# Patient Record
Sex: FEMALE | Race: WHITE | NOT HISPANIC OR LATINO | Employment: OTHER | URBAN - NONMETROPOLITAN AREA
[De-identification: names, ages, dates, MRNs, and addresses within clinical notes are randomized per-mention and may not be internally consistent; named-entity substitution may affect disease eponyms.]

---

## 2017-01-13 ENCOUNTER — OFFICE VISIT (OUTPATIENT)
Dept: CARDIOLOGY | Facility: CLINIC | Age: 72
End: 2017-01-13

## 2017-01-13 VITALS
HEART RATE: 59 BPM | BODY MASS INDEX: 45.99 KG/M2 | HEIGHT: 67 IN | DIASTOLIC BLOOD PRESSURE: 58 MMHG | WEIGHT: 293 LBS | SYSTOLIC BLOOD PRESSURE: 132 MMHG

## 2017-01-13 DIAGNOSIS — I10 ESSENTIAL HYPERTENSION: ICD-10-CM

## 2017-01-13 DIAGNOSIS — I48.0 PAROXYSMAL ATRIAL FIBRILLATION (HCC): Primary | ICD-10-CM

## 2017-01-13 PROCEDURE — 93000 ELECTROCARDIOGRAM COMPLETE: CPT | Performed by: INTERNAL MEDICINE

## 2017-01-13 PROCEDURE — 99213 OFFICE O/P EST LOW 20 MIN: CPT | Performed by: INTERNAL MEDICINE

## 2017-01-13 RX ORDER — CLONIDINE HYDROCHLORIDE 0.2 MG/1
0.2 TABLET ORAL 2 TIMES DAILY
COMMUNITY
End: 2017-03-21

## 2017-01-13 NOTE — PROGRESS NOTES
Holly Corona  1945  [phone]      01/13/2017    [facility] CARDIOLOGY     Abdoul Andrew MD  [address] [place] [address]    Chief Complaint   Patient presents with   • Atrial Fibrillation       Problem List:   PROBLEM LIST:  1. Paroxysmal atrial fibrillation/atrial flutter:  a. Echocardiogram, 02/08/2006 with mild to moderate LVH, moderate MR, pulmonary HTN with RVSP at 55 mmHg; EF 65%.   b. GXT, 04/03/2006 with no ischemia, EF 65%.  c. Event recorder, April 2007 through May 2007 showing atrial flutter.  d. Echocardiogram, 05/07/2007 with pulmonary HTN, LA 4.5, EF 65%.  e. Cardiolite, 05/07/2007, normal study, EF 78%.  f. Left heart cath, 05/09/2007 with normal coronary arteries, EF 70%.  g. EKG, 10/21/2007 showing atrial fibrillation at 146 BPM.  h. Failed sotalol therapy.  i. Tikosyn initiated, 08/08/2012.  j. Echocardiogram, 09/17/2012 with an EF of 65%; mild LVH, moderate diastolic dysfunction, moderate to severe biatrial enlargement, mild MR, moderate TR with an RVSP of 65 mmHg. LA 4.7. Aortic valve sclerosis.  k. Pulmonary vein isolation procedure with ablation of right atrial flutter and nonsustained low posterior atrial tachycardia, 03/15/2013 complicated by dysphagia that lasted 24-48 hours.  2. Hypertension.   3. Right leg deep venous thrombosis, treated with Coumadin therapy until October 2007.  4. Hiatal hernia/gastroesophageal reflux disease.  5. Carotid bruits: Normal carotid duplex, March 2006 and June 2007.  6. Osteoarthritis.  7. Hypothyroidism.  8. Sleep apnea, on CPAP.  9. Dyslipidemia, no current therapy.  10. Colon cancer, status post resection of 17 inches of the right ascending colon, undergoing chemotherapy.  11. Pulmonary embolism, February 2012, Coumadin initiated.  12. Pulmonary hypertension:  a. Echocardiogram, 06/21/2011, showing PA systolic pressure estimated at 60-65 mmHg.  b. Moderate TR with an RVSP of 65 mmHg by echocardiogram,  09/17/2012.  13. Surgical history:  a. Hysterectomy.  b. Carpal tunnel repair.  c. Left knee replacement.  d. Foot surgery.    Allergies  Allergies   Allergen Reactions   • Ciprofloxacin    • Iodinated Diagnostic Agents    • Ofloxacin        Current Medications    Current Outpatient Prescriptions:   •  albuterol (PROVENTIL) (2.5 MG/3ML) 0.083% nebulizer solution, Take 2.5 mg by nebulization every 4 (four) hours as needed for wheezing., Disp: , Rfl:   •  aspirin 81 MG EC tablet, Take 1 tablet by mouth daily., Disp: , Rfl:   •  CloNIDine (CATAPRES) 0.2 MG tablet, Take 0.2 mg by mouth 2 (Two) Times a Day., Disp: , Rfl:   •  esomeprazole (NexIUM) 40 MG capsule, Take 1 capsule by mouth daily., Disp: , Rfl:   •  hydrALAZINE (APRESOLINE) 25 MG tablet, Take 1 tablet by mouth 3 (three) times a day., Disp: 90 tablet, Rfl: 5  •  hydrochlorothiazide (HYDRODIURIL) 12.5 MG tablet, Take 12.5 mg by mouth daily., Disp: , Rfl:   •  levothyroxine (SYNTHROID, LEVOTHROID) 88 MCG tablet, Take 1 tablet by mouth daily., Disp: , Rfl:   •  losartan (COZAAR) 100 MG tablet, Take 1 tablet by mouth daily., Disp: , Rfl:   •  sotalol (BETAPACE) 120 MG tablet, Take 0.5 mg by mouth 2 (Two) Times a Day. Take one half of a tablet twice a day, Disp: , Rfl:   •  warfarin (COUMADIN) 6 MG tablet, Take  by mouth. 5mg x 6 days & 7.5 mg on wednesday, Disp: , Rfl:     History of Present Illness   HPI    Pt presents for follow up of paroxysmal atrial fibrillation. Since we last saw the pt, she had two episodes of atrial fibrillation that lasted about 2-3 hours with symptoms of palpitations and SOB with fatigue.  She is taking her coumadin with therapeutic levels. Her BP has been running high and her other doctor prescribed hydralazine. Since starting this medication, she still has up and down BPs. She cannot be very active due to mobility issues and has gained weight.     ROS:  General:  Positive fatigue, positive weight gain no weight loss  Cardiovascular:   "Denies CP, PND, syncope, near syncope, edema or palpitations.  Pulmonary:  Denies MAGUIRE, cough, or wheezing      Vitals:    01/13/17 1433   BP: 132/58   BP Location: Left arm   Patient Position: Sitting   Pulse: 59   Weight: (!) 330 lb (150 kg)   Height: 67\" (170.2 cm)     PE:  NAD  Neck: no JVD, no carotid bruits, no TM  Heart RRR, NL S1, S2, S4 present, no rubs, TR murmur  Lungs: CTA  Abd: soft, non-tender, NL BS  Ext: No musculoskeletal deformities    Diagnostic Data:    ECG 12 Lead  Date/Time: 1/13/2017 3:02 PM  Performed by: MELANIE MARIO  Authorized by: MELANIE MARIO   Rhythm: sinus rhythm  BPM: 59  Comments: QTc normal            1. Paroxysmal atrial fibrillation    2. Essential hypertension          Plan:  1) AF- overall well controlled for the most part on Sotalol: will increase sotalol to 80 mg po BID  Continue present medications.   2) Anticoagulation  Continue warfarin  3) HTN- continue to monitor, per Dr. Romero  Wt loss, exercise, salt reduction  4) Pulm HTN: per Dr Nick BAKER/up in 6 months  Scribed for Melanie Mario MD by Hiwot Lemus PA-C. 1/13/2017  3:04 PM    IHiwot PA, personally performed the services described in this documentation as scribed by the above named individual in my presence, and it is both accurate and complete.  1/13/2017  3:07 PM  "

## 2017-01-13 NOTE — LETTER
January 13, 2017     Abdoul Andrew MD  [address] [place], [place] [id number]    Patient: Holly Corona   YOB: 1945   Date of Visit: 1/13/2017       Dear Dr. Lorrie MD:    Thank you for referring Holly Corona to me for evaluation. Below are the relevant portions of my assessment and plan of care.    If you have questions, please do not hesitate to call me. I look forward to following Holly along with you.         Sincerely,        Bala Mario MD        CC: MD Hiwot Castillo PA  1/13/2017  3:11 PM  Sign at close encounter  Holly Corona  1945  [phone]      01/13/2017    [facility] CARDIOLOGY     Abdoul Andrew MD  [address] [place], [place] [id number]    Chief Complaint   Patient presents with   • Atrial Fibrillation       Problem List:   PROBLEM LIST:  1. Paroxysmal atrial fibrillation/atrial flutter:  a. Echocardiogram, 02/08/2006 with mild to moderate LVH, moderate MR, pulmonary HTN with RVSP at 55 mmHg; EF 65%.   b. GXT, 04/03/2006 with no ischemia, EF 65%.  c. Event recorder, April 2007 through May 2007 showing atrial flutter.  d. Echocardiogram, 05/07/2007 with pulmonary HTN, LA 4.5, EF 65%.  e. Cardiolite, 05/07/2007, normal study, EF 78%.  f. Left heart cath, 05/09/2007 with normal coronary arteries, EF 70%.  g. EKG, 10/21/2007 showing atrial fibrillation at 146 BPM.  h. Failed sotalol therapy.  i. Tikosyn initiated, 08/08/2012.  j. Echocardiogram, 09/17/2012 with an EF of 65%; mild LVH, moderate diastolic dysfunction, moderate to severe biatrial enlargement, mild MR, moderate TR with an RVSP of 65 mmHg. LA 4.7. Aortic valve sclerosis.  k. Pulmonary vein isolation procedure with ablation of right atrial flutter and nonsustained low posterior atrial tachycardia, 03/15/2013 complicated by dysphagia that lasted 24-48 hours.  2. Hypertension.   3. Right leg deep venous thrombosis, treated with Coumadin therapy until  October 2007.  4. Hiatal hernia/gastroesophageal reflux disease.  5. Carotid bruits: Normal carotid duplex, March 2006 and June 2007.  6. Osteoarthritis.  7. Hypothyroidism.  8. Sleep apnea, on CPAP.  9. Dyslipidemia, no current therapy.  10. Colon cancer, status post resection of 17 inches of the right ascending colon, undergoing chemotherapy.  11. Pulmonary embolism, February 2012, Coumadin initiated.  12. Pulmonary hypertension:  a. Echocardiogram, 06/21/2011, showing PA systolic pressure estimated at 60-65 mmHg.  b. Moderate TR with an RVSP of 65 mmHg by echocardiogram, 09/17/2012.  13. Surgical history:  a. Hysterectomy.  b. Carpal tunnel repair.  c. Left knee replacement.  d. Foot surgery.    Allergies  Allergies   Allergen Reactions   • Ciprofloxacin    • Iodinated Diagnostic Agents    • Ofloxacin        Current Medications    Current Outpatient Prescriptions:   •  albuterol (PROVENTIL) (2.5 MG/3ML) 0.083% nebulizer solution, Take 2.5 mg by nebulization every 4 (four) hours as needed for wheezing., Disp: , Rfl:   •  aspirin 81 MG EC tablet, Take 1 tablet by mouth daily., Disp: , Rfl:   •  CloNIDine (CATAPRES) 0.2 MG tablet, Take 0.2 mg by mouth 2 (Two) Times a Day., Disp: , Rfl:   •  esomeprazole (NexIUM) 40 MG capsule, Take 1 capsule by mouth daily., Disp: , Rfl:   •  hydrALAZINE (APRESOLINE) 25 MG tablet, Take 1 tablet by mouth 3 (three) times a day., Disp: 90 tablet, Rfl: 5  •  hydrochlorothiazide (HYDRODIURIL) 12.5 MG tablet, Take 12.5 mg by mouth daily., Disp: , Rfl:   •  levothyroxine (SYNTHROID, LEVOTHROID) 88 MCG tablet, Take 1 tablet by mouth daily., Disp: , Rfl:   •  losartan (COZAAR) 100 MG tablet, Take 1 tablet by mouth daily., Disp: , Rfl:   •  sotalol (BETAPACE) 120 MG tablet, Take 0.5 mg by mouth 2 (Two) Times a Day. Take one half of a tablet twice a day, Disp: , Rfl:   •  warfarin (COUMADIN) 6 MG tablet, Take  by mouth. 5mg x 6 days & 7.5 mg on wednesday, Disp: , Rfl:     History of Present  "Illness   HPI    Pt presents for follow up of paroxysmal atrial fibrillation. Since we last saw the pt, she had two episodes of atrial fibrillation that lasted about 2-3 hours with symptoms of palpitations and SOB with fatigue.  She is taking her coumadin with therapeutic levels. Her BP has been running high and her other doctor prescribed hydralazine. Since starting this medication, she still has up and down BPs. She cannot be very active due to mobility issues and has gained weight.     ROS:  General:  Positive fatigue, positive weight gain no weight loss  Cardiovascular:  Denies CP, PND, syncope, near syncope, edema or palpitations.  Pulmonary:  Denies MAGUIRE, cough, or wheezing      Vitals:    01/13/17 1433   BP: 132/58   BP Location: Left arm   Patient Position: Sitting   Pulse: 59   Weight: (!) 330 lb (150 kg)   Height: 67\" (170.2 cm)     PE:  NAD  Neck: no JVD, no carotid bruits, no TM  Heart RRR, NL S1, S2, S4 present, no rubs, TR murmur  Lungs: CTA  Abd: soft, non-tender, NL BS  Ext: No musculoskeletal deformities    Diagnostic Data:    ECG 12 Lead  Date/Time: 1/13/2017 3:02 PM  Performed by: BALA MARIO  Authorized by: BALA MARIO   Rhythm: sinus rhythm  BPM: 59  Comments: QTc normal            1. Paroxysmal atrial fibrillation    2. Essential hypertension          Plan:  1) AF- overall well controlled for the most part on Sotalol: will increase sotalol to 80 mg po BID  Continue present medications.   2) Anticoagulation  Continue warfarin  3) HTN- continue to monitor, per Dr. Romero  Wt loss, exercise, salt reduction  4) Pulm HTN: per Dr Nick BAKER/up in 6 months  Scribed for Bala Mario MD by Hiwot Lemus PA-C. 1/13/2017  3:04 PM    I, MARIELLA Strong, personally performed the services described in this documentation as scribed by the above named individual in my presence, and it is both accurate and complete.  1/13/2017  3:07 PM  "

## 2017-02-21 ENCOUNTER — OFFICE VISIT (OUTPATIENT)
Dept: CARDIOLOGY | Facility: CLINIC | Age: 72
End: 2017-02-21

## 2017-02-21 VITALS
DIASTOLIC BLOOD PRESSURE: 50 MMHG | HEIGHT: 67 IN | WEIGHT: 293 LBS | HEART RATE: 76 BPM | SYSTOLIC BLOOD PRESSURE: 150 MMHG | OXYGEN SATURATION: 96 % | BODY MASS INDEX: 45.99 KG/M2

## 2017-02-21 DIAGNOSIS — R00.2 PALPITATIONS: ICD-10-CM

## 2017-02-21 DIAGNOSIS — R60.9 EDEMA, UNSPECIFIED TYPE: ICD-10-CM

## 2017-02-21 DIAGNOSIS — I48.0 PAROXYSMAL ATRIAL FIBRILLATION (HCC): Primary | ICD-10-CM

## 2017-02-21 DIAGNOSIS — I73.9 PERIPHERAL VASCULAR DISEASE (HCC): ICD-10-CM

## 2017-02-21 DIAGNOSIS — I10 ESSENTIAL HYPERTENSION: ICD-10-CM

## 2017-02-21 DIAGNOSIS — I27.20 PULMONARY HYPERTENSION (HCC): ICD-10-CM

## 2017-02-21 DIAGNOSIS — R42 DIZZINESS: ICD-10-CM

## 2017-02-21 DIAGNOSIS — R09.89 BRUIT OF RIGHT CAROTID ARTERY: ICD-10-CM

## 2017-02-21 DIAGNOSIS — E78.5 DYSLIPIDEMIA: ICD-10-CM

## 2017-02-21 PROCEDURE — 99214 OFFICE O/P EST MOD 30 MIN: CPT | Performed by: INTERNAL MEDICINE

## 2017-02-21 RX ORDER — SOTALOL HYDROCHLORIDE 160 MG/1
160 TABLET ORAL 2 TIMES DAILY
COMMUNITY
End: 2017-03-21 | Stop reason: SDUPTHER

## 2017-02-21 RX ORDER — POTASSIUM CHLORIDE 20 MEQ/1
20 TABLET, EXTENDED RELEASE ORAL DAILY
Qty: 30 TABLET | Refills: 11 | Status: SHIPPED | OUTPATIENT
Start: 2017-02-21 | End: 2017-02-21 | Stop reason: SDUPTHER

## 2017-02-21 RX ORDER — FUROSEMIDE 40 MG/1
40 TABLET ORAL DAILY
Qty: 30 TABLET | Refills: 11 | Status: SHIPPED | OUTPATIENT
Start: 2017-02-21 | End: 2017-02-21 | Stop reason: SDUPTHER

## 2017-02-21 RX ORDER — POTASSIUM CHLORIDE 20 MEQ/1
20 TABLET, EXTENDED RELEASE ORAL DAILY
Qty: 30 TABLET | Refills: 11 | Status: SHIPPED | OUTPATIENT
Start: 2017-02-21 | End: 2017-03-24 | Stop reason: SDUPTHER

## 2017-02-21 RX ORDER — FUROSEMIDE 40 MG/1
40 TABLET ORAL DAILY
Qty: 30 TABLET | Refills: 11 | Status: SHIPPED | OUTPATIENT
Start: 2017-02-21 | End: 2017-03-24 | Stop reason: SDUPTHER

## 2017-02-21 NOTE — PROGRESS NOTES
Subjective   Holly Corona is a 71 y.o. female     Chief Complaint   Patient presents with   • Follow-up     presents as a follow up   • Hypertension       PROBLEM LIST:     1. Atrial fibrillation  1.1 Pulmonary vein isolation procedure with ablation of right atrial flutter by Dr. Mario, March 2013.  2. Severe pulmonary hypertension with pulmonary pressure 60-65% by most recent cath.  3. Hypertension  3.1 Echo 9/9/15 - mild LVH; EF > 65%; mild to mod MR; mod TR; PA 55-60; mild ME  4. Dyslipidemia  5. History of DVT/PE  6. History of colon CA x 2 status post surgery 2014.  7. Carotid Artery Stenosis  7.1 Carotid U/S 3/8/16 - 16-49% MARYBEL and LICA; antegrade flow  8. HANK - CPAP       Specialty Problems        Cardiology Problems    HTN (hypertension)        Paroxysmal atrial fibrillation        Atrial fibrillation        Carotid artery stenosis        Carotid bruit        Deep venous thrombosis        Diastolic dysfunction        Hypertension        Palpitations        Peripheral vascular disease        Pulmonary embolus        Pulmonary hypertension        Syncope                HPI: Ms. Corona presents to follow up on the above problems.  She continues to have marked shortness of breath and tachycardia with even low levels of physical activity.  She now becomes fatigued and short of breath walking from room to room.  She has no orthopnea, PND, presyncope, or syncope.  She does have mild dizziness, and her lower extremity edema has significantly worsened since the time of her last visit.    The patient denies any bleeding with Coumadin and she relates, specifically, no hemoptysis, hematemesis, melena, hematochezia, or hematuria.  She has had no symptoms of recurrent pulmonary embolism or other symptoms of peripheral arterial disease or arterial embolic events.    Ms. Corona states that she is following with pulmonary medicine is scheduled for pulmonary function studies tomorrow, and CT of the chest shortly thereafter.   Echocardiogram performed in our office in 2015 demonstrated preserved left ventricular systolic function, probably mild to moderate (Issa grade 2) diastolic dysfunction, and PA pressures in the mid to high 40s.  Ms. Corona is had no evidence of significant coronary disease and negative stress tests in the past.    CURRENT MEDICATION:    Current Outpatient Prescriptions   Medication Sig Dispense Refill   • albuterol (PROVENTIL) (2.5 MG/3ML) 0.083% nebulizer solution Take 2.5 mg by nebulization every 4 (four) hours as needed for wheezing.     • aspirin 81 MG EC tablet Take 1 tablet by mouth daily.     • CloNIDine (CATAPRES) 0.2 MG tablet Take 0.2 mg by mouth 2 (Two) Times a Day.     • esomeprazole (NexIUM) 40 MG capsule Take 1 capsule by mouth daily.     • hydrALAZINE (APRESOLINE) 25 MG tablet Take 1 tablet by mouth 3 (three) times a day. 90 tablet 5   • hydrochlorothiazide (HYDRODIURIL) 12.5 MG tablet Take 12.5 mg by mouth daily.     • levothyroxine (SYNTHROID, LEVOTHROID) 88 MCG tablet Take 1 tablet by mouth daily.     • losartan (COZAAR) 100 MG tablet Take 1 tablet by mouth daily.     • Sotalol HCl AF 80 MG tablet Take 1 tablet by mouth 2 (Two) Times a Day. 180 tablet 2   • Sotalol HCl, AF, 160 MG tablet Take 160 mg by mouth 2 (Two) Times a Day.     • warfarin (COUMADIN) 6 MG tablet Take  by mouth. 5mg x 6 days & 7.5 mg on wednesday     • furosemide (LASIX) 40 MG tablet Take 1 tablet by mouth Daily. 30 tablet 11   • potassium chloride (K-DUR,KLOR-CON) 20 MEQ CR tablet Take 1 tablet by mouth Daily. 30 tablet 11     No current facility-administered medications for this visit.        ALLERGIES:    Ciprofloxacin; Iodinated diagnostic agents; and Ofloxacin    PAST MEDICAL HISTORY:    Past Medical History   Diagnosis Date   • Anemia    • Atrial fibrillation      A.  History of prior pulmonary vein ablation 03/14/2013. B.  On chronic Coumadin and Tikosyn therapy.   • Carotid artery stenosis    • Carotid bruit    • Deep  venous thrombosis      A.  History of right lower extremity DVT treated with in therapy until October 2000.   • Diastolic dysfunction    • Diastolic dysfunction    • Dizziness    • Dyslipidemia    • Edema    • Exertional shortness of breath    • GERD (gastroesophageal reflux disease)    • H/O echocardiogram      A.  Echocardiogram of 10/16/2013 reports an ejection fraction of 55-60%, a moderately to severely dilated left atrium, mild to moderately dilated right atrium, trace aortic regurgitation, mild mitral and tricuspid regurgitation with calculated    • Hiatal hernia    • Hypertension      A.  Echocardiogram 10/16/2013 reports a calculated RVSP of 50-55 mmHg.B.  Right heart catheterization 03/12/2009 at  reported RV of 72/19, PA 72/27 and PCWP of 24, this was felt to be     • Hypothyroidism    • Morbid obesity    • Obstructive sleep apnea      A.  On CPAP each bedtime.   • Osteoarthritis    • Palpitations    • Peripheral vascular disease    • Pulmonary embolus      A.  Developed during chemotherapy in 2/2012. B.  Coumadin therapy reinitiated at that time.   • Pulmonary hypertension    • Signet ring cell adenocarcinoma      A.  Diagnosed on colonoscopy E. 10/14/2011, status post colon resection and 2 of 20 nodes positive, T3, N1b Stage IIIB. B.  Status post chemotherapy.    • Syncope        SURGICAL HISTORY:    Past Surgical History   Procedure Laterality Date   • Other surgical history       cardiac cath procedure summary, A.  Cardiac catheterization April 2007 reported no significance coronary artery disease with markedly elevated LVEDP.   • Other surgical history       catheter ablation atrial fibrillation, 2013   • Foot surgery     • Hernia repair       2011   • Hysterectomy       1993   • Other surgical history Left      knee replacement 2005   • Other surgical history       neuroplasty decompression median nerve at carpal tunnel 1997   • Other surgical history       partial colectomy , A.  Status post  "right hemicolectomy secondary to colon cancer in 2011.       SOCIAL HISTORY:    Social History     Social History   • Marital status:      Spouse name: N/A   • Number of children: N/A   • Years of education: N/A     Occupational History   • Not on file.     Social History Main Topics   • Smoking status: Former Smoker   • Smokeless tobacco: Never Used   • Alcohol use No   • Drug use: No   • Sexual activity: Defer     Other Topics Concern   • Not on file     Social History Narrative       FAMILY HISTORY:    Family History   Problem Relation Age of Onset   • Other Mother      MEDICAL PROBLEMS   • Other Sister      myocardial infarction.       Review of Systems   Constitutional: Positive for fatigue.   HENT: Negative.    Eyes: Negative.    Respiratory: Positive for shortness of breath. Negative for chest tightness.    Cardiovascular: Positive for chest pain, palpitations (heart rate going up) and leg swelling.   Gastrointestinal: Negative.  Negative for abdominal pain, blood in stool (no melena, no hematuria, no heamtemesis, no hematochezia ), constipation, diarrhea, nausea and vomiting.   Endocrine: Negative.    Genitourinary: Negative.    Musculoskeletal: Positive for arthralgias and myalgias.   Skin: Negative.    Allergic/Immunologic: Negative.    Neurological: Negative.  Negative for dizziness, tremors, seizures, syncope, facial asymmetry, speech difficulty, weakness, light-headedness, numbness and headaches.   Hematological: Bruises/bleeds easily.   Psychiatric/Behavioral: The patient is nervous/anxious.        VISIT VITALS:    Visit Vitals   • /50 (BP Location: Left arm, Patient Position: Sitting)   • Pulse 76   • Ht 67\" (170.2 cm)   • Wt (!) 336 lb 3.2 oz (152 kg)   • SpO2 96%   • BMI 52.66 kg/m2       RECENT LABS:    Objective       Physical Exam   Constitutional: She is oriented to person, place, and time. She appears well-developed and well-nourished. No distress.   HENT:   Head: Normocephalic and " atraumatic.   Eyes: Conjunctivae and EOM are normal. Pupils are equal, round, and reactive to light.   Neck: Normal range of motion. Neck supple. Normal carotid pulses, no hepatojugular reflux and no JVD present. Carotid bruit is present (soft right carotid bruit).   Cardiovascular: Intact distal pulses and normal pulses.   No extrasystoles are present. PMI is not displaced.  Exam reveals no gallop, no friction rub and no decreased pulses.    Murmur (2-3/6 tricuspid regurgitation murmur ) heard.  Pulmonary/Chest: Effort normal and breath sounds normal. No respiratory distress. She has no wheezes. She has no rales. She exhibits no tenderness.   Abdominal: Soft. Bowel sounds are normal. She exhibits no distension, no abdominal bruit and no mass. There is no tenderness.   Negative organomegaly      Musculoskeletal: Normal range of motion. She exhibits edema (3-4 + edema LLE to the calf. 3+ edema RLE to the knee).   Neurological: She is alert and oriented to person, place, and time. She has normal reflexes.   Skin: Skin is warm and dry. She is not diaphoretic.   Nursing note and vitals reviewed.      Procedures      Assessment/Plan   #1.  Ms. Corona has progressively severe exertional dyspnea and tachycardia which occurs only with activity.  This likely is multifactorial with, clearly, weight gain and physical deconditioning playing a role.  However, the patient's pulmonary hypertension and general to debility may also be culpable to some degree in her exertional dyspnea.    #2.  The patient describes no symptoms to suggest ischemia as a cause, and stress testing and catheter previously been unremarkable.  Therefore, I don't think that repeat ischemia assessment is indicated at this time.    #3.  However, I would like to repeat echocardiography to reassess LV systolic and diastolic performance, to look for evidence of pericardial constriction or restrictive physiology, and reassess pulmonary pressures.  Based on the  patient's previous study, I don't believe the degree of diastolic dysfunction we observed can be considered the sole cause of the patient's moderate and progressively severe pulmonary hypertension.    #4.  I'm concerned about Ms. Corona is lower extremity edema which is significantly worsened.  I think she's to the point where tissue injury may be problematic, particularly on the left.  Therefore, I discussed with the patient's mechanical measures to reduce edema, continue sodium restriction, and we will trial a several week course of Lasix 40 mg a day (in addition to hydrochlorothiazide) to see if we can improve edema to the point where tissue perfusion won't be of concern     #5.  Ms. Corona will follow-up with Dr. Andrew per his office, with Dr. Hernandez as R rescheduled, and in our office in 4-6 weeks or on a when necessary basis for symptoms.               Diagnosis Plan   1. Paroxysmal atrial fibrillation  Adult Transthoracic Echo Complete    Magnesium    Comprehensive Metabolic Panel    Adult Transthoracic Echo Complete    Magnesium    Comprehensive Metabolic Panel   2. Essential hypertension  Adult Transthoracic Echo Complete    Magnesium    Comprehensive Metabolic Panel    Adult Transthoracic Echo Complete    Magnesium    Comprehensive Metabolic Panel   3. Pulmonary hypertension  Adult Transthoracic Echo Complete    Magnesium    Comprehensive Metabolic Panel    Adult Transthoracic Echo Complete    Magnesium    Comprehensive Metabolic Panel   4. Bruit of right carotid artery  Magnesium    Comprehensive Metabolic Panel    Magnesium    Comprehensive Metabolic Panel   5. Edema, unspecified type  Adult Transthoracic Echo Complete    Magnesium    Comprehensive Metabolic Panel    Adult Transthoracic Echo Complete    Magnesium    Comprehensive Metabolic Panel   6. Dyslipidemia  Magnesium    Comprehensive Metabolic Panel    Magnesium    Comprehensive Metabolic Panel   7. Peripheral vascular disease  Magnesium     Comprehensive Metabolic Panel    Magnesium    Comprehensive Metabolic Panel   8. Dizziness  Adult Transthoracic Echo Complete    Magnesium    Comprehensive Metabolic Panel    Adult Transthoracic Echo Complete    Magnesium    Comprehensive Metabolic Panel   9. Palpitations         Return in about 4 weeks (around 3/21/2017), or if symptoms worsen or fail to improve, for Next scheduled follow up.         Sacha Romero MD

## 2017-03-09 ENCOUNTER — OUTSIDE FACILITY SERVICE (OUTPATIENT)
Dept: CARDIOLOGY | Facility: CLINIC | Age: 72
End: 2017-03-09

## 2017-03-09 ENCOUNTER — HOSPITAL ENCOUNTER (OUTPATIENT)
Dept: CARDIOLOGY | Facility: HOSPITAL | Age: 72
Discharge: HOME | End: 2017-03-09
Attending: INTERNAL MEDICINE | Admitting: INTERNAL MEDICINE

## 2017-03-09 PROCEDURE — 93306 TTE W/DOPPLER COMPLETE: CPT | Performed by: INTERNAL MEDICINE

## 2017-03-09 PROCEDURE — 93306 TTE W/DOPPLER COMPLETE: CPT

## 2017-03-21 ENCOUNTER — OFFICE VISIT (OUTPATIENT)
Dept: CARDIOLOGY | Facility: CLINIC | Age: 72
End: 2017-03-21

## 2017-03-21 VITALS
HEART RATE: 66 BPM | OXYGEN SATURATION: 96 % | HEIGHT: 67 IN | DIASTOLIC BLOOD PRESSURE: 80 MMHG | SYSTOLIC BLOOD PRESSURE: 193 MMHG | BODY MASS INDEX: 45.99 KG/M2 | WEIGHT: 293 LBS

## 2017-03-21 DIAGNOSIS — R60.9 EDEMA, UNSPECIFIED TYPE: ICD-10-CM

## 2017-03-21 DIAGNOSIS — R06.02 EXERTIONAL SHORTNESS OF BREATH: ICD-10-CM

## 2017-03-21 DIAGNOSIS — I10 ESSENTIAL HYPERTENSION: ICD-10-CM

## 2017-03-21 DIAGNOSIS — E78.5 DYSLIPIDEMIA: ICD-10-CM

## 2017-03-21 DIAGNOSIS — I48.0 PAROXYSMAL ATRIAL FIBRILLATION (HCC): Primary | ICD-10-CM

## 2017-03-21 DIAGNOSIS — R42 DIZZINESS: ICD-10-CM

## 2017-03-21 DIAGNOSIS — I51.89 DIASTOLIC DYSFUNCTION: ICD-10-CM

## 2017-03-21 DIAGNOSIS — I27.20 PULMONARY HYPERTENSION (HCC): ICD-10-CM

## 2017-03-21 PROCEDURE — 99213 OFFICE O/P EST LOW 20 MIN: CPT | Performed by: INTERNAL MEDICINE

## 2017-03-21 RX ORDER — CLONIDINE HYDROCHLORIDE 0.2 MG/1
0.2 TABLET ORAL 3 TIMES DAILY
Qty: 90 TABLET | Refills: 3 | Status: SHIPPED | OUTPATIENT
Start: 2017-03-21 | End: 2022-08-18 | Stop reason: SDUPTHER

## 2017-03-21 NOTE — PROGRESS NOTES
Subjective   Holly Corona is a 71 y.o. female     Chief Complaint   Patient presents with   • Follow-up     4 week echo f/u       PROBLEM LIST:     1. Atrial fibrillation  1.1 Pulmonary vein isolation procedure with ablation of right atrial flutter by Dr. Mario, March 2013.  2. Severe pulmonary hypertension with pulmonary pressure 60-65% by most recent cath.  3. Hypertension  3.1 Echo 9/9/15 - mild LVH; EF > 65%; mild to mod MR; mod TR; PA 55-60; mild OR  4. Dyslipidemia  5. History of DVT/PE  6. History of colon CA x 2 status post surgery 2014.  7. Carotid Artery Stenosis  7.1 Carotid U/S 3/8/16 - 16-49% MARYBEL and LICA; antegrade flow  8. HANK - CPAP          Specialty Problems        Cardiology Problems    HTN (hypertension)        Paroxysmal atrial fibrillation        Atrial fibrillation        Carotid artery stenosis        Carotid bruit        Deep venous thrombosis        Diastolic dysfunction        Hypertension        Palpitations        Peripheral vascular disease        Pulmonary embolus        Pulmonary hypertension        Syncope                HPI:  Ms. Weber returns for follow-up on severe dyspnea.    She tolerated the addition of Lasix to her chronic hydrochlorothiazide, lost 4 pounds, but this had minimal change in her lower extremity edema and no change in her exertional dyspnea.  Ms. Corona is trying to increase her physical activity and is actually gone to the [employer] gym and does try to walk on a fairly regular basis.  She continues to deny orthopnea or PND.    Pulmonary function studies from Dr. Leung's office demonstrated no significant obstructive or restrictive disease knowing mildly decreased diffusion capacity of carbon monoxide.    Echocardiogram demonstrated preserved LV systolic function, grade 2 diastolic, and PA systolic pressures of 55-60.  All of these findings are unchanged from the patient's prior exam.  I most recent labs potassium was 4.5, creatinine was 1.3 and  BUN/creatinine ratio was slightly greater than 20 suggesting a mild degree of intravascular volume depletion.            CURRENT MEDICATION:    Current Outpatient Prescriptions   Medication Sig Dispense Refill   • albuterol (PROVENTIL) (2.5 MG/3ML) 0.083% nebulizer solution Take 2.5 mg by nebulization every 4 (four) hours as needed for wheezing.     • aspirin 81 MG EC tablet Take 1 tablet by mouth daily.     • CloNIDine (CATAPRES) 0.2 MG tablet Take 1 tablet by mouth 3 (Three) Times a Day. 90 tablet 3   • esomeprazole (NexIUM) 40 MG capsule Take 1 capsule by mouth daily.     • furosemide (LASIX) 40 MG tablet Take 1 tablet by mouth Daily. 30 tablet 11   • hydrALAZINE (APRESOLINE) 25 MG tablet Take 1 tablet by mouth 3 (three) times a day. 90 tablet 5   • hydrochlorothiazide (HYDRODIURIL) 12.5 MG tablet Take 12.5 mg by mouth daily.     • levothyroxine (SYNTHROID, LEVOTHROID) 88 MCG tablet Take 1 tablet by mouth daily.     • losartan (COZAAR) 100 MG tablet Take 1 tablet by mouth daily.     • potassium chloride (K-DUR,KLOR-CON) 20 MEQ CR tablet Take 1 tablet by mouth Daily. 30 tablet 11   • Sotalol HCl AF 80 MG tablet Take 1 tablet by mouth 2 (Two) Times a Day. 180 tablet 2   • warfarin (COUMADIN) 6 MG tablet Take  by mouth. 5mg x 6 days & 7.5 mg on wednesday       No current facility-administered medications for this visit.        ALLERGIES:    Ciprofloxacin; Iodinated diagnostic agents; and Ofloxacin    PAST MEDICAL HISTORY:    Past Medical History   Diagnosis Date   • Anemia    • Atrial fibrillation      A.  History of prior pulmonary vein ablation 03/14/2013. B.  On chronic Coumadin and Tikosyn therapy.   • Carotid artery stenosis    • Carotid bruit    • Deep venous thrombosis      A.  History of right lower extremity DVT treated with in therapy until October 2000.   • Diastolic dysfunction    • Diastolic dysfunction    • Dizziness    • Dyslipidemia    • Edema    • Exertional shortness of breath    • GERD  (gastroesophageal reflux disease)    • H/O echocardiogram      A.  Echocardiogram of 10/16/2013 reports an ejection fraction of 55-60%, a moderately to severely dilated left atrium, mild to moderately dilated right atrium, trace aortic regurgitation, mild mitral and tricuspid regurgitation with calculated    • Hiatal hernia    • Hypertension      A.  Echocardiogram 10/16/2013 reports a calculated RVSP of 50-55 mmHg.B.  Right heart catheterization 03/12/2009 at  reported RV of 72/19, PA 72/27 and PCWP of 24, this was felt to be     • Hypothyroidism    • Morbid obesity    • Obstructive sleep apnea      A.  On CPAP each bedtime.   • Osteoarthritis    • Palpitations    • Peripheral vascular disease    • Pulmonary embolus      A.  Developed during chemotherapy in 2/2012. B.  Coumadin therapy reinitiated at that time.   • Pulmonary hypertension    • Signet ring cell adenocarcinoma      A.  Diagnosed on colonoscopy E. 10/14/2011, status post colon resection and 2 of 20 nodes positive, T3, N1b Stage IIIB. B.  Status post chemotherapy.    • Syncope        SURGICAL HISTORY:    Past Surgical History   Procedure Laterality Date   • Other surgical history       cardiac cath procedure summary, A.  Cardiac catheterization April 2007 reported no significance coronary artery disease with markedly elevated LVEDP.   • Other surgical history       catheter ablation atrial fibrillation, 2013   • Foot surgery     • Hernia repair       2011   • Hysterectomy       1993   • Other surgical history Left      knee replacement 2005   • Other surgical history       neuroplasty decompression median nerve at carpal tunnel 1997   • Other surgical history       partial colectomy , A.  Status post right hemicolectomy secondary to colon cancer in 2011.       SOCIAL HISTORY:    Social History     Social History   • Marital status:      Spouse name: N/A   • Number of children: N/A   • Years of education: N/A     Occupational History   • Not on  "file.     Social History Main Topics   • Smoking status: Former Smoker   • Smokeless tobacco: Never Used   • Alcohol use No   • Drug use: No   • Sexual activity: Defer     Other Topics Concern   • Not on file     Social History Narrative       FAMILY HISTORY:    Family History   Problem Relation Age of Onset   • Other Mother      MEDICAL PROBLEMS   • Other Sister      myocardial infarction.       Review of Systems   Constitutional: Positive for fatigue. Negative for chills, diaphoresis and fever.   HENT: Negative.    Eyes: Positive for visual disturbance (reading glasses).   Respiratory: Positive for shortness of breath (recent normal PFT, no orthopnea or PND). Negative for chest tightness.    Cardiovascular: Positive for palpitations and leg swelling. Negative for chest pain.   Gastrointestinal: Negative.  Negative for abdominal pain, blood in stool, constipation, diarrhea, nausea and vomiting.   Endocrine: Negative.  Negative for cold intolerance and heat intolerance.   Genitourinary: Negative.    Musculoskeletal: Positive for arthralgias and myalgias.   Skin: Negative.    Allergic/Immunologic: Negative.    Neurological: Positive for dizziness (occasionally ) and light-headedness (\"not much\").   Hematological: Negative.    Psychiatric/Behavioral: Negative.        VISIT VITALS:    Visit Vitals   • BP (!) 193/80 (BP Location: Left arm, Patient Position: Sitting)   • Pulse 66   • Ht 67\" (170.2 cm)   • Wt (!) 332 lb (151 kg)   • SpO2 96%   • BMI 52 kg/m2       RECENT LABS:    Objective       Physical Exam    Procedures      Assessment/Plan    #1.  Profound exertional dyspnea.  I think this is likely multifactorial in etiology with a physical component due to weight gain, physical deconditioning, diastolic dysfunction, and stable pulmonary hypertension.  I feel that it is the patient's best interest to continue efforts at advancing her physical capacity, and to continue, at least for OR present, dual diuretic therapy.  " We will need to follow electrolytes and renal function closely.    #2.  As we have no other etiology for the patient's progressive symptoms we will perform pharmacologic stress testing is ischemia assessment.    #3.  Ms. Corona will follow-up with Dr. Andrew and Dr. Hernandez as instructed, and in our office after testing her on a when necessary basis for symptoms.  We will check a basic metabolic panel and magnesium in 3-4 weeks.    #4.  To address the patient's persistent hypertension we will increase clonidine to 0.2 mg 3 times daily and hydralazine to 25 mg 3 times daily.  Ms. Corona will follow blood pressures closely at home.                 Diagnosis Plan   1. Paroxysmal atrial fibrillation  Stress Test With Myocardial Perfusion One Day    Comprehensive Metabolic Panel    Magnesium    Stress Test With Myocardial Perfusion One Day    Comprehensive Metabolic Panel    Magnesium   2. Essential hypertension  Stress Test With Myocardial Perfusion One Day    Comprehensive Metabolic Panel    Magnesium    Stress Test With Myocardial Perfusion One Day    Comprehensive Metabolic Panel    Magnesium   3. Edema, unspecified type  Stress Test With Myocardial Perfusion One Day    Comprehensive Metabolic Panel    Magnesium    Stress Test With Myocardial Perfusion One Day    Comprehensive Metabolic Panel    Magnesium   4. Exertional shortness of breath  Stress Test With Myocardial Perfusion One Day    Comprehensive Metabolic Panel    Magnesium    Stress Test With Myocardial Perfusion One Day    Comprehensive Metabolic Panel    Magnesium   5. Dizziness  Stress Test With Myocardial Perfusion One Day    Comprehensive Metabolic Panel    Magnesium    Stress Test With Myocardial Perfusion One Day    Comprehensive Metabolic Panel    Magnesium   6. Dyslipidemia  Stress Test With Myocardial Perfusion One Day    Comprehensive Metabolic Panel    Magnesium    Stress Test With Myocardial Perfusion One Day    Comprehensive Metabolic Panel     Magnesium   7. Pulmonary hypertension  Stress Test With Myocardial Perfusion One Day    Comprehensive Metabolic Panel    Magnesium    Stress Test With Myocardial Perfusion One Day    Comprehensive Metabolic Panel    Magnesium   8. Diastolic dysfunction         Return for f/u after stress .         Sacha Romero MD

## 2017-03-24 DIAGNOSIS — I10 ESSENTIAL HYPERTENSION: ICD-10-CM

## 2017-03-24 RX ORDER — POTASSIUM CHLORIDE 20 MEQ/1
20 TABLET, EXTENDED RELEASE ORAL DAILY
Qty: 90 TABLET | Refills: 3 | Status: SHIPPED | OUTPATIENT
Start: 2017-03-24 | End: 2018-04-25 | Stop reason: SDUPTHER

## 2017-03-24 RX ORDER — FUROSEMIDE 40 MG/1
40 TABLET ORAL DAILY
Qty: 90 TABLET | Refills: 3 | Status: SHIPPED | OUTPATIENT
Start: 2017-03-24 | End: 2018-04-30 | Stop reason: SDUPTHER

## 2017-03-24 RX ORDER — HYDRALAZINE HYDROCHLORIDE 25 MG/1
25 TABLET, FILM COATED ORAL 3 TIMES DAILY
Qty: 90 TABLET | Refills: 5 | Status: SHIPPED | OUTPATIENT
Start: 2017-03-24 | End: 2017-06-20

## 2017-04-11 ENCOUNTER — HOSPITAL ENCOUNTER (OUTPATIENT)
Dept: CARDIOLOGY | Facility: HOSPITAL | Age: 72
Discharge: HOME | End: 2017-04-11
Attending: INTERNAL MEDICINE

## 2017-04-11 ENCOUNTER — OUTSIDE FACILITY SERVICE (OUTPATIENT)
Dept: CARDIOLOGY | Facility: CLINIC | Age: 72
End: 2017-04-11

## 2017-04-11 DIAGNOSIS — R53.83 OTHER FATIGUE: Primary | ICD-10-CM

## 2017-04-11 DIAGNOSIS — I48.0 PAROXYSMAL ATRIAL FIBRILLATION (HCC): ICD-10-CM

## 2017-04-11 DIAGNOSIS — E78.5 HYPERLIPIDEMIA, UNSPECIFIED HYPERLIPIDEMIA TYPE: Primary | ICD-10-CM

## 2017-04-11 LAB
MAXIMAL PREDICTED HEART RATE: 147 BPM
STRESS TARGET HR: 125 BPM

## 2017-04-11 PROCEDURE — 78452 HT MUSCLE IMAGE SPECT MULT: CPT | Performed by: INTERNAL MEDICINE

## 2017-04-11 PROCEDURE — 0 TECHNETIUM SESTAMIBI: Performed by: INTERNAL MEDICINE

## 2017-04-11 PROCEDURE — 93018 CV STRESS TEST I&R ONLY: CPT | Performed by: INTERNAL MEDICINE

## 2017-04-11 PROCEDURE — 93017 CV STRESS TEST TRACING ONLY: CPT

## 2017-04-11 PROCEDURE — A9500 TC99M SESTAMIBI: HCPCS | Performed by: INTERNAL MEDICINE

## 2017-04-11 PROCEDURE — 25010000002 REGADENOSON 0.4 MG/5ML SOLUTION: Performed by: INTERNAL MEDICINE

## 2017-04-11 PROCEDURE — 78452 HT MUSCLE IMAGE SPECT MULT: CPT

## 2017-04-11 RX ADMIN — Medication 1 DOSE: at 13:30

## 2017-04-11 RX ADMIN — REGADENOSON 0.4 MG: 0.08 INJECTION, SOLUTION INTRAVENOUS at 13:30

## 2017-04-17 ENCOUNTER — OFFICE VISIT (OUTPATIENT)
Dept: CARDIOLOGY | Facility: CLINIC | Age: 72
End: 2017-04-17

## 2017-04-17 VITALS
OXYGEN SATURATION: 96 % | DIASTOLIC BLOOD PRESSURE: 80 MMHG | HEIGHT: 67 IN | HEART RATE: 70 BPM | WEIGHT: 293 LBS | BODY MASS INDEX: 45.99 KG/M2 | SYSTOLIC BLOOD PRESSURE: 158 MMHG

## 2017-04-17 DIAGNOSIS — I48.0 PAROXYSMAL ATRIAL FIBRILLATION (HCC): Primary | ICD-10-CM

## 2017-04-17 DIAGNOSIS — I51.89 DIASTOLIC DYSFUNCTION: ICD-10-CM

## 2017-04-17 DIAGNOSIS — R60.9 EDEMA, UNSPECIFIED TYPE: ICD-10-CM

## 2017-04-17 DIAGNOSIS — E78.5 DYSLIPIDEMIA: ICD-10-CM

## 2017-04-17 DIAGNOSIS — R06.02 EXERTIONAL SHORTNESS OF BREATH: ICD-10-CM

## 2017-04-17 DIAGNOSIS — R94.39 ABNORMAL STRESS TEST: ICD-10-CM

## 2017-04-17 DIAGNOSIS — R00.2 PALPITATIONS: ICD-10-CM

## 2017-04-17 DIAGNOSIS — I10 ESSENTIAL HYPERTENSION: ICD-10-CM

## 2017-04-17 DIAGNOSIS — R42 DIZZINESS: ICD-10-CM

## 2017-04-17 DIAGNOSIS — I27.20 PULMONARY HYPERTENSION (HCC): ICD-10-CM

## 2017-04-17 PROCEDURE — 99213 OFFICE O/P EST LOW 20 MIN: CPT | Performed by: INTERNAL MEDICINE

## 2017-04-17 NOTE — PROGRESS NOTES
Subjective   Holly Corona is a 71 y.o. female     Chief Complaint   Patient presents with   • Follow-up     presents as a follow up       PROBLEM LIST:      1. Atrial fibrillation  1.1 Pulmonary vein isolation procedure with ablation of right atrial flutter by Dr. Mario, March 2013.  2. Severe pulmonary hypertension with pulmonary pressure 60-65% by most recent cath.  3. Hypertension  3.1 Echo 9/9/15 - mild LVH; EF > 65%; mild to mod MR; mod TR; PA 55-60; mild IL  3.2 recent ECHO 03/09/17, left ventricular chamber is mildly dilated. Mild concentric LVH. EF > 65%. Issa Grade ll diastolic dysfunction. Left atrium moderately dilated, right atrium mild to moderate dilated. Systolic pressure 55-60 mmHg.   3.3 recent stress 04/11/17 inferior ischemia, chest wall attenuation.   4. Dyslipidemia  5. History of DVT/PE  6. History of colon CA x 2 status post surgery 2014.  7. Carotid Artery Stenosis  7.1 Carotid U/S 3/8/16 - 16-49% MARYBEL and LICA; antegrade flow  8. HANK - CPAP      Specialty Problems        Cardiology Problems    HTN (hypertension)        Paroxysmal atrial fibrillation        Atrial fibrillation        Carotid artery stenosis        Carotid bruit        Deep venous thrombosis        Diastolic dysfunction        Hypertension        Palpitations        Peripheral vascular disease        Pulmonary embolus        Pulmonary hypertension        Syncope                HPI:  Ms. Corona returns for follow-up on test results.  She continues to have profound exertional dyspnea.  All the symptoms have also remained unchanged.    Echocardiogram demonstrated preserved LV systolic function with grade 2 diastolic dysfunction, and PA systolic pressures of 55-60.    Stress test demonstrated chest wall attenuation, inferior wall ischemia, and preserved LV systolic function.    Notably, right heart catheterization from 2009 at  demonstrated PA systolic pressures in the mid 70s.  Also, previous stress test demonstrated  inferoposterolateral ischemia.  This prompted catheterization that demonstrated no epicardial obstructive disease.                CURRENT MEDICATION:    Current Outpatient Prescriptions   Medication Sig Dispense Refill   • albuterol (PROVENTIL) (2.5 MG/3ML) 0.083% nebulizer solution Take 2.5 mg by nebulization every 4 (four) hours as needed for wheezing.     • aspirin 81 MG EC tablet Take 1 tablet by mouth daily.     • CloNIDine (CATAPRES) 0.2 MG tablet Take 1 tablet by mouth 3 (Three) Times a Day. 90 tablet 3   • esomeprazole (NexIUM) 40 MG capsule Take 1 capsule by mouth daily.     • furosemide (LASIX) 40 MG tablet Take 1 tablet by mouth Daily. 90 tablet 3   • hydrALAZINE (APRESOLINE) 25 MG tablet Take 1 tablet by mouth 3 (Three) Times a Day. 90 tablet 5   • hydrochlorothiazide (HYDRODIURIL) 12.5 MG tablet Take 12.5 mg by mouth daily.     • levothyroxine (SYNTHROID, LEVOTHROID) 88 MCG tablet Take 1 tablet by mouth daily.     • losartan (COZAAR) 100 MG tablet Take 1 tablet by mouth daily.     • potassium chloride (K-DUR,KLOR-CON) 20 MEQ CR tablet Take 1 tablet by mouth Daily. 90 tablet 3   • Sotalol HCl AF 80 MG tablet Take 1 tablet by mouth 2 (Two) Times a Day. 180 tablet 2   • warfarin (COUMADIN) 6 MG tablet Take  by mouth. 5mg x 6 days & 7.5 mg on wednesday       No current facility-administered medications for this visit.        ALLERGIES:    Ciprofloxacin; Iodinated diagnostic agents; and Ofloxacin    PAST MEDICAL HISTORY:    Past Medical History:   Diagnosis Date   • Anemia    • Atrial fibrillation     A.  History of prior pulmonary vein ablation 03/14/2013. B.  On chronic Coumadin and Tikosyn therapy.   • Carotid artery stenosis    • Carotid bruit    • Deep venous thrombosis     A.  History of right lower extremity DVT treated with in therapy until October 2000.   • Diastolic dysfunction    • Diastolic dysfunction    • Dizziness    • Dyslipidemia    • Edema    • Exertional shortness of breath    • GERD  (gastroesophageal reflux disease)    • H/O echocardiogram     A.  Echocardiogram of 10/16/2013 reports an ejection fraction of 55-60%, a moderately to severely dilated left atrium, mild to moderately dilated right atrium, trace aortic regurgitation, mild mitral and tricuspid regurgitation with calculated    • Hiatal hernia    • Hypertension     A.  Echocardiogram 10/16/2013 reports a calculated RVSP of 50-55 mmHg.B.  Right heart catheterization 03/12/2009 at  reported RV of 72/19, PA 72/27 and PCWP of 24, this was felt to be     • Hypothyroidism    • Morbid obesity    • Obstructive sleep apnea     A.  On CPAP each bedtime.   • Osteoarthritis    • Palpitations    • Peripheral vascular disease    • Pulmonary embolus     A.  Developed during chemotherapy in 2/2012. B.  Coumadin therapy reinitiated at that time.   • Pulmonary hypertension    • Signet ring cell adenocarcinoma     A.  Diagnosed on colonoscopy E. 10/14/2011, status post colon resection and 2 of 20 nodes positive, T3, N1b Stage IIIB. B.  Status post chemotherapy.    • Syncope        SURGICAL HISTORY:    Past Surgical History:   Procedure Laterality Date   • FOOT SURGERY     • HERNIA REPAIR      2011   • HYSTERECTOMY      1993   • OTHER SURGICAL HISTORY      cardiac cath procedure summary, A.  Cardiac catheterization April 2007 reported no significance coronary artery disease with markedly elevated LVEDP.   • OTHER SURGICAL HISTORY      catheter ablation atrial fibrillation, 2013   • OTHER SURGICAL HISTORY Left     knee replacement 2005   • OTHER SURGICAL HISTORY      neuroplasty decompression median nerve at carpal tunnel 1997   • OTHER SURGICAL HISTORY      partial colectomy , A.  Status post right hemicolectomy secondary to colon cancer in 2011.       SOCIAL HISTORY:    Social History     Social History   • Marital status:      Spouse name: N/A   • Number of children: N/A   • Years of education: N/A     Occupational History   • Not on file.  "    Social History Main Topics   • Smoking status: Former Smoker   • Smokeless tobacco: Never Used   • Alcohol use No   • Drug use: No   • Sexual activity: Defer     Other Topics Concern   • Not on file     Social History Narrative       FAMILY HISTORY:    Family History   Problem Relation Age of Onset   • Other Mother      MEDICAL PROBLEMS   • Other Sister      myocardial infarction.       Review of Systems   Constitutional: Positive for fatigue (anergy ). Negative for chills, diaphoresis and fever.   HENT: Negative.    Eyes: Negative.  Negative for visual disturbance.   Respiratory: Positive for shortness of breath (progressively worsening ). Negative for chest tightness.    Cardiovascular: Positive for leg swelling. Negative for chest pain and palpitations.   Gastrointestinal: Negative.  Negative for abdominal pain, blood in stool (no melena, no hematuria, no hematmesis, no hematochezia ), constipation, diarrhea, nausea and vomiting.   Endocrine: Negative.  Negative for cold intolerance and heat intolerance.   Genitourinary: Negative.    Musculoskeletal: Positive for arthralgias, joint swelling and myalgias.   Skin: Negative.    Allergic/Immunologic: Negative.    Neurological: Positive for weakness and light-headedness. Negative for dizziness, tremors, seizures, facial asymmetry, speech difficulty, numbness and headaches.   Hematological: Negative.    Psychiatric/Behavioral: Negative.        VISIT VITALS:    /80 (BP Location: Left arm, Patient Position: Sitting)  Pulse 70  Ht 67\" (170.2 cm)  Wt (!) 338 lb 3.2 oz (153 kg)  SpO2 96%  BMI 52.97 kg/m2    RECENT LABS:    Objective       Physical Exam   Constitutional: She is oriented to person, place, and time. She appears well-developed and well-nourished. She appears distressed.   HENT:   Head: Normocephalic and atraumatic.   Eyes: Conjunctivae and EOM are normal.   Neck: Normal range of motion. Neck supple. Normal carotid pulses, no hepatojugular reflux " and no JVD present. Carotid bruit is not present. No thyroid mass and no thyromegaly present.   Cardiovascular: Normal rate, regular rhythm, normal heart sounds and intact distal pulses.  Exam reveals no gallop and no friction rub.    No murmur heard.  Pulses:       Radial pulses are 2+ on the right side, and 2+ on the left side.   Pulmonary/Chest: Effort normal and breath sounds normal. No respiratory distress. She has no decreased breath sounds. She has no wheezes. She has no rhonchi. She has no rales. She exhibits no tenderness.   Abdominal: Soft. Bowel sounds are normal. She exhibits no distension and no abdominal bruit. There is no tenderness.   Negative organomegaly      Musculoskeletal: She exhibits edema.   Neurological: She is alert and oriented to person, place, and time. She has normal reflexes.   Skin: Skin is warm and dry. No rash noted. She is not diaphoretic. No erythema. No pallor.   Psychiatric: She has a normal mood and affect. Her speech is normal and behavior is normal. Judgment and thought content normal. Cognition and memory are normal.   Nursing note and vitals reviewed.      Procedures      Assessment/Plan    #1.  Long discussion with Ms. Corona reference the above findings.  Clearly she has had no progression of pulmonary hypertension and may have had a significant degree of improvement.  Therefore, I don't think we can blame progressive symptoms and worsening pulmonary hypertension.    #2.  Likewise, the patient had prior inferior wall ischemic defect without epicardial coronary disease.  I think that that makes the current finding less likely to represent disease progression.  However, in the setting of progressive symptoms without worsening of lung disease, I think it is reasonable to perform repeat cardiac catheterization for definitive assessment.    #3.  Part of the rationale for that decision rests on the fact that Ms. Corona is extremely concerned about coronary circulation and may be  limiting her activity to some degree because of concerns of precipitating heart damage.    #4.  However, the patient is also severely physically limited by spinal DJD.  She can't perform any physical activity without severe back and leg pain.    #5.  We will hold Coumadin for 4 days prior to catheterization and Ms. Corona will follow-up with Dr. Hoffman's office as instructed.  Other recommendations will be based on findings at heart catheter.              No diagnosis found.    No Follow-up on file.         Ying Emmanuel MA

## 2017-04-24 ENCOUNTER — OUTSIDE FACILITY SERVICE (OUTPATIENT)
Dept: CARDIOLOGY | Facility: CLINIC | Age: 72
End: 2017-04-24

## 2017-04-25 ENCOUNTER — OFFICE VISIT (OUTPATIENT)
Dept: CARDIOLOGY | Facility: CLINIC | Age: 72
End: 2017-04-25

## 2017-04-25 VITALS
SYSTOLIC BLOOD PRESSURE: 162 MMHG | BODY MASS INDEX: 45.99 KG/M2 | WEIGHT: 293 LBS | DIASTOLIC BLOOD PRESSURE: 80 MMHG | HEART RATE: 72 BPM | OXYGEN SATURATION: 95 % | HEIGHT: 67 IN

## 2017-04-25 DIAGNOSIS — I51.89 DIASTOLIC DYSFUNCTION: ICD-10-CM

## 2017-04-25 DIAGNOSIS — E78.5 DYSLIPIDEMIA: ICD-10-CM

## 2017-04-25 DIAGNOSIS — R60.9 EDEMA, UNSPECIFIED TYPE: Primary | ICD-10-CM

## 2017-04-25 DIAGNOSIS — R94.39 ABNORMAL STRESS TEST: ICD-10-CM

## 2017-04-25 DIAGNOSIS — I48.0 PAROXYSMAL ATRIAL FIBRILLATION (HCC): Primary | ICD-10-CM

## 2017-04-25 DIAGNOSIS — R60.9 EDEMA, UNSPECIFIED TYPE: ICD-10-CM

## 2017-04-25 DIAGNOSIS — I10 ESSENTIAL HYPERTENSION: ICD-10-CM

## 2017-04-25 DIAGNOSIS — R06.02 EXERTIONAL SHORTNESS OF BREATH: ICD-10-CM

## 2017-04-25 PROCEDURE — 99213 OFFICE O/P EST LOW 20 MIN: CPT | Performed by: INTERNAL MEDICINE

## 2017-04-25 RX ORDER — METOLAZONE 5 MG/1
5 TABLET ORAL DAILY
Qty: 30 TABLET | Refills: 0 | Status: SHIPPED | OUTPATIENT
Start: 2017-04-25 | End: 2017-05-02

## 2017-04-25 RX ORDER — METOLAZONE 5 MG/1
5 TABLET ORAL DAILY
Qty: 30 TABLET | Refills: 0 | Status: SHIPPED | OUTPATIENT
Start: 2017-04-25 | End: 2017-04-25 | Stop reason: SDUPTHER

## 2017-04-25 NOTE — PATIENT INSTRUCTIONS
**Start metolazone 5 mg take with morning lasix 40 mg for the next 5 days and hold hydrochlorothiazide while taking metolazone, take extra potassium.  Once metolazone is stopped start hydrochlorothiazide back. MAKE SURE YOU TAKE LASIX 40 MG DAILY and go back to once a day potassium after 5 days.

## 2017-04-25 NOTE — PROGRESS NOTES
Subjective   Holly Corona is a 71 y.o. female     Chief Complaint   Patient presents with   • Follow-up     presents as a follow up from cath       PROBLEM LIST:     1. Paroxysmal Atrial fibrillation  1.1 Pulmonary vein isolation procedure with ablation of right atrial flutter by Dr. Mario, March 2013.  2. Severe pulmonary hypertension with pulmonary pressure 60-65% by most recent cath.  3. Hypertension  3.1 Echo 9/9/15 - mild LVH; EF > 65%; mild to mod MR; mod TR; PA 55-60; mild OR  3.2 recent ECHO 03/09/17, left ventricular chamber is mildly dilated. Mild concentric LVH. EF > 65%. Issa Grade ll diastolic dysfunction. Left atrium moderately dilated, right atrium mild to moderate dilated. Systolic pressure 55-60 mmHg.   3.3 recent stress 04/11/17 inferior ischemia, chest wall attenuation.   4. Dyslipidemia  5. History of DVT/PE  6. History of colon CA x 2 status post surgery 2014.  7. Carotid Artery Stenosis  7.1 Carotid U/S 3/8/16 - 16-49% MARYBEL and LICA; antegrade flow  8. HANK - CPAP         Specialty Problems        Cardiology Problems    HTN (hypertension)        Paroxysmal atrial fibrillation        Atrial fibrillation        Carotid artery stenosis        Carotid bruit        Deep venous thrombosis        Diastolic dysfunction        Hypertension        Palpitations        Peripheral vascular disease        Pulmonary embolus        Pulmonary hypertension        Syncope        Abnormal stress test                HPI:  Ms. Corona returns for follow-up today after an aborted attempt at cardiac catheterization.    Ms. Corona was prepped in the usual fashion, as we were to begin the procedure, she developed incessant coughing.  We tried placing her on a wet, and she eventually had to sit up or coughing persisted.  Examined that time demonstrated HJR (albeit this was a difficult exam because of the patient's size and a (scant wheezes, but no rales.    We treated Ms. Corona empirically with bronchodilators which  improved her symptoms somewhat, and IV Lasix which resulted in complete resolution of symptoms.  She was to return today to reschedule catheterization.    Today, Ms. Corona relates that she was on vacation for 4 days, and had increased salt intake, and did not take her Lasix.  I believe that she was volume overloaded prior to the procedure yesterday.  This was exacerbated by the sodium bicarbonate drip we were using for renal protection.              CURRENT MEDICATION:    Current Outpatient Prescriptions   Medication Sig Dispense Refill   • albuterol (PROVENTIL) (2.5 MG/3ML) 0.083% nebulizer solution Take 2.5 mg by nebulization every 4 (four) hours as needed for wheezing.     • aspirin 81 MG EC tablet Take 1 tablet by mouth daily.     • CloNIDine (CATAPRES) 0.2 MG tablet Take 1 tablet by mouth 3 (Three) Times a Day. 90 tablet 3   • esomeprazole (NexIUM) 40 MG capsule Take 1 capsule by mouth daily.     • furosemide (LASIX) 40 MG tablet Take 1 tablet by mouth Daily. 90 tablet 3   • hydrALAZINE (APRESOLINE) 25 MG tablet Take 1 tablet by mouth 3 (Three) Times a Day. 90 tablet 5   • hydrochlorothiazide (HYDRODIURIL) 12.5 MG tablet Take 12.5 mg by mouth daily.     • levothyroxine (SYNTHROID, LEVOTHROID) 88 MCG tablet Take 1 tablet by mouth daily.     • losartan (COZAAR) 100 MG tablet Take 1 tablet by mouth daily.     • potassium chloride (K-DUR,KLOR-CON) 20 MEQ CR tablet Take 1 tablet by mouth Daily. 90 tablet 3   • Sotalol HCl AF 80 MG tablet Take 1 tablet by mouth 2 (Two) Times a Day. 180 tablet 2   • warfarin (COUMADIN) 6 MG tablet Take  by mouth. 5mg x 6 days & 7.5 mg on wednesday     • metOLazone (ZAROXOLYN) 5 MG tablet Take 1 tablet by mouth Daily. 30 tablet 0     No current facility-administered medications for this visit.        ALLERGIES:    Ciprofloxacin; Iodinated diagnostic agents; and Ofloxacin    PAST MEDICAL HISTORY:    Past Medical History:   Diagnosis Date   • Anemia    • Atrial fibrillation     A.   History of prior pulmonary vein ablation 03/14/2013. B.  On chronic Coumadin and Tikosyn therapy.   • Carotid artery stenosis    • Carotid bruit    • Deep venous thrombosis     A.  History of right lower extremity DVT treated with in therapy until October 2000.   • Diastolic dysfunction    • Diastolic dysfunction    • Dizziness    • Dyslipidemia    • Edema    • Exertional shortness of breath    • GERD (gastroesophageal reflux disease)    • H/O echocardiogram     A.  Echocardiogram of 10/16/2013 reports an ejection fraction of 55-60%, a moderately to severely dilated left atrium, mild to moderately dilated right atrium, trace aortic regurgitation, mild mitral and tricuspid regurgitation with calculated    • Hiatal hernia    • Hypertension     A.  Echocardiogram 10/16/2013 reports a calculated RVSP of 50-55 mmHg.B.  Right heart catheterization 03/12/2009 at  reported RV of 72/19, PA 72/27 and PCWP of 24, this was felt to be     • Hypothyroidism    • Morbid obesity    • Obstructive sleep apnea     A.  On CPAP each bedtime.   • Osteoarthritis    • Palpitations    • Peripheral vascular disease    • Pulmonary embolus     A.  Developed during chemotherapy in 2/2012. B.  Coumadin therapy reinitiated at that time.   • Pulmonary hypertension    • Signet ring cell adenocarcinoma     A.  Diagnosed on colonoscopy E. 10/14/2011, status post colon resection and 2 of 20 nodes positive, T3, N1b Stage IIIB. B.  Status post chemotherapy.    • Syncope        SURGICAL HISTORY:    Past Surgical History:   Procedure Laterality Date   • FOOT SURGERY     • HERNIA REPAIR      2011   • HYSTERECTOMY      1993   • OTHER SURGICAL HISTORY      cardiac cath procedure summary, A.  Cardiac catheterization April 2007 reported no significance coronary artery disease with markedly elevated LVEDP.   • OTHER SURGICAL HISTORY      catheter ablation atrial fibrillation, 2013   • OTHER SURGICAL HISTORY Left     knee replacement 2005   • OTHER SURGICAL  "HISTORY      neuroplasty decompression median nerve at carpal tunnel 1997   • OTHER SURGICAL HISTORY      partial colectomy , A.  Status post right hemicolectomy secondary to colon cancer in 2011.       SOCIAL HISTORY:    Social History     Social History   • Marital status:      Spouse name: N/A   • Number of children: N/A   • Years of education: N/A     Occupational History   • Not on file.     Social History Main Topics   • Smoking status: Former Smoker   • Smokeless tobacco: Never Used   • Alcohol use No   • Drug use: No   • Sexual activity: Defer     Other Topics Concern   • Not on file     Social History Narrative       FAMILY HISTORY:    Family History   Problem Relation Age of Onset   • Other Mother      MEDICAL PROBLEMS   • Other Sister      myocardial infarction.       Review of Systems   Constitutional: Positive for fatigue. Negative for chills, diaphoresis and fever.   HENT: Negative.    Eyes: Negative.    Respiratory: Positive for shortness of breath (with  min. exertion). Negative for chest tightness.    Cardiovascular: Positive for leg swelling (moderate ). Negative for chest pain and palpitations.   Gastrointestinal: Negative.  Negative for abdominal pain, blood in stool (no melena, no hematuria, no hematemesis, no hematochezia ), constipation, nausea and vomiting.   Endocrine: Negative.  Negative for cold intolerance and heat intolerance.   Genitourinary: Positive for frequency.   Musculoskeletal: Positive for arthralgias and gait problem (ambulates with aid of cane ). Negative for myalgias.   Skin: Negative.    Allergic/Immunologic: Negative.    Neurological: Negative for dizziness, tremors, seizures, syncope, facial asymmetry, speech difficulty, weakness, light-headedness, numbness and headaches.   Hematological: Bruises/bleeds easily.   Psychiatric/Behavioral: Negative.        VISIT VITALS:    /80 (BP Location: Left arm, Patient Position: Sitting)  Pulse 72  Ht 67\" (170.2 cm)  Wt " (!) 336 lb 3.2 oz (152 kg)  SpO2 95%  BMI 52.66 kg/m2    RECENT LABS:    Objective       Physical Exam    Procedures      Assessment/Plan   #1.  Ms. Corona was scheduled for heart catheter because of persistent exertional and rest dyspnea and inferior ischemia stress testing.  Although she has a positive study she had no high risk scintigraphic markers.  She was having no angina.    #2.  Given the circumstances described above and the patient's response to symptoms, I believe the most likely cause of Ms. Corona's dyspnea is elevated left ventricular filling pressures and interstitial edema with some degree of pulmonary congestion.    #3.  Therefore, I would like to perform an empiric trial of increased diuretics.  Ms. Corona admits that she was taking her Lasix only intermittently even prior to her four-day vacation.  She will take Lasix 40 mg daily in the morning.  For the next 5 days we'll hold her hydrochlorothiazide and substitute metolazone 5 mg daily and add additional potassium.    #4.  I would like to check labs in 5 days and follow-up in 7 days to assess Ms. Corona is response to therapy.  If she has significant improvement I think would be reasonable to try to increase her diuretic dosing and follow her renal function closely.  Additionally, if she is response to therapy and would not feel compelled to proceed to cardiac catheterization in the absence of significant angina.               Diagnosis Plan   1. Paroxysmal atrial fibrillation  Basic Metabolic Panel    Magnesium   2. Essential hypertension  Basic Metabolic Panel    Magnesium   3. Dyslipidemia  Basic Metabolic Panel    Magnesium   4. Edema, unspecified type  Basic Metabolic Panel    Magnesium   5. Exertional shortness of breath  Basic Metabolic Panel    Magnesium   6. Abnormal stress test     7. Diastolic dysfunction         Return in about 7 days (around 5/2/2017).         Sacha Romero MD

## 2017-05-01 DIAGNOSIS — I48.0 PAROXYSMAL ATRIAL FIBRILLATION (HCC): Primary | ICD-10-CM

## 2017-05-02 ENCOUNTER — OFFICE VISIT (OUTPATIENT)
Dept: CARDIOLOGY | Facility: CLINIC | Age: 72
End: 2017-05-02

## 2017-05-02 VITALS
BODY MASS INDEX: 45.99 KG/M2 | HEART RATE: 58 BPM | DIASTOLIC BLOOD PRESSURE: 56 MMHG | OXYGEN SATURATION: 96 % | HEIGHT: 67 IN | WEIGHT: 293 LBS | SYSTOLIC BLOOD PRESSURE: 148 MMHG

## 2017-05-02 DIAGNOSIS — R79.89 ELEVATED SERUM CREATININE: ICD-10-CM

## 2017-05-02 DIAGNOSIS — I51.89 DIASTOLIC DYSFUNCTION: ICD-10-CM

## 2017-05-02 DIAGNOSIS — R06.02 EXERTIONAL SHORTNESS OF BREATH: ICD-10-CM

## 2017-05-02 DIAGNOSIS — R60.9 EDEMA, UNSPECIFIED TYPE: ICD-10-CM

## 2017-05-02 DIAGNOSIS — I27.20 PULMONARY HYPERTENSION (HCC): ICD-10-CM

## 2017-05-02 DIAGNOSIS — I10 ESSENTIAL HYPERTENSION: ICD-10-CM

## 2017-05-02 DIAGNOSIS — I48.0 PAROXYSMAL ATRIAL FIBRILLATION (HCC): Primary | ICD-10-CM

## 2017-05-02 DIAGNOSIS — E78.5 DYSLIPIDEMIA: ICD-10-CM

## 2017-05-02 PROCEDURE — 99214 OFFICE O/P EST MOD 30 MIN: CPT | Performed by: INTERNAL MEDICINE

## 2017-05-02 RX ORDER — METOLAZONE 5 MG/1
5 TABLET ORAL EVERY OTHER DAY
Qty: 30 TABLET | Refills: 3 | Status: SHIPPED | OUTPATIENT
Start: 2017-05-02 | End: 2017-05-02 | Stop reason: SDUPTHER

## 2017-05-02 RX ORDER — METOLAZONE 5 MG/1
5 TABLET ORAL EVERY OTHER DAY
Qty: 90 TABLET | Refills: 3 | Status: SHIPPED | OUTPATIENT
Start: 2017-05-02 | End: 2018-04-30 | Stop reason: SDUPTHER

## 2017-05-30 ENCOUNTER — OFFICE VISIT (OUTPATIENT)
Dept: CARDIOLOGY | Facility: CLINIC | Age: 72
End: 2017-05-30

## 2017-05-30 VITALS
HEART RATE: 63 BPM | SYSTOLIC BLOOD PRESSURE: 152 MMHG | HEIGHT: 67 IN | WEIGHT: 293 LBS | BODY MASS INDEX: 45.99 KG/M2 | OXYGEN SATURATION: 97 % | DIASTOLIC BLOOD PRESSURE: 60 MMHG

## 2017-05-30 DIAGNOSIS — R94.39 ABNORMAL STRESS TEST: ICD-10-CM

## 2017-05-30 DIAGNOSIS — R60.9 EDEMA, UNSPECIFIED TYPE: ICD-10-CM

## 2017-05-30 DIAGNOSIS — I27.20 PULMONARY HYPERTENSION (HCC): ICD-10-CM

## 2017-05-30 DIAGNOSIS — I48.0 PAROXYSMAL ATRIAL FIBRILLATION (HCC): ICD-10-CM

## 2017-05-30 DIAGNOSIS — I73.9 PERIPHERAL VASCULAR DISEASE (HCC): ICD-10-CM

## 2017-05-30 DIAGNOSIS — E78.5 DYSLIPIDEMIA: ICD-10-CM

## 2017-05-30 DIAGNOSIS — R06.02 EXERTIONAL SHORTNESS OF BREATH: ICD-10-CM

## 2017-05-30 DIAGNOSIS — I10 ESSENTIAL HYPERTENSION: Primary | ICD-10-CM

## 2017-05-30 PROCEDURE — 99214 OFFICE O/P EST MOD 30 MIN: CPT | Performed by: INTERNAL MEDICINE

## 2017-06-09 ENCOUNTER — OUTSIDE FACILITY SERVICE (OUTPATIENT)
Dept: CARDIOLOGY | Facility: CLINIC | Age: 72
End: 2017-06-09

## 2017-06-09 PROCEDURE — 93458 L HRT ARTERY/VENTRICLE ANGIO: CPT | Performed by: INTERNAL MEDICINE

## 2017-06-20 ENCOUNTER — OFFICE VISIT (OUTPATIENT)
Dept: CARDIOLOGY | Facility: CLINIC | Age: 72
End: 2017-06-20

## 2017-06-20 VITALS
SYSTOLIC BLOOD PRESSURE: 178 MMHG | OXYGEN SATURATION: 96 % | DIASTOLIC BLOOD PRESSURE: 86 MMHG | HEART RATE: 57 BPM | WEIGHT: 293 LBS | HEIGHT: 67 IN | BODY MASS INDEX: 45.99 KG/M2

## 2017-06-20 DIAGNOSIS — I10 ESSENTIAL HYPERTENSION: ICD-10-CM

## 2017-06-20 DIAGNOSIS — E78.5 DYSLIPIDEMIA: ICD-10-CM

## 2017-06-20 DIAGNOSIS — R60.9 EDEMA, UNSPECIFIED TYPE: ICD-10-CM

## 2017-06-20 DIAGNOSIS — R06.02 SHORTNESS OF BREATH: ICD-10-CM

## 2017-06-20 DIAGNOSIS — I48.0 PAROXYSMAL ATRIAL FIBRILLATION (HCC): Primary | ICD-10-CM

## 2017-06-20 DIAGNOSIS — G47.33 OBSTRUCTIVE SLEEP APNEA: ICD-10-CM

## 2017-06-20 DIAGNOSIS — I27.20 PULMONARY HYPERTENSION (HCC): ICD-10-CM

## 2017-06-20 DIAGNOSIS — I51.89 DIASTOLIC DYSFUNCTION: ICD-10-CM

## 2017-06-20 PROCEDURE — 99213 OFFICE O/P EST LOW 20 MIN: CPT | Performed by: INTERNAL MEDICINE

## 2017-06-20 RX ORDER — HYDRALAZINE HYDROCHLORIDE 50 MG/1
50 TABLET, FILM COATED ORAL 3 TIMES DAILY
Qty: 90 TABLET | Refills: 5 | Status: SHIPPED | OUTPATIENT
Start: 2017-06-20 | End: 2017-06-29

## 2017-06-20 RX ORDER — ISOSORBIDE MONONITRATE 30 MG/1
30 TABLET, EXTENDED RELEASE ORAL DAILY
Qty: 30 TABLET | Refills: 11 | Status: SHIPPED | OUTPATIENT
Start: 2017-06-20 | End: 2018-03-13 | Stop reason: SDUPTHER

## 2017-06-20 NOTE — PROGRESS NOTES
Subjective   Holly Corona is a 71 y.o. female     Chief Complaint   Patient presents with   • Follow-up     heart cath. f/u       PROBLEM LIST:     1. Paroxysmal Atrial  fibrillation  1.1 Pulmonary vein isolation procedure with ablation of right atrial flutter by Dr. Mario, March 2013.  2. Severe pulmonary hypertension with pulmonary pressure 60-65% by most recent cath.  3. Hypertension  3.1 Echo 9/9/15 - mild LVH; EF > 65%; mild to mod MR; mod TR; PA 55-60; mild VT  3.2 recent ECHO 03/09/17, left ventricular chamber is mildly dilated. Mild concentric LVH. EF > 65%. Issa Grade ll diastolic dysfunction. Left atrium moderately dilated, right atrium mild to moderate dilated. Systolic pressure 55-60 mmHg.   3.3 recent stress 04/11/17 inferior ischemia, chest wall attenuation.   4. Dyslipidemia  5. History of DVT/PE  6. History of colon CA x 2 status post surgery 2014.  7. Carotid Artery Stenosis  7.1 Carotid U/S 3/8/16 - 16-49% MARYBEL and LICA; antegrade flow  8. HANK - CPAP      Specialty Problems        Cardiology Problems    HTN (hypertension)        Paroxysmal atrial fibrillation        Atrial fibrillation        Carotid artery stenosis        Carotid bruit        Deep venous thrombosis        Diastolic dysfunction        Hypertension        Palpitations        Peripheral vascular disease        Pulmonary embolus        Pulmonary hypertension        Syncope        Abnormal stress test                HPI:  Ms. Corona returns for follow-up after cardiac catheterization.    She had no postprocedural complications, and she has had no change in her symptoms.  Catheter was performed because of severe progressive low-level exertional dyspnea and chest discomfort in the setting of a positive stress test demonstrating inferior ischemia.  Arteriography demonstrated normal coronaries, global LV systolic function was preserved with an ejection fraction estimated at 55%, and left ventricular end-diastolic pressure was  40-42.    Ms. Corona continues to be short of breath.  She relates orthopnea and occasional PND.  Lower extremity edema remains unchanged.  She has been compliant with medications but readily admits she does not restrict her sodium intake.            CURRENT MEDICATION:    Current Outpatient Prescriptions   Medication Sig Dispense Refill   • albuterol (PROVENTIL) (2.5 MG/3ML) 0.083% nebulizer solution Take 2.5 mg by nebulization every 4 (four) hours as needed for wheezing.     • aspirin 81 MG EC tablet Take 1 tablet by mouth daily.     • CloNIDine (CATAPRES) 0.2 MG tablet Take 1 tablet by mouth 3 (Three) Times a Day. 90 tablet 3   • esomeprazole (NexIUM) 40 MG capsule Take 1 capsule by mouth daily.     • furosemide (LASIX) 40 MG tablet Take 1 tablet by mouth Daily. 90 tablet 3   • hydrALAZINE (APRESOLINE) 50 MG tablet Take 1 tablet by mouth 3 (Three) Times a Day. 90 tablet 5   • levothyroxine (SYNTHROID, LEVOTHROID) 88 MCG tablet Take 1 tablet by mouth daily.     • losartan (COZAAR) 100 MG tablet Take 1 tablet by mouth daily.     • metOLazone (ZAROXOLYN) 5 MG tablet Take 1 tablet by mouth Every Other Day. 90 tablet 3   • potassium chloride (K-DUR,KLOR-CON) 20 MEQ CR tablet Take 1 tablet by mouth Daily. (Patient taking differently: Take 20 mEq by mouth. Daily and takes 40 mg every other day with metolazone) 90 tablet 3   • Sotalol HCl AF 80 MG tablet Take 1 tablet by mouth 2 (Two) Times a Day. 180 tablet 2   • warfarin (COUMADIN) 6 MG tablet Take  by mouth. 5mg x 6 days & 7.5 mg on wednesday     • isosorbide mononitrate (IMDUR) 30 MG 24 hr tablet Take 1 tablet by mouth Daily. 30 tablet 11     No current facility-administered medications for this visit.        ALLERGIES:    Ciprofloxacin; Iodinated diagnostic agents; and Ofloxacin    PAST MEDICAL HISTORY:    Past Medical History:   Diagnosis Date   • Anemia    • Atrial fibrillation     A.  History of prior pulmonary vein ablation 03/14/2013. B.  On chronic Coumadin and  Tikosyn therapy.   • Carotid artery stenosis    • Carotid bruit    • Deep venous thrombosis     A.  History of right lower extremity DVT treated with in therapy until October 2000.   • Diastolic dysfunction    • Diastolic dysfunction    • Dizziness    • Dyslipidemia    • Edema    • Exertional shortness of breath    • GERD (gastroesophageal reflux disease)    • H/O echocardiogram     A.  Echocardiogram of 10/16/2013 reports an ejection fraction of 55-60%, a moderately to severely dilated left atrium, mild to moderately dilated right atrium, trace aortic regurgitation, mild mitral and tricuspid regurgitation with calculated    • Hiatal hernia    • Hypertension     A.  Echocardiogram 10/16/2013 reports a calculated RVSP of 50-55 mmHg.B.  Right heart catheterization 03/12/2009 at  reported RV of 72/19, PA 72/27 and PCWP of 24, this was felt to be     • Hypothyroidism    • Morbid obesity    • Obstructive sleep apnea     A.  On CPAP each bedtime.   • Osteoarthritis    • Palpitations    • Peripheral vascular disease    • Pulmonary embolus     A.  Developed during chemotherapy in 2/2012. B.  Coumadin therapy reinitiated at that time.   • Pulmonary hypertension    • Signet ring cell adenocarcinoma     A.  Diagnosed on colonoscopy E. 10/14/2011, status post colon resection and 2 of 20 nodes positive, T3, N1b Stage IIIB. B.  Status post chemotherapy.    • Syncope        SURGICAL HISTORY:    Past Surgical History:   Procedure Laterality Date   • CARDIAC CATHETERIZATION     • FOOT SURGERY     • HERNIA REPAIR      2011   • HYSTERECTOMY      1993   • OTHER SURGICAL HISTORY      cardiac cath procedure summary, A.  Cardiac catheterization April 2007 reported no significance coronary artery disease with markedly elevated LVEDP.   • OTHER SURGICAL HISTORY      catheter ablation atrial fibrillation, 2013   • OTHER SURGICAL HISTORY Left     knee replacement 2005   • OTHER SURGICAL HISTORY      neuroplasty decompression median nerve at  "carpal tunnel 1997   • OTHER SURGICAL HISTORY      partial colectomy , A.  Status post right hemicolectomy secondary to colon cancer in 2011.       SOCIAL HISTORY:    Social History     Social History   • Marital status:      Spouse name: N/A   • Number of children: N/A   • Years of education: N/A     Occupational History   • Not on file.     Social History Main Topics   • Smoking status: Former Smoker   • Smokeless tobacco: Never Used   • Alcohol use No   • Drug use: No   • Sexual activity: Defer     Other Topics Concern   • Not on file     Social History Narrative       FAMILY HISTORY:    Family History   Problem Relation Age of Onset   • Other Mother      MEDICAL PROBLEMS   • Other Sister      myocardial infarction.       Review of Systems   Constitutional: Positive for fatigue. Negative for chills, diaphoresis and fever.   HENT: Negative.    Eyes: Positive for visual disturbance (reading glasses).   Respiratory: Positive for apnea (HANK, treated with CPAP) and shortness of breath (with exertion). Negative for cough, chest tightness and wheezing.    Cardiovascular: Positive for leg swelling (BLE). Negative for chest pain and palpitations.   Gastrointestinal: Negative.  Negative for abdominal pain, blood in stool (no melena, no hematuria, no hematemesis, no hematocheiza), constipation, diarrhea, nausea and vomiting.   Endocrine: Negative.  Negative for cold intolerance and heat intolerance.   Genitourinary: Negative.    Musculoskeletal: Positive for arthralgias and myalgias.   Skin: Negative.    Allergic/Immunologic: Negative.    Neurological: Negative.  Negative for dizziness, seizures, syncope, facial asymmetry (no stroke like symptoms), speech difficulty, weakness, light-headedness, numbness and headaches.   Hematological: Negative.    Psychiatric/Behavioral: Negative.        VISIT VITALS:    /86 (BP Location: Left arm, Patient Position: Sitting)  Pulse 57  Ht 67\" (170.2 cm)  Wt (!) 332 lb 9.6 oz " (151 kg)  SpO2 96%  BMI 52.09 kg/m2    RECENT LABS:    Objective       Physical Exam    Procedures      Assessment/Plan   #1.  I think that Ms. Corona's dyspnea is multifactorial in etiology.  Clearly, diastolic dysfunction and increased left ventricular filling pressures may be a significant contributing factor.    #2.  Therefore, I would like to try to increase diuretic dosing with Lasix 40 mg twice daily stagger dosing and continue metolazone 2.5 mg every other day.    #3.  I  Extensive discussion with the patient reference sodium restriction and increase physical activity as able.    #4.  Empirically, we will trial isosorbide mononitrate and attempt to cause pulmonary vasodilation and lower pressures.  I would also like Ms. Corona to perform overnight oximetry to see if she is becoming hypoxemic despite CPAP.    #5.  The patient needs better long-term blood pressure control.  We will increase hydralazine to 50 mg 3 times daily and follow blood pressures closely.    #6.  Ms. Corona will follow-up with Dr. Amador as instructed, and in our office in one week with previous labs or on a when necessary basis for symptoms as discussed today.                   Diagnosis Plan   1. Paroxysmal atrial fibrillation  Basic Metabolic Panel    Magnesium    Basic Metabolic Panel    Magnesium   2. Essential hypertension  hydrALAZINE (APRESOLINE) 50 MG tablet    Basic Metabolic Panel    Magnesium    Basic Metabolic Panel    Magnesium   3. Pulmonary hypertension  Basic Metabolic Panel    Magnesium    Basic Metabolic Panel    Magnesium   4. Edema, unspecified type  Basic Metabolic Panel    Magnesium    Basic Metabolic Panel    Magnesium   5. Dyslipidemia  Basic Metabolic Panel    Magnesium    Basic Metabolic Panel    Magnesium   6. Obstructive sleep apnea  Overnight Sleep Oximetry Study    Basic Metabolic Panel    Magnesium    Overnight Sleep Oximetry Study    Basic Metabolic Panel    Magnesium   7. Shortness of breath  Overnight Sleep  Oximetry Study    Basic Metabolic Panel    Magnesium    Overnight Sleep Oximetry Study    Basic Metabolic Panel    Magnesium   8. Diastolic dysfunction  Basic Metabolic Panel    Magnesium    Basic Metabolic Panel    Magnesium       Return in about 9 days (around 6/29/2017).         Sacha Romero MD

## 2017-06-20 NOTE — PATIENT INSTRUCTIONS
"Low-Sodium Eating Plan  Sodium raises blood pressure and causes water to be held in the body. Getting less sodium from food will help lower your blood pressure, reduce any swelling, and protect your heart, liver, and kidneys. We get sodium by adding salt (sodium chloride) to food. Most of our sodium comes from canned, boxed, and frozen foods. Restaurant foods, fast foods, and pizza are also very high in sodium. Even if you take medicine to lower your blood pressure or to reduce fluid in your body, getting less sodium from your food is important.  WHAT IS MY PLAN?  Most people should limit their sodium intake to 2,000 mg a day. Your health care provider recommends that you limit your sodium intake to __2,000 mg________ a day.   WHAT DO I NEED TO KNOW ABOUT THIS EATING PLAN?  For the low-sodium eating plan, you will follow these general guidelines:  · Choose foods with a % Daily Value for sodium of less than 5% (as listed on the food label).    · Use salt-free seasonings or herbs instead of table salt or sea salt.    · Check with your health care provider or pharmacist before using salt substitutes.    · Eat fresh foods.  · Eat more vegetables and fruits.  · Limit canned vegetables. If you do use them, rinse them well to decrease the sodium.    · Limit cheese to 1 oz (28 g) per day.     · Eat lower-sodium products, often labeled as \"lower sodium\" or \"no salt added.\"  · Avoid foods that contain monosodium glutamate (MSG). MSG is sometimes added to Chinese food and some canned foods.    · Check food labels (Nutrition Facts labels) on foods to learn how much sodium is in one serving.  · Eat more home-cooked food and less restaurant, buffet, and fast food.   · When eating at a restaurant, ask that your food be prepared with less salt, or no salt if possible.    HOW DO I READ FOOD LABELS FOR SODIUM INFORMATION?  The Nutrition Facts label lists the amount of sodium in one serving of the food. If you eat more than one " serving, you must multiply the listed amount of sodium by the number of servings.  Food labels may also identify foods as:  · Sodium free--Less than 5 mg in a serving.  · Very low sodium--35 mg or less in a serving.  · Low sodium--140 mg or less in a serving.  · Light in sodium--50% less sodium in a serving. For example, if a food that usually has 300 mg of sodium is changed to become light in sodium, it will have 150 mg of sodium.  · Reduced sodium--25% less sodium in a serving. For example, if a food that usually has 400 mg of sodium is changed to reduced sodium, it will have 300 mg of sodium.  WHAT FOODS CAN I EAT?  Grains   Low-sodium cereals, including oats, puffed wheat and rice, and shredded wheat cereals. Low-sodium crackers. Unsalted rice and pasta. Lower-sodium bread.   Vegetables   Frozen or fresh vegetables. Low-sodium or reduced-sodium canned vegetables. Low-sodium or reduced-sodium tomato sauce and paste. Low-sodium or reduced-sodium tomato and vegetable juices.   Fruits   Fresh, frozen, and canned fruit. Fruit juice.   Meat and Other Protein Products   Low-sodium canned tuna and salmon. Fresh or frozen meat, poultry, seafood, and fish. Lamb. Unsalted nuts. Dried beans, peas, and lentils without added salt. Unsalted canned beans. Homemade soups without salt. Eggs.   Dairy   Milk. Soy milk. Ricotta cheese. Low-sodium or reduced-sodium cheeses. Yogurt.   Condiments   Fresh and dried herbs and spices. Salt-free seasonings. Onion and garlic powders. Low-sodium varieties of mustard and ketchup. Fresh or refrigerated horseradish. Lemon juice.   Fats and Oils    Reduced-sodium salad dressings. Unsalted butter.    Other   Unsalted popcorn and pretzels.   The items listed above may not be a complete list of recommended foods or beverages. Contact your dietitian for more options.  WHAT FOODS ARE NOT RECOMMENDED?  Grains   Instant hot cereals. Bread stuffing, pancake, and biscuit mixes. Croutons. Seasoned rice or  pasta mixes. Noodle soup cups. Boxed or frozen macaroni and cheese. Self-rising flour. Regular salted crackers.  Vegetables   Regular canned vegetables. Regular canned tomato sauce and paste. Regular tomato and vegetable juices. Frozen vegetables in sauces. Salted French fries. Olives. Pickles. Relishes. Sauerkraut. Salsa.  Meat and Other Protein Products   Salted, canned, smoked, spiced, or pickled meats, seafood, or fish. Dugan, ham, sausage, hot dogs, corned beef, chipped beef, and packaged luncheon meats. Salt pork. Jerky. Pickled herring. Anchovies, regular canned tuna, and sardines. Salted nuts.  Dairy   Processed cheese and cheese spreads. Cheese curds. Blue cheese and cottage cheese. Buttermilk.   Condiments   Onion and garlic salt, seasoned salt, table salt, and sea salt. Canned and packaged gravies. Worcestershire sauce. Tartar sauce. Barbecue sauce. Teriyaki sauce. Soy sauce, including reduced sodium. Steak sauce. Fish sauce. Oyster sauce. Cocktail sauce. Horseradish that you find on the shelf. Regular ketchup and mustard. Meat flavorings and tenderizers. Bouillon cubes. Hot sauce. Tabasco sauce. Marinades. Taco seasonings. Relishes.  Fats and Oils    Regular salad dressings. Salted butter. Margarine. Ghee. Dugan fat.   Other   Potato and tortilla chips. Corn chips and puffs. Salted popcorn and pretzels. Canned or dried soups. Pizza. Frozen entrees and pot pies.    The items listed above may not be a complete list of foods and beverages to avoid. Contact your dietitian for more information.     This information is not intended to replace advice given to you by your health care provider. Make sure you discuss any questions you have with your health care provider.     Document Released: 06/09/2003 Document Revised: 01/08/2016 Document Reviewed: 10/22/2014  [employer] Patient Education ©2017 [employer]        **Take metolazone 5 mg every other day and lasix 40 mg in the Am and another lasix 40  mg about 2 pm. Weight daily and keep a daily log.

## 2017-06-22 ENCOUNTER — TELEPHONE (OUTPATIENT)
Dept: CARDIOLOGY | Facility: CLINIC | Age: 72
End: 2017-06-22

## 2017-06-22 NOTE — TELEPHONE ENCOUNTER
Patient verbalize understanding pulse ox. Results. Per [facility], fax results to Dr. Hernandez and have Holly F/u with Dr. Hernandez.

## 2017-06-29 ENCOUNTER — OFFICE VISIT (OUTPATIENT)
Dept: CARDIOLOGY | Facility: CLINIC | Age: 72
End: 2017-06-29

## 2017-06-29 VITALS
HEIGHT: 67 IN | HEART RATE: 52 BPM | WEIGHT: 293 LBS | SYSTOLIC BLOOD PRESSURE: 112 MMHG | OXYGEN SATURATION: 95 % | DIASTOLIC BLOOD PRESSURE: 62 MMHG | BODY MASS INDEX: 45.99 KG/M2

## 2017-06-29 DIAGNOSIS — I27.20 PULMONARY HYPERTENSION (HCC): Primary | ICD-10-CM

## 2017-06-29 DIAGNOSIS — I51.89 DIASTOLIC DYSFUNCTION: ICD-10-CM

## 2017-06-29 DIAGNOSIS — R60.9 EDEMA, UNSPECIFIED TYPE: ICD-10-CM

## 2017-06-29 DIAGNOSIS — I73.9 PERIPHERAL VASCULAR DISEASE (HCC): ICD-10-CM

## 2017-06-29 DIAGNOSIS — I48.0 PAROXYSMAL ATRIAL FIBRILLATION (HCC): ICD-10-CM

## 2017-06-29 DIAGNOSIS — I10 ESSENTIAL HYPERTENSION: ICD-10-CM

## 2017-06-29 PROCEDURE — 99214 OFFICE O/P EST MOD 30 MIN: CPT | Performed by: INTERNAL MEDICINE

## 2017-06-29 RX ORDER — HYDRALAZINE HYDROCHLORIDE 25 MG/1
25 TABLET, FILM COATED ORAL 3 TIMES DAILY
Qty: 90 TABLET | Refills: 3 | Status: SHIPPED | OUTPATIENT
Start: 2017-06-29 | End: 2018-04-13 | Stop reason: SDUPTHER

## 2017-06-29 NOTE — PROGRESS NOTES
Subjective   Holly Corona is a 71 y.o. female     Chief Complaint   Patient presents with   • Follow-up       PROBLEM LIST:     1. Paroxysmal Atrial  fibrillation  1.1 Pulmonary vein isolation procedure with ablation of right atrial flutter by Dr. Mario, March 2013.  2. Severe pulmonary hypertension with pulmonary pressure 60-65% by most recent cath.  3. Hypertension  3.1 Echo 9/9/15 - mild LVH; EF > 65%; mild to mod MR; mod TR; PA 55-60; mild IA  3.2 recent ECHO 03/09/17, left ventricular chamber is mildly dilated. Mild concentric LVH. EF > 65%. Issa Grade ll diastolic dysfunction. Left atrium moderately dilated, right atrium mild to moderate dilated. Systolic pressure 55-60 mmHg.   3.3 recent stress 04/11/17 inferior ischemia, chest wall attenuation.   4. Dyslipidemia  5. History of DVT/PE  6. History of colon CA x 2 status post surgery 2014.  7. Carotid Artery Stenosis  7.1 Carotid U/S 3/8/16 - 16-49% MARYBEL and LICA; antegrade flow  8. HANK - CPAP      Specialty Problems        Cardiology Problems    HTN (hypertension)        Paroxysmal atrial fibrillation        Atrial fibrillation        Carotid artery stenosis        Carotid bruit        Deep venous thrombosis        Diastolic dysfunction        Hypertension        Palpitations        Peripheral vascular disease        Pulmonary embolus        Pulmonary hypertension        Syncope        Abnormal stress test                HPI:  Ms. Corona returns for follow-up on diastolic dysfunction and congestive heart failure.    She has successfully restricting her salt intake, as an increased her diuretic and hydralazine as well as continue to take long-acting nitrate.  She feels poorly.  She feels drained and weak.  She has had no change in her lower extremity edema and no change in her exertional dyspnea.  Nocturnal pulse oximetry study demonstrated no significant hypoxemic episodes.              CURRENT MEDICATION:    Current Outpatient Prescriptions   Medication Sig  Dispense Refill   • albuterol (PROVENTIL) (2.5 MG/3ML) 0.083% nebulizer solution Take 2.5 mg by nebulization every 4 (four) hours as needed for wheezing.     • aspirin 81 MG EC tablet Take 1 tablet by mouth daily.     • CloNIDine (CATAPRES) 0.2 MG tablet Take 1 tablet by mouth 3 (Three) Times a Day. (Patient taking differently: Take 0.2 mg by mouth 2 (Two) Times a Day.) 90 tablet 3   • esomeprazole (NexIUM) 40 MG capsule Take 1 capsule by mouth daily.     • furosemide (LASIX) 40 MG tablet Take 1 tablet by mouth Daily. (Patient taking differently: Take 80 mg by mouth Daily.) 90 tablet 3   • hydrALAZINE (APRESOLINE) 50 MG tablet Take 1 tablet by mouth 3 (Three) Times a Day. 90 tablet 5   • isosorbide mononitrate (IMDUR) 30 MG 24 hr tablet Take 1 tablet by mouth Daily. 30 tablet 11   • levothyroxine (SYNTHROID, LEVOTHROID) 88 MCG tablet Take 1 tablet by mouth daily.     • losartan (COZAAR) 100 MG tablet Take 1 tablet by mouth daily.     • metOLazone (ZAROXOLYN) 5 MG tablet Take 1 tablet by mouth Every Other Day. 90 tablet 3   • potassium chloride (K-DUR,KLOR-CON) 20 MEQ CR tablet Take 1 tablet by mouth Daily. (Patient taking differently: Take 20 mEq by mouth. 20 meq alternated with 40 meq) 90 tablet 3   • Sotalol HCl AF 80 MG tablet Take 1 tablet by mouth 2 (Two) Times a Day. 180 tablet 2   • warfarin (COUMADIN) 6 MG tablet Take  by mouth. 5mg x 6 days & 7.5 mg on wednesday       No current facility-administered medications for this visit.        ALLERGIES:    Ciprofloxacin; Iodinated diagnostic agents; and Ofloxacin    PAST MEDICAL HISTORY:    Past Medical History:   Diagnosis Date   • Anemia    • Atrial fibrillation     A.  History of prior pulmonary vein ablation 03/14/2013. B.  On chronic Coumadin and Tikosyn therapy.   • Carotid artery stenosis    • Carotid bruit    • Deep venous thrombosis     A.  History of right lower extremity DVT treated with in therapy until October 2000.   • Diastolic dysfunction    •  Diastolic dysfunction    • Dizziness    • Dyslipidemia    • Edema    • Exertional shortness of breath    • GERD (gastroesophageal reflux disease)    • H/O echocardiogram     A.  Echocardiogram of 10/16/2013 reports an ejection fraction of 55-60%, a moderately to severely dilated left atrium, mild to moderately dilated right atrium, trace aortic regurgitation, mild mitral and tricuspid regurgitation with calculated    • Hiatal hernia    • Hypertension     A.  Echocardiogram 10/16/2013 reports a calculated RVSP of 50-55 mmHg.B.  Right heart catheterization 03/12/2009 at  reported RV of 72/19, PA 72/27 and PCWP of 24, this was felt to be     • Hypothyroidism    • Morbid obesity    • Obstructive sleep apnea     A.  On CPAP each bedtime.   • Osteoarthritis    • Palpitations    • Peripheral vascular disease    • Pulmonary embolus     A.  Developed during chemotherapy in 2/2012. B.  Coumadin therapy reinitiated at that time.   • Pulmonary hypertension    • Signet ring cell adenocarcinoma     A.  Diagnosed on colonoscopy E. 10/14/2011, status post colon resection and 2 of 20 nodes positive, T3, N1b Stage IIIB. B.  Status post chemotherapy.    • Syncope        SURGICAL HISTORY:    Past Surgical History:   Procedure Laterality Date   • CARDIAC CATHETERIZATION     • FOOT SURGERY     • HERNIA REPAIR      2011   • HYSTERECTOMY      1993   • OTHER SURGICAL HISTORY      cardiac cath procedure summary, A.  Cardiac catheterization April 2007 reported no significance coronary artery disease with markedly elevated LVEDP.   • OTHER SURGICAL HISTORY      catheter ablation atrial fibrillation, 2013   • OTHER SURGICAL HISTORY Left     knee replacement 2005   • OTHER SURGICAL HISTORY      neuroplasty decompression median nerve at carpal tunnel 1997   • OTHER SURGICAL HISTORY      partial colectomy , A.  Status post right hemicolectomy secondary to colon cancer in 2011.       SOCIAL HISTORY:    Social History     Social History   •  "Marital status:      Spouse name: N/A   • Number of children: N/A   • Years of education: N/A     Occupational History   • Not on file.     Social History Main Topics   • Smoking status: Former Smoker   • Smokeless tobacco: Never Used   • Alcohol use No   • Drug use: No   • Sexual activity: Defer     Other Topics Concern   • Not on file     Social History Narrative       FAMILY HISTORY:    Family History   Problem Relation Age of Onset   • Other Mother      MEDICAL PROBLEMS   • Other Sister      myocardial infarction.       Review of Systems   Constitutional: Positive for fatigue (anergy ). Negative for chills, diaphoresis and fever.   HENT: Negative.    Eyes: Positive for visual disturbance (reading glasses).   Respiratory: Positive for shortness of breath. Negative for cough, chest tightness and wheezing.    Cardiovascular: Positive for palpitations (occas.) and leg swelling. Negative for chest pain.   Gastrointestinal: Negative.  Negative for abdominal pain, blood in stool (no melena, no hematuria, no hematemesis, no hematochezia), constipation, diarrhea, nausea and vomiting.   Endocrine: Negative.  Negative for cold intolerance and heat intolerance.   Genitourinary: Negative.    Musculoskeletal: Positive for arthralgias and myalgias.   Skin: Negative.    Allergic/Immunologic: Positive for environmental allergies.   Neurological: Negative.  Negative for dizziness, tremors, seizures, syncope, facial asymmetry (no stroke like symptoms ), speech difficulty, weakness, light-headedness, numbness and headaches.   Hematological: Negative.    Psychiatric/Behavioral: Negative.        VISIT VITALS:    /62 (BP Location: Left arm, Patient Position: Sitting)  Pulse 52  Ht 67\" (170.2 cm)  Wt (!) 332 lb (151 kg)  SpO2 95%  BMI 52 kg/m2    RECENT LABS:    Objective       Physical Exam   Constitutional: She is oriented to person, place, and time. She appears well-developed and well-nourished. No distress. "   HENT:   Head: Normocephalic and atraumatic.   Eyes: Conjunctivae and EOM are normal. Pupils are equal, round, and reactive to light.   Neck: Normal range of motion. Neck supple. Normal carotid pulses, no hepatojugular reflux and no JVD present. Carotid bruit is not present. No thyroid mass and no thyromegaly present.   Cardiovascular: Intact distal pulses and normal pulses.  Exam reveals no gallop, no friction rub and no decreased pulses.    Murmur heard.   Systolic murmur is present with a grade of 2/6   Pulmonary/Chest: Effort normal and breath sounds normal. No respiratory distress. She has no decreased breath sounds. She has no wheezes. She has no rhonchi. She has no rales. She exhibits no tenderness.   Abdominal: Soft. Bowel sounds are normal. She exhibits no distension and no mass. There is no tenderness.   Musculoskeletal: She exhibits edema (tense edema LLE, 3+ RLE). She exhibits no tenderness or deformity.   Lymphadenopathy:     She has no cervical adenopathy.     She has no axillary adenopathy.   Neurological: She is alert and oriented to person, place, and time. She has normal reflexes.   Skin: Skin is warm and dry. No rash noted. She is not diaphoretic. No erythema. No pallor.   Psychiatric: She has a normal mood and affect. Her speech is normal and behavior is normal. Judgment and thought content normal. Cognition and memory are normal.   Nursing note and vitals reviewed.      Procedures      Assessment/Plan   #1.  HFFPEF.  I think that Ms. Corona feels poorly because of a marked decline in her systolic blood pressure as a result of a combination of therapeutic changes we made at the time of her last visit.  I still believe is a dressing increased preload is the only option we have from a cardiac standpoint is improving dyspnea.    #2.  Therefore, we will decrease hydralazine to 25 mg 3 times daily but continue increased Lasix dosing and isosorbide.    #3.  I admonished the patient to try to start relic  regular physical activity.  I've asked Mrs. Corona to at least be mobile for 15-20 minutes at a time on most days of the week.  I think that physical deconditioning and severe obesity are also major contributing factors to her dyspnea.    #4.  We will need to recheck electrolytes and renal functi                      No diagnosis found.    No Follow-up on file.         Nissa Qiu LPN

## 2017-07-13 ENCOUNTER — OFFICE VISIT (OUTPATIENT)
Dept: CARDIOLOGY | Facility: CLINIC | Age: 72
End: 2017-07-13

## 2017-07-13 VITALS
OXYGEN SATURATION: 96 % | BODY MASS INDEX: 45.99 KG/M2 | HEIGHT: 67 IN | WEIGHT: 293 LBS | HEART RATE: 64 BPM | SYSTOLIC BLOOD PRESSURE: 152 MMHG | DIASTOLIC BLOOD PRESSURE: 84 MMHG

## 2017-07-13 DIAGNOSIS — I48.0 PAROXYSMAL ATRIAL FIBRILLATION (HCC): ICD-10-CM

## 2017-07-13 DIAGNOSIS — E78.5 DYSLIPIDEMIA: ICD-10-CM

## 2017-07-13 DIAGNOSIS — I10 ESSENTIAL HYPERTENSION: ICD-10-CM

## 2017-07-13 DIAGNOSIS — R60.9 EDEMA, UNSPECIFIED TYPE: ICD-10-CM

## 2017-07-13 DIAGNOSIS — Z79.899 ENCOUNTER FOR LONG-TERM (CURRENT) USE OF MEDICATIONS: ICD-10-CM

## 2017-07-13 DIAGNOSIS — M54.6 CHRONIC BILATERAL THORACIC BACK PAIN: ICD-10-CM

## 2017-07-13 DIAGNOSIS — I51.89 DIASTOLIC DYSFUNCTION: Primary | ICD-10-CM

## 2017-07-13 DIAGNOSIS — R06.02 EXERTIONAL SHORTNESS OF BREATH: ICD-10-CM

## 2017-07-13 DIAGNOSIS — G89.29 CHRONIC BILATERAL THORACIC BACK PAIN: ICD-10-CM

## 2017-07-13 DIAGNOSIS — M54.40 CHRONIC LOW BACK PAIN WITH SCIATICA, SCIATICA LATERALITY UNSPECIFIED, UNSPECIFIED BACK PAIN LATERALITY: ICD-10-CM

## 2017-07-13 DIAGNOSIS — G89.29 CHRONIC LOW BACK PAIN WITH SCIATICA, SCIATICA LATERALITY UNSPECIFIED, UNSPECIFIED BACK PAIN LATERALITY: ICD-10-CM

## 2017-07-13 DIAGNOSIS — I27.20 PULMONARY HYPERTENSION (HCC): ICD-10-CM

## 2017-07-13 PROCEDURE — 99213 OFFICE O/P EST LOW 20 MIN: CPT | Performed by: INTERNAL MEDICINE

## 2017-07-13 NOTE — PROGRESS NOTES
Subjective   Holly Corona is a 71 y.o. female     Chief Complaint   Patient presents with   • Follow-up     patient appears in office today for follow up        PROBLEM LIST:     1. Paroxysmal Atrial  fibrillation  1.1 Pulmonary vein isolation procedure with ablation of right atrial flutter by Dr. Mario, March 2013.  2. Severe pulmonary hypertension with pulmonary pressure 60-65% by most recent cath.  3. Hypertension  3.1 Echo 9/9/15 - mild LVH; EF > 65%; mild to mod MR; mod TR; PA 55-60; mild IL  3.2 recent ECHO 03/09/17, left ventricular chamber is mildly dilated. Mild concentric LVH. EF > 65%. Issa Grade ll diastolic dysfunction. Left atrium moderately dilated, right atrium mild to moderate dilated. Systolic pressure 55-60 mmHg.   3.3 recent stress 04/11/17 inferior ischemia, chest wall attenuation.   4. Dyslipidemia  5. History of DVT/PE  6. History of colon CA x 2 status post surgery 2014.  7. Carotid Artery Stenosis  7.1 Carotid U/S 3/8/16 - 16-49% MARYBEL and LICA; antegrade flow  8. HANK - CPAP     Specialty Problems        Cardiology Problems    HTN (hypertension)        Paroxysmal atrial fibrillation        Atrial fibrillation        Carotid artery stenosis        Carotid bruit        Deep venous thrombosis        Diastolic dysfunction        Hypertension        Palpitations        Peripheral vascular disease        Pulmonary embolus        Pulmonary hypertension        Syncope        Abnormal stress test                HPI:  Ms. Corona returns for follow-up on response to therapy.    At the time of her last visit she felt poorly which was felt secondary to significant decrease in her systolic blood pressure.  Hydralazine was decreased, diuretics were continued, and the patient was admonished to begin a regular low-level aerobic exercise program.     Ms. Corona has increased her physical activity, but still feels poorly on initial discussion.,  But on more detailed questioning, she does have somewhat less  fatigue, less exertional dyspnea, but the same degree of anergy.    Labs from yesterday demonstrated BUNs of 34 and creatinine 1.5.  Electrolytes were unremarkable            CURRENT MEDICATION:    Current Outpatient Prescriptions   Medication Sig Dispense Refill   • albuterol (PROVENTIL) (2.5 MG/3ML) 0.083% nebulizer solution Take 2.5 mg by nebulization every 4 (four) hours as needed for wheezing.     • aspirin 81 MG EC tablet Take 1 tablet by mouth daily.     • CloNIDine (CATAPRES) 0.2 MG tablet Take 1 tablet by mouth 3 (Three) Times a Day. (Patient taking differently: Take 0.2 mg by mouth 2 (Two) Times a Day.) 90 tablet 3   • esomeprazole (NexIUM) 40 MG capsule Take 1 capsule by mouth daily.     • furosemide (LASIX) 40 MG tablet Take 1 tablet by mouth Daily. (Patient taking differently: Take 80 mg by mouth Daily.) 90 tablet 3   • hydrALAZINE (APRESOLINE) 25 MG tablet Take 1 tablet by mouth 3 (Three) Times a Day. 90 tablet 3   • isosorbide mononitrate (IMDUR) 30 MG 24 hr tablet Take 1 tablet by mouth Daily. 30 tablet 11   • levothyroxine (SYNTHROID, LEVOTHROID) 88 MCG tablet Take 1 tablet by mouth daily.     • metOLazone (ZAROXOLYN) 5 MG tablet Take 1 tablet by mouth Every Other Day. 90 tablet 3   • potassium chloride (K-DUR,KLOR-CON) 20 MEQ CR tablet Take 1 tablet by mouth Daily. (Patient taking differently: Take 20 mEq by mouth. 20 meq alternated with 40 meq) 90 tablet 3   • Sotalol HCl AF 80 MG tablet Take 1 tablet by mouth 2 (Two) Times a Day. 180 tablet 2   • warfarin (COUMADIN) 6 MG tablet Take  by mouth. 5mg x 6 days & 7.5 mg on wednesday     • losartan (COZAAR) 50 MG tablet Take 1 tablet by mouth Daily. 30 tablet 5     No current facility-administered medications for this visit.        ALLERGIES:    Ciprofloxacin; Iodinated diagnostic agents; and Ofloxacin    PAST MEDICAL HISTORY:    Past Medical History:   Diagnosis Date   • Anemia    • Atrial fibrillation     A.  History of prior pulmonary vein ablation  03/14/2013. B.  On chronic Coumadin and Tikosyn therapy.   • Carotid artery stenosis    • Carotid bruit    • Deep venous thrombosis     A.  History of right lower extremity DVT treated with in therapy until October 2000.   • Diastolic dysfunction    • Diastolic dysfunction    • Dizziness    • Dyslipidemia    • Edema    • Exertional shortness of breath    • GERD (gastroesophageal reflux disease)    • H/O echocardiogram     A.  Echocardiogram of 10/16/2013 reports an ejection fraction of 55-60%, a moderately to severely dilated left atrium, mild to moderately dilated right atrium, trace aortic regurgitation, mild mitral and tricuspid regurgitation with calculated    • Hiatal hernia    • Hypertension     A.  Echocardiogram 10/16/2013 reports a calculated RVSP of 50-55 mmHg.B.  Right heart catheterization 03/12/2009 at  reported RV of 72/19, PA 72/27 and PCWP of 24, this was felt to be     • Hypothyroidism    • Morbid obesity    • Obstructive sleep apnea     A.  On CPAP each bedtime.   • Osteoarthritis    • Palpitations    • Peripheral vascular disease    • Pulmonary embolus     A.  Developed during chemotherapy in 2/2012. B.  Coumadin therapy reinitiated at that time.   • Pulmonary hypertension    • Signet ring cell adenocarcinoma     A.  Diagnosed on colonoscopy E. 10/14/2011, status post colon resection and 2 of 20 nodes positive, T3, N1b Stage IIIB. B.  Status post chemotherapy.    • Syncope        SURGICAL HISTORY:    Past Surgical History:   Procedure Laterality Date   • CARDIAC CATHETERIZATION     • FOOT SURGERY     • HERNIA REPAIR      2011   • HYSTERECTOMY      1993   • OTHER SURGICAL HISTORY      cardiac cath procedure summary, A.  Cardiac catheterization April 2007 reported no significance coronary artery disease with markedly elevated LVEDP.   • OTHER SURGICAL HISTORY      catheter ablation atrial fibrillation, 2013   • OTHER SURGICAL HISTORY Left     knee replacement 2005   • OTHER SURGICAL HISTORY       neuroplasty decompression median nerve at carpal tunnel 1997   • OTHER SURGICAL HISTORY      partial colectomy , A.  Status post right hemicolectomy secondary to colon cancer in 2011.       SOCIAL HISTORY:    Social History     Social History   • Marital status:      Spouse name: N/A   • Number of children: N/A   • Years of education: N/A     Occupational History   • Not on file.     Social History Main Topics   • Smoking status: Former Smoker   • Smokeless tobacco: Never Used   • Alcohol use No   • Drug use: No   • Sexual activity: Defer     Other Topics Concern   • Not on file     Social History Narrative       FAMILY HISTORY:    Family History   Problem Relation Age of Onset   • Other Mother      MEDICAL PROBLEMS   • Other Sister      myocardial infarction.       Review of Systems   Constitutional: Negative.  Negative for chills, diaphoresis, fatigue and fever.   HENT: Negative.    Eyes: Positive for visual disturbance (wears reading glasses).   Respiratory: Positive for shortness of breath (ucnhanged since last visit ). Negative for cough, chest tightness and wheezing.    Cardiovascular: Positive for leg swelling. Negative for chest pain and palpitations.   Gastrointestinal: Negative.  Negative for abdominal pain, blood in stool (no melena, no hematuria, no hematemesis, no hematochezia), constipation, diarrhea, nausea and vomiting.   Endocrine: Negative.  Negative for cold intolerance, heat intolerance, polyphagia and polyuria.   Genitourinary: Negative.  Negative for difficulty urinating, frequency and urgency.   Musculoskeletal: Positive for arthralgias (back/legs) and back pain (due to arthritis). Negative for myalgias, neck pain and neck stiffness.   Skin: Negative.  Negative for rash.   Allergic/Immunologic: Negative.  Negative for environmental allergies and food allergies.   Neurological: Positive for light-headedness. Negative for dizziness, tremors, seizures, syncope, facial asymmetry, speech  "difficulty, weakness, numbness and headaches.   Hematological: Negative.  Does not bruise/bleed easily.   Psychiatric/Behavioral: Negative.  Negative for agitation, confusion and sleep disturbance. The patient is not nervous/anxious.        VISIT VITALS:    /84 (BP Location: Left arm, Patient Position: Sitting)  Pulse 64  Ht 67\" (170.2 cm)  Wt (!) 329 lb (149 kg)  SpO2 96%  BMI 51.53 kg/m2    RECENT LABS:    Objective       Physical Exam  Exam today was limited to vital signs as above.  Procedures      Assessment/Plan    #1. HFPEF.  I think that currently, intravascular volume is reasonably well-controlled.  Although the patient has significant pulmonary hypertension and lower extremity edema, I think that each of these is multifactorial and not solely related to increased intravascular volume.    #2.  Therefore, we will continue current medications.  I do not feel comfortable increasing diuretic added to address edema at this time because of the elevated BUN/creatinine as above.    #3.  I again encouraged Ms. Corona 2-calorie restricted, continue exercise, and use physical measures for lower extremity edema.    #4.  The patient will follow-up with Dr. darrel arroyo instructed, and in our office in 2-3 months or on a when necessary basis for symptoms.             Diagnosis Plan   1. Diastolic dysfunction     2. Essential hypertension     3. Paroxysmal atrial fibrillation     4. Pulmonary hypertension     5. Edema, unspecified type     6. Exertional shortness of breath     7. Dyslipidemia     8. Chronic bilateral thoracic back pain  MRI thoracic spine w wo contrast    Ambulatory Referral to Neurology    MRI thoracic spine w wo contrast       Return in about 6 weeks (around 8/24/2017), or if symptoms worsen or fail to improve, for Next scheduled follow up.         Sacha Romero MD  "

## 2017-07-25 ENCOUNTER — TELEPHONE (OUTPATIENT)
Dept: CARDIOLOGY | Facility: CLINIC | Age: 72
End: 2017-07-25

## 2017-07-25 RX ORDER — LOSARTAN POTASSIUM 50 MG/1
50 TABLET ORAL DAILY
Qty: 30 TABLET | Refills: 5 | Status: SHIPPED | OUTPATIENT
Start: 2017-07-25 | End: 2017-12-10 | Stop reason: SDUPTHER

## 2017-07-25 NOTE — TELEPHONE ENCOUNTER
Dr. Romero reviewed chart. Patient is to cut losartan in 1/2 to 50 mg daily. Continue monitoring BP and HR. Patient to call our office with questions or concerns.     ----- Message from Violetta Saul LPN sent at 7/25/2017  1:30 PM EDT -----  Contact: HAILEE      ----- Message -----     From: Sharon Gautam     Sent: 7/25/2017  12:31 PM       To: [facility]    PT IS HAVING ISSUES WITH HER B/P 100/48  HR 53 AND REQUESTING A NURSE TO CALL HER. PT SAID   AFTER TAKING  HER MEDICINE  SHE GETS REAL SLEEPY. PLEASE CALL HER ON THE HOME PHONE   [phone]

## 2017-08-09 ENCOUNTER — TELEPHONE (OUTPATIENT)
Dept: CARDIOLOGY | Facility: CLINIC | Age: 72
End: 2017-08-09

## 2017-08-09 DIAGNOSIS — I10 ESSENTIAL HYPERTENSION: ICD-10-CM

## 2017-08-09 DIAGNOSIS — Z79.899 ENCOUNTER FOR LONG-TERM (CURRENT) USE OF MEDICATIONS: ICD-10-CM

## 2017-08-09 DIAGNOSIS — R60.9 EDEMA, UNSPECIFIED TYPE: Primary | ICD-10-CM

## 2017-08-09 NOTE — TELEPHONE ENCOUNTER
Per Dr. Romero, it is ok to order labs.         ----- Message from Estefanía Owens sent at 8/9/2017 10:53 AM EDT -----  Pt rq pt and BMP labs to be done before her next visit on Aug. 24.

## 2017-08-24 ENCOUNTER — OFFICE VISIT (OUTPATIENT)
Dept: CARDIOLOGY | Facility: CLINIC | Age: 72
End: 2017-08-24

## 2017-08-24 VITALS
BODY MASS INDEX: 45.99 KG/M2 | HEIGHT: 67 IN | SYSTOLIC BLOOD PRESSURE: 119 MMHG | WEIGHT: 293 LBS | HEART RATE: 58 BPM | OXYGEN SATURATION: 95 % | DIASTOLIC BLOOD PRESSURE: 65 MMHG

## 2017-08-24 DIAGNOSIS — Z79.899 ENCOUNTER FOR MONITORING SOTALOL THERAPY: ICD-10-CM

## 2017-08-24 DIAGNOSIS — Z51.81 ENCOUNTER FOR MONITORING SOTALOL THERAPY: ICD-10-CM

## 2017-08-24 DIAGNOSIS — I10 ESSENTIAL HYPERTENSION: ICD-10-CM

## 2017-08-24 DIAGNOSIS — R06.02 SHORTNESS OF BREATH: ICD-10-CM

## 2017-08-24 DIAGNOSIS — I51.89 DIASTOLIC DYSFUNCTION: ICD-10-CM

## 2017-08-24 DIAGNOSIS — I48.0 PAROXYSMAL ATRIAL FIBRILLATION (HCC): Primary | ICD-10-CM

## 2017-08-24 DIAGNOSIS — R60.9 EDEMA, UNSPECIFIED TYPE: ICD-10-CM

## 2017-08-24 DIAGNOSIS — I73.9 PERIPHERAL VASCULAR DISEASE (HCC): ICD-10-CM

## 2017-08-24 PROCEDURE — 99213 OFFICE O/P EST LOW 20 MIN: CPT | Performed by: INTERNAL MEDICINE

## 2017-08-24 PROCEDURE — 93000 ELECTROCARDIOGRAM COMPLETE: CPT | Performed by: INTERNAL MEDICINE

## 2017-08-24 NOTE — PROGRESS NOTES
Subjective   Holly Corona is a 71 y.o. female     Chief Complaint   Patient presents with   • Follow-up     6 week f/u    • Shortness of Breath   • Atrial Fibrillation     on sotalol 80 BID       PROBLEM LIST:     1. Paroxysmal Atrial  fibrillation  1.1 Pulmonary vein isolation procedure with ablation of right atrial flutter by Dr. Mario, March 2013.  2. Severe pulmonary hypertension with pulmonary pressure 60-65% by most recent cath.  3. Hypertension  3.1 Echo 9/9/15 - mild LVH; EF > 65%; mild to mod MR; mod TR; PA 55-60; mild LA  3.2 recent ECHO 03/09/17, left ventricular chamber is mildly dilated. Mild concentric LVH. EF > 65%. Issa Grade ll diastolic dysfunction. Left atrium moderately dilated, right atrium mild to moderate dilated. Systolic pressure 55-60 mmHg.   3.3 recent stress 04/11/17 inferior ischemia, chest wall attenuation.   4. Dyslipidemia  5. History of DVT/PE  6. History of colon CA x 2 status post surgery 2014.  7. Carotid Artery Stenosis  7.1 Carotid U/S 3/8/16 - 16-49% MARYBEL and LICA; antegrade flow  8. HANK - CPAP              Specialty Problems        Cardiology Problems    HTN (hypertension)        Paroxysmal atrial fibrillation        Atrial fibrillation        Carotid artery stenosis        Carotid bruit        Deep venous thrombosis        Diastolic dysfunction        Hypertension        Palpitations        Peripheral vascular disease        Pulmonary embolus        Pulmonary hypertension        Syncope        Abnormal stress test                HPI:  Ms. Corona returns for follow-up on labs and to reassess clinical course.    She feels improved from the time of her last visit.  She's trying to be more active, is more diligent on her sodium restriction and mechanical measures for lower extremity edema.  She has lost 3 pounds.    Despite decreasing diuretics she has less lower extremity edema.  She continues to be without other cardiac symptoms at this time although she does describe 1  episode of nonanginal chest discomfort.  This occurred when she was walking and treadmill but was very atypical for angina by description.  Additionally, the patient had normal coronary arteries recent catheter.    With a decrease in diuretic dosing creatinine has decreased from 1.3-1.2 and BUN from 34-31.            CURRENT MEDICATION:    Current Outpatient Prescriptions   Medication Sig Dispense Refill   • albuterol (PROVENTIL) (2.5 MG/3ML) 0.083% nebulizer solution Take 2.5 mg by nebulization every 4 (four) hours as needed for wheezing.     • aspirin 81 MG EC tablet Take 1 tablet by mouth daily.     • CloNIDine (CATAPRES) 0.2 MG tablet Take 1 tablet by mouth 3 (Three) Times a Day. (Patient taking differently: Take 0.2 mg by mouth 2 (Two) Times a Day.) 90 tablet 3   • esomeprazole (NexIUM) 40 MG capsule Take 1 capsule by mouth daily.     • furosemide (LASIX) 40 MG tablet Take 1 tablet by mouth Daily. (Patient taking differently: Take 40 mg by mouth 2 (Two) Times a Day.) 90 tablet 3   • hydrALAZINE (APRESOLINE) 25 MG tablet Take 1 tablet by mouth 3 (Three) Times a Day. 90 tablet 3   • isosorbide mononitrate (IMDUR) 30 MG 24 hr tablet Take 1 tablet by mouth Daily. 30 tablet 11   • levothyroxine (SYNTHROID, LEVOTHROID) 88 MCG tablet Take 1 tablet by mouth daily.     • losartan (COZAAR) 50 MG tablet Take 1 tablet by mouth Daily. 30 tablet 5   • metOLazone (ZAROXOLYN) 5 MG tablet Take 1 tablet by mouth Every Other Day. 90 tablet 3   • potassium chloride (K-DUR,KLOR-CON) 20 MEQ CR tablet Take 1 tablet by mouth Daily. (Patient taking differently: Take 20 mEq by mouth. 20 meq alternated with 40 meq) 90 tablet 3   • Sotalol HCl AF 80 MG tablet Take 1 tablet by mouth 2 (Two) Times a Day. 180 tablet 2   • warfarin (COUMADIN) 6 MG tablet Take  by mouth. 5mg x 6 days & 7.5 mg on wednesday       No current facility-administered medications for this visit.        ALLERGIES:    Ciprofloxacin; Iodinated diagnostic agents; and  Ofloxacin    PAST MEDICAL HISTORY:    Past Medical History:   Diagnosis Date   • Anemia    • Atrial fibrillation     A.  History of prior pulmonary vein ablation 03/14/2013. B.  On chronic Coumadin and Tikosyn therapy.   • Carotid artery stenosis    • Carotid bruit    • Deep venous thrombosis     A.  History of right lower extremity DVT treated with in therapy until October 2000.   • Diastolic dysfunction    • Diastolic dysfunction    • Dizziness    • Dyslipidemia    • Edema    • Exertional shortness of breath    • GERD (gastroesophageal reflux disease)    • H/O echocardiogram     A.  Echocardiogram of 10/16/2013 reports an ejection fraction of 55-60%, a moderately to severely dilated left atrium, mild to moderately dilated right atrium, trace aortic regurgitation, mild mitral and tricuspid regurgitation with calculated    • Hiatal hernia    • Hypertension     A.  Echocardiogram 10/16/2013 reports a calculated RVSP of 50-55 mmHg.B.  Right heart catheterization 03/12/2009 at  reported RV of 72/19, PA 72/27 and PCWP of 24, this was felt to be     • Hypothyroidism    • Morbid obesity    • Obstructive sleep apnea     A.  On CPAP each bedtime.   • Osteoarthritis    • Palpitations    • Peripheral vascular disease    • Pulmonary embolus     A.  Developed during chemotherapy in 2/2012. B.  Coumadin therapy reinitiated at that time.   • Pulmonary hypertension    • Signet ring cell adenocarcinoma     A.  Diagnosed on colonoscopy E. 10/14/2011, status post colon resection and 2 of 20 nodes positive, T3, N1b Stage IIIB. B.  Status post chemotherapy.    • Syncope        SURGICAL HISTORY:    Past Surgical History:   Procedure Laterality Date   • CARDIAC CATHETERIZATION     • FOOT SURGERY     • HERNIA REPAIR      2011   • HYSTERECTOMY      1993   • OTHER SURGICAL HISTORY      cardiac cath procedure summary, A.  Cardiac catheterization April 2007 reported no significance coronary artery disease with markedly elevated LVEDP.   •  OTHER SURGICAL HISTORY      catheter ablation atrial fibrillation, 2013   • OTHER SURGICAL HISTORY Left     knee replacement 2005   • OTHER SURGICAL HISTORY      neuroplasty decompression median nerve at carpal tunnel 1997   • OTHER SURGICAL HISTORY      partial colectomy , A.  Status post right hemicolectomy secondary to colon cancer in 2011.       SOCIAL HISTORY:    Social History     Social History   • Marital status:      Spouse name: N/A   • Number of children: N/A   • Years of education: N/A     Occupational History   • Not on file.     Social History Main Topics   • Smoking status: Former Smoker   • Smokeless tobacco: Never Used   • Alcohol use No   • Drug use: No   • Sexual activity: Defer     Other Topics Concern   • Not on file     Social History Narrative       FAMILY HISTORY:    Family History   Problem Relation Age of Onset   • Other Mother      MEDICAL PROBLEMS   • Other Sister      myocardial infarction.       Review of Systems   Constitutional: Positive for fatigue (anergy). Negative for chills, diaphoresis and fever.   HENT: Negative.    Eyes: Negative.  Negative for visual disturbance.   Respiratory: Positive for apnea (HANK, uses CPAP) and shortness of breath (with exertion, no orthopnea or PND). Negative for cough, chest tightness and wheezing.    Cardiovascular: Positive for palpitations (improved since last visit) and leg swelling (stable since last visit). Negative for chest pain.   Gastrointestinal: Negative for abdominal pain, blood in stool (no melena, no hematuria, no hematemesis, no hematochezia), constipation, diarrhea, nausea and vomiting.   Endocrine: Negative.  Negative for cold intolerance and heat intolerance.   Genitourinary: Negative.    Musculoskeletal: Positive for gait problem (ambulated with aid of cane).   Skin: Negative for color change, pallor, rash and wound.   Allergic/Immunologic: Negative.    Neurological: Negative for dizziness, tremors, seizures, syncope, facial  "asymmetry (no stroke like symptoms), speech difficulty, weakness, light-headedness, numbness and headaches.   Hematological: Negative.    Psychiatric/Behavioral: Negative.  Negative for sleep disturbance.       VISIT VITALS:    /65 (BP Location: Left arm, Patient Position: Sitting)  Pulse 58  Ht 67\" (170.2 cm)  Wt (!) 327 lb 6.4 oz (149 kg)  SpO2 95%  BMI 51.28 kg/m2    RECENT LABS:    Objective       Physical Exam   Constitutional: She is oriented to person, place, and time. She appears well-developed and well-nourished. No distress.   HENT:   Head: Normocephalic and atraumatic.   Eyes: Conjunctivae, EOM and lids are normal. Pupils are equal, round, and reactive to light. Lids are everted and swept, no foreign bodies found.   Neck: Normal range of motion. Neck supple. Normal carotid pulses, no hepatojugular reflux and no JVD present. Carotid bruit is not present. No thyroid mass and no thyromegaly present.   Cardiovascular: Exam reveals no gallop and no friction rub.    Murmur heard.   Systolic murmur is present with a grade of 2/6   S1 decreased     Pulmonary/Chest: Effort normal and breath sounds normal. No respiratory distress. She has no decreased breath sounds. She has no wheezes. She has no rhonchi. She has no rales. She exhibits no tenderness.   Abdominal: Soft. Bowel sounds are normal. She exhibits no distension and no mass. There is no tenderness.   Negative organomegaly      Musculoskeletal: Normal range of motion. She exhibits edema. She exhibits no deformity.   Lymphadenopathy:     She has no cervical adenopathy.     She has no axillary adenopathy.   Neurological: She is alert and oriented to person, place, and time. She has normal reflexes.   Skin: Skin is warm and dry. No rash noted. She is not diaphoretic. No erythema. No pallor.   Psychiatric: She has a normal mood and affect. Her speech is normal and behavior is normal. Judgment and thought content normal. Cognition and memory are " normal.   Nursing note and vitals reviewed.        ECG 12 Lead  Date/Time: 8/24/2017 10:17 AM  Performed by: SACHA ROMERO  Authorized by: SACHA ROMERO   Rhythm: sinus rhythm  Clinical impression: normal ECG  Comments: EPR              Assessment/Plan    #1.  Azotemia has improved area despite decreased diuretic dosing the patient has lost 3 pounds and has less edema.  Therefore, we will continue current measures.    #2.  Ms. Corona was encouraged to continue to restrict calories restrict sodium water, and to attempt progressive exercise.    #3.  Ms. Corona will follow-up with Dr. Andrew as instructed, and in our office in 6 months, or on a when necessary basis for symptoms as discussed today.                   Diagnosis Plan   1. Paroxysmal atrial fibrillation  ECG 12 Lead   2. Shortness of breath  ECG 12 Lead   3. Encounter for monitoring sotalol therapy  ECG 12 Lead   4. Edema, unspecified type     5. Essential hypertension     6. Diastolic dysfunction     7. Peripheral vascular disease         Return in about 4 months (around 12/24/2017), or if symptoms worsen or fail to improve, for Next scheduled follow up.         Sacha Romero MD   Scribed by VICTOR MANUEL Hager, read and verified by Sacha Romero MD [facility]

## 2017-09-08 ENCOUNTER — OFFICE VISIT (OUTPATIENT)
Dept: CARDIOLOGY | Facility: CLINIC | Age: 72
End: 2017-09-08

## 2017-09-08 VITALS
DIASTOLIC BLOOD PRESSURE: 60 MMHG | BODY MASS INDEX: 45.99 KG/M2 | HEART RATE: 57 BPM | WEIGHT: 293 LBS | HEIGHT: 67 IN | SYSTOLIC BLOOD PRESSURE: 124 MMHG

## 2017-09-08 DIAGNOSIS — I10 ESSENTIAL HYPERTENSION: ICD-10-CM

## 2017-09-08 DIAGNOSIS — I48.0 PAROXYSMAL ATRIAL FIBRILLATION (HCC): Primary | ICD-10-CM

## 2017-09-08 DIAGNOSIS — I27.20 PULMONARY HYPERTENSION (HCC): ICD-10-CM

## 2017-09-08 PROCEDURE — 99213 OFFICE O/P EST LOW 20 MIN: CPT | Performed by: INTERNAL MEDICINE

## 2017-09-08 PROCEDURE — 93000 ELECTROCARDIOGRAM COMPLETE: CPT | Performed by: INTERNAL MEDICINE

## 2017-09-08 NOTE — PROGRESS NOTES
Holly Corona  1945  [phone]      09/08/2017    [facility] CARDIOLOGY     Abdoul Andrew MD  [address] [place] [address]    Chief Complaint   Patient presents with   • Atrial Fibrillation       Problem List:   PROBLEM LIST:  1. Paroxysmal atrial fibrillation/atrial flutter:  a. Echocardiogram, 02/08/2006 with mild to moderate LVH, moderate MR, pulmonary HTN with RVSP at 55 mmHg; EF 65%.   b. GXT, 04/03/2006 with no ischemia, EF 65%.  c. Event recorder, April 2007 through May 2007 showing atrial flutter.  d. Echocardiogram, 05/07/2007 with pulmonary HTN, LA 4.5, EF 65%.  e. Cardiolite, 05/07/2007, normal study, EF 78%.  f. Left heart cath, 05/09/2007 with normal coronary arteries, EF 70%.  g. EKG, 10/21/2007 showing atrial fibrillation at 146 BPM.  h. Failed sotalol therapy.  i. Tikosyn initiated, 08/08/2012.  j. Echocardiogram, 09/17/2012 with an EF of 65%; mild LVH, moderate diastolic dysfunction, moderate to severe biatrial enlargement, mild MR, moderate TR with an RVSP of 65 mmHg. LA 4.7. Aortic valve sclerosis.  k. Pulmonary vein isolation procedure with ablation of right atrial flutter and nonsustained low posterior atrial tachycardia, 03/15/2013 complicated by dysphagia that lasted 24-48 hours.  2. CP  a. Heart cath 6/9/17: Non obstructive CAD, LVEF 55%   3. HTN  4. Right leg deep venous thrombosis, treated with Coumadin therapy until October 2007.  5. Hiatal hernia/gastroesophageal reflux disease.  6. Carotid bruits: Normal carotid duplex, March 2006 and June 2007.  7. Osteoarthritis.  8. Hypothyroidism.  9. Sleep apnea, on CPAP.  10. Dyslipidemia, no current therapy.  11. Colon cancer, status post resection of 17 inches of the right ascending colon, undergoing chemotherapy.  12. Pulmonary embolism, February 2012, Coumadin initiated.  13. Pulmonary hypertension:  a. Echocardiogram, 06/21/2011, showing PA systolic pressure estimated at 60-65  mmHg.  b. Moderate TR with an RVSP of 65 mmHg by echocardiogram, 09/17/2012.  14. Surgical history:  a. Hysterectomy.  b. Carpal tunnel repair.  c. Left knee replacement.  d. Foot surgery.     Allergies  Allergies   Allergen Reactions   • Ciprofloxacin    • Iodinated Diagnostic Agents    • Ofloxacin        Current Medications    Current Outpatient Prescriptions:   •  albuterol (PROVENTIL) (2.5 MG/3ML) 0.083% nebulizer solution, Take 2.5 mg by nebulization every 4 (four) hours as needed for wheezing., Disp: , Rfl:   •  aspirin 81 MG EC tablet, Take 1 tablet by mouth daily., Disp: , Rfl:   •  CloNIDine (CATAPRES) 0.2 MG tablet, Take 1 tablet by mouth 3 (Three) Times a Day. (Patient taking differently: Take 0.2 mg by mouth 2 (Two) Times a Day.), Disp: 90 tablet, Rfl: 3  •  esomeprazole (NexIUM) 40 MG capsule, Take 1 capsule by mouth daily., Disp: , Rfl:   •  furosemide (LASIX) 40 MG tablet, Take 1 tablet by mouth Daily. (Patient taking differently: Take 40 mg by mouth 2 (Two) Times a Day.), Disp: 90 tablet, Rfl: 3  •  hydrALAZINE (APRESOLINE) 25 MG tablet, Take 1 tablet by mouth 3 (Three) Times a Day., Disp: 90 tablet, Rfl: 3  •  isosorbide mononitrate (IMDUR) 30 MG 24 hr tablet, Take 1 tablet by mouth Daily., Disp: 30 tablet, Rfl: 11  •  levothyroxine (SYNTHROID, LEVOTHROID) 88 MCG tablet, Take 1 tablet by mouth daily., Disp: , Rfl:   •  losartan (COZAAR) 50 MG tablet, Take 1 tablet by mouth Daily., Disp: 30 tablet, Rfl: 5  •  metOLazone (ZAROXOLYN) 5 MG tablet, Take 1 tablet by mouth Every Other Day., Disp: 90 tablet, Rfl: 3  •  potassium chloride (K-DUR,KLOR-CON) 20 MEQ CR tablet, Take 1 tablet by mouth Daily. (Patient taking differently: Take 20 mEq by mouth. 20 meq alternated with 40 meq), Disp: 90 tablet, Rfl: 3  •  Sotalol HCl AF 80 MG tablet, Take 1 tablet by mouth 2 (Two) Times a Day., Disp: 180 tablet, Rfl: 2  •  warfarin (COUMADIN) 6 MG tablet, Take  by mouth. 5mg x 6 days & 7.5 mg on wednesday, Disp: , Rfl:  "    History of Present Illness   HPI    Pt presents for follow up of AF/HTN. Since we last saw the pt, she had an abnormal stress test and heart cath. Since then pt denies any sustained AF episodes, CP, LH, and dizziness. Denies any hospitalizations, ER visits, bleeding, or TIA/CVA symptoms. Overall feels well.    ROS:  General:  + fatigue, weight gain or loss  Cardiovascular:  Denies CP, PND, syncope, near syncope, + edema + palpitations.  Pulmonary:  + chronic MAGUIRE, No cough, or wheezing      Vitals:    09/08/17 1258   BP: 124/60   BP Location: Right arm   Patient Position: Sitting   Pulse: 57   Weight: (!) 330 lb (150 kg)   Height: 67\" (170.2 cm)     PE:  General: NAD  Neck: no JVD, no carotid bruits, no TM  Heart RRR, NL S1, S2, S4 present, no rubs, murmurs  Lungs: CTA, no wheezes, rhonchi, or rales  Abd: soft, non-tender, NL BS  Ext: No musculoskeletal deformities, no edema, cyanosis, or clubbing  Psych: normal mood and affect    Diagnostic Data:        ECG 12 Lead  Date/Time: 9/8/2017 1:22 PM  Performed by: MELANIE NORMAN  Authorized by: MELANIE NORMAN   Comparison: not compared with previous ECG   Rhythm: sinus rhythm  BPM: 60              1. Paroxysmal atrial fibrillation    2. Essential hypertension    3. Pulmonary hypertension          Plan:  1) PAF s/p RFA/PV: No recurrence on sotalol  Continue present medications.   2) Anticoagulation  Continue warfarin  3) HTN  Wt loss, exercise, salt reduction    F/up in 6 months      "

## 2017-10-05 RX ORDER — SOTALOL HYDROCHLORIDE 80 MG/1
TABLET ORAL
Qty: 180 TABLET | Refills: 2 | Status: SHIPPED | OUTPATIENT
Start: 2017-10-05 | End: 2018-06-25 | Stop reason: SDUPTHER

## 2017-10-10 ENCOUNTER — TELEPHONE (OUTPATIENT)
Dept: CARDIOLOGY | Facility: CLINIC | Age: 72
End: 2017-10-10

## 2017-10-10 DIAGNOSIS — M54.40 CHRONIC LOW BACK PAIN WITH SCIATICA, SCIATICA LATERALITY UNSPECIFIED, UNSPECIFIED BACK PAIN LATERALITY: Primary | ICD-10-CM

## 2017-10-10 DIAGNOSIS — Z79.899 ENCOUNTER FOR LONG-TERM (CURRENT) USE OF HIGH-RISK MEDICATION: ICD-10-CM

## 2017-10-10 DIAGNOSIS — I10 ESSENTIAL HYPERTENSION: Primary | ICD-10-CM

## 2017-10-10 DIAGNOSIS — G89.29 CHRONIC LOW BACK PAIN WITH SCIATICA, SCIATICA LATERALITY UNSPECIFIED, UNSPECIFIED BACK PAIN LATERALITY: Primary | ICD-10-CM

## 2017-10-11 ENCOUNTER — TELEPHONE (OUTPATIENT)
Dept: CARDIOLOGY | Facility: CLINIC | Age: 72
End: 2017-10-11

## 2017-10-11 DIAGNOSIS — G89.29 CHRONIC LOW BACK PAIN WITH SCIATICA, SCIATICA LATERALITY UNSPECIFIED, UNSPECIFIED BACK PAIN LATERALITY: Primary | ICD-10-CM

## 2017-10-11 DIAGNOSIS — M54.40 CHRONIC LOW BACK PAIN WITH SCIATICA, SCIATICA LATERALITY UNSPECIFIED, UNSPECIFIED BACK PAIN LATERALITY: Primary | ICD-10-CM

## 2017-10-11 NOTE — TELEPHONE ENCOUNTER
Ok to order without contrast per Dr. Romero.       ----- Message from Estefanía Owens sent at 10/11/2017 11:21 AM EDT -----   Pt said she does not want to have contrast with her MRI and will require a new order without contrast ordered.

## 2017-12-06 ENCOUNTER — TELEPHONE (OUTPATIENT)
Dept: CARDIOLOGY | Facility: CLINIC | Age: 72
End: 2017-12-06

## 2017-12-06 DIAGNOSIS — I10 ESSENTIAL HYPERTENSION: ICD-10-CM

## 2017-12-06 DIAGNOSIS — I48.0 PAROXYSMAL ATRIAL FIBRILLATION (HCC): Primary | ICD-10-CM

## 2017-12-06 DIAGNOSIS — Z79.899 NEED FOR PROPHYLACTIC CHEMOTHERAPY: ICD-10-CM

## 2017-12-06 NOTE — TELEPHONE ENCOUNTER
Per Dr. Romero, ok to order BMP and mag.     ----- Message from Violetta Saul LPN sent at 12/6/2017  4:44 PM EST -----   Holly states she has an appointment next week with Dr. Romero and she wants to come by and  lab orders prior to appointment.

## 2017-12-11 RX ORDER — LOSARTAN POTASSIUM 50 MG/1
TABLET ORAL
Qty: 30 TABLET | Refills: 5 | Status: SHIPPED | OUTPATIENT
Start: 2017-12-11 | End: 2018-04-13 | Stop reason: SDUPTHER

## 2017-12-12 ENCOUNTER — OFFICE VISIT (OUTPATIENT)
Dept: CARDIOLOGY | Facility: CLINIC | Age: 72
End: 2017-12-12

## 2017-12-12 VITALS
WEIGHT: 293 LBS | OXYGEN SATURATION: 97 % | HEART RATE: 58 BPM | HEIGHT: 67 IN | DIASTOLIC BLOOD PRESSURE: 69 MMHG | BODY MASS INDEX: 45.99 KG/M2 | SYSTOLIC BLOOD PRESSURE: 152 MMHG

## 2017-12-12 DIAGNOSIS — I48.0 PAROXYSMAL ATRIAL FIBRILLATION (HCC): Primary | ICD-10-CM

## 2017-12-12 DIAGNOSIS — I27.20 PULMONARY HYPERTENSION (HCC): ICD-10-CM

## 2017-12-12 DIAGNOSIS — I10 ESSENTIAL HYPERTENSION: ICD-10-CM

## 2017-12-12 DIAGNOSIS — R60.9 EDEMA, UNSPECIFIED TYPE: ICD-10-CM

## 2017-12-12 PROCEDURE — 99214 OFFICE O/P EST MOD 30 MIN: CPT | Performed by: INTERNAL MEDICINE

## 2017-12-12 RX ORDER — FAMOTIDINE 40 MG/1
TABLET, FILM COATED ORAL DAILY
COMMUNITY
Start: 2017-12-10 | End: 2017-12-12 | Stop reason: ALTCHOICE

## 2017-12-12 RX ORDER — PANTOPRAZOLE SODIUM 40 MG/1
40 TABLET, DELAYED RELEASE ORAL DAILY
Qty: 90 TABLET | Refills: 3 | Status: SHIPPED | OUTPATIENT
Start: 2017-12-12 | End: 2019-02-11 | Stop reason: SDUPTHER

## 2017-12-12 NOTE — PROGRESS NOTES
Subjective   Holly Corona is a 72 y.o. female     Chief Complaint   Patient presents with   • Follow-up     4 month    • Shortness of Breath   • Extremity Weakness       PROBLEM LIST:     1. Paroxysmal Atrial  fibrillation  1.1 Pulmonary vein isolation procedure with ablation of right atrial flutter by Dr. Mario, March 2013.  2. Severe pulmonary hypertension with pulmonary pressure 60-65% by most recent cath.  3. Hypertension  3.1 Echo 9/9/15 - mild LVH; EF > 65%; mild to mod MR; mod TR; PA 55-60; mild WA  3.2 recent ECHO 03/09/17, left ventricular chamber is mildly dilated. Mild concentric LVH. EF > 65%. Issa Grade ll diastolic dysfunction. Left atrium moderately dilated, right atrium mild to moderate dilated. Systolic pressure 55-60 mmHg.   3.3 recent stress 04/11/17 inferior ischemia, chest wall attenuation.   4. Dyslipidemia  5. History of DVT/PE  6. History of colon CA x 2 status post surgery 2014.  7. Carotid Artery Stenosis  7.1 Carotid U/S 3/8/16 - 16-49% MARYBEL and LICA; antegrade flow  8. HANK - CPAP            Specialty Problems        Cardiology Problems    HTN (hypertension)        Paroxysmal atrial fibrillation        Atrial fibrillation        Carotid artery stenosis        Carotid bruit        Deep venous thrombosis        Diastolic dysfunction        Hypertension        Palpitations        Peripheral vascular disease        Pulmonary embolus        Pulmonary hypertension        Syncope        Abnormal stress test                HPI:  Ms. Corona returns for follow-up on the above problems.  She has done well since the time of her last visit.  She has no chest pain.  She relates orthopnea, PND, and her lower extremity edema is not changed.  He has palpitations, dizziness, presyncope, or syncope.    Her major complaint currently is of pain and weakness in both lower extremities.  She restrict her sodium intake and elevates her legs when she can but she cannot use compressive stockings.  Feels that she  needs right knee replacement think that surgery would be at too high risk because of her other comorbid conditions.     Although blood pressures elevated today, Ms. Corona states that they are generally well-controlled at home with systolic blood pressures in the 120s and 130s.  Ms. Corona recently saw Dr. Mario who felt no changes in her antidysrhythmic therapy were indicated.  Ms. Corona is had no symptoms of arterial embolic events while on Coumadin, and also specifically denies hemoptysis, hematemesis, melena, or hematochezia.                CURRENT MEDICATION:    Current Outpatient Prescriptions   Medication Sig Dispense Refill   • albuterol (PROVENTIL) (2.5 MG/3ML) 0.083% nebulizer solution Take 2.5 mg by nebulization every 4 (four) hours as needed for wheezing.     • aspirin 81 MG EC tablet Take 1 tablet by mouth daily.     • CloNIDine (CATAPRES) 0.2 MG tablet Take 1 tablet by mouth 3 (Three) Times a Day. (Patient taking differently: Take 0.2 mg by mouth 2 (Two) Times a Day.) 90 tablet 3   • furosemide (LASIX) 40 MG tablet Take 1 tablet by mouth Daily. 90 tablet 3   • hydrALAZINE (APRESOLINE) 25 MG tablet Take 1 tablet by mouth 3 (Three) Times a Day. 90 tablet 3   • isosorbide mononitrate (IMDUR) 30 MG 24 hr tablet Take 1 tablet by mouth Daily. 30 tablet 11   • levothyroxine (SYNTHROID, LEVOTHROID) 88 MCG tablet Take 1 tablet by mouth daily.     • losartan (COZAAR) 50 MG tablet TAKE 1 TABLET DAILY 30 tablet 5   • metOLazone (ZAROXOLYN) 5 MG tablet Take 1 tablet by mouth Every Other Day. 90 tablet 3   • potassium chloride (K-DUR,KLOR-CON) 20 MEQ CR tablet Take 1 tablet by mouth Daily. (Patient taking differently: Take 20 mEq by mouth. 20 meq alternated with 40 meq) 90 tablet 3   • sotalol (BETAPACE AF) 80 MG tablet tablet TAKE 1 TABLET TWICE A  tablet 2   • warfarin (COUMADIN) 6 MG tablet Take  by mouth. 5mg x 6 days & 7.5 mg on wednesday     • pantoprazole (PROTONIX) 40 MG EC tablet Take 1 tablet by  mouth Daily. 90 tablet 3     No current facility-administered medications for this visit.        ALLERGIES:    Ciprofloxacin; Iodinated diagnostic agents; and Ofloxacin    PAST MEDICAL HISTORY:    Past Medical History:   Diagnosis Date   • Anemia    • Atrial fibrillation     A.  History of prior pulmonary vein ablation 03/14/2013. B.  On chronic Coumadin and Tikosyn therapy.   • Carotid artery stenosis    • Carotid bruit    • Deep venous thrombosis     A.  History of right lower extremity DVT treated with in therapy until October 2000.   • Diastolic dysfunction    • Diastolic dysfunction    • Dizziness    • Dyslipidemia    • Edema    • Exertional shortness of breath    • GERD (gastroesophageal reflux disease)    • H/O echocardiogram     A.  Echocardiogram of 10/16/2013 reports an ejection fraction of 55-60%, a moderately to severely dilated left atrium, mild to moderately dilated right atrium, trace aortic regurgitation, mild mitral and tricuspid regurgitation with calculated    • Hiatal hernia    • Hypertension     A.  Echocardiogram 10/16/2013 reports a calculated RVSP of 50-55 mmHg.B.  Right heart catheterization 03/12/2009 at  reported RV of 72/19, PA 72/27 and PCWP of 24, this was felt to be     • Hypothyroidism    • Morbid obesity    • Obstructive sleep apnea     A.  On CPAP each bedtime.   • Osteoarthritis    • Palpitations    • Peripheral vascular disease    • Pulmonary embolus     A.  Developed during chemotherapy in 2/2012. B.  Coumadin therapy reinitiated at that time.   • Pulmonary hypertension    • Signet ring cell adenocarcinoma     A.  Diagnosed on colonoscopy E. 10/14/2011, status post colon resection and 2 of 20 nodes positive, T3, N1b Stage IIIB. B.  Status post chemotherapy.    • Syncope        SURGICAL HISTORY:    Past Surgical History:   Procedure Laterality Date   • CARDIAC CATHETERIZATION     • FOOT SURGERY     • HERNIA REPAIR      2011   • HYSTERECTOMY      1993   • OTHER SURGICAL HISTORY       cardiac cath procedure summary, A.  Cardiac catheterization April 2007 reported no significance coronary artery disease with markedly elevated LVEDP.   • OTHER SURGICAL HISTORY      catheter ablation atrial fibrillation, 2013   • OTHER SURGICAL HISTORY Left     knee replacement 2005   • OTHER SURGICAL HISTORY      neuroplasty decompression median nerve at carpal tunnel 1997   • OTHER SURGICAL HISTORY      partial colectomy , A.  Status post right hemicolectomy secondary to colon cancer in 2011.       SOCIAL HISTORY:    Social History     Social History   • Marital status:      Spouse name: N/A   • Number of children: N/A   • Years of education: N/A     Occupational History   • Not on file.     Social History Main Topics   • Smoking status: Former Smoker   • Smokeless tobacco: Never Used   • Alcohol use No   • Drug use: No   • Sexual activity: Defer     Other Topics Concern   • Not on file     Social History Narrative       FAMILY HISTORY:    Family History   Problem Relation Age of Onset   • Other Mother      MEDICAL PROBLEMS   • Other Sister      myocardial infarction.       Review of Systems   Constitutional: Positive for fatigue (mobility fatigue ). Negative for chills, diaphoresis and fever.   HENT: Negative.    Eyes: Negative for visual disturbance.   Respiratory: Positive for shortness of breath (with min exertion). Negative for cough, choking, wheezing and stridor.    Cardiovascular: Positive for leg swelling (moderate BLE). Negative for palpitations.   Gastrointestinal: Negative for abdominal pain, blood in stool (no melena, no hematuria, no hematemesis, no hematochezia), constipation, diarrhea, nausea and vomiting.   Endocrine: Negative for cold intolerance and heat intolerance.   Genitourinary: Positive for frequency.   Musculoskeletal: Positive for arthralgias, back pain and gait problem.   Skin: Negative for color change, pallor, rash and wound.   Allergic/Immunologic: Negative.   "  Neurological: Positive for weakness (mobility weakness). Negative for dizziness, tremors, seizures, syncope, facial asymmetry, speech difficulty, light-headedness, numbness and headaches.   Hematological: Negative.        VISIT VITALS:  Vitals:    12/12/17 1154   BP: 152/69   BP Location: Left arm   Patient Position: Sitting   Pulse: 58   SpO2: 97%   Weight: (!) 149 kg (328 lb)   Height: 170.2 cm (67\")      /69 (BP Location: Left arm, Patient Position: Sitting)  Pulse 58  Ht 170.2 cm (67\")  Wt (!) 149 kg (328 lb)  SpO2 97%  BMI 51.37 kg/m2    RECENT LABS:    Objective       Physical Exam   Constitutional: She is oriented to person, place, and time. She appears well-developed and well-nourished. No distress.   HENT:   Head: Normocephalic and atraumatic.   Eyes: Conjunctivae and EOM are normal. Pupils are equal, round, and reactive to light.   Neck: Normal range of motion. Neck supple. Normal carotid pulses, no hepatojugular reflux and no JVD present. Carotid bruit is not present. No tracheal deviation present. No thyroid mass and no thyromegaly present.   Cardiovascular: Normal rate, regular rhythm and intact distal pulses.    Murmur heard.   Systolic (RUSB radiates to LUSB and [place]) murmur is present   S1 decreased      Pulmonary/Chest: Effort normal and breath sounds normal. She has no decreased breath sounds. She has no wheezes. She has no rhonchi. She has no rales.   Abdominal: Soft. Bowel sounds are normal. She exhibits no distension, no abdominal bruit and no mass. There is no tenderness. There is no rebound and no guarding.   Negative organomegaly      Musculoskeletal: Normal range of motion. She exhibits edema (3+ edema to mid calf BLE, mild venostasis changes BLE). She exhibits no tenderness or deformity.   Lymphadenopathy:     She has no cervical adenopathy.     She has no axillary adenopathy.   Neurological: She is alert and oriented to person, place, and time.   Skin: Skin is warm and dry. No " rash noted. No erythema. No pallor.   Psychiatric: She has a normal mood and affect. Her speech is normal and behavior is normal. Judgment and thought content normal. Cognition and memory are normal.   Nursing note and vitals reviewed.      Procedures      Assessment/Plan   #1.  She'll atrial fibrillation.  The patient is stable regard.    #2.  Lower extremity edema, multifactorial in etiology, currently stable.    #3.  Mild chronic renal insufficiency.  Renal function stabilized and a decrease in diuretic therapy but 8 and edema have remained stable or actually improved somewhat since that maneuver.  No changes are indicated at this time.    #4.  Moderate pulmonary hypertension.  We'll need to follow pressures over time.    #5 other problems also appear to be stable.  Therefore, I've asked Ms. Corona to follow-up with Dr. Andrew as instructed through his office and in our office in 6-9 months or on a when necessary basis for symptoms as discussed in detail today.                   Diagnosis Plan   1. Paroxysmal atrial fibrillation     2. Essential hypertension     3. Pulmonary hypertension     4. Edema, unspecified type         Return in about 9 months (around 9/12/2018), or if symptoms worsen or fail to improve, for Next scheduled follow up.         Sacha Romero MD   Scribed for Dr. Sacha Romero by Brook Machuca, [facility] December 12, 2017 1:48 PM               Electronically signed by:            This note is dictated utilizing voice recognition software.  Although this record has been proof read, transcriptional errors may still be present. If questions occur regarding the content of this record please do not hesitate to call our office.

## 2018-03-09 ENCOUNTER — OFFICE VISIT (OUTPATIENT)
Dept: CARDIOLOGY | Facility: CLINIC | Age: 73
End: 2018-03-09

## 2018-03-09 VITALS
BODY MASS INDEX: 45.99 KG/M2 | WEIGHT: 293 LBS | HEIGHT: 67 IN | DIASTOLIC BLOOD PRESSURE: 50 MMHG | SYSTOLIC BLOOD PRESSURE: 134 MMHG | HEART RATE: 62 BPM

## 2018-03-09 DIAGNOSIS — I10 ESSENTIAL HYPERTENSION: ICD-10-CM

## 2018-03-09 DIAGNOSIS — I48.0 PAROXYSMAL ATRIAL FIBRILLATION (HCC): Primary | ICD-10-CM

## 2018-03-09 PROCEDURE — 99213 OFFICE O/P EST LOW 20 MIN: CPT | Performed by: INTERNAL MEDICINE

## 2018-03-09 PROCEDURE — 93010 ELECTROCARDIOGRAM REPORT: CPT | Performed by: INTERNAL MEDICINE

## 2018-03-09 NOTE — PROGRESS NOTES
Holly Corona  1945  [phone]      03/09/2018    [facility] CARDIOLOGY     Abdoul Andrew MD  [address] [place] [address]    Chief Complaint   Patient presents with   • Atrial Fibrillation         PROBLEM LIST:  1. Paroxysmal atrial fibrillation/atrial flutter:  a. Echocardiogram, 02/08/2006 with mild to moderate LVH, moderate MR, pulmonary HTN with RVSP at 55 mmHg; EF 65%.   b. GXT, 04/03/2006 with no ischemia, EF 65%.  c. Event recorder, April 2007 through May 2007 showing atrial flutter.  d. Echocardiogram, 05/07/2007 with pulmonary HTN, LA 4.5, EF 65%.  e. Cardiolite, 05/07/2007, normal study, EF 78%.  f. Left heart cath, 05/09/2007 with normal coronary arteries, EF 70%.  g. EKG, 10/21/2007 showing atrial fibrillation at 146 BPM.  h. Failed sotalol therapy.  i. Tikosyn initiated, 08/08/2012.  j. Echocardiogram, 09/17/2012 with an EF of 65%; mild LVH, moderate diastolic dysfunction, moderate to severe biatrial enlargement, mild MR, moderate TR with an RVSP of 65 mmHg. LA 4.7. Aortic valve sclerosis.  k. Pulmonary vein isolation procedure with ablation of right atrial flutter and nonsustained low posterior atrial tachycardia, 03/15/2013 complicated by dysphagia that lasted 24-48 hours.  2. CP  a. Heart cath 6/9/17: Non obstructive CAD, LVEF 55%   3. HTN  4. Right leg deep venous thrombosis, treated with Coumadin therapy until October 2007.  5. Hiatal hernia/gastroesophageal reflux disease.  6. Carotid bruits: Normal carotid duplex, March 2006 and June 2007.  7. Osteoarthritis.  8. Hypothyroidism.  9. Sleep apnea, on CPAP.  10. Dyslipidemia, no current therapy.  11. Colon cancer, status post resection of 17 inches of the right ascending colon, undergoing chemotherapy.  12. Pulmonary embolism, February 2012, Coumadin initiated.  13. Pulmonary hypertension:  a. Echocardiogram, 06/21/2011, showing PA systolic pressure estimated at 60-65 mmHg.  b. Moderate TR with  an RVSP of 65 mmHg by echocardiogram, 09/17/2012.  14. Surgical history:  a. Hysterectomy.  b. Carpal tunnel repair.  c. Left knee replacement.  d. Foot surgery.    Allergies  Allergies   Allergen Reactions   • Ciprofloxacin    • Iodinated Diagnostic Agents    • Ofloxacin        Current Medications    Current Outpatient Prescriptions:   •  albuterol (PROVENTIL) (2.5 MG/3ML) 0.083% nebulizer solution, Take 2.5 mg by nebulization every 4 (four) hours as needed for wheezing., Disp: , Rfl:   •  aspirin 81 MG EC tablet, Take 1 tablet by mouth daily., Disp: , Rfl:   •  CloNIDine (CATAPRES) 0.2 MG tablet, Take 1 tablet by mouth 3 (Three) Times a Day. (Patient taking differently: Take 0.2 mg by mouth 2 (Two) Times a Day.), Disp: 90 tablet, Rfl: 3  •  furosemide (LASIX) 40 MG tablet, Take 1 tablet by mouth Daily., Disp: 90 tablet, Rfl: 3  •  hydrALAZINE (APRESOLINE) 25 MG tablet, Take 1 tablet by mouth 3 (Three) Times a Day., Disp: 90 tablet, Rfl: 3  •  isosorbide mononitrate (IMDUR) 30 MG 24 hr tablet, Take 1 tablet by mouth Daily., Disp: 30 tablet, Rfl: 11  •  levothyroxine (SYNTHROID, LEVOTHROID) 88 MCG tablet, Take 1 tablet by mouth daily., Disp: , Rfl:   •  losartan (COZAAR) 50 MG tablet, TAKE 1 TABLET DAILY, Disp: 30 tablet, Rfl: 5  •  metOLazone (ZAROXOLYN) 5 MG tablet, Take 1 tablet by mouth Every Other Day., Disp: 90 tablet, Rfl: 3  •  pantoprazole (PROTONIX) 40 MG EC tablet, Take 1 tablet by mouth Daily., Disp: 90 tablet, Rfl: 3  •  potassium chloride (K-DUR,KLOR-CON) 20 MEQ CR tablet, Take 1 tablet by mouth Daily. (Patient taking differently: Take 20 mEq by mouth. 20 meq alternated with 40 meq), Disp: 90 tablet, Rfl: 3  •  sotalol (BETAPACE AF) 80 MG tablet tablet, TAKE 1 TABLET TWICE A DAY, Disp: 180 tablet, Rfl: 2  •  warfarin (COUMADIN) 6 MG tablet, Take  by mouth. 5mg x 6 days & 7.5 mg on wednesday, Disp: , Rfl:     History of Present Illness   HPI    Pt presents for follow up of atrial fibrillation. Since we  "last saw the pt, pt admits to occasional short episodes of atrial fibrillation that last about one hour. She has chronic SOB which is unchanged due to COPD and pulm HTN. She denies CP, LH, and dizziness. Denies any hospitalizations, ER visits, bleeding, or TIA/CVA symptoms. Overall feels well. INRs have been therapeutic.     ROS:  General:  Denies fatigue, weight gain or loss  Cardiovascular:  Denies CP, PND, syncope, near syncope, edema + palpitations.  Pulmonary:  +MAGUIRE, - cough, or wheezing      Vitals:    03/09/18 1326   BP: 134/50   BP Location: Left arm   Patient Position: Sitting   Pulse: 62   Weight: (!) 149 kg (328 lb)   Height: 170.2 cm (67\")     Body mass index is 51.37 kg/(m^2).  PE:  General: NAD A & O x 3  Neck: no JVD, no carotid bruits, no TM  Heart RRR, NL S1, S2, S4 present, no rubs, murmurs  Lungs: CTA, no wheezes, rhonchi, or rales  Abd: soft, non-tender, NL BS  Ext: No musculoskeletal deformities, no edema, cyanosis, or clubbing  Psych: normal mood and affect    Diagnostic Data:        ECG 12 Lead  Date/Time: 3/9/2018 1:48 PM  Performed by: MELANIE MARIO  Authorized by: MELANIE MARIO   Comparison: compared with previous ECG from 9/8/2017  Similar to previous ECG  Rhythm: sinus rhythm  BPM: 62              1. Paroxysmal atrial fibrillation    2. Essential hypertension          Plan:  1) PAF s/p RFA/PV: Doing well on sotalol, QTc stable  Continue present medications.   2) Anticoagulation  Continue warfarin  3) HTN- controlled  Wt loss, exercise, salt reduction      Follow up 6-8 months    Scribed for Melanie Mario MD by Hiwot Lemus PA-C. 3/9/2018  1:49 PM     I, Melanie Mario MD, personally performed the services described in this documentation as scribed by the above named individual in my presence, and it is both accurate and complete.  3/9/2018  1:51 PM        "

## 2018-03-13 ENCOUNTER — OFFICE VISIT (OUTPATIENT)
Dept: CARDIOLOGY | Facility: CLINIC | Age: 73
End: 2018-03-13

## 2018-03-13 VITALS
HEIGHT: 67 IN | DIASTOLIC BLOOD PRESSURE: 55 MMHG | HEART RATE: 78 BPM | BODY MASS INDEX: 45.99 KG/M2 | OXYGEN SATURATION: 92 % | WEIGHT: 293 LBS | SYSTOLIC BLOOD PRESSURE: 138 MMHG

## 2018-03-13 DIAGNOSIS — E78.2 MIXED HYPERLIPIDEMIA: ICD-10-CM

## 2018-03-13 DIAGNOSIS — I48.0 PAROXYSMAL ATRIAL FIBRILLATION (HCC): ICD-10-CM

## 2018-03-13 DIAGNOSIS — I73.9 PERIPHERAL VASCULAR DISEASE (HCC): ICD-10-CM

## 2018-03-13 DIAGNOSIS — Z79.899 ENCOUNTER FOR LONG-TERM (CURRENT) USE OF MEDICATIONS: ICD-10-CM

## 2018-03-13 DIAGNOSIS — E78.5 DYSLIPIDEMIA: ICD-10-CM

## 2018-03-13 DIAGNOSIS — I10 ESSENTIAL HYPERTENSION: ICD-10-CM

## 2018-03-13 DIAGNOSIS — M54.40 ACUTE BILATERAL LOW BACK PAIN WITH SCIATICA, SCIATICA LATERALITY UNSPECIFIED: Primary | ICD-10-CM

## 2018-03-13 DIAGNOSIS — R06.02 SHORTNESS OF BREATH: ICD-10-CM

## 2018-03-13 DIAGNOSIS — M79.604 PAIN IN BOTH LOWER EXTREMITIES: ICD-10-CM

## 2018-03-13 DIAGNOSIS — M79.605 PAIN IN BOTH LOWER EXTREMITIES: ICD-10-CM

## 2018-03-13 DIAGNOSIS — I27.20 PULMONARY HYPERTENSION (HCC): ICD-10-CM

## 2018-03-13 DIAGNOSIS — R60.1 GENERALIZED EDEMA: ICD-10-CM

## 2018-03-13 PROCEDURE — 99214 OFFICE O/P EST MOD 30 MIN: CPT | Performed by: INTERNAL MEDICINE

## 2018-03-13 RX ORDER — ISOSORBIDE MONONITRATE 30 MG/1
30 TABLET, EXTENDED RELEASE ORAL DAILY
Qty: 90 TABLET | Refills: 3 | Status: SHIPPED | OUTPATIENT
Start: 2018-03-13 | End: 2019-02-11 | Stop reason: SDUPTHER

## 2018-03-13 RX ORDER — CYCLOSPORINE 0.5 MG/ML
1 EMULSION OPHTHALMIC NIGHTLY
COMMUNITY
Start: 2018-01-12

## 2018-04-13 DIAGNOSIS — I10 ESSENTIAL HYPERTENSION: ICD-10-CM

## 2018-04-13 RX ORDER — LOSARTAN POTASSIUM 50 MG/1
50 TABLET ORAL DAILY
Qty: 90 TABLET | Refills: 3 | Status: SHIPPED | OUTPATIENT
Start: 2018-04-13 | End: 2019-01-04

## 2018-04-13 RX ORDER — HYDRALAZINE HYDROCHLORIDE 25 MG/1
25 TABLET, FILM COATED ORAL 3 TIMES DAILY
Qty: 90 TABLET | Refills: 3 | Status: SHIPPED | OUTPATIENT
Start: 2018-04-13 | End: 2018-04-18 | Stop reason: SDUPTHER

## 2018-04-18 DIAGNOSIS — I10 ESSENTIAL HYPERTENSION: ICD-10-CM

## 2018-04-18 RX ORDER — HYDRALAZINE HYDROCHLORIDE 25 MG/1
25 TABLET, FILM COATED ORAL 3 TIMES DAILY
Qty: 90 TABLET | Refills: 3 | Status: SHIPPED | OUTPATIENT
Start: 2018-04-18 | End: 2018-08-07 | Stop reason: SDUPTHER

## 2018-04-23 ENCOUNTER — TRANSCRIBE ORDERS (OUTPATIENT)
Dept: CARDIOLOGY | Facility: HOSPITAL | Age: 73
End: 2018-04-23

## 2018-04-23 ENCOUNTER — TELEPHONE (OUTPATIENT)
Dept: CARDIOLOGY | Facility: CLINIC | Age: 73
End: 2018-04-23

## 2018-04-23 DIAGNOSIS — R06.02 SHORTNESS OF BREATH: Primary | ICD-10-CM

## 2018-04-23 NOTE — TELEPHONE ENCOUNTER
Eloisa from Dr. Hernandez's office called stating that Edwin seen Dr. Hernandez today and that the patient was explaining to her that she feels her blood is not pumping well and she is worried about pulmonary hypertension so Dr. Hernandez has ordered an echo and wants to get that scheduled since its been awhile. Informed her that she had one last march 2017 but she wants to go ahead and proceed with echo. Eloisa is going to fax the last office note and order to get echo scheduled. I will take order to C and have them scheduled echo and call patient with date and take. patient is scheduled for Tuesday 05/02/18 @ 1500. patient aware of date and time.

## 2018-04-24 ENCOUNTER — TELEPHONE (OUTPATIENT)
Dept: CARDIOLOGY | Facility: CLINIC | Age: 73
End: 2018-04-24

## 2018-04-24 NOTE — TELEPHONE ENCOUNTER
----- Message from Sharon Gautam sent at 4/24/2018  3:22 PM EDT -----  Contact: HAILEE  PATIENT CALLED AND STATED HER BLOOD PRESSURE /77. I INFORMED KENN FUENTES.      I spoke with patient who states that she had been in the basement boxing things for the [employer] sale. She states that she had been bending over and picking things up when she started to feel hot and dizzy like she was going to pass out. She states she had to climb the basement stairs and she then checked her BP and it showed 203/77. She states she has been very anxious and is taking half a Xanax 0.5 mg to help her calm down some. She rechecked her BP and got 164/102.  She denies chest pain or worsening shortness of breath at this time. Patient is advised to lay down for a little while and see if she starts to feel better. She will recheck her BP and call us back, but if she develops worsening symptoms she will go to the ER.     4:30 Patient called back to state that she is feeling some better after laying down. She states her KARMEN is now 164/73, 63. She states she is just going to rest and take it easy the rest of the evening. I again instructed the patient to proceed to the ER for any worsening symptoms.

## 2018-04-25 ENCOUNTER — TELEPHONE (OUTPATIENT)
Dept: CARDIOLOGY | Facility: CLINIC | Age: 73
End: 2018-04-25

## 2018-04-25 RX ORDER — POTASSIUM CHLORIDE 20 MEQ/1
20 TABLET, EXTENDED RELEASE ORAL DAILY
Qty: 180 TABLET | Refills: 3 | Status: SHIPPED | OUTPATIENT
Start: 2018-04-25 | End: 2019-08-13

## 2018-04-25 NOTE — TELEPHONE ENCOUNTER
----- Message from Nissa Qiu LPN sent at 4/25/2018  3:38 PM EDT -----  REFILL K+ 90 DAY SUPPLY TO [facility]

## 2018-04-26 ENCOUNTER — APPOINTMENT (OUTPATIENT)
Dept: CARDIOLOGY | Facility: HOSPITAL | Age: 73
End: 2018-04-26

## 2018-04-30 DIAGNOSIS — R60.9 EDEMA, UNSPECIFIED TYPE: ICD-10-CM

## 2018-04-30 RX ORDER — METOLAZONE 5 MG/1
5 TABLET ORAL EVERY OTHER DAY
Qty: 90 TABLET | Refills: 3 | Status: SHIPPED | OUTPATIENT
Start: 2018-04-30 | End: 2019-11-22

## 2018-04-30 RX ORDER — FUROSEMIDE 40 MG/1
40 TABLET ORAL DAILY
Qty: 90 TABLET | Refills: 3 | Status: SHIPPED | OUTPATIENT
Start: 2018-04-30 | End: 2019-02-11 | Stop reason: SDUPTHER

## 2018-05-02 ENCOUNTER — HOSPITAL ENCOUNTER (OUTPATIENT)
Dept: CARDIOLOGY | Facility: HOSPITAL | Age: 73
Discharge: HOME | End: 2018-05-02
Admitting: SPECIALIST

## 2018-05-02 ENCOUNTER — OUTSIDE FACILITY SERVICE (OUTPATIENT)
Dept: CARDIOLOGY | Facility: CLINIC | Age: 73
End: 2018-05-02

## 2018-05-02 DIAGNOSIS — R06.02 SHORTNESS OF BREATH: ICD-10-CM

## 2018-05-02 LAB
MAXIMAL PREDICTED HEART RATE: 146 BPM
STRESS TARGET HR: 124 BPM

## 2018-05-02 PROCEDURE — 93306 TTE W/DOPPLER COMPLETE: CPT

## 2018-05-02 PROCEDURE — 93306 TTE W/DOPPLER COMPLETE: CPT | Performed by: INTERNAL MEDICINE

## 2018-06-25 ENCOUNTER — TELEPHONE (OUTPATIENT)
Dept: CARDIOLOGY | Facility: CLINIC | Age: 73
End: 2018-06-25

## 2018-06-25 DIAGNOSIS — I10 ESSENTIAL HYPERTENSION: ICD-10-CM

## 2018-06-25 DIAGNOSIS — E78.2 MIXED HYPERLIPIDEMIA: ICD-10-CM

## 2018-06-25 DIAGNOSIS — I48.0 PAROXYSMAL ATRIAL FIBRILLATION (HCC): ICD-10-CM

## 2018-06-25 DIAGNOSIS — I27.20 PULMONARY HYPERTENSION (HCC): Primary | ICD-10-CM

## 2018-06-25 DIAGNOSIS — Z79.899 ENCOUNTER FOR LONG-TERM (CURRENT) USE OF MEDICATIONS: ICD-10-CM

## 2018-06-25 DIAGNOSIS — R06.02 SHORTNESS OF BREATH: ICD-10-CM

## 2018-06-25 RX ORDER — SOTALOL HYDROCHLORIDE 80 MG/1
80 TABLET ORAL EVERY 12 HOURS SCHEDULED
Qty: 180 TABLET | Refills: 3 | Status: SHIPPED | OUTPATIENT
Start: 2018-06-25 | End: 2019-02-11 | Stop reason: SDUPTHER

## 2018-06-25 NOTE — TELEPHONE ENCOUNTER
Patient called stating that she has been having a lot of gout flare ups almost an episode every 2-3 weeks. Patient went to see her pediatrist Dr. Arteaga and he ran a uric acid labs on her which demonstrated her level to be 13  And normal range is 2-6. Patient was stated on allopurinol but has  Not started bc [facility] and the pharmacist said that the lasix and metolazone both can cause gout. Patient takes lasix 40 daily and metolazone 5 mg every other day. The pharmacist told her to ask about maybe switching to spironolactone. Holly would like to know what Dr. Romero Suggest. I informed her that he is at the cath lab and I would speak with him tomorrow in regard and call her back. Patient verbalized understanding.       Spoke with Dr. Romero in regard to above. Spironolactone not near as potent as lasix or metolazone and with her history patient needs to stay on lasix and metolazone. The 2 diuretics can lead to gout and she needs to go ahead and start allopurinol. Dr. Romero is fine with order labs CMP and mag. Since it has been awhile to since her kidney function has been checked. Patient will come by our office to  lab order.

## 2018-07-05 ENCOUNTER — TELEPHONE (OUTPATIENT)
Dept: CARDIOLOGY | Facility: CLINIC | Age: 73
End: 2018-07-05

## 2018-07-05 NOTE — TELEPHONE ENCOUNTER
Patient was advised that Kidney function is stable, continue medicines as prescribed. Patient verbalized understanding. Patient will come by the office to  copy of labs

## 2018-07-05 NOTE — TELEPHONE ENCOUNTER
----- Message from Ying Martell MA sent at 7/5/2018  3:29 PM EDT -----  Patient is calling for lab results. Patient call back is [phone]

## 2018-08-07 ENCOUNTER — TELEPHONE (OUTPATIENT)
Dept: CARDIOLOGY | Facility: CLINIC | Age: 73
End: 2018-08-07

## 2018-08-07 DIAGNOSIS — I10 ESSENTIAL HYPERTENSION: ICD-10-CM

## 2018-08-07 RX ORDER — HYDRALAZINE HYDROCHLORIDE 25 MG/1
25 TABLET, FILM COATED ORAL 3 TIMES DAILY
Qty: 90 TABLET | Refills: 3 | Status: SHIPPED | OUTPATIENT
Start: 2018-08-07 | End: 2020-07-02 | Stop reason: SDUPTHER

## 2018-08-07 NOTE — TELEPHONE ENCOUNTER
----- Message from Ying Martell MA sent at 8/7/2018 12:39 PM EDT -----  Patient called stating she needs a refill on hydralazine 25mg tid. [facility].

## 2018-09-11 ENCOUNTER — OFFICE VISIT (OUTPATIENT)
Dept: CARDIOLOGY | Facility: CLINIC | Age: 73
End: 2018-09-11

## 2018-09-11 VITALS
HEIGHT: 67 IN | BODY MASS INDEX: 45.99 KG/M2 | SYSTOLIC BLOOD PRESSURE: 127 MMHG | HEART RATE: 71 BPM | DIASTOLIC BLOOD PRESSURE: 51 MMHG | WEIGHT: 293 LBS | OXYGEN SATURATION: 95 %

## 2018-09-11 DIAGNOSIS — I73.9 PERIPHERAL VASCULAR DISEASE (HCC): ICD-10-CM

## 2018-09-11 DIAGNOSIS — R55 SYNCOPE, UNSPECIFIED SYNCOPE TYPE: ICD-10-CM

## 2018-09-11 DIAGNOSIS — I51.89 DIASTOLIC DYSFUNCTION: ICD-10-CM

## 2018-09-11 DIAGNOSIS — I65.23 BILATERAL CAROTID ARTERY STENOSIS: ICD-10-CM

## 2018-09-11 DIAGNOSIS — R42 DIZZINESS: ICD-10-CM

## 2018-09-11 DIAGNOSIS — I48.0 PAROXYSMAL ATRIAL FIBRILLATION (HCC): Primary | ICD-10-CM

## 2018-09-11 DIAGNOSIS — E78.5 DYSLIPIDEMIA: ICD-10-CM

## 2018-09-11 DIAGNOSIS — R94.39 ABNORMAL STRESS TEST: ICD-10-CM

## 2018-09-11 DIAGNOSIS — R00.2 PALPITATIONS: ICD-10-CM

## 2018-09-11 DIAGNOSIS — R53.83 FATIGUE, UNSPECIFIED TYPE: ICD-10-CM

## 2018-09-11 DIAGNOSIS — I25.10 CORONARY ARTERY DISEASE INVOLVING NATIVE CORONARY ARTERY OF NATIVE HEART WITHOUT ANGINA PECTORIS: ICD-10-CM

## 2018-09-11 DIAGNOSIS — I27.20 PULMONARY HYPERTENSION (HCC): ICD-10-CM

## 2018-09-11 DIAGNOSIS — Z00.00 HEALTHCARE MAINTENANCE: ICD-10-CM

## 2018-09-11 DIAGNOSIS — I82.4Y9 ACUTE DEEP VEIN THROMBOSIS (DVT) OF PROXIMAL VEIN OF LOWER EXTREMITY, UNSPECIFIED LATERALITY (HCC): ICD-10-CM

## 2018-09-11 DIAGNOSIS — I26.99 OTHER ACUTE PULMONARY EMBOLISM WITHOUT ACUTE COR PULMONALE (HCC): ICD-10-CM

## 2018-09-11 DIAGNOSIS — E78.2 MIXED HYPERLIPIDEMIA: ICD-10-CM

## 2018-09-11 DIAGNOSIS — R09.89 BRUIT OF RIGHT CAROTID ARTERY: ICD-10-CM

## 2018-09-11 DIAGNOSIS — G47.33 OSA ON CPAP: ICD-10-CM

## 2018-09-11 DIAGNOSIS — I10 ESSENTIAL HYPERTENSION: ICD-10-CM

## 2018-09-11 DIAGNOSIS — R60.9 EDEMA, UNSPECIFIED TYPE: ICD-10-CM

## 2018-09-11 DIAGNOSIS — Z99.89 OSA ON CPAP: ICD-10-CM

## 2018-09-11 PROBLEM — M10.9: Status: ACTIVE | Noted: 2018-09-11

## 2018-09-11 PROCEDURE — 93000 ELECTROCARDIOGRAM COMPLETE: CPT | Performed by: INTERNAL MEDICINE

## 2018-09-11 PROCEDURE — 99214 OFFICE O/P EST MOD 30 MIN: CPT | Performed by: INTERNAL MEDICINE

## 2018-09-11 RX ORDER — PREDNISONE 10 MG/1
TABLET ORAL
COMMUNITY
Start: 2018-09-07 | End: 2019-02-11

## 2018-09-11 RX ORDER — WARFARIN SODIUM 5 MG/1
5 TABLET ORAL TAKE AS DIRECTED
Status: ON HOLD | COMMUNITY
Start: 2018-08-28 | End: 2021-03-29 | Stop reason: SDUPTHER

## 2018-09-11 NOTE — PROGRESS NOTES
Subjective   Holly Corona is a 73 y.o. female     Chief Complaint   Patient presents with   • Atrial Fibrillation     Here for 6 mo. f/u   • Hypertension   • Palpitations   • Deep Vein Thrombosis   • Pulmonary Embolism   • Sleep Apnea   • Hypothyroidism       PROBLEM LIST:         1. Paroxysmal Atrial  fibrillation  1.1 Pulmonary vein isolation procedure with ablation of right atrial flutter by Dr. Mario, March 2013.  2. Severe pulmonary hypertension with pulmonary pressure 60-65% by most recent cath.  3. Hypertension  3.1 Echo 9/9/15 - mild LVH; EF > 65%; mild to mod MR; mod TR; PA 55-60; mild IA  3.2 recent ECHO 03/09/17, left ventricular chamber is mildly dilated. Mild concentric LVH. EF > 65%. Issa Grade ll diastolic dysfunction. Left atrium moderately dilated, right atrium mild to moderate dilated. Systolic pressure 55-60 mmHg.   3.3 recent stress 04/11/17 inferior ischemia, chest wall attenuation.   4. Dyslipidemia  5. History of DVT/PE  6. History of colon CA x 2 status post surgery 2014.  7. Carotid Artery Stenosis  7.1 Carotid U/S 3/8/16 - 16-49% MARYBEL and LICA; antegrade flow  8. HANK - CPAP   9. GOUT, recurrent flare-ups            Specialty Problems        Cardiology Problems    HTN (hypertension)        Paroxysmal atrial fibrillation (CMS/HCC)        Atrial fibrillation (CMS/HCC)        Carotid artery stenosis        Carotid bruit        Deep venous thrombosis (CMS/HCC)        Diastolic dysfunction        Hypertension        Palpitations        Peripheral vascular disease (CMS/HCC)        Pulmonary embolus (CMS/HCC)        Pulmonary hypertension        Syncope        Abnormal stress test                HPI:  Ms. Corona returns for follow-up on the above problems.    She has had several episodes of gout since her last visit.  This is her major complaint today.  She searched the Internet and found that this was likely related to her diuretic therapy which is what she was told when she called our office  with her symptoms.  She was instructed not to stop diuretics because of her severely elevated left ventricular filling pressures at the time of prior cardiac catheterization, and her ongoing edema.    Ms. Corona continues to be without chest pain, orthopnea, or PND.  Lower extremity edema is stable.  She has no palpitations presyncope or syncope.  When initially evaluated for gout Ms. Corona was prescribed allopurinol.  She does not take that medication for fear of side effects.              CURRENT MEDICATION:    Current Outpatient Prescriptions   Medication Sig Dispense Refill   • albuterol (PROVENTIL) (2.5 MG/3ML) 0.083% nebulizer solution Take 2.5 mg by nebulization every 4 (four) hours as needed for wheezing.     • aspirin 81 MG EC tablet Take 1 tablet by mouth daily.     • CloNIDine (CATAPRES) 0.2 MG tablet Take 1 tablet by mouth 3 (Three) Times a Day. (Patient taking differently: Take 0.2 mg by mouth 2 (Two) Times a Day.) 90 tablet 3   • furosemide (LASIX) 40 MG tablet Take 1 tablet by mouth Daily. 90 tablet 3   • hydrALAZINE (APRESOLINE) 25 MG tablet Take 1 tablet by mouth 3 (Three) Times a Day. 90 tablet 3   • isosorbide mononitrate (IMDUR) 30 MG 24 hr tablet Take 1 tablet by mouth Daily. 90 tablet 3   • levothyroxine (SYNTHROID, LEVOTHROID) 88 MCG tablet Take 1 tablet by mouth daily.     • losartan (COZAAR) 50 MG tablet Take 1 tablet by mouth Daily. 90 tablet 3   • metOLazone (ZAROXOLYN) 5 MG tablet Take 1 tablet by mouth Every Other Day. 90 tablet 3   • O2 (OXYGEN) Inhale 2 L/min 1 (One) Time. Through c-pap     • pantoprazole (PROTONIX) 40 MG EC tablet Take 1 tablet by mouth Daily. 90 tablet 3   • potassium chloride (K-DUR,KLOR-CON) 20 MEQ CR tablet Take 1 tablet by mouth Daily. 20 meq alternated with 40 meq 180 tablet 3   • predniSONE (DELTASONE) 10 MG tablet taper dosing     • RESTASIS 0.05 % ophthalmic emulsion Daily.     • sotalol (BETAPACE AF) 80 MG tablet tablet Take 1 tablet by mouth Every 12 (Twelve)  Hours. 180 tablet 3   • warfarin (COUMADIN) 5 MG tablet Daily.       No current facility-administered medications for this visit.        ALLERGIES:    Ciprofloxacin; Ofloxacin; and Iodinated diagnostic agents    PAST MEDICAL HISTORY:    Past Medical History:   Diagnosis Date   • Anemia    • Atrial fibrillation (CMS/HCC)     A.  History of prior pulmonary vein ablation 03/14/2013. B.  On chronic Coumadin and Tikosyn therapy.   • Carotid artery stenosis    • Carotid bruit    • Deep venous thrombosis (CMS/HCC)     A.  History of right lower extremity DVT treated with in therapy until October 2000.   • Diastolic dysfunction    • Diastolic dysfunction    • Dizziness    • Dyslipidemia    • Edema    • Exertional shortness of breath    • GERD (gastroesophageal reflux disease)    • Gout    • H/O echocardiogram     A.  Echocardiogram of 10/16/2013 reports an ejection fraction of 55-60%, a moderately to severely dilated left atrium, mild to moderately dilated right atrium, trace aortic regurgitation, mild mitral and tricuspid regurgitation with calculated    • Hiatal hernia    • Hypertension     A.  Echocardiogram 10/16/2013 reports a calculated RVSP of 50-55 mmHg.B.  Right heart catheterization 03/12/2009 at  reported RV of 72/19, PA 72/27 and PCWP of 24, this was felt to be     • Hypothyroidism    • Morbid obesity (CMS/HCC)    • Obstructive sleep apnea     A.  On CPAP each bedtime.   • Osteoarthritis    • Palpitations    • Peripheral vascular disease (CMS/HCC)    • Pulmonary embolus (CMS/HCC)     A.  Developed during chemotherapy in 2/2012. B.  Coumadin therapy reinitiated at that time.   • Pulmonary hypertension    • Signet ring cell adenocarcinoma (CMS/HCC)     A.  Diagnosed on colonoscopy E. 10/14/2011, status post colon resection and 2 of 20 nodes positive, T3, N1b Stage IIIB. B.  Status post chemotherapy.    • Syncope        SURGICAL HISTORY:    Past Surgical History:   Procedure Laterality Date   • CARDIAC  CATHETERIZATION     • FOOT SURGERY     • HERNIA REPAIR      2011   • HYSTERECTOMY      1993   • OTHER SURGICAL HISTORY      cardiac cath procedure summary, A.  Cardiac catheterization April 2007 reported no significance coronary artery disease with markedly elevated LVEDP.   • OTHER SURGICAL HISTORY      catheter ablation atrial fibrillation, 2013   • OTHER SURGICAL HISTORY Left     knee replacement 2005   • OTHER SURGICAL HISTORY      neuroplasty decompression median nerve at carpal tunnel 1997   • OTHER SURGICAL HISTORY      partial colectomy , A.  Status post right hemicolectomy secondary to colon cancer in 2011.       SOCIAL HISTORY:    Social History     Social History   • Marital status:      Spouse name: N/A   • Number of children: N/A   • Years of education: N/A     Occupational History   • Not on file.     Social History Main Topics   • Smoking status: Former Smoker   • Smokeless tobacco: Never Used   • Alcohol use No   • Drug use: No   • Sexual activity: Defer     Other Topics Concern   • Not on file     Social History Narrative   • No narrative on file       FAMILY HISTORY:    Family History   Problem Relation Age of Onset   • Other Mother         MEDICAL PROBLEMS   • Other Sister         myocardial infarction.       Review of Systems   Constitutional: Positive for fatigue.   HENT: Negative.    Eyes: Positive for visual disturbance (glasses prn).   Respiratory: Positive for shortness of breath.         No orthopnea/PND   Cardiovascular: Positive for palpitations and leg swelling (usual). Negative for chest pain.   Gastrointestinal: Negative.  Negative for blood in stool (no melena,hematuria,hematochezia,hemoptysis).   Endocrine: Negative.    Genitourinary: Negative.    Musculoskeletal: Positive for arthralgias, back pain, gait problem (ambulates with cane), joint swelling (gout) and myalgias.   Skin: Negative.    Allergic/Immunologic: Negative.    Neurological: Negative for dizziness and  "light-headedness.   Hematological: Bruises/bleeds easily (on Coumadin).   Psychiatric/Behavioral: Positive for sleep disturbance (uses c-pap with 02).       VISIT VITALS:  Vitals:    09/11/18 1048   BP: 165/57   BP Location: Left arm   Patient Position: Sitting   Pulse: 71   SpO2: 95%   Weight: (!) 148 kg (325 lb 9.6 oz)   Height: 170.2 cm (67.01\")      /57 (BP Location: Left arm, Patient Position: Sitting)   Pulse 71   Ht 170.2 cm (67.01\")   Wt (!) 148 kg (325 lb 9.6 oz)   SpO2 95%   BMI 50.98 kg/m²     RECENT LABS:    Objective       Physical Exam   Constitutional: She is oriented to person, place, and time. She appears well-developed and well-nourished. No distress.   HENT:   Head: Normocephalic and atraumatic.   Eyes: Pupils are equal, round, and reactive to light. Conjunctivae and EOM are normal.   Neck: Normal range of motion. Neck supple. No hepatojugular reflux and no JVD present. Carotid bruit is present (soft left bruit). No tracheal deviation present.   Cardiovascular: Normal rate, regular rhythm, S2 normal and intact distal pulses.  Exam reveals no S3, no S4 and no friction rub.    Murmur heard.   Systolic murmur is present   S1 decreased  2/6 RUSB  Systolic murmur   Pulmonary/Chest: Effort normal and breath sounds normal. She has no wheezes. She has no rhonchi. She has no rales.   NL. Exp. phase   Abdominal: Soft. Bowel sounds are normal. She exhibits no distension, no abdominal bruit and no mass. There is no tenderness. There is no rebound and no guarding.   No organomegaly   Musculoskeletal: Normal range of motion. She exhibits edema. She exhibits no tenderness or deformity.   BLE,  edema 3-4 + to mid calf   LLE severe venous stasis changes  RLE, Mod. Venous changes   Neurological: She is alert and oriented to person, place, and time.   Skin: Skin is warm and dry. No rash noted. No erythema. No pallor.   Psychiatric: She has a normal mood and affect. Her behavior is normal. Judgment and " thought content normal.   Nursing note and vitals reviewed.        ECG 12 Lead  Date/Time: 9/11/2018 11:02 AM  Performed by: CINDY LEYVA  Authorized by: CINDY LEYVA   Rhythm: sinus rhythm  Rate: bradycardic  QRS axis: normal                Assessment/Plan   #1.  History of mild congestive heart failure related to diastolic dysfunction and severely elevated left ventricular filling pressures area and left ventricular end-diastolic pressure was 40 at the time prior cardiac catheterization.    #2.  Pulmonary hypertension.  This is likely secondary to diastolic dysfunction, struck of sleep apnea and history of pulmonary embolism.    #3.  Given #1 and 2 above, I think that it is of cordoba tantamount importance that the patient continue diuretic therapy.  Therefore, I explained the pathophysiology of increased uric acid levels in the setting of diuretics, Ms. Corona voiced understanding, and she will continue her current dosing as starting allopurinol.    #4.  I also encouraged the patient to follow-up with neurosurgery with regard to any treatment which might increase her morbility which would help with edema, weight loss, physical conditioning, blood pressure, and lipids.    #5.  Ms. Corona will follow-up with Dr. Andrew as instructed and in our office in 6 months or on a when necessary basis for symptoms as discussed today.        Diagnosis Plan   1. Paroxysmal atrial fibrillation (CMS/HCC)     2. Essential hypertension     3. Palpitations     4. Diastolic dysfunction     5. Acute deep vein thrombosis (DVT) of proximal vein of lower extremity, unspecified laterality (CMS/HCC)     6. Other acute pulmonary embolism without acute cor pulmonale (CMS/HCC)     7. Peripheral vascular disease (CMS/HCC)     8. Pulmonary hypertension     9. Syncope, unspecified syncope type     10. Bruit of right carotid artery     11. Bilateral carotid artery stenosis     12. Abnormal stress test     13. HANK on CPAP     14. Dyslipidemia      15. Edema, unspecified type     16. Dizziness         No Follow-up on file.              Patient's Body mass index is 50.98 kg/m². BMI is above normal parameters. Recommendations include: educational material and referral to primary care.       Nissa Qiu LPN    Scribed for Dr. Sacha Romero by Nissa Qiu LPN September 11, 2018 11:02 AM         Electronically signed by:            This note is dictated utilizing voice recognition software.  Although this record has been proof read, transcriptional errors may still be present. If questions occur regarding the content of this record please do not hesitate to call our office.

## 2018-09-11 NOTE — PATIENT INSTRUCTIONS
Obesity, Adult  Obesity is the condition of having too much total body fat. Being overweight or obese means that your weight is greater than what is considered healthy for your body size. Obesity is determined by a measurement called BMI. BMI is an estimate of body fat and is calculated from height and weight. For adults, a BMI of 30 or higher is considered obese.  Obesity can eventually lead to other health concerns and major illnesses, including:  · Stroke.  · Coronary artery disease (CAD).  · Type 2 diabetes.  · Some types of cancer, including cancers of the colon, breast, uterus, and gallbladder.  · Osteoarthritis.  · High blood pressure (hypertension).  · High cholesterol.  · Sleep apnea.  · Gallbladder stones.  · Infertility problems.    What are the causes?  The main cause of obesity is taking in (consuming) more calories than your body uses for energy. Other factors that contribute to this condition may include:  · Being born with genes that make you more likely to become obese.  · Having a medical condition that causes obesity. These conditions include:  ? Hypothyroidism.  ? Polycystic ovarian syndrome (PCOS).  ? Binge-eating disorder.  ? Cushing syndrome.  · Taking certain medicines, such as steroids, antidepressants, and seizure medicines.  · Not being physically active (sedentary lifestyle).  · Living where there are limited places to exercise safely or buy healthy foods.  · Not getting enough sleep.    What increases the risk?  The following factors may increase your risk of this condition:  · Having a family history of obesity.  · Being a woman of -American descent.  · Being a man of  descent.    What are the signs or symptoms?  Having excessive body fat is the main symptom of this condition.  How is this diagnosed?  This condition may be diagnosed based on:  · Your symptoms.  · Your medical history.  · A physical exam. Your health care provider may measure:  ? Your BMI. If you are an  adult with a BMI between 25 and less than 30, you are considered overweight. If you are an adult with a BMI of 30 or higher, you are considered obese.  ? The distances around your hips and your waist (circumferences). These may be compared to each other to help diagnose your condition.  ? Your skinfold thickness. Your health care provider may gently pinch a fold of your skin and measure it.    How is this treated?  Treatment for this condition often includes changing your lifestyle. Treatment may include some or all of the following:  · Dietary changes. Work with your health care provider and a dietitian to set a weight-loss goal that is healthy and reasonable for you. Dietary changes may include eating:  ? Smaller portions. A portion size is the amount of a particular food that is healthy for you to eat at one time. This varies from person to person.  ? Low-calorie or low-fat options.  ? More whole grains, fruits, and vegetables.  · Regular physical activity. This may include aerobic activity (cardio) and strength training.  · Medicine to help you lose weight. Your health care provider may prescribe medicine if you are unable to lose 1 pound a week after 6 weeks of eating more healthily and doing more physical activity.  · Surgery. Surgical options may include gastric banding and gastric bypass. Surgery may be done if:  ? Other treatments have not helped to improve your condition.  ? You have a BMI of 40 or higher.  ? You have life-threatening health problems related to obesity.    Follow these instructions at home:    Eating and drinking    · Follow recommendations from your health care provider about what you eat and drink. Your health care provider may advise you to:  ? Limit fast foods, sweets, and processed snack foods.  ? Choose low-fat options, such as low-fat milk instead of whole milk.  ? Eat 5 or more servings of fruits or vegetables every day.  ? Eat at home more often. This gives you more control over  what you eat.  ? Choose healthy foods when you eat out.  ? Learn what a healthy portion size is.  ? Keep low-fat snacks on hand.  ? Avoid sugary drinks, such as soda, fruit juice, iced tea sweetened with sugar, and flavored milk.  ? Eat a healthy breakfast.  · Drink enough water to keep your urine clear or pale yellow.  · Do not go without eating for long periods of time (do not fast) or follow a fad diet. Fasting and fad diets can be unhealthy and even dangerous.  Physical Activity  · Exercise regularly, as told by your health care provider. Ask your health care provider what types of exercise are safe for you and how often you should exercise.  · Warm up and stretch before being active.  · Cool down and stretch after being active.  · Rest between periods of activity.  Lifestyle  · Limit the time that you spend in front of your TV, computer, or video game system.  · Find ways to reward yourself that do not involve food.  · Limit alcohol intake to no more than 1 drink a day for nonpregnant women and 2 drinks a day for men. One drink equals 12 oz of beer, 5 oz of wine, or 1½ oz of hard liquor.  General instructions  · Keep a weight loss journal to keep track of the food you eat and how much you exercise you get.  · Take over-the-counter and prescription medicines only as told by your health care provider.  · Take vitamins and supplements only as told by your health care provider.  · Consider joining a support group. Your health care provider may be able to recommend a support group.  · Keep all follow-up visits as told by your health care provider. This is important.  Contact a health care provider if:  · You are unable to meet your weight loss goal after 6 weeks of dietary and lifestyle changes.  This information is not intended to replace advice given to you by your health care provider. Make sure you discuss any questions you have with your health care provider.  Document Released: 01/25/2006 Document Revised:  05/22/2017 Document Reviewed: 10/05/2016  [employer] Interactive Patient Education © 2018 [employer]  MyPlate from [employer]  The general, healthful diet is based on the 2010 Dietary Guidelines for Americans. The amount of food you need to eat from each food group depends on your age, sex, and level of physical activity and can be individualized by a dietitian. Go to ChooseMyPlate.gov for more information.  What do I need to know about the MyPlate plan?  · Enjoy your food, but eat less.  · Avoid oversized portions.  ? ½ of your plate should include fruits and vegetables.  ? ¼ of your plate should be grains.  ? ¼ of your plate should be protein.  Grains  · Make at least half of your grains whole grains.  · For a 2,000 calorie daily food plan, eat 6 oz every day.  · 1 oz is about 1 slice bread, 1 cup cereal, or ½ cup cooked rice, cereal, or pasta.  Vegetables  · Make half your plate fruits and vegetables.  · For a 2,000 calorie daily food plan, eat 2½ cups every day.  · 1 cup is about 1 cup raw or cooked vegetables or vegetable juice or 2 cups raw leafy greens.  Fruits  · Make half your plate fruits and vegetables.  · For a 2,000 calorie daily food plan, eat 2 cups every day.  · 1 cup is about 1 cup fruit or 100% fruit juice or ½ cup dried fruit.  Protein  · For a 2,000 calorie daily food plan, eat 5½ oz every day.  · 1 oz is about 1 oz meat, poultry, or fish, ¼ cup cooked beans, 1 egg, 1 Tbsp peanut butter, or ½ oz nuts or seeds.  Dairy  · Switch to fat-free or low-fat (1%) milk.  · For a 2,000 calorie daily food plan, eat 3 cups every day.  · 1 cup is about 1 cup milk or yogurt or soy milk (soy beverage), 1½ oz natural cheese, or 2 oz processed cheese.  Fats, Oils, and Empty Calories  · Only small amounts of oils are recommended.  · Empty calories are calories from solid fats or added sugars.  · Compare sodium in foods like soup, bread, and frozen meals. Choose the foods with lower numbers.  · Drink water instead of  sugary drinks.  What foods can I eat?  Grains  Whole grains such as whole wheat, quinoa, millet, and bulgur. Bread, rolls, and pasta made from whole grains. Brown or wild rice. Hot or cold cereals made from whole grains and without added sugar.  Vegetables  All fresh vegetables, especially fresh red, dark green, or orange vegetables. Peas and beans. Low-sodium frozen or canned vegetables prepared without added salt. Low-sodium vegetable juices.  Fruits  All fresh, frozen, and dried fruits. Canned fruit packed in water or fruit juice without added sugar. Fruit juices without added sugar.  Meats and Other Protein Sources  Boiled, baked, or grilled lean meat trimmed of fat. Skinless poultry. Fresh seafood and shellfish. Canned seafood packed in water. Unsalted nuts and unsalted nut butters. Tofu. Dried beans and pea. Eggs.  Dairy  Low-fat or fat-free milk, yogurt, and cheeses.  Sweets and Desserts  Frozen desserts made from low-fat milk.  Fats and Oils  Olive, peanut, and canola oils and margarine. Salad dressing and mayonnaise made from these oils.  Other  Soups and casseroles made from allowed ingredients and without added fat or salt.  The items listed above may not be a complete list of recommended foods or beverages. Contact your dietitian for more options.  What foods are not recommended?  Grains  Sweetened, low-fiber cereals. Packaged baked goods. Snack crackers and chips. Cheese crackers, butter crackers, and biscuits. Frozen waffles, sweet breads, doughnuts, pastries, packaged baking mixes, pancakes, cakes, and cookies.  Vegetables  Regular canned or frozen vegetables or vegetables prepared with salt. Canned tomatoes. Canned tomato sauce. Fried vegetables. Vegetables in cream sauce or cheese sauce.  Fruits  Fruits packed in syrup or made with added sugar.  Meats and Other Protein Sources  Marbled or fatty meats such as ribs. Poultry with skin. Fried meats, poultry, eggs, or fish. Sausages, hot dogs, and deli  meats such as pastrami, bologna, or salami.  Dairy  Whole milk, cream, cheeses made from whole milk, sour cream. Ice cream or yogurt made from whole milk or with added sugar.  Beverages  For adults, no more than one alcoholic drink per day. Regular soft drinks or other sugary beverages. Juice drinks.  Sweets and Desserts  Sugary or fatty desserts, candy, and other sweets.  Fats and Oils  Solid shortening or partially hydrogenated oils. Solid margarine. Margarine that contains trans fats. Butter.  The items listed above may not be a complete list of foods and beverages to avoid. Contact your dietitian for more information.  This information is not intended to replace advice given to you by your health care provider. Make sure you discuss any questions you have with your health care provider.  Document Released: 01/06/2009 Document Revised: 05/25/2017 Document Reviewed: 11/26/2014  [employer] Interactive Patient Education © 2018 [employer]

## 2018-10-12 ENCOUNTER — TELEPHONE (OUTPATIENT)
Dept: CARDIOLOGY | Facility: CLINIC | Age: 73
End: 2018-10-12

## 2018-10-12 DIAGNOSIS — R53.83 FATIGUE, UNSPECIFIED TYPE: ICD-10-CM

## 2018-10-12 DIAGNOSIS — Z00.00 HEALTHCARE MAINTENANCE: ICD-10-CM

## 2018-10-12 DIAGNOSIS — E78.2 MIXED HYPERLIPIDEMIA: ICD-10-CM

## 2018-10-12 DIAGNOSIS — I25.10 CORONARY ARTERY DISEASE INVOLVING NATIVE CORONARY ARTERY OF NATIVE HEART WITHOUT ANGINA PECTORIS: Primary | ICD-10-CM

## 2018-10-12 NOTE — TELEPHONE ENCOUNTER
"----- Message from Violetta Saul LPN sent at 10/12/2018 10:20 AM EDT -----   Received VM from pt stating that she's supposed to get labs done after her Alopurinol. Pt stated that she wants to get \"complete and comprehensive\" lab work done. She would like an order entered for outside lab.     Lab orders entered for patient and faxed to [facility] lab per patient request.   "

## 2018-10-18 ENCOUNTER — TELEPHONE (OUTPATIENT)
Dept: CARDIOLOGY | Facility: CLINIC | Age: 73
End: 2018-10-18

## 2018-10-18 NOTE — TELEPHONE ENCOUNTER
Patient called wanting lab results. I advised patient of results. She was wanting uric acid ordered since the patient was on allopurinol. I advised patient to have her PCP order the test. She verbalized understanding.

## 2018-10-18 NOTE — TELEPHONE ENCOUNTER
----- Message from Alejandranatalie Almanza sent at 10/18/2018 10:07 AM EDT -----  Contact: PT  PT IS WANTING SOMEONE TO CALL HER ABOUT HER BLOOD WORK. SHE SAID SHE CALLED YESTERDAY BUT NO ONE RETURNED HER CALL. SHE IS WANTING A NURSE TO CALL HER BACK. PLEASE CALL [phone]

## 2019-01-03 ENCOUNTER — TELEPHONE (OUTPATIENT)
Dept: CARDIOLOGY | Facility: CLINIC | Age: 74
End: 2019-01-03

## 2019-01-03 NOTE — TELEPHONE ENCOUNTER
Patient called stating that she is not wanting to take Losartan any more due to a recall she seen on TV.     Per [facility] to start Benicar 40mg. She is to take it one daily in replacement of losartan. I advised her on voicemail I would send in 1 month for the patient to see if it can be tolerated. She was advised to keep a blood pressure log.     I called patient and left voicemail with instructions.

## 2019-01-04 RX ORDER — OLMESARTAN MEDOXOMIL 40 MG/1
40 TABLET ORAL DAILY
Qty: 30 TABLET | Refills: 0 | Status: SHIPPED | OUTPATIENT
Start: 2019-01-04 | End: 2019-01-07

## 2019-01-07 ENCOUNTER — TELEPHONE (OUTPATIENT)
Dept: CARDIOLOGY | Facility: CLINIC | Age: 74
End: 2019-01-07

## 2019-01-07 RX ORDER — TELMISARTAN 80 MG/1
80 TABLET ORAL DAILY
Qty: 90 TABLET | Refills: 3 | Status: SHIPPED | OUTPATIENT
Start: 2019-01-07 | End: 2021-11-10 | Stop reason: ALTCHOICE

## 2019-01-07 NOTE — TELEPHONE ENCOUNTER
----- Message from Nissa Qiu LPN sent at 1/7/2019  4:40 PM EST -----  [facility] CHANGED LOSARTAN TO BENICAR. 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

## 2019-01-07 NOTE — TELEPHONE ENCOUNTER
Comments     Change to Micardis 80 mg daily. And any further needs to be reviewed by . -[facility]      Patient was notified of medication changes @ 0604 PM. -STEPHANIE;ELVA

## 2019-02-11 ENCOUNTER — OFFICE VISIT (OUTPATIENT)
Dept: CARDIOLOGY | Facility: CLINIC | Age: 74
End: 2019-02-11

## 2019-02-11 VITALS
HEART RATE: 64 BPM | DIASTOLIC BLOOD PRESSURE: 58 MMHG | WEIGHT: 293 LBS | BODY MASS INDEX: 45.99 KG/M2 | OXYGEN SATURATION: 96 % | HEIGHT: 67 IN | SYSTOLIC BLOOD PRESSURE: 143 MMHG

## 2019-02-11 DIAGNOSIS — I73.9 PERIPHERAL VASCULAR DISEASE (HCC): ICD-10-CM

## 2019-02-11 DIAGNOSIS — I27.20 PULMONARY HYPERTENSION (HCC): ICD-10-CM

## 2019-02-11 DIAGNOSIS — I10 ESSENTIAL HYPERTENSION: ICD-10-CM

## 2019-02-11 DIAGNOSIS — R42 DIZZINESS: ICD-10-CM

## 2019-02-11 DIAGNOSIS — R07.2 PRECORDIAL PAIN: ICD-10-CM

## 2019-02-11 DIAGNOSIS — G47.33 OSA ON CPAP: ICD-10-CM

## 2019-02-11 DIAGNOSIS — R09.89 BILATERAL CAROTID BRUITS: ICD-10-CM

## 2019-02-11 DIAGNOSIS — K21.9 GASTROESOPHAGEAL REFLUX DISEASE, ESOPHAGITIS PRESENCE NOT SPECIFIED: ICD-10-CM

## 2019-02-11 DIAGNOSIS — R60.9 EDEMA, UNSPECIFIED TYPE: ICD-10-CM

## 2019-02-11 DIAGNOSIS — R00.2 PALPITATIONS: ICD-10-CM

## 2019-02-11 DIAGNOSIS — Z99.89 OSA ON CPAP: ICD-10-CM

## 2019-02-11 DIAGNOSIS — I51.89 DIASTOLIC DYSFUNCTION: ICD-10-CM

## 2019-02-11 DIAGNOSIS — R55 SYNCOPE, UNSPECIFIED SYNCOPE TYPE: ICD-10-CM

## 2019-02-11 DIAGNOSIS — I82.4Y9 ACUTE DEEP VEIN THROMBOSIS (DVT) OF PROXIMAL VEIN OF LOWER EXTREMITY, UNSPECIFIED LATERALITY (HCC): ICD-10-CM

## 2019-02-11 DIAGNOSIS — E78.5 DYSLIPIDEMIA: ICD-10-CM

## 2019-02-11 DIAGNOSIS — I48.0 PAROXYSMAL ATRIAL FIBRILLATION (HCC): Primary | ICD-10-CM

## 2019-02-11 DIAGNOSIS — I65.23 BILATERAL CAROTID ARTERY STENOSIS: ICD-10-CM

## 2019-02-11 DIAGNOSIS — I26.99 OTHER ACUTE PULMONARY EMBOLISM WITHOUT ACUTE COR PULMONALE (HCC): ICD-10-CM

## 2019-02-11 DIAGNOSIS — R06.02 EXERTIONAL SHORTNESS OF BREATH: ICD-10-CM

## 2019-02-11 PROCEDURE — 99214 OFFICE O/P EST MOD 30 MIN: CPT | Performed by: INTERNAL MEDICINE

## 2019-02-11 RX ORDER — PANTOPRAZOLE SODIUM 40 MG/1
40 TABLET, DELAYED RELEASE ORAL DAILY
Qty: 90 TABLET | Refills: 3 | Status: SHIPPED | OUTPATIENT
Start: 2019-02-11

## 2019-02-11 RX ORDER — ISOSORBIDE MONONITRATE 30 MG/1
30 TABLET, EXTENDED RELEASE ORAL DAILY
Qty: 90 TABLET | Refills: 3 | Status: SHIPPED | OUTPATIENT
Start: 2019-02-11 | End: 2020-02-13 | Stop reason: SDUPTHER

## 2019-02-11 RX ORDER — ALLOPURINOL 300 MG/1
TABLET ORAL NIGHTLY
COMMUNITY
Start: 2019-01-08 | End: 2019-03-22

## 2019-02-11 RX ORDER — FUROSEMIDE 40 MG/1
40 TABLET ORAL DAILY
Qty: 90 TABLET | Refills: 3 | Status: SHIPPED | OUTPATIENT
Start: 2019-02-11 | End: 2020-07-02 | Stop reason: DRUGHIGH

## 2019-02-11 RX ORDER — SOTALOL HYDROCHLORIDE 80 MG/1
80 TABLET ORAL EVERY 12 HOURS SCHEDULED
Qty: 180 TABLET | Refills: 3 | Status: SHIPPED | OUTPATIENT
Start: 2019-02-11 | End: 2019-11-22

## 2019-02-11 RX ORDER — ALLOPURINOL 100 MG/1
TABLET ORAL EVERY MORNING
COMMUNITY
Start: 2019-01-16 | End: 2019-03-22

## 2019-02-11 NOTE — PROGRESS NOTES
Subjective   Holly Corona is a 73 y.o. female     Chief Complaint   Patient presents with   • Atrial Fibrillation   • Hypertension   • Palpitations     PHTN and SOB   • Deep Vein Thrombosis   • Pulmonary Embolism   • Sleep Apnea   • Shortness of Breath   • Hyperlipidemia       PROBLEM LIST:       1. Paroxysmal Atrial  fibrillation  1.1 Pulmonary vein isolation procedure with ablation of right atrial flutter by Dr. Mario, March 2013.  2. Severe pulmonary hypertension with pulmonary pressure 60-65% by most recent cath.  3. Hypertension  3.1 Echo 9/9/15 - mild LVH; EF > 65%; mild to mod MR; mod TR; PA 55-60; mild WI  3.2 recent ECHO 03/09/17, left ventricular chamber is mildly dilated. Mild concentric LVH. EF > 65%. Issa Grade ll diastolic dysfunction. Left atrium moderately dilated, right atrium mild to moderate dilated. Systolic pressure 55-60 mmHg.   3.3 recent stress 04/11/17 inferior ischemia, chest wall attenuation.   4. Dyslipidemia  5. History of DVT/PE  6. History of colon CA x 2 status post surgery 2014.  7. Carotid Artery Stenosis  7.1 Carotid U/S 3/8/16 - 16-49% MARYBEL and LICA; antegrade flow  8. HANK - CPAP   9. GOUT, recurrent flare-ups            Specialty Problems        Cardiology Problems    HTN (hypertension)        Paroxysmal atrial fibrillation (CMS/HCC)        Atrial fibrillation (CMS/HCC)        Carotid artery stenosis        Carotid bruit        Deep venous thrombosis (CMS/HCC)        Diastolic dysfunction        Palpitations        Peripheral vascular disease (CMS/HCC)        Pulmonary embolus (CMS/HCC)        Pulmonary hypertension (CMS/HCC)        Syncope        Abnormal stress test                HPI:  Ms. Corona returns for follow-up on the above problems.    Her complaints are unchanged.  She has profound exertional dyspnea and moving from room to room.  However, she readily admits that she almost always goes from bed to table to chair and regularly doesn't change per Salo for an entire  day.  She makes no attempt to regular exercise.  He states this is because of limiting back pain    The patient denies orthopnea or PND.  She uses her CPAP mask nightly.  She complains of worsening lower extremity edema her weights have been stable and (see exam below) I ceased no significant change by examination today.  Holly palpitates but only when she attempts to be physically active she has no palpitations at rest.  He does describe short-lived episodes of dizziness or decreased awareness but not of dizziness, presyncope, or syncope.  She does not palpitate during those episodes.  She also denies hemoptysis, hematemesis, melena, hematochezia, or hematuria.  She describes no symptoms of focal neurologic events to suggest TIA or stroke.    Another complaint today is of chronic itching which Ms. Corona relates to allopurinol.  She stopped that medication temporarily and her itching improved, but her uric acid levels jay quickly.  She restarted that medication and itching recurred.                CURRENT MEDICATION:    Current Outpatient Medications   Medication Sig Dispense Refill   • albuterol (PROVENTIL) (2.5 MG/3ML) 0.083% nebulizer solution Take 2.5 mg by nebulization every 4 (four) hours as needed for wheezing.     • allopurinol (ZYLOPRIM) 100 MG tablet Every Morning.     • allopurinol (ZYLOPRIM) 300 MG tablet Every Night.     • aspirin 81 MG EC tablet Take 1 tablet by mouth daily.     • CloNIDine (CATAPRES) 0.2 MG tablet Take 1 tablet by mouth 3 (Three) Times a Day. (Patient taking differently: Take 0.2 mg by mouth 2 (Two) Times a Day.) 90 tablet 3   • furosemide (LASIX) 40 MG tablet Take 1 tablet by mouth Daily. 90 tablet 3   • hydrALAZINE (APRESOLINE) 25 MG tablet Take 1 tablet by mouth 3 (Three) Times a Day. 90 tablet 3   • isosorbide mononitrate (IMDUR) 30 MG 24 hr tablet Take 1 tablet by mouth Daily. 90 tablet 3   • levothyroxine (SYNTHROID, LEVOTHROID) 88 MCG tablet Take 1 tablet by mouth daily.     •  metOLazone (ZAROXOLYN) 5 MG tablet Take 1 tablet by mouth Every Other Day. 90 tablet 3   • O2 (OXYGEN) Inhale 2 L/min 1 (One) Time. Through c-pap     • pantoprazole (PROTONIX) 40 MG EC tablet Take 1 tablet by mouth Daily. 90 tablet 3   • potassium chloride (K-DUR,KLOR-CON) 20 MEQ CR tablet Take 1 tablet by mouth Daily. 20 meq alternated with 40 meq (Patient taking differently: Take 40 mEq by mouth Every Other Day.) 180 tablet 3   • RESTASIS 0.05 % ophthalmic emulsion Daily.     • sotalol (BETAPACE AF) 80 MG tablet tablet Take 1 tablet by mouth Every 12 (Twelve) Hours. 180 tablet 3   • telmisartan (MICARDIS) 80 MG tablet Take 1 tablet by mouth Daily. 90 tablet 3   • warfarin (COUMADIN) 5 MG tablet Daily. Takes 5 mg 5 days a week and 2.5 mg other 2 days       No current facility-administered medications for this visit.        ALLERGIES:    Ciprofloxacin; Ofloxacin; and Iodinated diagnostic agents    PAST MEDICAL HISTORY:    Past Medical History:   Diagnosis Date   • Anemia    • Atrial fibrillation (CMS/HCC)     A.  History of prior pulmonary vein ablation 03/14/2013. B.  On chronic Coumadin and Tikosyn therapy.   • Carotid artery stenosis    • Carotid bruit    • Deep venous thrombosis (CMS/HCC)     A.  History of right lower extremity DVT treated with in therapy until October 2000.   • Diastolic dysfunction    • Diastolic dysfunction    • Dizziness    • Dyslipidemia    • Edema    • Exertional shortness of breath    • GERD (gastroesophageal reflux disease)    • Gout    • H/O echocardiogram     A.  Echocardiogram of 10/16/2013 reports an ejection fraction of 55-60%, a moderately to severely dilated left atrium, mild to moderately dilated right atrium, trace aortic regurgitation, mild mitral and tricuspid regurgitation with calculated    • Hiatal hernia    • Hypertension     A.  Echocardiogram 10/16/2013 reports a calculated RVSP of 50-55 mmHg.B.  Right heart catheterization 03/12/2009 at  reported RV of 72/19, PA  72/27 and PCWP of 24, this was felt to be     • Hypothyroidism    • Morbid obesity (CMS/HCC)    • Obstructive sleep apnea     A.  On CPAP each bedtime.   • Osteoarthritis    • Palpitations    • Peripheral vascular disease (CMS/HCC)    • Pulmonary embolus (CMS/HCC)     A.  Developed during chemotherapy in 2/2012. B.  Coumadin therapy reinitiated at that time.   • Pulmonary hypertension (CMS/HCC)    • Signet ring cell adenocarcinoma (CMS/HCC)     A.  Diagnosed on colonoscopy E. 10/14/2011, status post colon resection and 2 of 20 nodes positive, T3, N1b Stage IIIB. B.  Status post chemotherapy.    • Syncope        SURGICAL HISTORY:    Past Surgical History:   Procedure Laterality Date   • CARDIAC CATHETERIZATION     • FOOT SURGERY     • HERNIA REPAIR      2011   • HYSTERECTOMY      1993   • OTHER SURGICAL HISTORY      cardiac cath procedure summary, A.  Cardiac catheterization April 2007 reported no significance coronary artery disease with markedly elevated LVEDP.   • OTHER SURGICAL HISTORY      catheter ablation atrial fibrillation, 2013   • OTHER SURGICAL HISTORY Left     knee replacement 2005   • OTHER SURGICAL HISTORY      neuroplasty decompression median nerve at carpal tunnel 1997   • OTHER SURGICAL HISTORY      partial colectomy , A.  Status post right hemicolectomy secondary to colon cancer in 2011.       SOCIAL HISTORY:    Social History     Socioeconomic History   • Marital status:      Spouse name: Not on file   • Number of children: Not on file   • Years of education: Not on file   • Highest education level: Not on file   Social Needs   • Financial resource strain: Not on file   • Food insecurity - worry: Not on file   • Food insecurity - inability: Not on file   • Transportation needs - medical: Not on file   • Transportation needs - non-medical: Not on file   Occupational History   • Not on file   Tobacco Use   • Smoking status: Former Smoker   • Smokeless tobacco: Never Used   Substance and  "Sexual Activity   • Alcohol use: No   • Drug use: No   • Sexual activity: Defer   Other Topics Concern   • Not on file   Social History Narrative   • Not on file       FAMILY HISTORY:    Family History   Problem Relation Age of Onset   • Other Mother         MEDICAL PROBLEMS   • Other Sister         myocardial infarction.       Review of Systems   Constitutional: Positive for fatigue.   HENT: Negative.    Eyes: Positive for visual disturbance (glasses prn).   Respiratory: Positive for shortness of breath (on 02).    Cardiovascular: Positive for chest pain (occas. \"discomfort\"), palpitations and leg swelling (rt. > lt.).   Gastrointestinal: Negative.  Negative for blood in stool (no melena,hematuria,hemoptysis,hematochezia).   Endocrine: Negative.    Genitourinary: Negative.    Musculoskeletal: Positive for arthralgias and myalgias.   Skin: Negative.    Allergic/Immunologic: Negative.    Neurological: Positive for dizziness and light-headedness.        Denies stroke like sx's   Hematological: Negative.    Psychiatric/Behavioral: Negative.        VISIT VITALS:  Vitals:    02/11/19 0836   BP: 143/58   BP Location: Left arm   Patient Position: Sitting   Pulse: 64   SpO2: 96%   Weight: (!) 149 kg (327 lb 9.6 oz)   Height: 170.2 cm (67.01\")      /58 (BP Location: Left arm, Patient Position: Sitting)   Pulse 64   Ht 170.2 cm (67.01\")   Wt (!) 149 kg (327 lb 9.6 oz)   SpO2 96%   BMI 51.30 kg/m²     RECENT LABS:    Objective       Physical Exam   Constitutional: She is oriented to person, place, and time. She appears well-developed and well-nourished. No distress.   HENT:   Head: Normocephalic and atraumatic.   Eyes: Conjunctivae and EOM are normal. Pupils are equal, round, and reactive to light.   Neck: Normal range of motion. Neck supple. No JVD present. Carotid bruit is present (loud rt., soft lt.). No tracheal deviation present.   NL. Carotid upstrokes     Cardiovascular: Normal rate, regular rhythm and intact " distal pulses. Exam reveals gallop and S4. Exam reveals no S3.   Murmur heard.   Systolic murmur is present.  Pulses:       Radial pulses are 2+ on the right side, and 2+ on the left side.   S1 decreased  2-3/6 systolic ejection murmur RUSB & LUSB  No MR    Pulmonary/Chest: Effort normal and breath sounds normal. She has no wheezes. She has no rhonchi. She has no rales.   Abdominal: Soft. Bowel sounds are normal. She exhibits no distension, no abdominal bruit and no mass. There is no tenderness. There is no rebound and no guarding.   Musculoskeletal: Normal range of motion. She exhibits edema. She exhibits no tenderness or deformity.   RLE, 1+ edema, mod. Venous stasis changes  LLE, 1-2+ edema to mid calf, mod. Venous stasis changes   Neurological: She is alert and oriented to person, place, and time.   Skin: Skin is warm and dry. No rash noted. No erythema. No pallor.   Psychiatric: She has a normal mood and affect. Her behavior is normal. Judgment and thought content normal.   Nursing note and vitals reviewed.      Procedures      Assessment/Plan   #1.  Profound exertional dyspnea.  As discussed with the patient on multiple occasions, I believe this is multifactorial in etiology with morbid obesity and physical deconditioning plane major roles.  I again tried to enlist Ms. Corona is interested in efforts at beginning to increase her physical activity as I have a multiple times in the past.  She feels that her back pain is limiting.  Therefore I asked her again to pursue any measures which might improve her back discomfort.  She was going to look at having back injections but decided not to pursue that route.  Discussed how exercise could result in mood elevation, calorie consumption, and might help with weight loss as well as improving physical conditioning.    #2.  Paroxysmal atrial fibrillation.  Although the patient palpitates this was only with exercise and I feel this represents tachycardia from lung disease,  obesity, and physical deconditioning.  We will not pursue further evaluation or changes in treatment at this time.  The patient is tolerating anticoagulation with warfarin without bleeding or symptoms of stroke.    #3.  Suboptimally controlled systemic hypertension.  Ms. Corona actually clear today if she is overmedicated from the standpoint of blood pressure.  I tried to reassure her that she needs to continue her medications and lifestyle modification to improve  pressures.    #4.  Itching.  I asked Ms. Corona did discuss with Dr. Andrew and possibly changing allopurinol    #5.  I believe that Ms. Corona is depressed, a motivational, and I think that there is significant secondary gain as a part of her willingness to actively involve herself in her own care.  I tried to address this with the patient today and urged her to begin any efforts which might improve her strength, physical activity, and address her physical to deconditioning and sleep.    #6.  The patient will follow-up with Dr. Andrew as instructed, and we will see Ms. Corona in our office in 6-12 months or on a when necessary basis.  Last carotid duplex study was done in 2016, we will repeat that study given physical exam findings today.  We'll follow-up with Ms. Corona telephonically if there is been significant change.   Diagnosis Plan   1. Paroxysmal atrial fibrillation (CMS/HCC)  sotalol (BETAPACE AF) 80 MG tablet tablet   2. Bilateral carotid artery stenosis  Duplex Carotid Ultrasound CAR   3. Acute deep vein thrombosis (DVT) of proximal vein of lower extremity, unspecified laterality (CMS/HCC)     4. Diastolic dysfunction     5. Essential hypertension     6. Palpitations     7. Peripheral vascular disease (CMS/HCC)     8. Other acute pulmonary embolism without acute cor pulmonale (CMS/HCC)     9. Pulmonary hypertension (CMS/HCC)     10. Syncope, unspecified syncope type     11. Exertional shortness of breath     12. HANK on CPAP     13. Dizziness     14.  Dyslipidemia     15. Edema, unspecified type  furosemide (LASIX) 40 MG tablet   16. Gastroesophageal reflux disease, esophagitis presence not specified  pantoprazole (PROTONIX) 40 MG EC tablet   17. Precordial pain  isosorbide mononitrate (IMDUR) 30 MG 24 hr tablet   18. Bilateral carotid bruits  Duplex Carotid Ultrasound CAR       Return in about 6 months (around 8/11/2019), or if symptoms worsen or fail to improve.           Patient's Body mass index is 51.3 kg/m². BMI is above normal parameters. Recommendations include: educational material and referral to primary care.       Nissa Qiu LPN      Scribed for Dr. Sacha Romero by Nissa Qiu LPN February 11, 2019 9:24 AM       Electronically signed by:            This note is dictated utilizing voice recognition software.  Although this record has been proof read, transcriptional errors may still be present. If questions occur regarding the content of this record please do not hesitate to call our office.

## 2019-02-11 NOTE — PATIENT INSTRUCTIONS
Obesity, Adult  Obesity is the condition of having too much total body fat. Being overweight or obese means that your weight is greater than what is considered healthy for your body size. Obesity is determined by a measurement called BMI. BMI is an estimate of body fat and is calculated from height and weight. For adults, a BMI of 30 or higher is considered obese.  Obesity can eventually lead to other health concerns and major illnesses, including:  · Stroke.  · Coronary artery disease (CAD).  · Type 2 diabetes.  · Some types of cancer, including cancers of the colon, breast, uterus, and gallbladder.  · Osteoarthritis.  · High blood pressure (hypertension).  · High cholesterol.  · Sleep apnea.  · Gallbladder stones.  · Infertility problems.    What are the causes?  The main cause of obesity is taking in (consuming) more calories than your body uses for energy. Other factors that contribute to this condition may include:  · Being born with genes that make you more likely to become obese.  · Having a medical condition that causes obesity. These conditions include:  ? Hypothyroidism.  ? Polycystic ovarian syndrome (PCOS).  ? Binge-eating disorder.  ? Cushing syndrome.  · Taking certain medicines, such as steroids, antidepressants, and seizure medicines.  · Not being physically active (sedentary lifestyle).  · Living where there are limited places to exercise safely or buy healthy foods.  · Not getting enough sleep.    What increases the risk?  The following factors may increase your risk of this condition:  · Having a family history of obesity.  · Being a woman of -American descent.  · Being a man of  descent.    What are the signs or symptoms?  Having excessive body fat is the main symptom of this condition.  How is this diagnosed?  This condition may be diagnosed based on:  · Your symptoms.  · Your medical history.  · A physical exam. Your health care provider may measure:  ? Your BMI. If you are an  adult with a BMI between 25 and less than 30, you are considered overweight. If you are an adult with a BMI of 30 or higher, you are considered obese.  ? The distances around your hips and your waist (circumferences). These may be compared to each other to help diagnose your condition.  ? Your skinfold thickness. Your health care provider may gently pinch a fold of your skin and measure it.    How is this treated?  Treatment for this condition often includes changing your lifestyle. Treatment may include some or all of the following:  · Dietary changes. Work with your health care provider and a dietitian to set a weight-loss goal that is healthy and reasonable for you. Dietary changes may include eating:  ? Smaller portions. A portion size is the amount of a particular food that is healthy for you to eat at one time. This varies from person to person.  ? Low-calorie or low-fat options.  ? More whole grains, fruits, and vegetables.  · Regular physical activity. This may include aerobic activity (cardio) and strength training.  · Medicine to help you lose weight. Your health care provider may prescribe medicine if you are unable to lose 1 pound a week after 6 weeks of eating more healthily and doing more physical activity.  · Surgery. Surgical options may include gastric banding and gastric bypass. Surgery may be done if:  ? Other treatments have not helped to improve your condition.  ? You have a BMI of 40 or higher.  ? You have life-threatening health problems related to obesity.    Follow these instructions at home:    Eating and drinking    · Follow recommendations from your health care provider about what you eat and drink. Your health care provider may advise you to:  ? Limit fast foods, sweets, and processed snack foods.  ? Choose low-fat options, such as low-fat milk instead of whole milk.  ? Eat 5 or more servings of fruits or vegetables every day.  ? Eat at home more often. This gives you more control over  what you eat.  ? Choose healthy foods when you eat out.  ? Learn what a healthy portion size is.  ? Keep low-fat snacks on hand.  ? Avoid sugary drinks, such as soda, fruit juice, iced tea sweetened with sugar, and flavored milk.  ? Eat a healthy breakfast.  · Drink enough water to keep your urine clear or pale yellow.  · Do not go without eating for long periods of time (do not fast) or follow a fad diet. Fasting and fad diets can be unhealthy and even dangerous.  Physical Activity  · Exercise regularly, as told by your health care provider. Ask your health care provider what types of exercise are safe for you and how often you should exercise.  · Warm up and stretch before being active.  · Cool down and stretch after being active.  · Rest between periods of activity.  Lifestyle  · Limit the time that you spend in front of your TV, computer, or video game system.  · Find ways to reward yourself that do not involve food.  · Limit alcohol intake to no more than 1 drink a day for nonpregnant women and 2 drinks a day for men. One drink equals 12 oz of beer, 5 oz of wine, or 1½ oz of hard liquor.  General instructions  · Keep a weight loss journal to keep track of the food you eat and how much you exercise you get.  · Take over-the-counter and prescription medicines only as told by your health care provider.  · Take vitamins and supplements only as told by your health care provider.  · Consider joining a support group. Your health care provider may be able to recommend a support group.  · Keep all follow-up visits as told by your health care provider. This is important.  Contact a health care provider if:  · You are unable to meet your weight loss goal after 6 weeks of dietary and lifestyle changes.  This information is not intended to replace advice given to you by your health care provider. Make sure you discuss any questions you have with your health care provider.  Document Released: 01/25/2006 Document Revised:  05/22/2017 Document Reviewed: 10/05/2016  [employer] Interactive Patient Education © 2018 [employer]  MyPlate from [employer]  The general, healthful diet is based on the 2010 Dietary Guidelines for Americans. The amount of food you need to eat from each food group depends on your age, sex, and level of physical activity and can be individualized by a dietitian. Go to ChooseMyPlate.gov for more information.  What do I need to know about the MyPlate plan?  · Enjoy your food, but eat less.  · Avoid oversized portions.  ? ½ of your plate should include fruits and vegetables.  ? ¼ of your plate should be grains.  ? ¼ of your plate should be protein.  Grains  · Make at least half of your grains whole grains.  · For a 2,000 calorie daily food plan, eat 6 oz every day.  · 1 oz is about 1 slice bread, 1 cup cereal, or ½ cup cooked rice, cereal, or pasta.  Vegetables  · Make half your plate fruits and vegetables.  · For a 2,000 calorie daily food plan, eat 2½ cups every day.  · 1 cup is about 1 cup raw or cooked vegetables or vegetable juice or 2 cups raw leafy greens.  Fruits  · Make half your plate fruits and vegetables.  · For a 2,000 calorie daily food plan, eat 2 cups every day.  · 1 cup is about 1 cup fruit or 100% fruit juice or ½ cup dried fruit.  Protein  · For a 2,000 calorie daily food plan, eat 5½ oz every day.  · 1 oz is about 1 oz meat, poultry, or fish, ¼ cup cooked beans, 1 egg, 1 Tbsp peanut butter, or ½ oz nuts or seeds.  Dairy  · Switch to fat-free or low-fat (1%) milk.  · For a 2,000 calorie daily food plan, eat 3 cups every day.  · 1 cup is about 1 cup milk or yogurt or soy milk (soy beverage), 1½ oz natural cheese, or 2 oz processed cheese.  Fats, Oils, and Empty Calories  · Only small amounts of oils are recommended.  · Empty calories are calories from solid fats or added sugars.  · Compare sodium in foods like soup, bread, and frozen meals. Choose the foods with lower numbers.  · Drink water instead of  sugary drinks.  What foods can I eat?  Grains  Whole grains such as whole wheat, quinoa, millet, and bulgur. Bread, rolls, and pasta made from whole grains. Brown or wild rice. Hot or cold cereals made from whole grains and without added sugar.  Vegetables  All fresh vegetables, especially fresh red, dark green, or orange vegetables. Peas and beans. Low-sodium frozen or canned vegetables prepared without added salt. Low-sodium vegetable juices.  Fruits  All fresh, frozen, and dried fruits. Canned fruit packed in water or fruit juice without added sugar. Fruit juices without added sugar.  Meats and Other Protein Sources  Boiled, baked, or grilled lean meat trimmed of fat. Skinless poultry. Fresh seafood and shellfish. Canned seafood packed in water. Unsalted nuts and unsalted nut butters. Tofu. Dried beans and pea. Eggs.  Dairy  Low-fat or fat-free milk, yogurt, and cheeses.  Sweets and Desserts  Frozen desserts made from low-fat milk.  Fats and Oils  Olive, peanut, and canola oils and margarine. Salad dressing and mayonnaise made from these oils.  Other  Soups and casseroles made from allowed ingredients and without added fat or salt.  The items listed above may not be a complete list of recommended foods or beverages. Contact your dietitian for more options.  What foods are not recommended?  Grains  Sweetened, low-fiber cereals. Packaged baked goods. Snack crackers and chips. Cheese crackers, butter crackers, and biscuits. Frozen waffles, sweet breads, doughnuts, pastries, packaged baking mixes, pancakes, cakes, and cookies.  Vegetables  Regular canned or frozen vegetables or vegetables prepared with salt. Canned tomatoes. Canned tomato sauce. Fried vegetables. Vegetables in cream sauce or cheese sauce.  Fruits  Fruits packed in syrup or made with added sugar.  Meats and Other Protein Sources  Marbled or fatty meats such as ribs. Poultry with skin. Fried meats, poultry, eggs, or fish. Sausages, hot dogs, and deli  meats such as pastrami, bologna, or salami.  Dairy  Whole milk, cream, cheeses made from whole milk, sour cream. Ice cream or yogurt made from whole milk or with added sugar.  Beverages  For adults, no more than one alcoholic drink per day. Regular soft drinks or other sugary beverages. Juice drinks.  Sweets and Desserts  Sugary or fatty desserts, candy, and other sweets.  Fats and Oils  Solid shortening or partially hydrogenated oils. Solid margarine. Margarine that contains trans fats. Butter.  The items listed above may not be a complete list of foods and beverages to avoid. Contact your dietitian for more information.  This information is not intended to replace advice given to you by your health care provider. Make sure you discuss any questions you have with your health care provider.  Document Released: 01/06/2009 Document Revised: 05/25/2017 Document Reviewed: 11/26/2014  [employer] Interactive Patient Education © 2018 Elsevier Inc.

## 2019-02-13 ENCOUNTER — HOSPITAL ENCOUNTER (OUTPATIENT)
Dept: CARDIOLOGY | Facility: HOSPITAL | Age: 74
Discharge: HOME | End: 2019-02-13
Admitting: INTERNAL MEDICINE

## 2019-02-13 DIAGNOSIS — R09.89 BILATERAL CAROTID BRUITS: ICD-10-CM

## 2019-02-13 DIAGNOSIS — I65.23 BILATERAL CAROTID ARTERY STENOSIS: ICD-10-CM

## 2019-02-13 PROCEDURE — 93880 EXTRACRANIAL BILAT STUDY: CPT

## 2019-02-13 PROCEDURE — 93880 EXTRACRANIAL BILAT STUDY: CPT | Performed by: INTERNAL MEDICINE

## 2019-02-19 ENCOUNTER — TELEPHONE (OUTPATIENT)
Dept: CARDIOLOGY | Facility: CLINIC | Age: 74
End: 2019-02-19

## 2019-02-25 LAB
BH CV ECHO MEAS - BSA(HAYCOCK): 2.7 M^2
BH CV ECHO MEAS - BSA: 2.5 M^2
BH CV ECHO MEAS - BZI_BMI: 51.2 KILOGRAMS/M^2
BH CV ECHO MEAS - BZI_METRIC_HEIGHT: 170.2 CM
BH CV ECHO MEAS - BZI_METRIC_WEIGHT: 148.3 KG
BH CV XLRA MEAS LEFT BULB EDV: 13 CM/SEC
BH CV XLRA MEAS LEFT BULB PSV: 89.4 CM/SEC
BH CV XLRA MEAS LEFT CCA RATIO VEL: 84.8 CM/SEC
BH CV XLRA MEAS LEFT DIST CCA EDV: 14.5 CM/SEC
BH CV XLRA MEAS LEFT DIST CCA PSV: 84.8 CM/SEC
BH CV XLRA MEAS LEFT DIST ICA EDV: -28.3 CM/SEC
BH CV XLRA MEAS LEFT DIST ICA PSV: -141 CM/SEC
BH CV XLRA MEAS LEFT ICA RATIO VEL: -141 CM/SEC
BH CV XLRA MEAS LEFT ICA/CCA RATIO: -1.7
BH CV XLRA MEAS LEFT MID CCA EDV: 0 CM/SEC
BH CV XLRA MEAS LEFT MID CCA PSV: 89.4 CM/SEC
BH CV XLRA MEAS LEFT MID ICA EDV: -29 CM/SEC
BH CV XLRA MEAS LEFT MID ICA PSV: -131 CM/SEC
BH CV XLRA MEAS LEFT PROX CCA EDV: 10.7 CM/SEC
BH CV XLRA MEAS LEFT PROX CCA PSV: 114 CM/SEC
BH CV XLRA MEAS LEFT PROX ICA EDV: -15.3 CM/SEC
BH CV XLRA MEAS LEFT PROX ICA PSV: -92.4 CM/SEC
BH CV XLRA MEAS LEFT VERTEBRAL A EDV: 8.4 CM/SEC
BH CV XLRA MEAS LEFT VERTEBRAL A PSV: 68 CM/SEC
BH CV XLRA MEAS RIGHT BULB EDV: 8.6 CM/SEC
BH CV XLRA MEAS RIGHT BULB PSV: 121 CM/SEC
BH CV XLRA MEAS RIGHT CCA RATIO VEL: 116 CM/SEC
BH CV XLRA MEAS RIGHT DIST CCA EDV: 2.2 CM/SEC
BH CV XLRA MEAS RIGHT DIST CCA PSV: 116 CM/SEC
BH CV XLRA MEAS RIGHT DIST ICA EDV: -14 CM/SEC
BH CV XLRA MEAS RIGHT DIST ICA PSV: -112 CM/SEC
BH CV XLRA MEAS RIGHT ICA RATIO VEL: -112 CM/SEC
BH CV XLRA MEAS RIGHT ICA/CCA RATIO: -0.97
BH CV XLRA MEAS RIGHT MID CCA EDV: 0 CM/SEC
BH CV XLRA MEAS RIGHT MID CCA PSV: 95.6 CM/SEC
BH CV XLRA MEAS RIGHT MID ICA EDV: -7.5 CM/SEC
BH CV XLRA MEAS RIGHT MID ICA PSV: -106 CM/SEC
BH CV XLRA MEAS RIGHT PROX CCA EDV: 5.4 CM/SEC
BH CV XLRA MEAS RIGHT PROX CCA PSV: 124 CM/SEC
BH CV XLRA MEAS RIGHT PROX ICA EDV: -15 CM/SEC
BH CV XLRA MEAS RIGHT PROX ICA PSV: -107 CM/SEC
BH CV XLRA MEAS RIGHT VERTEBRAL A EDV: 0 CM/SEC
BH CV XLRA MEAS RIGHT VERTEBRAL A PSV: 47 CM/SEC

## 2019-02-26 ENCOUNTER — TELEPHONE (OUTPATIENT)
Dept: CARDIOLOGY | Facility: CLINIC | Age: 74
End: 2019-02-26

## 2019-02-26 NOTE — TELEPHONE ENCOUNTER
2-25-19 3:16 PM L/M NEEDING TO DISCUSS CAROTID U/S RESULTS. PH,LPN      PATIENT AWARE OF CAROTID U/S RESULTS. ALFREDO TORREZ        ----- Message from Sacha Romero MD sent at 2/25/2019 12:34 PM EST -----  Routine f/u.

## 2019-02-26 NOTE — TELEPHONE ENCOUNTER
Pt called to get result notes for Duplex Carotid Ultrasound.  Interpretation Summary explained.  Pt requested copy at front office to p/uSim VELA CMA

## 2019-03-22 ENCOUNTER — OFFICE VISIT (OUTPATIENT)
Dept: CARDIOLOGY | Facility: CLINIC | Age: 74
End: 2019-03-22

## 2019-03-22 VITALS
BODY MASS INDEX: 45.99 KG/M2 | DIASTOLIC BLOOD PRESSURE: 60 MMHG | SYSTOLIC BLOOD PRESSURE: 144 MMHG | WEIGHT: 293 LBS | HEIGHT: 67 IN | HEART RATE: 62 BPM

## 2019-03-22 DIAGNOSIS — I48.0 PAROXYSMAL ATRIAL FIBRILLATION (HCC): Primary | ICD-10-CM

## 2019-03-22 DIAGNOSIS — I10 ESSENTIAL HYPERTENSION: ICD-10-CM

## 2019-03-22 PROCEDURE — 93000 ELECTROCARDIOGRAM COMPLETE: CPT | Performed by: INTERNAL MEDICINE

## 2019-03-22 PROCEDURE — 99213 OFFICE O/P EST LOW 20 MIN: CPT | Performed by: INTERNAL MEDICINE

## 2019-03-22 NOTE — PROGRESS NOTES
Holly Corona  1945  [phone]    03/22/2019    [facility] CARDIOLOGY     Abdoul Andrew MD  [address] [place] [address]    Chief Complaint   Patient presents with   • Atrial Fibrillation       PROBLEM LIST:  1. Paroxysmal atrial fibrillation/atrial flutter:  a. Echocardiogram, 02/08/2006 with mild to moderate LVH, moderate MR, pulmonary HTN with RVSP at 55 mmHg; EF 65%.   b. GXT, 04/03/2006 with no ischemia, EF 65%.  c. Event recorder, April 2007 through May 2007 showing atrial flutter.  d. Echocardiogram, 05/07/2007 with pulmonary HTN, LA 4.5, EF 65%.  e. Cardiolite, 05/07/2007, normal study, EF 78%.  f. Left heart cath, 05/09/2007 with normal coronary arteries, EF 70%.  g. EKG, 10/21/2007 showing atrial fibrillation at 146 BPM.  h. Failed sotalol therapy.  i. Tikosyn initiated, 08/08/2012.  j. Echocardiogram, 09/17/2012 with an EF of 65%; mild LVH, moderate diastolic dysfunction, moderate to severe biatrial enlargement, mild MR, moderate TR with an RVSP of 65 mmHg. LA 4.7. Aortic valve sclerosis.  k. Pulmonary vein isolation procedure with ablation of right atrial flutter and nonsustained low posterior atrial tachycardia, 03/15/2013 complicated by dysphagia that lasted 24-48 hours.  2. CP  a. Heart cath 6/9/17: Non obstructive CAD, LVEF 55%   3. HTN  4. Right leg deep venous thrombosis, treated with Coumadin therapy until October 2007.  5. Hiatal hernia/gastroesophageal reflux disease.  6. Carotid bruits: Normal carotid duplex, March 2006 and June 2007.  7. Osteoarthritis.  8. Hypothyroidism.  9. Sleep apnea, on CPAP.  10. Dyslipidemia, no current therapy.  11. Colon cancer, status post resection of 17 inches of the right ascending colon, undergoing chemotherapy.  12. Pulmonary embolism, February 2012, Coumadin initiated.  13. Pulmonary hypertension:  a. Echocardiogram, 06/21/2011, showing PA systolic pressure estimated at 60-65 mmHg.  b. Moderate TR with an  RVSP of 65 mmHg by echocardiogram, 09/17/2012.  14. Surgical history:  a. Hysterectomy.  b. Carpal tunnel repair.  c. Left knee replacement.  d. Foot surgery.     Allergies  Allergies   Allergen Reactions   • Ciprofloxacin Itching   • Ofloxacin    • Iodinated Diagnostic Agents Rash       Current Medications    Current Outpatient Medications:   •  albuterol (PROVENTIL) (2.5 MG/3ML) 0.083% nebulizer solution, Take 2.5 mg by nebulization every 4 (four) hours as needed for wheezing., Disp: , Rfl:   •  aspirin 81 MG EC tablet, Take 1 tablet by mouth daily., Disp: , Rfl:   •  CloNIDine (CATAPRES) 0.2 MG tablet, Take 1 tablet by mouth 3 (Three) Times a Day. (Patient taking differently: Take 0.2 mg by mouth 2 (Two) Times a Day.), Disp: 90 tablet, Rfl: 3  •  furosemide (LASIX) 40 MG tablet, Take 1 tablet by mouth Daily., Disp: 90 tablet, Rfl: 3  •  hydrALAZINE (APRESOLINE) 25 MG tablet, Take 1 tablet by mouth 3 (Three) Times a Day., Disp: 90 tablet, Rfl: 3  •  isosorbide mononitrate (IMDUR) 30 MG 24 hr tablet, Take 1 tablet by mouth Daily., Disp: 90 tablet, Rfl: 3  •  levothyroxine (SYNTHROID, LEVOTHROID) 88 MCG tablet, Take 1 tablet by mouth daily., Disp: , Rfl:   •  metOLazone (ZAROXOLYN) 5 MG tablet, Take 1 tablet by mouth Every Other Day., Disp: 90 tablet, Rfl: 3  •  O2 (OXYGEN), Inhale 2 L/min 1 (One) Time. Through c-pap, Disp: , Rfl:   •  pantoprazole (PROTONIX) 40 MG EC tablet, Take 1 tablet by mouth Daily., Disp: 90 tablet, Rfl: 3  •  potassium chloride (K-DUR,KLOR-CON) 20 MEQ CR tablet, Take 1 tablet by mouth Daily. 20 meq alternated with 40 meq (Patient taking differently: Take 40 mEq by mouth Every Other Day.), Disp: 180 tablet, Rfl: 3  •  RESTASIS 0.05 % ophthalmic emulsion, Daily., Disp: , Rfl:   •  sotalol (BETAPACE AF) 80 MG tablet tablet, Take 1 tablet by mouth Every 12 (Twelve) Hours., Disp: 180 tablet, Rfl: 3  •  telmisartan (MICARDIS) 80 MG tablet, Take 1 tablet by mouth Daily., Disp: 90 tablet, Rfl: 3  •   "warfarin (COUMADIN) 5 MG tablet, Daily. Takes 5 mg 5 days a week and 2.5 mg other 2 days, Disp: , Rfl:     History of Present Illness     Pt presents for follow up of AF/HTN . Since we last saw the pt, pt denies any sustained AF episodes, SOB, CP, LH, and dizziness. Denies any hospitalizations, ER visits, bleeding, or TIA/CVA symptoms. Overall feels ok  BP at home well controlled. Dealing with anemia and workup in progress    ROS:  General:  + fatigue, No weight gain or loss  Cardiovascular:  Denies CP, PND, syncope, near syncope,+ edema + rare  palpitations.  Pulmonary:  + chronic mild MAGUIRE, No cough, or wheezing      Vitals:    03/22/19 1417   BP: 144/60   BP Location: Left arm   Patient Position: Sitting   Pulse: 62   Weight: (!) 147 kg (325 lb)   Height: 170.2 cm (67\")     Body mass index is 50.9 kg/m².  PE:  General: NAD  Neck: no JVD, no carotid bruits, no TM  Heart RRR, NL S1, S2, S4 present, no rubs, + TR murmur  Lungs: CTA, no wheezes, rhonchi, or rales  Abd: soft, non-tender, NL BS  Ext: No musculoskeletal deformities, + edema, No cyanosis, or clubbing  Psych: normal mood and affect    Diagnostic Data:        ECG 12 Lead  Date/Time: 3/22/2019 2:47 PM  Performed by: Bala Mario MD  Authorized by: Bala Mario MD   Comparison: compared with previous ECG from 9/13/2018  Similar to previous ECG  Rhythm: sinus bradycardia  BPM: 50              1. Paroxysmal atrial fibrillation (CMS/HCC)    2. Essential hypertension          Plan:  1) PAF s/p RFA/PV: Doing well on sotalol, QTc stable  Continue present medications.   2) Anticoagulation  Continue warfarin  3) HTN- controlled  Wt loss, exercise, salt reduction    F/up in 9 months      "

## 2019-04-15 ENCOUNTER — TRANSCRIBE ORDERS (OUTPATIENT)
Dept: CARDIOLOGY | Facility: HOSPITAL | Age: 74
End: 2019-04-15

## 2019-04-15 DIAGNOSIS — R09.02 HYPOXEMIA: ICD-10-CM

## 2019-04-15 DIAGNOSIS — R06.02 SHORTNESS OF BREATH: Primary | ICD-10-CM

## 2019-04-30 ENCOUNTER — HOSPITAL ENCOUNTER (OUTPATIENT)
Dept: CARDIOLOGY | Facility: HOSPITAL | Age: 74
Discharge: HOME | End: 2019-04-30
Admitting: SPECIALIST

## 2019-04-30 DIAGNOSIS — R06.02 SHORTNESS OF BREATH: ICD-10-CM

## 2019-04-30 DIAGNOSIS — R09.02 HYPOXEMIA: ICD-10-CM

## 2019-04-30 PROCEDURE — 93306 TTE W/DOPPLER COMPLETE: CPT | Performed by: INTERNAL MEDICINE

## 2019-04-30 PROCEDURE — 93306 TTE W/DOPPLER COMPLETE: CPT

## 2019-05-05 LAB
BH CV ECHO MEAS - ACS: 1.3 CM
BH CV ECHO MEAS - AO MAX PG: 48 MMHG
BH CV ECHO MEAS - AO MEAN PG (FULL): 25.8 MMHG
BH CV ECHO MEAS - AO MEAN PG: 31 MMHG
BH CV ECHO MEAS - AO ROOT AREA (BSA CORRECTED): 1
BH CV ECHO MEAS - AO ROOT AREA: 5 CM^2
BH CV ECHO MEAS - AO ROOT DIAM: 2.5 CM
BH CV ECHO MEAS - AO V2 MAX: 345 CM/SEC
BH CV ECHO MEAS - AO V2 MEAN: 263.2 CM/SEC
BH CV ECHO MEAS - AO V2 VTI: 94 CM
BH CV ECHO MEAS - AVA(I,A): 1.2 CM^2
BH CV ECHO MEAS - AVA(I,D): 1.2 CM^2
BH CV ECHO MEAS - BSA(HAYCOCK): 2.7 M^2
BH CV ECHO MEAS - BSA: 2.5 M^2
BH CV ECHO MEAS - BZI_BMI: 50.9 KILOGRAMS/M^2
BH CV ECHO MEAS - BZI_METRIC_HEIGHT: 170.2 CM
BH CV ECHO MEAS - BZI_METRIC_WEIGHT: 147.4 KG
BH CV ECHO MEAS - EDV(CUBED): 69.3 ML
BH CV ECHO MEAS - EDV(MOD-SP4): 75 ML
BH CV ECHO MEAS - EDV(TEICH): 74.6 ML
BH CV ECHO MEAS - EF(CUBED): 91.1 %
BH CV ECHO MEAS - EF(MOD-SP4): 68 %
BH CV ECHO MEAS - EF(TEICH): 86.4 %
BH CV ECHO MEAS - ESV(CUBED): 6.1 ML
BH CV ECHO MEAS - ESV(MOD-SP4): 24 ML
BH CV ECHO MEAS - ESV(TEICH): 10.2 ML
BH CV ECHO MEAS - FS: 55.4 %
BH CV ECHO MEAS - IVS/LVPW: 1.1
BH CV ECHO MEAS - IVSD: 1.7 CM
BH CV ECHO MEAS - LA DIMENSION: 4.6 CM
BH CV ECHO MEAS - LA/AO: 1.8
BH CV ECHO MEAS - LV DIASTOLIC VOL/BSA (35-75): 30.2 ML/M^2
BH CV ECHO MEAS - LV IVRT: 0.06 SEC
BH CV ECHO MEAS - LV MASS(C)D: 280.5 GRAMS
BH CV ECHO MEAS - LV MASS(C)DI: 112.9 GRAMS/M^2
BH CV ECHO MEAS - LV MEAN PG: 5.2 MMHG
BH CV ECHO MEAS - LV SYSTOLIC VOL/BSA (12-30): 9.7 ML/M^2
BH CV ECHO MEAS - LV V1 MEAN: 106.7 CM/SEC
BH CV ECHO MEAS - LV V1 VTI: 38.3 CM
BH CV ECHO MEAS - LVIDD: 4.1 CM
BH CV ECHO MEAS - LVIDS: 1.8 CM
BH CV ECHO MEAS - LVLD AP4: 6.9 CM
BH CV ECHO MEAS - LVLS AP4: 5.8 CM
BH CV ECHO MEAS - LVOT AREA (M): 3.1 CM^2
BH CV ECHO MEAS - LVOT AREA: 3 CM^2
BH CV ECHO MEAS - LVOT DIAM: 2 CM
BH CV ECHO MEAS - LVPWD: 1.6 CM
BH CV ECHO MEAS - MV A MAX VEL: 79 CM/SEC
BH CV ECHO MEAS - MV DEC SLOPE: 415.4 CM/SEC^2
BH CV ECHO MEAS - MV E MAX VEL: 121.9 CM/SEC
BH CV ECHO MEAS - MV E/A: 1.5
BH CV ECHO MEAS - MV MEAN PG: 2.3 MMHG
BH CV ECHO MEAS - MV P1/2T MAX VEL: 144.3 CM/SEC
BH CV ECHO MEAS - MV P1/2T: 101.8 MSEC
BH CV ECHO MEAS - MV V2 MEAN: 68.1 CM/SEC
BH CV ECHO MEAS - MV V2 VTI: 51.4 CM
BH CV ECHO MEAS - MVA P1/2T LCG: 1.5 CM^2
BH CV ECHO MEAS - MVA(P1/2T): 2.2 CM^2
BH CV ECHO MEAS - MVA(VTI): 2.2 CM^2
BH CV ECHO MEAS - RAP SYSTOLE: 10 MMHG
BH CV ECHO MEAS - RVDD: 3.1 CM
BH CV ECHO MEAS - RVSP: 53.5 MMHG
BH CV ECHO MEAS - SI(AO): 188.7 ML/M^2
BH CV ECHO MEAS - SI(CUBED): 25.4 ML/M^2
BH CV ECHO MEAS - SI(LVOT): 46.3 ML/M^2
BH CV ECHO MEAS - SI(MOD-SP4): 20.5 ML/M^2
BH CV ECHO MEAS - SI(TEICH): 25.9 ML/M^2
BH CV ECHO MEAS - SV(AO): 469.1 ML
BH CV ECHO MEAS - SV(CUBED): 63.2 ML
BH CV ECHO MEAS - SV(LVOT): 115.1 ML
BH CV ECHO MEAS - SV(MOD-SP4): 51 ML
BH CV ECHO MEAS - SV(TEICH): 64.4 ML
BH CV ECHO MEAS - TR MAX VEL: 329.8 CM/SEC
MAXIMAL PREDICTED HEART RATE: 145 BPM
STRESS TARGET HR: 123 BPM

## 2019-08-13 ENCOUNTER — OFFICE VISIT (OUTPATIENT)
Dept: CARDIOLOGY | Facility: CLINIC | Age: 74
End: 2019-08-13

## 2019-08-13 VITALS
OXYGEN SATURATION: 93 % | DIASTOLIC BLOOD PRESSURE: 57 MMHG | BODY MASS INDEX: 45.99 KG/M2 | SYSTOLIC BLOOD PRESSURE: 150 MMHG | WEIGHT: 293 LBS | HEIGHT: 67 IN | HEART RATE: 65 BPM

## 2019-08-13 DIAGNOSIS — Z99.89 OSA ON CPAP: ICD-10-CM

## 2019-08-13 DIAGNOSIS — R06.02 EXERTIONAL SHORTNESS OF BREATH: ICD-10-CM

## 2019-08-13 DIAGNOSIS — R60.9 EDEMA, UNSPECIFIED TYPE: ICD-10-CM

## 2019-08-13 DIAGNOSIS — I73.9 PERIPHERAL VASCULAR DISEASE (HCC): ICD-10-CM

## 2019-08-13 DIAGNOSIS — I51.89 DIASTOLIC DYSFUNCTION: ICD-10-CM

## 2019-08-13 DIAGNOSIS — I10 ESSENTIAL HYPERTENSION: ICD-10-CM

## 2019-08-13 DIAGNOSIS — G47.33 OSA ON CPAP: ICD-10-CM

## 2019-08-13 DIAGNOSIS — I27.20 PULMONARY HYPERTENSION (HCC): ICD-10-CM

## 2019-08-13 DIAGNOSIS — I65.23 BILATERAL CAROTID ARTERY STENOSIS: ICD-10-CM

## 2019-08-13 DIAGNOSIS — R00.2 PALPITATIONS: ICD-10-CM

## 2019-08-13 DIAGNOSIS — Z13.1 DIABETES MELLITUS SCREENING: ICD-10-CM

## 2019-08-13 DIAGNOSIS — E78.5 DYSLIPIDEMIA: ICD-10-CM

## 2019-08-13 DIAGNOSIS — I48.0 PAROXYSMAL ATRIAL FIBRILLATION (HCC): ICD-10-CM

## 2019-08-13 DIAGNOSIS — R42 DIZZINESS: ICD-10-CM

## 2019-08-13 DIAGNOSIS — R94.39 ABNORMAL STRESS TEST: Primary | ICD-10-CM

## 2019-08-13 PROCEDURE — 99214 OFFICE O/P EST MOD 30 MIN: CPT | Performed by: INTERNAL MEDICINE

## 2019-08-13 PROCEDURE — 93000 ELECTROCARDIOGRAM COMPLETE: CPT | Performed by: INTERNAL MEDICINE

## 2019-08-13 RX ORDER — IRON POLYSACCHARIDE COMPLEX 150 MG
CAPSULE ORAL DAILY
Refills: 5 | COMMUNITY
Start: 2019-08-05 | End: 2020-07-02

## 2019-08-13 RX ORDER — KETOCONAZOLE 20 MG/G
CREAM TOPICAL EVERY 12 HOURS SCHEDULED
COMMUNITY
End: 2020-02-13

## 2019-08-13 NOTE — PATIENT INSTRUCTIONS
Obesity, Adult  Obesity is the condition of having too much total body fat. Being overweight or obese means that your weight is greater than what is considered healthy for your body size. Obesity is determined by a measurement called BMI. BMI is an estimate of body fat and is calculated from height and weight. For adults, a BMI of 30 or higher is considered obese.  Obesity can eventually lead to other health concerns and major illnesses, including:  · Stroke.  · Coronary artery disease (CAD).  · Type 2 diabetes.  · Some types of cancer, including cancers of the colon, breast, uterus, and gallbladder.  · Osteoarthritis.  · High blood pressure (hypertension).  · High cholesterol.  · Sleep apnea.  · Gallbladder stones.  · Infertility problems.  What are the causes?  The main cause of obesity is taking in (consuming) more calories than your body uses for energy. Other factors that contribute to this condition may include:  · Being born with genes that make you more likely to become obese.  · Having a medical condition that causes obesity. These conditions include:  ? Hypothyroidism.  ? Polycystic ovarian syndrome (PCOS).  ? Binge-eating disorder.  ? Cushing syndrome.  · Taking certain medicines, such as steroids, antidepressants, and seizure medicines.  · Not being physically active (sedentary lifestyle).  · Living where there are limited places to exercise safely or buy healthy foods.  · Not getting enough sleep.  What increases the risk?  The following factors may increase your risk of this condition:  · Having a family history of obesity.  · Being a woman of -American descent.  · Being a man of  descent.  What are the signs or symptoms?  Having excessive body fat is the main symptom of this condition.  How is this diagnosed?  This condition may be diagnosed based on:  · Your symptoms.  · Your medical history.  · A physical exam. Your health care provider may measure:  ? Your BMI. If you are an adult  with a BMI between 25 and less than 30, you are considered overweight. If you are an adult with a BMI of 30 or higher, you are considered obese.  ? The distances around your hips and your waist (circumferences). These may be compared to each other to help diagnose your condition.  ? Your skinfold thickness. Your health care provider may gently pinch a fold of your skin and measure it.  How is this treated?  Treatment for this condition often includes changing your lifestyle. Treatment may include some or all of the following:  · Dietary changes. Work with your health care provider and a dietitian to set a weight-loss goal that is healthy and reasonable for you. Dietary changes may include eating:  ? Smaller portions. A portion size is the amount of a particular food that is healthy for you to eat at one time. This varies from person to person.  ? Low-calorie or low-fat options.  ? More whole grains, fruits, and vegetables.  · Regular physical activity. This may include aerobic activity (cardio) and strength training.  · Medicine to help you lose weight. Your health care provider may prescribe medicine if you are unable to lose 1 pound a week after 6 weeks of eating more healthily and doing more physical activity.  · Surgery. Surgical options may include gastric banding and gastric bypass. Surgery may be done if:  ? Other treatments have not helped to improve your condition.  ? You have a BMI of 40 or higher.  ? You have life-threatening health problems related to obesity.  Follow these instructions at home:    Eating and drinking    · Follow recommendations from your health care provider about what you eat and drink. Your health care provider may advise you to:  ? Limit fast foods, sweets, and processed snack foods.  ? Choose low-fat options, such as low-fat milk instead of whole milk.  ? Eat 5 or more servings of fruits or vegetables every day.  ? Eat at home more often. This gives you more control over what you  eat.  ? Choose healthy foods when you eat out.  ? Learn what a healthy portion size is.  ? Keep low-fat snacks on hand.  ? Avoid sugary drinks, such as soda, fruit juice, iced tea sweetened with sugar, and flavored milk.  ? Eat a healthy breakfast.  · Drink enough water to keep your urine clear or pale yellow.  · Do not go without eating for long periods of time (do not fast) or follow a fad diet. Fasting and fad diets can be unhealthy and even dangerous.  Physical Activity  · Exercise regularly, as told by your health care provider. Ask your health care provider what types of exercise are safe for you and how often you should exercise.  · Warm up and stretch before being active.  · Cool down and stretch after being active.  · Rest between periods of activity.  Lifestyle  · Limit the time that you spend in front of your TV, computer, or video game system.  · Find ways to reward yourself that do not involve food.  · Limit alcohol intake to no more than 1 drink a day for nonpregnant women and 2 drinks a day for men. One drink equals 12 oz of beer, 5 oz of wine, or 1½ oz of hard liquor.  General instructions  · Keep a weight loss journal to keep track of the food you eat and how much you exercise you get.  · Take over-the-counter and prescription medicines only as told by your health care provider.  · Take vitamins and supplements only as told by your health care provider.  · Consider joining a support group. Your health care provider may be able to recommend a support group.  · Keep all follow-up visits as told by your health care provider. This is important.  Contact a health care provider if:  · You are unable to meet your weight loss goal after 6 weeks of dietary and lifestyle changes.  This information is not intended to replace advice given to you by your health care provider. Make sure you discuss any questions you have with your health care provider.  Document Released: 01/25/2006 Document Revised: 05/22/2017  Document Reviewed: 10/05/2016  [employer] Interactive Patient Education © 2019 [employer]  MyPlate from [employer]    MyPlate is an outline of a general healthy diet based on the 2010 Dietary Guidelines for Americans, from the U.S. Department of Agriculture (USDA). It sets guidelines for how much food you should eat from each food group based on your age, sex, and level of physical activity.  What are tips for following MyPlate?  To follow MyPlate recommendations:  · Eat a wide variety of fruits and vegetables, grains, and protein foods.  · Serve smaller portions and eat less food throughout the day.  · Limit portion sizes to avoid overeating.  · Enjoy your food.  · Get at least 150 minutes of exercise every week. This is about 30 minutes each day, 5 or more days per week.  It can be difficult to have every meal look like MyPlate. Think about MyPlate as eating guidelines for an entire day, rather than each individual meal.  Fruits and vegetables  · Make half of your plate fruits and vegetables.  · Eat many different colors of fruits and vegetables each day.  · For a 2,000 calorie daily food plan, eat:  ? 2½ cups of vegetables every day.  ? 2 cups of fruit every day.  · 1 cup is equal to:  ? 1 cup raw or cooked vegetables.  ? 1 cup raw fruit.  ? 1 medium-sized orange, apple, or banana.  ? 1 cup 100% fruit or vegetable juice.  ? 2 cups raw leafy greens, such as lettuce, spinach, or kale.  ? ½ cup dried fruit.  Grains  · One fourth of your plate should be grains.  · Make at least half of the grains you eat each day whole grains.  · For a 2,000 calorie daily food plan, eat 6 oz of grains every day.  · 1 oz is equal to:  ? 1 slice bread.  ? 1 cup cereal.  ? ½ cup cooked rice, cereal, or pasta.  Protein  · One fourth of your plate should be protein.  · Eat a wide variety of protein foods, including meat, poultry, fish, eggs, beans, nuts, and tofu.  · For a 2,000 calorie daily food plan, eat 5½ oz of protein every day.  · 1 oz  is equal to:  ? 1 oz meat, poultry, or fish.  ? ¼ cup cooked beans.  ? 1 egg.  ? ½ oz nuts or seeds.  ? 1 Tbsp peanut butter.  Dairy  · Drink fat-free or low-fat (1%) milk.  · Eat or drink dairy as a side to meals.  · For a 2,000 calorie daily food plan, eat or drink 3 cups of dairy every day.  · 1 cup is equal to:  ? 1 cup milk, yogurt, cottage cheese, or soy milk (soy beverage).  ? 2 oz processed cheese.  ? 1½ oz natural cheese.  Fats, oils, salt, and sugars  · Only small amounts of oils are recommended.  · Avoid foods that are high in calories and low in nutritional value (empty calories), like foods high in fat or added sugars.  · Choose foods that are low in salt (sodium). Choose foods that have less than 140 milligrams (mg) of sodium per serving.  · Drink water instead of sugary drinks. Drink enough water each day to keep your urine pale yellow.  Where to find support  · Work with your health care provider or a nutrition specialist (dietitian) to develop a customized eating plan that is right for you.  · Download an hannah (mobile application) to help you track your daily food intake.  Where to find more information  · Go to ChooseMyPlate.gov for more information.  · Learn more and log your daily food intake according to MyPlate using USDA's SuperTracker: www.supertracker.usda.gov  Summary  · MyPlate is a general guideline for healthy eating from the USDA. It is based on the 2010 Dietary Guidelines for Americans.  · In general, fruits and vegetables should take up ½ of your plate, grains should take up ¼ of your plate, and protein should take up ¼ of your plate.  This information is not intended to replace advice given to you by your health care provider. Make sure you discuss any questions you have with your health care provider.  Document Released: 01/06/2009 Document Revised: 03/19/2018 Document Reviewed: 03/19/2018  [employer] Interactive Patient Education © 2019 [employer]

## 2019-08-13 NOTE — PROGRESS NOTES
Subjective   Holly Corona is a 73 y.o. female     Chief Complaint   Patient presents with   • Atrial Fibrillation   • Hypertension     Here for 6 mo. f/u   • Pulmonary Embolism   • Deep Vein Thrombosis   • Carotid Artery Disease   • Hyperlipidemia       PROBLEM LIST:     1. Paroxysmal Atrial  fibrillation  1.1 Pulmonary vein isolation procedure with ablation of right atrial flutter by Dr. Mario, March 2013.  2. Severe pulmonary hypertension with pulmonary pressure 60-65% by most recent cath.  3. Hypertension  3.1 Echo 9/9/15 - mild LVH; EF > 65%; mild to mod MR; mod TR; PA 55-60; mild MA  3.2 recent ECHO 03/09/17, left ventricular chamber is mildly dilated. Mild concentric LVH. EF > 65%. Issa Grade ll diastolic dysfunction. Left atrium moderately dilated, right atrium mild to moderate dilated. Systolic pressure 55-60 mmHg.   3.3 recent stress 04/11/17 inferior ischemia, chest wall attenuation.   4. Dyslipidemia  5. History of DVT/PE  6. History of colon CA x 2 status post surgery 2014.  7. Carotid Artery Stenosis  7.1 Carotid U/S 3/8/16 - 16-49% MARYBEL and LICA; antegrade flow  8. HANK - CPAP   9. GOUT, recurrent flare-ups                  Specialty Problems        Cardiology Problems    HTN (hypertension)        Paroxysmal atrial fibrillation (CMS/HCC)        Carotid artery stenosis        Carotid bruit        Deep venous thrombosis (CMS/HCC)        Diastolic dysfunction        Palpitations        Peripheral vascular disease (CMS/HCC)        Pulmonary embolus (CMS/HCC)        Pulmonary hypertension (CMS/HCC)        Syncope        Abnormal stress test                HPI:  Ms. Corona returns for follow-up on the above problems.    She feels slightly improved since the time of her last visit.  She is wearing compression hose and has less lower extremity edema.  She has lost 4 pounds and feels somewhat less short of breath with physical activity.  She continues to deny any type of chest discomfort suggestive of angina,  and she continues to be without orthopnea or PND.  She does not claudicate.  She describes no symptoms of TIA or stroke and she has had no bleeding specifically denying hemoptysis, hematemesis, melena, hematochezia, or hematuria.  He has minimal terms of palpitations, and no significant presyncope or syncope.  She has mild orthostasis.    Ms. Lynn states that blood pressures generally run in the high 130s and low 140s in systole when checked at home.                      CURRENT MEDICATION:    Current Outpatient Medications   Medication Sig Dispense Refill   • albuterol (PROVENTIL) (2.5 MG/3ML) 0.083% nebulizer solution Take 2.5 mg by nebulization every 4 (four) hours as needed for wheezing.     • aspirin 81 MG EC tablet Take 1 tablet by mouth daily.     • CloNIDine (CATAPRES) 0.2 MG tablet Take 1 tablet by mouth 3 (Three) Times a Day. (Patient taking differently: Take 0.2 mg by mouth 2 (Two) Times a Day.) 90 tablet 3   • FERREX 150 150 MG capsule Daily.  5   • furosemide (LASIX) 40 MG tablet Take 1 tablet by mouth Daily. 90 tablet 3   • hydrALAZINE (APRESOLINE) 25 MG tablet Take 1 tablet by mouth 3 (Three) Times a Day. 90 tablet 3   • isosorbide mononitrate (IMDUR) 30 MG 24 hr tablet Take 1 tablet by mouth Daily. 90 tablet 3   • ketoconazole (NIZORAL) 2 % cream Apply  topically to the appropriate area as directed Every 12 (Twelve) Hours.     • levothyroxine (SYNTHROID, LEVOTHROID) 88 MCG tablet Take 1 tablet by mouth daily.     • metOLazone (ZAROXOLYN) 5 MG tablet Take 1 tablet by mouth Every Other Day. (Patient taking differently: Take 2.5 mg by mouth Every Other Day.) 90 tablet 3   • O2 (OXYGEN) Inhale 2 L/min 1 (One) Time. Through c-pap     • pantoprazole (PROTONIX) 40 MG EC tablet Take 1 tablet by mouth Daily. 90 tablet 3   • RESTASIS 0.05 % ophthalmic emulsion Daily.     • sotalol (BETAPACE AF) 80 MG tablet tablet Take 1 tablet by mouth Every 12 (Twelve) Hours. 180 tablet 3   • telmisartan (MICARDIS) 80 MG  tablet Take 1 tablet by mouth Daily. 90 tablet 3   • warfarin (COUMADIN) 5 MG tablet Daily. Takes 5 mg 5 days a week and 2.5 mg x1 day then none one day     • potassium chloride (K-DUR,KLOR-CON) 20 MEQ CR tablet Take 1 tablet by mouth Daily. 20 meq alternated with 40 meq (Patient taking differently: Take 40 mEq by mouth Every Other Day.) 180 tablet 3     No current facility-administered medications for this visit.        ALLERGIES:    Ciprofloxacin; Ofloxacin; and Iodinated diagnostic agents    PAST MEDICAL HISTORY:    Past Medical History:   Diagnosis Date   • Anemia    • Atrial fibrillation (CMS/HCC)     A.  History of prior pulmonary vein ablation 03/14/2013. B.  On chronic Coumadin and Tikosyn therapy.   • Carotid artery stenosis    • Carotid bruit    • Deep venous thrombosis (CMS/HCC)     A.  History of right lower extremity DVT treated with in therapy until October 2000.   • Diastolic dysfunction    • Diastolic dysfunction    • Dizziness    • Dyslipidemia    • Edema    • Exertional shortness of breath    • GERD (gastroesophageal reflux disease)    • Gout    • H/O echocardiogram     A.  Echocardiogram of 10/16/2013 reports an ejection fraction of 55-60%, a moderately to severely dilated left atrium, mild to moderately dilated right atrium, trace aortic regurgitation, mild mitral and tricuspid regurgitation with calculated    • Hiatal hernia    • Hypertension     A.  Echocardiogram 10/16/2013 reports a calculated RVSP of 50-55 mmHg.B.  Right heart catheterization 03/12/2009 at  reported RV of 72/19, PA 72/27 and PCWP of 24, this was felt to be     • Hypothyroidism    • Morbid obesity (CMS/HCC)    • Obstructive sleep apnea     A.  On CPAP each bedtime.   • Osteoarthritis    • Palpitations    • Peripheral vascular disease (CMS/HCC)    • Pulmonary embolus (CMS/HCC)     A.  Developed during chemotherapy in 2/2012. B.  Coumadin therapy reinitiated at that time.   • Pulmonary hypertension (CMS/HCC)    • Signet ring  "cell adenocarcinoma (CMS/HCC)     A.  Diagnosed on colonoscopy E. 10/14/2011, status post colon resection and 2 of 20 nodes positive, T3, N1b Stage IIIB. B.  Status post chemotherapy.    • Syncope        SURGICAL HISTORY:    Past Surgical History:   Procedure Laterality Date   • CARDIAC CATHETERIZATION     • FOOT SURGERY     • HERNIA REPAIR      2011   • HYSTERECTOMY      1993   • OTHER SURGICAL HISTORY      cardiac cath procedure summary, A.  Cardiac catheterization April 2007 reported no significance coronary artery disease with markedly elevated LVEDP.   • OTHER SURGICAL HISTORY      catheter ablation atrial fibrillation, 2013   • OTHER SURGICAL HISTORY Left     knee replacement 2005   • OTHER SURGICAL HISTORY      neuroplasty decompression median nerve at carpal tunnel 1997   • OTHER SURGICAL HISTORY      partial colectomy , A.  Status post right hemicolectomy secondary to colon cancer in 2011.       SOCIAL HISTORY:    Social History     Socioeconomic History   • Marital status:      Spouse name: Not on file   • Number of children: Not on file   • Years of education: Not on file   • Highest education level: Not on file   Tobacco Use   • Smoking status: Former Smoker   • Smokeless tobacco: Never Used   Substance and Sexual Activity   • Alcohol use: No   • Drug use: No   • Sexual activity: Defer       FAMILY HISTORY:    Family History   Problem Relation Age of Onset   • Other Mother         MEDICAL PROBLEMS   • Other Sister         myocardial infarction.       Review of Systems   Constitutional: Positive for fatigue.   HENT: Negative.    Eyes: Positive for visual disturbance (galsses prn).   Respiratory: Positive for shortness of breath.    Cardiovascular: Positive for chest pain (\"very little\"), palpitations (occas.) and leg swelling.   Gastrointestinal: Negative.    Endocrine: Negative.    Genitourinary: Negative.    Musculoskeletal: Positive for arthralgias and myalgias.   Skin: Negative.  " "  Allergic/Immunologic: Positive for environmental allergies.   Neurological: Negative.    Hematological: Negative.    Psychiatric/Behavioral: Negative.        VISIT VITALS:  Vitals:    08/13/19 1019   BP: 150/57   BP Location: Left arm   Patient Position: Sitting   Pulse: 65   SpO2: 93%   Weight: (!) 146 kg (321 lb 9.6 oz)   Height: 170.2 cm (67.01\")      /57 (BP Location: Left arm, Patient Position: Sitting)   Pulse 65   Ht 170.2 cm (67.01\")   Wt (!) 146 kg (321 lb 9.6 oz)   SpO2 93%   BMI 50.36 kg/m²     RECENT LABS:    Objective       Physical Exam   Constitutional: She is oriented to person, place, and time. She appears well-developed and well-nourished. No distress.   HENT:   Head: Normocephalic and atraumatic.   Eyes: Conjunctivae and EOM are normal. Pupils are equal, round, and reactive to light.   Neck: Normal range of motion. Neck supple. No JVD present. Carotid bruit is not present. No tracheal deviation present.   Nl. Carotid upstrokes   Cardiovascular: Normal rate, regular rhythm, normal heart sounds and intact distal pulses.   Decreased S1  Split S2    2-3/6 systolic ejection murmur RUSB  2/6 TR  No MR   Pulmonary/Chest: Effort normal and breath sounds normal. She has no wheezes. She has no rhonchi. She has no rales.   Nl. Expir. Phase  Nl. breath sound intensity   Abdominal: Soft. Bowel sounds are normal. She exhibits no distension, no abdominal bruit and no mass. There is no tenderness. There is no rebound and no guarding.   No organomegaly   Musculoskeletal: Normal range of motion. She exhibits edema. She exhibits no tenderness or deformity.   BLE, 3+ edema to mid calf, left greater than rt.     Neurological: She is alert and oriented to person, place, and time.   Skin: Skin is warm and dry. No rash noted. No erythema. No pallor.   Psychiatric: She has a normal mood and affect. Her behavior is normal. Judgment and thought content normal.   Nursing note and vitals reviewed.        ECG 12 " Lead  Date/Time: 8/13/2019 2:27 PM  Performed by: Sacha Romero MD  Authorized by: Sacha Romero MD   Comments: Sinus bradycardia at 54.  Otherwise, within normal limits.              Assessment/Plan   #1.  Conduction system disease.  The patient has a history of atrial flutter status post ablation, atrial fibrillation, and subsequent pulmonary artery vein isolation.  She is minimally symptomatic of palpitations at this time and is tolerating Coumadin without bleeding and without symptoms of cerebral ischemia.  She has no symptoms of pro arrhythmia on sotalol.  QTC today was 456.  We will continue current therapy and follow QTC closely.    2.  The patient has a history of chest pain and exertional dyspnea, with chest pain currently quiesced sent and no obstructive coronary disease by cardiac catheterization several years ago.  Risk factor modification only is indicated at this time.    3.  Lower extremity edema.  This is multifactorial in etiology and is mildly improved.  It was recommended patient continue support hose, leg elevation, and other mechanical measures for edema as well as continue efforts at increasing physical activity and weight loss.    4.  Systemic hypertension.  Blood pressures are mildly elevated today but I feel that, in general, blood pressure control is adequate and would recommend no changes at this time.    5.  Pulmonary hypertension.  This is been stable symptomatically, the patient uses mask for sleep apnea, and I feel that efforts at exercise and weight loss may help improve pulmonary pressures.  There is no evidence for a significant cardiac contributing factor.    6.  Valvular heart disease.  The patient has mild to moderate aortic stenosis with grade 2 diastolic dysfunction.  We will continue current therapy.  The patient queries decreasing diuretics but I do not think that that would be wise, despite renal insufficiency, given the degree of edema still present and previously  demonstrated occultly with wound healing in the lower extremities.    7.  Ms. Corona will follow with Dr. Andrew as instructed, and we will plan on seeing her in follow-up in 6 months or on a as needed basis for symptoms as discussed in detail today.       Diagnosis Plan   1. Abnormal stress test     2. Bilateral carotid artery stenosis     3. Diastolic dysfunction     4. Essential hypertension     5. Palpitations     6. Paroxysmal atrial fibrillation (CMS/HCC)     7. Peripheral vascular disease (CMS/HCC)     8. Pulmonary hypertension (CMS/HCC)     9. Exertional shortness of breath     10. HANK on CPAP     11. Dizziness     12. Dyslipidemia     13. Edema, unspecified type         No Follow-up on file.                  Patient's Body mass index is 50.36 kg/m². BMI is above normal parameters. Recommendations include: educational material and referral to primary care.       Nissa Qiu LPN    Scribed for Dr. Sacha Romero by Nissa Qiu LPN August 13, 2019 10:26 AM         Electronically signed by:            This note is dictated utilizing voice recognition software.  Although this record has been proof read, transcriptional errors may still be present. If questions occur regarding the content of this record please do not hesitate to call our office.

## 2019-10-16 ENCOUNTER — TELEPHONE (OUTPATIENT)
Dept: CARDIOLOGY | Facility: CLINIC | Age: 74
End: 2019-10-16

## 2019-10-16 NOTE — TELEPHONE ENCOUNTER
PATIENT REPORTS LABS ON Monday WITH BUN 40 CREAT. 1.9 GFR 27 AND THAT DR. VITAL WANTS TO DECREASE HER LASIX TO 20 MG DAILY FROM 40 MG DAILY. PATIENT STILL WITH LE EDEMA AND SHORTNESS OF BREATH. WANTED DR. LEYVA OPINION. PER DR. LEYVA THAT'S FINE, BUT WITH FUTURE ISSUES WITH EDEMA AND SHORTNESS OF BREATH, DR. BYERS CAN ADDRESS. PATIENT AWARE OF THIS. VERBALIZED OK. ALFREDO TORREZ          ----- Message from Heather Obando MA sent at 10/16/2019 10:28 AM EDT -----  Patient requesting you call her back in regards to her appt yesterday with PCP. -STEPHANIE;ELVA

## 2019-11-22 ENCOUNTER — OFFICE VISIT (OUTPATIENT)
Dept: CARDIOLOGY | Facility: CLINIC | Age: 74
End: 2019-11-22

## 2019-11-22 VITALS
HEIGHT: 67 IN | DIASTOLIC BLOOD PRESSURE: 48 MMHG | HEART RATE: 61 BPM | SYSTOLIC BLOOD PRESSURE: 118 MMHG | WEIGHT: 293 LBS | BODY MASS INDEX: 45.99 KG/M2

## 2019-11-22 DIAGNOSIS — I48.0 PAROXYSMAL ATRIAL FIBRILLATION (HCC): Primary | ICD-10-CM

## 2019-11-22 PROCEDURE — 93000 ELECTROCARDIOGRAM COMPLETE: CPT | Performed by: INTERNAL MEDICINE

## 2019-11-22 PROCEDURE — 99214 OFFICE O/P EST MOD 30 MIN: CPT | Performed by: INTERNAL MEDICINE

## 2019-11-22 RX ORDER — SOTALOL HYDROCHLORIDE 120 MG/1
120 TABLET ORAL DAILY
Qty: 90 TABLET | Refills: 0 | OUTPATIENT
Start: 2019-11-22 | End: 2021-03-29

## 2019-11-22 RX ORDER — ALLOPURINOL 100 MG/1
100 TABLET ORAL DAILY
COMMUNITY
End: 2020-07-02

## 2019-11-22 RX ORDER — ALLOPURINOL 300 MG/1
300 TABLET ORAL NIGHTLY
COMMUNITY
End: 2020-07-02

## 2019-11-22 NOTE — PROGRESS NOTES
Cardiac Electrophysiology Outpatient Follow Up Note            [facility]     Follow Up Office Visit      Holly Corona  [id number]  11/22/2019  [unfilled]  [unfilled]    Primary Care Physician: Abdoul Andrew MD    Referred By: No ref. provider found    Subjective     Chief Complaint:   Chief Complaint   Patient presents with   • Atrial Fibrillation   PROBLEM LIST:  1. Paroxysmal atrial fibrillation/atrial flutter:  a. Echocardiogram, 02/08/2006 with mild to moderate LVH, moderate MR, pulmonary HTN with RVSP at 55 mmHg; EF 65%.   b. GXT, 04/03/2006 with no ischemia, EF 65%.  c. Event recorder, April 2007 through May 2007 showing atrial flutter.  d. Echocardiogram, 05/07/2007 with pulmonary HTN, LA 4.5, EF 65%.  e. Cardiolite, 05/07/2007, normal study, EF 78%.  f. Left heart cath, 05/09/2007 with normal coronary arteries, EF 70%.  g. EKG, 10/21/2007 showing atrial fibrillation at 146 BPM.  h. Failed sotalol therapy.  i. Tikosyn initiated, 08/08/2012.  j. Echocardiogram, 09/17/2012 with an EF of 65%; mild LVH, moderate diastolic dysfunction, moderate to severe biatrial enlargement, mild MR, moderate TR with an RVSP of 65 mmHg. LA 4.7. Aortic valve sclerosis.  k. Pulmonary vein isolation procedure with ablation of right atrial flutter and nonsustained low posterior atrial tachycardia, 03/15/2013 complicated by dysphagia that lasted 24-48 hours.  2. CP  a. Heart cath 6/9/17: Non obstructive CAD, LVEF 55%   3. HTN  4. Right leg deep venous thrombosis, treated with Coumadin therapy until October 2007.  5. Hiatal hernia/gastroesophageal reflux disease.  6. Carotid bruits: Normal carotid duplex, March 2006 and June 2007.  7. Osteoarthritis.  8. Hypothyroidism.  9. Sleep apnea, on CPAP.  10. Dyslipidemia, no current therapy.  11. Colon cancer, status post resection of 17 inches of the right ascending colon, undergoing chemotherapy.  12. Pulmonary embolism, February 2012,  Coumadin initiated.  13. Pulmonary hypertension:  a. Echocardiogram, 06/21/2011, showing PA systolic pressure estimated at 60-65 mmHg.  b. Moderate TR with an RVSP of 65 mmHg by echocardiogram, 09/17/2012.  14. Surgical history:  a. Hysterectomy.  b. Carpal tunnel repair.  c. Left knee replacement.  Foot surgery.    History of Present Illness:   Mrs. Holly Corona is a 74 y.o. female who presents to my electrophysiology clinic for follow up of paroxysmal H fibrillation.  She states to me that she has had more episodes of atrial fibrillation recently.  She recalls that for several years after her catheter ablation procedure of the left atrium for her paroxysmal A. fib it was markedly improved.  It is only in the last 6 months or so that she has had more defibrillation.  He tells me now that she has severe arthritis she has a lot of pain in her feet.    She has not had any syncope or presyncope.  No chest pain nausea vomiting fevers or chills.  She is compliant with her CPAP therapy.    She tells me that she is struggling to lose weight and rather is gaining weight.    She reliably takes her sotalol twice a day.  Her most recent laboratory studies demonstrated GFR of 30.    Review of Systems:   Constitutional: No fevers or chills, no recent weight gain or weight loss or fatigue  Eyes: No visual loss, blurred vision, double vision, yellow sclerae.  ENT: No headaches, hearing loss, vertigo, congestion or sore throat.   Cardiovascular: Per HPI  Respiratory: No cough or wheezing, no sputum production, no hematemesis   Gastrointestinal: No abdominal pain, no nausea, vomiting, constipation, diarrhea, melena.   Genitourinary: No dysuria, hematuria or increased frequency.  Musculoskeletal:  No gait disturbance, weakness or joint pain or stiffness  Integumentary: No rashes, urticaria, ulcers or sores.   Neurological: No headache, dizziness, syncope, paralysis, ataxia, no prior CVA/TIA  Psychiatric: No anxiety, or  depression  Endocrine: No diaphoresis, cold or heat intolerance. No polyuria or polydipsia.   Hematologic/Lymphatic: No anemia, abnormal bruising or bleeding. No history of DVT/PE.       Past Medical History:   Past Medical History:   Diagnosis Date   • Anemia    • Atrial fibrillation (CMS/HCC)     A.  History of prior pulmonary vein ablation 03/14/2013. B.  On chronic Coumadin and Tikosyn therapy.   • Carotid artery stenosis    • Carotid bruit    • Deep venous thrombosis (CMS/HCC)     A.  History of right lower extremity DVT treated with in therapy until October 2000.   • Diastolic dysfunction    • Diastolic dysfunction    • Dizziness    • Dyslipidemia    • Edema    • Exertional shortness of breath    • GERD (gastroesophageal reflux disease)    • Gout    • H/O echocardiogram     A.  Echocardiogram of 10/16/2013 reports an ejection fraction of 55-60%, a moderately to severely dilated left atrium, mild to moderately dilated right atrium, trace aortic regurgitation, mild mitral and tricuspid regurgitation with calculated    • Hiatal hernia    • Hypertension     A.  Echocardiogram 10/16/2013 reports a calculated RVSP of 50-55 mmHg.B.  Right heart catheterization 03/12/2009 at  reported RV of 72/19, PA 72/27 and PCWP of 24, this was felt to be     • Hypothyroidism    • Morbid obesity (CMS/HCC)    • Obstructive sleep apnea     A.  On CPAP each bedtime.   • Osteoarthritis    • Palpitations    • Peripheral vascular disease (CMS/HCC)    • Pulmonary embolus (CMS/HCC)     A.  Developed during chemotherapy in 2/2012. B.  Coumadin therapy reinitiated at that time.   • Pulmonary hypertension (CMS/HCC)    • Signet ring cell adenocarcinoma (CMS/HCC)     A.  Diagnosed on colonoscopy E. 10/14/2011, status post colon resection and 2 of 20 nodes positive, T3, N1b Stage IIIB. B.  Status post chemotherapy.    • Syncope        Past Surgical History:   Past Surgical History:   Procedure Laterality Date   • CARDIAC CATHETERIZATION     •  FOOT SURGERY     • HERNIA REPAIR      2011   • HYSTERECTOMY      1993   • OTHER SURGICAL HISTORY      cardiac cath procedure summary, A.  Cardiac catheterization April 2007 reported no significance coronary artery disease with markedly elevated LVEDP.   • OTHER SURGICAL HISTORY      catheter ablation atrial fibrillation, 2013   • OTHER SURGICAL HISTORY Left     knee replacement 2005   • OTHER SURGICAL HISTORY      neuroplasty decompression median nerve at carpal tunnel 1997   • OTHER SURGICAL HISTORY      partial colectomy , A.  Status post right hemicolectomy secondary to colon cancer in 2011.       Family History:   Family History   Problem Relation Age of Onset   • Other Mother         MEDICAL PROBLEMS   • Other Sister         myocardial infarction.       Social History:   Social History     Socioeconomic History   • Marital status:      Spouse name: Not on file   • Number of children: Not on file   • Years of education: Not on file   • Highest education level: Not on file   Tobacco Use   • Smoking status: Former Smoker   • Smokeless tobacco: Never Used   Substance and Sexual Activity   • Alcohol use: No   • Drug use: No   • Sexual activity: Defer       Medications:     Current Outpatient Medications:   •  allopurinol (ZYLOPRIM) 100 MG tablet, Take 100 mg by mouth Daily., Disp: , Rfl:   •  allopurinol (ZYLOPRIM) 300 MG tablet, Take 300 mg by mouth Every Night., Disp: , Rfl:   •  CloNIDine (CATAPRES) 0.2 MG tablet, Take 1 tablet by mouth 3 (Three) Times a Day. (Patient taking differently: Take 0.2 mg by mouth 2 (Two) Times a Day.), Disp: 90 tablet, Rfl: 3  •  FERREX 150 150 MG capsule, Daily., Disp: , Rfl: 5  •  furosemide (LASIX) 40 MG tablet, Take 1 tablet by mouth Daily., Disp: 90 tablet, Rfl: 3  •  hydrALAZINE (APRESOLINE) 25 MG tablet, Take 1 tablet by mouth 3 (Three) Times a Day., Disp: 90 tablet, Rfl: 3  •  isosorbide mononitrate (IMDUR) 30 MG 24 hr tablet, Take 1 tablet by mouth Daily., Disp: 90  "tablet, Rfl: 3  •  ketoconazole (NIZORAL) 2 % cream, Apply  topically to the appropriate area as directed Every 12 (Twelve) Hours., Disp: , Rfl:   •  levothyroxine (SYNTHROID, LEVOTHROID) 88 MCG tablet, Take 1 tablet by mouth daily., Disp: , Rfl:   •  O2 (OXYGEN), Inhale 2 L/min 1 (One) Time. Through c-pap, Disp: , Rfl:   •  pantoprazole (PROTONIX) 40 MG EC tablet, Take 1 tablet by mouth Daily., Disp: 90 tablet, Rfl: 3  •  RESTASIS 0.05 % ophthalmic emulsion, Daily., Disp: , Rfl:   •  sotalol (BETAPACE AF) 80 MG tablet tablet, Take 1 tablet by mouth Every 12 (Twelve) Hours., Disp: 180 tablet, Rfl: 3  •  telmisartan (MICARDIS) 80 MG tablet, Take 1 tablet by mouth Daily., Disp: 90 tablet, Rfl: 3  •  warfarin (COUMADIN) 5 MG tablet, Daily. Takes 5 mg 5 days a week and 2.5 mg x1 day then none one day, Disp: , Rfl:     Allergies:   Allergies   Allergen Reactions   • Ciprofloxacin Itching   • Ofloxacin    • Iodinated Diagnostic Agents Rash       Objective     Physical Exam:  Vital Signs:   Vitals:    11/22/19 1147   BP: 118/48   BP Location: Left arm   Patient Position: Sitting   Pulse: 61   Weight: (!) 146 kg (322 lb)   Height: 170.2 cm (67\")     GEN: Well nourished, well-developed, no acute distress, morbidly obese.  HEENT: Normocephalic, atraumatic, moist mucous membranes  NECK: Supple, no JVD, no thyromegaly, no lymphadenopathy  CARD: Regular rate and rhythm, normal S1 & S2 are present.  No murmur, gallop or rubs are appreciated.  LUNGS: Clear to auscultation bilateraly, normal respiratory effort  ABDOMEN: Soft, nontender, normal bowel sounds  EXTREMITIES: No gross deformities, no clubbing, cyanosis.  Edema marketed bilateral extremity enlargement consistent with lipedema  SKIN: Warm, dry  NEURO: No focal deficits, alert and oriented x 3  PSYCHIATRIC: Normal affect and mood, appropriate use of semantics and logic.        No results found for: GLUCOSE, CALCIUM, NA, K, CO2, CL, BUN, CREATININE, EGFRIFAFRI, EGFRIFNONA, " BCR, ANIONGAP  No results found for: WBC, HGB, HCT, MCV, PLT  No results found for: INR, PROTIME  No results found for: TSH, C5IRKMD, O7TJNWI, THYROIDAB    Cardiac Testing:  .all cardiac testing.    I personally viewed and interpreted the patient's EKG/Telemetry/lab data      ECG 12 Lead  Date/Time: 11/22/2019 12:10 PM  Performed by: Hank Maldonado DO  Authorized by: Hank Maldonado DO   Comparison: not compared with previous ECG   Rhythm: sinus rhythm    Clinical impression: normal ECG  Comments: QT interval on sotalol 80 mg twice daily is acceptable.            Tobacco Cessation: N/A  Obstructive Sleep Apnea Screening: Diagnosed with sleep apnea already.    Assessment & Plan    This is a 74-year-old survivor of colon cancer with morbid obesity sleep apnea severe osteoarthritis of the lower extremities severe left atrial enlargement moderate aortic stenosis normal LV systolic function and kidney disease.  Her most recent GFR is 30.  We are following her for paroxysmal H fibrillation.  Her symptoms are recurring.  This is consistent with a late term recurrence of age fibrillation after catheter ablation.  I discussed with her options including repeat catheter ablation procedure.  Her body mass would make this quite technically challenging her BMI is 50.  She is not interested in another ablation procedure.  Frankly I do not think she would be a good candidate for another ablation either.      I would advocate that we change her sotalol dosing to once a day given her kidney function.  We will change the dose to 120 mg once a day of sotalol.    Continue anticoagulation for indications of primary prevention of stroke but also additionally history of pulmonary embolism.    Obesity Counseling:    I discussed with the patient that they are obese.  Her continued weight gain is very likely the reason for recurrence of her age fibrillation so many years after catheter ablation.  We discussed this in particular at length.   She understands.  We discussed that obesity is a significant risk factor for heart disease, hypertension, diabetes, other forms of health problems and indeed premature death.  We discussed that it is critical that the patient loose weight to minimize their risk of excess morbidity and mortality.  We further discussed various options regarding weight loss strategies including dietary caloric restriction, exercise, referral to a dietitian and possibly also bariatric surgical options   (which are appropriate for some but not all patients ). We spent over 10 minutes discussing weight loss options. They voiced a clear understanding of these medical facts.  They voice a clear understanding of my medical advice that they endeavor to loose weight as their obesity is adversely affecting their health.    Holly was seen today for atrial fibrillation.    Diagnoses and all orders for this visit:    Paroxysmal atrial fibrillation (CMS/HCC)      Follow Up:   No Follow-up on file.    Thank you for allowing me to participate in the care of your patient. Please to not hesitate to contact me with additional questions or concerns.        Hank Maldonado DO, FAC, [facility]  Cardiac Electrophysiologist

## 2019-12-03 ENCOUNTER — TELEPHONE (OUTPATIENT)
Dept: CARDIOLOGY | Facility: CLINIC | Age: 74
End: 2019-12-03

## 2019-12-03 NOTE — TELEPHONE ENCOUNTER
PT called she saw Dr. Maldonado in [place] and he wants her to increase her Sotalol from 80mg 1 BID to 120mg daily. She wants your ok before she starts the medication.

## 2020-02-13 ENCOUNTER — OFFICE VISIT (OUTPATIENT)
Dept: CARDIOLOGY | Facility: CLINIC | Age: 75
End: 2020-02-13

## 2020-02-13 VITALS
DIASTOLIC BLOOD PRESSURE: 63 MMHG | OXYGEN SATURATION: 98 % | HEIGHT: 67 IN | BODY MASS INDEX: 45.99 KG/M2 | WEIGHT: 293 LBS | HEART RATE: 59 BPM | SYSTOLIC BLOOD PRESSURE: 153 MMHG

## 2020-02-13 DIAGNOSIS — Z99.89 OSA ON CPAP: ICD-10-CM

## 2020-02-13 DIAGNOSIS — I51.89 DIASTOLIC DYSFUNCTION: ICD-10-CM

## 2020-02-13 DIAGNOSIS — I26.99 OTHER ACUTE PULMONARY EMBOLISM WITHOUT ACUTE COR PULMONALE (HCC): ICD-10-CM

## 2020-02-13 DIAGNOSIS — I65.23 BILATERAL CAROTID ARTERY STENOSIS: Primary | ICD-10-CM

## 2020-02-13 DIAGNOSIS — R60.9 EDEMA, UNSPECIFIED TYPE: ICD-10-CM

## 2020-02-13 DIAGNOSIS — I73.9 PERIPHERAL VASCULAR DISEASE (HCC): ICD-10-CM

## 2020-02-13 DIAGNOSIS — R00.2 PALPITATIONS: ICD-10-CM

## 2020-02-13 DIAGNOSIS — I48.0 PAROXYSMAL ATRIAL FIBRILLATION (HCC): ICD-10-CM

## 2020-02-13 DIAGNOSIS — R07.2 PRECORDIAL PAIN: ICD-10-CM

## 2020-02-13 DIAGNOSIS — I27.20 PULMONARY HYPERTENSION (HCC): ICD-10-CM

## 2020-02-13 DIAGNOSIS — I82.4Y9 ACUTE DEEP VEIN THROMBOSIS (DVT) OF PROXIMAL VEIN OF LOWER EXTREMITY, UNSPECIFIED LATERALITY (HCC): ICD-10-CM

## 2020-02-13 DIAGNOSIS — R06.02 EXERTIONAL SHORTNESS OF BREATH: ICD-10-CM

## 2020-02-13 DIAGNOSIS — R42 DIZZINESS: ICD-10-CM

## 2020-02-13 DIAGNOSIS — E78.5 DYSLIPIDEMIA: ICD-10-CM

## 2020-02-13 DIAGNOSIS — R55 SYNCOPE, UNSPECIFIED SYNCOPE TYPE: ICD-10-CM

## 2020-02-13 DIAGNOSIS — G47.33 OSA ON CPAP: ICD-10-CM

## 2020-02-13 DIAGNOSIS — I10 ESSENTIAL HYPERTENSION: ICD-10-CM

## 2020-02-13 PROCEDURE — 93000 ELECTROCARDIOGRAM COMPLETE: CPT | Performed by: INTERNAL MEDICINE

## 2020-02-13 PROCEDURE — 99214 OFFICE O/P EST MOD 30 MIN: CPT | Performed by: INTERNAL MEDICINE

## 2020-02-13 RX ORDER — ISOSORBIDE MONONITRATE 30 MG/1
30 TABLET, EXTENDED RELEASE ORAL DAILY
Qty: 90 TABLET | Refills: 3 | Status: SHIPPED | OUTPATIENT
Start: 2020-02-13

## 2020-02-13 NOTE — PATIENT INSTRUCTIONS
Obesity, Adult  Obesity is the condition of having too much total body fat. Being overweight or obese means that your weight is greater than what is considered healthy for your body size. Obesity is determined by a measurement called BMI. BMI is an estimate of body fat and is calculated from height and weight. For adults, a BMI of 30 or higher is considered obese.  Obesity can lead to other health concerns and major illnesses, including:  · Stroke.  · Coronary artery disease (CAD).  · Type 2 diabetes.  · Some types of cancer, including cancers of the colon, breast, uterus, and gallbladder.  · Osteoarthritis.  · High blood pressure (hypertension).  · High cholesterol.  · Sleep apnea.  · Gallbladder stones.  · Infertility problems.  What are the causes?  Common causes of this condition include:  · Eating daily meals that are high in calories, sugar, and fat.  · Being born with genes that may make you more likely to become obese.  · Having a medical condition that causes obesity, including:  ? Hypothyroidism.  ? Polycystic ovarian syndrome (PCOS).  ? Binge-eating disorder.  ? Cushing syndrome.  · Taking certain medicines, such as steroids, antidepressants, and seizure medicines.  · Not being physically active (sedentary lifestyle).  · Not getting enough sleep.  · Drinking high amounts of sugar-sweetened beverages, such as soft drinks.  What increases the risk?  The following factors may make you more likely to develop this condition:  · Having a family history of obesity.  · Being a woman of  descent.  · Being a man of  descent.  · Living in an area with limited access to:  ? [place], recreation centers, or sidewalks.  ? Healthy food choices, such as grocery stores and farmers' markets.  What are the signs or symptoms?  The main sign of this condition is having too much body fat.  How is this diagnosed?  This condition is diagnosed based on:  · Your BMI. If you are an adult with a BMI of 30 or  higher, you are considered obese.  · Your waist circumference. This measures the distance around your waistline.  · Your skinfold thickness. Your health care provider may gently pinch a fold of your skin and measure it.  You may have other tests to check for underlying conditions.  How is this treated?  Treatment for this condition often includes changing your lifestyle. Treatment may include some or all of the following:  · Dietary changes. This may include developing a healthy meal plan.  · Regular physical activity. This may include activity that causes your heart to beat faster (aerobic exercise) and strength training. Work with your health care provider to design an exercise program that works for you.  · Medicine to help you lose weight if you are unable to lose 1 pound a week after 6 weeks of healthy eating and more physical activity.  · Treating conditions that cause the obesity (underlying conditions).  · Surgery. Surgical options may include gastric banding and gastric bypass. Surgery may be done if:  ? Other treatments have not helped to improve your condition.  ? You have a BMI of 40 or higher.  ? You have life-threatening health problems related to obesity.  Follow these instructions at home:  Eating and drinking    · Follow recommendations from your health care provider about what you eat and drink. Your health care provider may advise you to:  ? Limit fast food, sweets, and processed snack foods.  ? Choose low-fat options, such as low-fat milk instead of whole milk.  ? Eat 5 or more servings of fruits or vegetables every day.  ? Eat at home more often. This gives you more control over what you eat.  ? Choose healthy foods when you eat out.  ? Learn to read food labels. This will help you understand how much food is considered 1 serving.  ? Learn what a healthy serving size is.  ? Keep low-fat snacks available.  ? Limit sugary drinks, such as soda, fruit juice, sweetened iced tea, and flavored  milk.  · Drink enough water to keep your urine pale yellow.  · Do not follow a fad diet. Fad diets can be unhealthy and even dangerous.  Physical activity  · Exercise regularly, as told by your health care provider.  ? Most adults should get up to 150 minutes of moderate-intensity exercise every week.  ? Ask your health care provider what types of exercise are safe for you and how often you should exercise.  · Warm up and stretch before being active.  · Cool down and stretch after being active.  · Rest between periods of activity.  Lifestyle  · Work with your health care provider and a dietitian to set a weight-loss goal that is healthy and reasonable for you.  · Limit your screen time.  · Find ways to reward yourself that do not involve food.  · Do not drink alcohol if:  ? Your health care provider tells you not to drink.  ? You are pregnant, may be pregnant, or are planning to become pregnant.  · If you drink alcohol:  ? Limit how much you use to:  § 0-1 drink a day for women.  § 0-2 drinks a day for men.  ? Be aware of how much alcohol is in your drink. In the [place] one drink equals one 12 oz bottle of beer (355 mL), one 5 oz glass of wine (148 mL), or one 1½ oz glass of hard liquor (44 mL).  General instructions  · Keep a weight-loss journal to keep track of the food you eat and how much exercise you get.  · Take over-the-counter and prescription medicines only as told by your health care provider.  · Take vitamins and supplements only as told by your health care provider.  · Consider joining a support group. Your health care provider may be able to recommend a support group.  · Keep all follow-up visits as told by your health care provider. This is important.  Contact a health care provider if:  · You are unable to meet your weight loss goal after 6 weeks of dietary and lifestyle changes.  Get help right away if you are having:  · Trouble breathing.  · Suicidal thoughts or behaviors.  Summary  · Obesity is the  condition of having too much total body fat.  · Being overweight or obese means that your weight is greater than what is considered healthy for your body size.  · Work with your health care provider and a dietitian to set a weight-loss goal that is healthy and reasonable for you.  · Exercise regularly, as told by your health care provider. Ask your health care provider what types of exercise are safe for you and how often you should exercise.  This information is not intended to replace advice given to you by your health care provider. Make sure you discuss any questions you have with your health care provider.  Document Released: 01/25/2006 Document Revised: 08/22/2019 Document Reviewed: 08/22/2019  [employer] Interactive Patient Education © 2019 [employer]  MyPlate from [employer]    MyPlate is an outline of a general healthy diet based on the 2010 Dietary Guidelines for Americans, from the [place] Department of Agriculture (USDA). It sets guidelines for how much food you should eat from each food group based on your age, sex, and level of physical activity.  What are tips for following MyPlate?  To follow MyPlate recommendations:  · Eat a wide variety of fruits and vegetables, grains, and protein foods.  · Serve smaller portions and eat less food throughout the day.  · Limit portion sizes to avoid overeating.  · Enjoy your food.  · Get at least 150 minutes of exercise every week. This is about 30 minutes each day, 5 or more days per week.  It can be difficult to have every meal look like MyPlate. Think about MyPlate as eating guidelines for an entire day, rather than each individual meal.  Fruits and vegetables  · Make half of your plate fruits and vegetables.  · Eat many different colors of fruits and vegetables each day.  · For a 2,000 calorie daily food plan, eat:  ? 2½ cups of vegetables every day.  ? 2 cups of fruit every day.  · 1 cup is equal to:  ? 1 cup raw or cooked vegetables.  ? 1 cup raw fruit.  ? 1  medium-sized orange, apple, or banana.  ? 1 cup 100% fruit or vegetable juice.  ? 2 cups raw leafy greens, such as lettuce, spinach, or kale.  ? ½ cup dried fruit.  Grains  · One fourth of your plate should be grains.  · Make at least half of the grains you eat each day whole grains.  · For a 2,000 calorie daily food plan, eat 6 oz of grains every day.  · 1 oz is equal to:  ? 1 slice bread.  ? 1 cup cereal.  ? ½ cup cooked rice, cereal, or pasta.  Protein  · One fourth of your plate should be protein.  · Eat a wide variety of protein foods, including meat, poultry, fish, eggs, beans, nuts, and tofu.  · For a 2,000 calorie daily food plan, eat 5½ oz of protein every day.  · 1 oz is equal to:  ? 1 oz meat, poultry, or fish.  ? ¼ cup cooked beans.  ? 1 egg.  ? ½ oz nuts or seeds.  ? 1 Tbsp peanut butter.  Dairy  · Drink fat-free or low-fat (1%) milk.  · Eat or drink dairy as a side to meals.  · For a 2,000 calorie daily food plan, eat or drink 3 cups of dairy every day.  · 1 cup is equal to:  ? 1 cup milk, yogurt, cottage cheese, or soy milk (soy beverage).  ? 2 oz processed cheese.  ? 1½ oz natural cheese.  Fats, oils, salt, and sugars  · Only small amounts of oils are recommended.  · Avoid foods that are high in calories and low in nutritional value (empty calories), like foods high in fat or added sugars.  · Choose foods that are low in salt (sodium). Choose foods that have less than 140 milligrams (mg) of sodium per serving.  · Drink water instead of sugary drinks. Drink enough water each day to keep your urine pale yellow.  Where to find support  · Work with your health care provider or a nutrition specialist (dietitian) to develop a customized eating plan that is right for you.  · Download an hannah (mobile application) to help you track your daily food intake.  Where to find more information  · Go to ChooseMyPlate.gov for more information.  · Learn more and log your daily food intake according to MyPlate using  USDA's SuperTracker: www.supertracker.usda.gov  Summary  · MyPlate is a general guideline for healthy eating from the USDA. It is based on the 2010 Dietary Guidelines for Americans.  · In general, fruits and vegetables should take up ½ of your plate, grains should take up ¼ of your plate, and protein should take up ¼ of your plate.  This information is not intended to replace advice given to you by your health care provider. Make sure you discuss any questions you have with your health care provider.  Document Released: 01/06/2009 Document Revised: 03/19/2018 Document Reviewed: 03/19/2018  [employer] Interactive Patient Education © 2019 [employer]

## 2020-02-13 NOTE — PROGRESS NOTES
Subjective   Holly Corona is a 74 y.o. female     Chief Complaint   Patient presents with   • Atrial Fibrillation     Here for 6 mo. f/u   • Hypertension   • Palpitations   • Peripheral Vascular Disease   • Sleep Apnea       PROBLEM LIST:     1. Paroxysmal Atrial  fibrillation  1.1 Pulmonary vein isolation procedure with ablation of right atrial flutter by Dr. Mario, March 2013.  2. Severe pulmonary hypertension with pulmonary pressure 60-65% by most recent cath.  3. Hypertension  3.1 Echo 9/9/15 - mild LVH; EF > 65%; mild to mod MR; mod TR; PA 55-60; mild UT  3.2 recent ECHO 03/09/17, left ventricular chamber is mildly dilated. Mild concentric LVH. EF > 65%. Issa Grade ll diastolic dysfunction. Left atrium moderately dilated, right atrium mild to moderate dilated. Systolic pressure 55-60 mmHg.   3.3 recent stress 04/11/17 inferior ischemia, chest wall attenuation.   4. Dyslipidemia  5. History of DVT/PE  6. History of colon CA x 2 status post surgery 2014.  7. Carotid Artery Stenosis  7.1 Carotid U/S 3/8/16 - 16-49% MARYBEL and LICA; antegrade flow  8. HANK - CPAP   9. GOUT, recurrent flare-ups                        Specialty Problems        Cardiology Problems    HTN (hypertension)        Paroxysmal atrial fibrillation (CMS/HCC)        Carotid artery stenosis        Carotid bruit        Deep venous thrombosis (CMS/HCC)        Diastolic dysfunction        Palpitations        Peripheral vascular disease (CMS/HCC)        Pulmonary embolus (CMS/HCC)        Pulmonary hypertension (CMS/HCC)        Syncope        Abnormal stress test                HPI:  Ms. Corona returns for follow-up on the above.    She describes mild worsening of her exertional dyspnea but readily admits that she does know physical activity other than that required to move about her house or perform other necessary activities.  She does not cough or wheeze.  She denies orthopnea or PND and uses her CPAP mask with supplemental oxygen regularly.   Despite decreasing diuretic her lower extremity edema has remained stable.  Ms. Corona denies palpitations, dizziness, presyncope, or syncope.  She has no symptoms of arterial embolic events or of endocarditis.    Last echo demonstrated preserved LV systolic function, moderate diastolic dysfunction, and aortic valve area of 1.2 to 1.3 cm² with mild AI, mild MR, mild to moderate TR, and pulmonary pressures in the 50s in systole.                            PRIOR MEDICATIONS    Current Outpatient Medications on File Prior to Visit   Medication Sig Dispense Refill   • allopurinol (ZYLOPRIM) 100 MG tablet Take 100 mg by mouth Daily.     • allopurinol (ZYLOPRIM) 300 MG tablet Take 300 mg by mouth Every Night.     • CloNIDine (CATAPRES) 0.2 MG tablet Take 1 tablet by mouth 3 (Three) Times a Day. (Patient taking differently: Take 0.2 mg by mouth 2 (Two) Times a Day.) 90 tablet 3   • FERREX 150 150 MG capsule Daily.  5   • furosemide (LASIX) 40 MG tablet Take 1 tablet by mouth Daily. (Patient taking differently: Take 20 mg by mouth Daily.) 90 tablet 3   • hydrALAZINE (APRESOLINE) 25 MG tablet Take 1 tablet by mouth 3 (Three) Times a Day. 90 tablet 3   • isosorbide mononitrate (IMDUR) 30 MG 24 hr tablet Take 1 tablet by mouth Daily. 90 tablet 3   • levothyroxine (SYNTHROID, LEVOTHROID) 88 MCG tablet Take 1 tablet by mouth daily.     • O2 (OXYGEN) Inhale 2 L/min 1 (One) Time. Through c-pap     • pantoprazole (PROTONIX) 40 MG EC tablet Take 1 tablet by mouth Daily. 90 tablet 3   • RESTASIS 0.05 % ophthalmic emulsion Daily.     • sotalol (BETAPACE) 120 MG tablet Take 1 tablet by mouth Daily for 90 days. (Patient taking differently: Take 80 mg by mouth 2 (Two) Times a Day.) 90 tablet 0   • telmisartan (MICARDIS) 80 MG tablet Take 1 tablet by mouth Daily. 90 tablet 3   • warfarin (COUMADIN) 5 MG tablet Daily. Takes 5 mg  6 days a week  then none one day     • [DISCONTINUED] ketoconazole (NIZORAL) 2 % cream Apply  topically to the  appropriate area as directed Every 12 (Twelve) Hours.       No current facility-administered medications on file prior to visit.        ALLERGIES:    Ciprofloxacin; Ofloxacin; and Iodinated diagnostic agents    PAST MEDICAL HISTORY:    Past Medical History:   Diagnosis Date   • Anemia    • Atrial fibrillation (CMS/HCC)     A.  History of prior pulmonary vein ablation 03/14/2013. B.  On chronic Coumadin and Tikosyn therapy.   • Carotid artery stenosis    • Carotid bruit    • Deep venous thrombosis (CMS/HCC)     A.  History of right lower extremity DVT treated with in therapy until October 2000.   • Diastolic dysfunction    • Diastolic dysfunction    • Dizziness    • Dyslipidemia    • Edema    • Exertional shortness of breath    • GERD (gastroesophageal reflux disease)    • Gout    • H/O echocardiogram     A.  Echocardiogram of 10/16/2013 reports an ejection fraction of 55-60%, a moderately to severely dilated left atrium, mild to moderately dilated right atrium, trace aortic regurgitation, mild mitral and tricuspid regurgitation with calculated    • Hiatal hernia    • Hypertension     A.  Echocardiogram 10/16/2013 reports a calculated RVSP of 50-55 mmHg.B.  Right heart catheterization 03/12/2009 at  reported RV of 72/19, PA 72/27 and PCWP of 24, this was felt to be     • Hypothyroidism    • Morbid obesity (CMS/HCC)    • Obstructive sleep apnea     A.  On CPAP each bedtime.   • Osteoarthritis    • Palpitations    • Peripheral vascular disease (CMS/HCC)    • Pulmonary embolus (CMS/HCC)     A.  Developed during chemotherapy in 2/2012. B.  Coumadin therapy reinitiated at that time.   • Pulmonary hypertension (CMS/HCC)    • Signet ring cell adenocarcinoma (CMS/HCC)     A.  Diagnosed on colonoscopy E. 10/14/2011, status post colon resection and 2 of 20 nodes positive, T3, N1b Stage IIIB. B.  Status post chemotherapy.    • Syncope        SURGICAL HISTORY:    Past Surgical History:   Procedure Laterality Date   • CARDIAC  "CATHETERIZATION     • FOOT SURGERY     • HERNIA REPAIR      2011   • HYSTERECTOMY      1993   • OTHER SURGICAL HISTORY      cardiac cath procedure summary, A.  Cardiac catheterization April 2007 reported no significance coronary artery disease with markedly elevated LVEDP.   • OTHER SURGICAL HISTORY      catheter ablation atrial fibrillation, 2013   • OTHER SURGICAL HISTORY Left     knee replacement 2005   • OTHER SURGICAL HISTORY      neuroplasty decompression median nerve at carpal tunnel 1997   • OTHER SURGICAL HISTORY      partial colectomy , A.  Status post right hemicolectomy secondary to colon cancer in 2011.       SOCIAL HISTORY:    Social History     Socioeconomic History   • Marital status:      Spouse name: Not on file   • Number of children: Not on file   • Years of education: Not on file   • Highest education level: Not on file   Tobacco Use   • Smoking status: Former Smoker   • Smokeless tobacco: Never Used   Substance and Sexual Activity   • Alcohol use: No   • Drug use: No   • Sexual activity: Defer       FAMILY HISTORY:    Family History   Problem Relation Age of Onset   • Other Mother         MEDICAL PROBLEMS   • Other Sister         myocardial infarction.       Review of Systems   Constitutional: Positive for fatigue.   HENT: Positive for congestion (head congestion).    Eyes: Positive for visual disturbance (glasses prn).   Respiratory: Positive for shortness of breath.    Cardiovascular: Positive for chest pain (\"not to much\"), palpitations (HX a-fib) and leg swelling.   Gastrointestinal: Negative.  Negative for blood in stool (no melena,hematuria,hemoptysis,hematochezia).   Endocrine: Negative.    Genitourinary: Negative.    Musculoskeletal: Positive for arthralgias, gait problem (ambulates with rolling walker) and myalgias.   Skin: Negative.    Allergic/Immunologic: Positive for environmental allergies.   Neurological: Positive for dizziness and light-headedness.   Hematological: " "Negative.    Psychiatric/Behavioral: Negative.        VISIT VITALS:  Vitals:    02/13/20 0909   BP: 153/63   BP Location: Left arm   Patient Position: Sitting   Pulse: 59   SpO2: 98%   Weight: (!) 148 kg (326 lb 6.4 oz)   Height: 170.2 cm (67.01\")      /63 (BP Location: Left arm, Patient Position: Sitting)   Pulse 59   Ht 170.2 cm (67.01\")   Wt (!) 148 kg (326 lb 6.4 oz)   SpO2 98%   BMI 51.11 kg/m²     RECENT LABS:    Objective       Physical Exam   Constitutional: She is oriented to person, place, and time. She appears well-developed and well-nourished. No distress.   HENT:   Head: Normocephalic and atraumatic.   Eyes: Pupils are equal, round, and reactive to light. Conjunctivae and EOM are normal.   Neck: Normal range of motion. Neck supple. No hepatojugular reflux and no JVD present. Carotid bruit is not present. No tracheal deviation present.   Nl. Carotid upstrokes   Cardiovascular: Normal rate, regular rhythm and intact distal pulses. Exam reveals gallop and S4 (soft).   Murmur heard.   Systolic murmur is present.  Pulses:       Radial pulses are 2+ on the right side, and 2+ on the left side.   3/6 systolic ejection murmur to RUSB  And LUSB radiates into bilat. Carotids  S1 decreased  No AI  No MR       Pulmonary/Chest: Effort normal and breath sounds normal. She has no wheezes. She has no rhonchi. She has no rales.   Nl. Expir. Phase  Nl. Breath sound intensity   Abdominal: Soft. Bowel sounds are normal. She exhibits no distension, no abdominal bruit and no mass. There is no tenderness. There is no rebound and no guarding.   No organomegaly   Musculoskeletal: Normal range of motion. She exhibits edema. She exhibits no tenderness or deformity.   BLE, 2+ edema to upper calf, did not check pedals due to edema     Neurological: She is alert and oriented to person, place, and time.   Skin: Skin is warm and dry. No rash noted. No erythema. No pallor.   Psychiatric: She has a normal mood and affect. Her " behavior is normal. Judgment and thought content normal.   Nursing note and vitals reviewed.        ECG 12 Lead  Date/Time: 2/13/2020 10:29 AM  Performed by: Sacha Romero MD  Authorized by: Sacha Romero MD   Comments: Sinus at 58 bpm.  Within normal limits.  No change from prior.              Assessment/Plan   #1.  Exertional dyspnea.  This is profound, progressive, and multifactorial in etiology.  Physical deconditioning and obesity are major contributing factors.  However, I believe that diastolic dysfunction, pulmonary hypertension, arthritis, and lower extremity edema also make physical activity difficult.  I encouraged Ms. Corona to try to be as physically active as possible.  I do not think that further evaluation for cardiovascular cause of her symptoms is warranted at this time.  Although last stress test was positive (inferior wall ischemia) similar findings to cardiac catheter cessation in 2016 or 2017 which demonstrated no obstructive disease.    2.  Systemic hypertension.  Blood pressures are generally well controlled.  The patient admits she has not taken medications today.    3.  Peripheral arterial disease.  The patient had nonobstructive carotid disease by prior duplex scan.  With known peripheral arterial disease the patient should be considered for high-dose statin.  I would defer to Dr. Andrew in that regard.    4.  Paroxysmal atrial fibrillation.  Ms. Corona does not palpitate, she has no dizziness, she describes no symptoms of TIA or stroke, and she has no bleeding on warfarin.  We will continue current therapy.    5.  We discussed today symptoms which might represent ischemia, heart failure, or dysrhythmia.  Ms. Corona will report any of these immediately.  Otherwise, she will follow with Dr. Andrew per his instructions, and in our office in 6 months or on a as needed basis.   Diagnosis Plan   1. Bilateral carotid artery stenosis     2. Acute deep vein thrombosis (DVT) of proximal vein of  lower extremity, unspecified laterality (CMS/HCC)     3. Diastolic dysfunction     4. Essential hypertension     5. Palpitations     6. Paroxysmal atrial fibrillation (CMS/HCC)     7. Peripheral vascular disease (CMS/HCC)     8. Other acute pulmonary embolism without acute cor pulmonale (CMS/HCC)     9. Pulmonary hypertension (CMS/HCC)     10. Syncope, unspecified syncope type     11. Exertional shortness of breath     12. HANK on CPAP     13. Dizziness     14. Dyslipidemia     15. Edema, unspecified type         No follow-ups on file.         Holly Corona  reports that she has quit smoking. She has never used smokeless tobacco.. I have educated her on the risk of diseases from using tobacco products such as cancer, COPD and heart diease.               Patient's Body mass index is 51.11 kg/m². BMI is above normal parameters. Recommendations include: educational material and referral to primary care.       Nissa Qiu LPN    Scribed for Dr. Sacha Romero by Nissa Qiu LPN February 13, 2020 9:12 AM         Electronically signed by:            This note is dictated utilizing voice recognition software.  Although this record has been proof read, transcriptional errors may still be present. If questions occur regarding the content of this record please do not hesitate to call our office.

## 2020-06-08 NOTE — TELEPHONE ENCOUNTER
Per Dr. Romero, ok to order MRI lumbar since we referred patient.       ----- Message from Violetta Saul LPN sent at 10/9/2017  4:31 PM EDT -----      ----- Message -----     From: Estefanía Owens     Sent: 10/9/2017  12:32 PM       To: [facility] Clinical [facility]     Pt left vm on my line saying that she said that she only had an upper back MRI, and she needs a lower back MRI before she can see Dr. Rubio in November.     Prior authorization needed for Lisdexamfetamine Dimesylate 10 MG Cap.

## 2020-07-02 ENCOUNTER — OFFICE VISIT (OUTPATIENT)
Dept: CARDIOLOGY | Facility: CLINIC | Age: 75
End: 2020-07-02

## 2020-07-02 VITALS
HEART RATE: 74 BPM | WEIGHT: 293 LBS | DIASTOLIC BLOOD PRESSURE: 64 MMHG | OXYGEN SATURATION: 93 % | TEMPERATURE: 97.1 F | SYSTOLIC BLOOD PRESSURE: 161 MMHG | BODY MASS INDEX: 45.99 KG/M2 | HEIGHT: 67 IN

## 2020-07-02 DIAGNOSIS — I26.99 OTHER ACUTE PULMONARY EMBOLISM WITHOUT ACUTE COR PULMONALE (HCC): ICD-10-CM

## 2020-07-02 DIAGNOSIS — I10 ESSENTIAL HYPERTENSION: ICD-10-CM

## 2020-07-02 DIAGNOSIS — E78.5 DYSLIPIDEMIA: ICD-10-CM

## 2020-07-02 DIAGNOSIS — I48.0 PAROXYSMAL ATRIAL FIBRILLATION (HCC): ICD-10-CM

## 2020-07-02 DIAGNOSIS — I51.89 DIASTOLIC DYSFUNCTION: ICD-10-CM

## 2020-07-02 DIAGNOSIS — R06.02 EXERTIONAL SHORTNESS OF BREATH: ICD-10-CM

## 2020-07-02 DIAGNOSIS — R60.9 EDEMA, UNSPECIFIED TYPE: ICD-10-CM

## 2020-07-02 DIAGNOSIS — G47.33 OSA ON CPAP: ICD-10-CM

## 2020-07-02 DIAGNOSIS — R42 DIZZINESS: ICD-10-CM

## 2020-07-02 DIAGNOSIS — Z99.89 OSA ON CPAP: ICD-10-CM

## 2020-07-02 DIAGNOSIS — R55 SYNCOPE, UNSPECIFIED SYNCOPE TYPE: ICD-10-CM

## 2020-07-02 DIAGNOSIS — I27.20 PULMONARY HYPERTENSION (HCC): ICD-10-CM

## 2020-07-02 DIAGNOSIS — R00.2 PALPITATIONS: ICD-10-CM

## 2020-07-02 DIAGNOSIS — I73.9 PERIPHERAL VASCULAR DISEASE (HCC): ICD-10-CM

## 2020-07-02 DIAGNOSIS — I82.4Y9 ACUTE DEEP VEIN THROMBOSIS (DVT) OF PROXIMAL VEIN OF LOWER EXTREMITY, UNSPECIFIED LATERALITY (HCC): ICD-10-CM

## 2020-07-02 DIAGNOSIS — I65.23 BILATERAL CAROTID ARTERY STENOSIS: Primary | ICD-10-CM

## 2020-07-02 PROCEDURE — 93000 ELECTROCARDIOGRAM COMPLETE: CPT | Performed by: INTERNAL MEDICINE

## 2020-07-02 PROCEDURE — 99214 OFFICE O/P EST MOD 30 MIN: CPT | Performed by: INTERNAL MEDICINE

## 2020-07-02 RX ORDER — HYDRALAZINE HYDROCHLORIDE 50 MG/1
50 TABLET, FILM COATED ORAL 3 TIMES DAILY
Qty: 90 TABLET | Refills: 11 | Status: SHIPPED | OUTPATIENT
Start: 2020-07-02 | End: 2021-11-10 | Stop reason: ALTCHOICE

## 2020-07-02 RX ORDER — FUROSEMIDE 20 MG/1
20 TABLET ORAL DAILY
COMMUNITY
Start: 2020-04-08 | End: 2022-04-14 | Stop reason: SDUPTHER

## 2020-07-02 RX ORDER — HYDRALAZINE HYDROCHLORIDE 50 MG/1
50 TABLET, FILM COATED ORAL 3 TIMES DAILY
Qty: 90 TABLET | Refills: 11 | Status: SHIPPED | OUTPATIENT
Start: 2020-07-02 | End: 2020-07-02 | Stop reason: SDUPTHER

## 2020-07-02 NOTE — PATIENT INSTRUCTIONS
Obesity, Adult  Obesity is the condition of having too much total body fat. Being overweight or obese means that your weight is greater than what is considered healthy for your body size. Obesity is determined by a measurement called BMI. BMI is an estimate of body fat and is calculated from height and weight. For adults, a BMI of 30 or higher is considered obese.  Obesity can lead to other health concerns and major illnesses, including:  · Stroke.  · Coronary artery disease (CAD).  · Type 2 diabetes.  · Some types of cancer, including cancers of the colon, breast, uterus, and gallbladder.  · Osteoarthritis.  · High blood pressure (hypertension).  · High cholesterol.  · Sleep apnea.  · Gallbladder stones.  · Infertility problems.  What are the causes?  Common causes of this condition include:  · Eating daily meals that are high in calories, sugar, and fat.  · Being born with genes that may make you more likely to become obese.  · Having a medical condition that causes obesity, including:  ? Hypothyroidism.  ? Polycystic ovarian syndrome (PCOS).  ? Binge-eating disorder.  ? Cushing syndrome.  · Taking certain medicines, such as steroids, antidepressants, and seizure medicines.  · Not being physically active (sedentary lifestyle).  · Not getting enough sleep.  · Drinking high amounts of sugar-sweetened beverages, such as soft drinks.  What increases the risk?  The following factors may make you more likely to develop this condition:  · Having a family history of obesity.  · Being a woman of  descent.  · Being a man of  descent.  · Living in an area with limited access to:  ? [place], recreation centers, or sidewalks.  ? Healthy food choices, such as grocery stores and farmers' markets.  What are the signs or symptoms?  The main sign of this condition is having too much body fat.  How is this diagnosed?  This condition is diagnosed based on:  · Your BMI. If you are an adult with a BMI of 30 or  higher, you are considered obese.  · Your waist circumference. This measures the distance around your waistline.  · Your skinfold thickness. Your health care provider may gently pinch a fold of your skin and measure it.  You may have other tests to check for underlying conditions.  How is this treated?  Treatment for this condition often includes changing your lifestyle. Treatment may include some or all of the following:  · Dietary changes. This may include developing a healthy meal plan.  · Regular physical activity. This may include activity that causes your heart to beat faster (aerobic exercise) and strength training. Work with your health care provider to design an exercise program that works for you.  · Medicine to help you lose weight if you are unable to lose 1 pound a week after 6 weeks of healthy eating and more physical activity.  · Treating conditions that cause the obesity (underlying conditions).  · Surgery. Surgical options may include gastric banding and gastric bypass. Surgery may be done if:  ? Other treatments have not helped to improve your condition.  ? You have a BMI of 40 or higher.  ? You have life-threatening health problems related to obesity.  Follow these instructions at home:  Eating and drinking    · Follow recommendations from your health care provider about what you eat and drink. Your health care provider may advise you to:  ? Limit fast food, sweets, and processed snack foods.  ? Choose low-fat options, such as low-fat milk instead of whole milk.  ? Eat 5 or more servings of fruits or vegetables every day.  ? Eat at home more often. This gives you more control over what you eat.  ? Choose healthy foods when you eat out.  ? Learn to read food labels. This will help you understand how much food is considered 1 serving.  ? Learn what a healthy serving size is.  ? Keep low-fat snacks available.  ? Limit sugary drinks, such as soda, fruit juice, sweetened iced tea, and flavored  milk.  · Drink enough water to keep your urine pale yellow.  · Do not follow a fad diet. Fad diets can be unhealthy and even dangerous.  Physical activity  · Exercise regularly, as told by your health care provider.  ? Most adults should get up to 150 minutes of moderate-intensity exercise every week.  ? Ask your health care provider what types of exercise are safe for you and how often you should exercise.  · Warm up and stretch before being active.  · Cool down and stretch after being active.  · Rest between periods of activity.  Lifestyle  · Work with your health care provider and a dietitian to set a weight-loss goal that is healthy and reasonable for you.  · Limit your screen time.  · Find ways to reward yourself that do not involve food.  · Do not drink alcohol if:  ? Your health care provider tells you not to drink.  ? You are pregnant, may be pregnant, or are planning to become pregnant.  · If you drink alcohol:  ? Limit how much you use to:  § 0-1 drink a day for women.  § 0-2 drinks a day for men.  ? Be aware of how much alcohol is in your drink. In the [place] one drink equals one 12 oz bottle of beer (355 mL), one 5 oz glass of wine (148 mL), or one 1½ oz glass of hard liquor (44 mL).  General instructions  · Keep a weight-loss journal to keep track of the food you eat and how much exercise you get.  · Take over-the-counter and prescription medicines only as told by your health care provider.  · Take vitamins and supplements only as told by your health care provider.  · Consider joining a support group. Your health care provider may be able to recommend a support group.  · Keep all follow-up visits as told by your health care provider. This is important.  Contact a health care provider if:  · You are unable to meet your weight loss goal after 6 weeks of dietary and lifestyle changes.  Get help right away if you are having:  · Trouble breathing.  · Suicidal thoughts or behaviors.  Summary  · Obesity is the  condition of having too much total body fat.  · Being overweight or obese means that your weight is greater than what is considered healthy for your body size.  · Work with your health care provider and a dietitian to set a weight-loss goal that is healthy and reasonable for you.  · Exercise regularly, as told by your health care provider. Ask your health care provider what types of exercise are safe for you and how often you should exercise.  This information is not intended to replace advice given to you by your health care provider. Make sure you discuss any questions you have with your health care provider.  Document Released: 01/25/2006 Document Revised: 08/22/2019 Document Reviewed: 08/22/2019  [employer] Patient Education © 2020 [employer]  MyPlate from [employer]    MyPlate is an outline of a general healthy diet based on the 2010 Dietary Guidelines for Americans, from the [place] Department of Agriculture (USDA). It sets guidelines for how much food you should eat from each food group based on your age, sex, and level of physical activity.  What are tips for following MyPlate?  To follow MyPlate recommendations:  · Eat a wide variety of fruits and vegetables, grains, and protein foods.  · Serve smaller portions and eat less food throughout the day.  · Limit portion sizes to avoid overeating.  · Enjoy your food.  · Get at least 150 minutes of exercise every week. This is about 30 minutes each day, 5 or more days per week.  It can be difficult to have every meal look like MyPlate. Think about MyPlate as eating guidelines for an entire day, rather than each individual meal.  Fruits and vegetables  · Make half of your plate fruits and vegetables.  · Eat many different colors of fruits and vegetables each day.  · For a 2,000 calorie daily food plan, eat:  ? 2½ cups of vegetables every day.  ? 2 cups of fruit every day.  · 1 cup is equal to:  ? 1 cup raw or cooked vegetables.  ? 1 cup raw fruit.  ? 1 medium-sized orange,  apple, or banana.  ? 1 cup 100% fruit or vegetable juice.  ? 2 cups raw leafy greens, such as lettuce, spinach, or kale.  ? ½ cup dried fruit.  Grains  · One fourth of your plate should be grains.  · Make at least half of the grains you eat each day whole grains.  · For a 2,000 calorie daily food plan, eat 6 oz of grains every day.  · 1 oz is equal to:  ? 1 slice bread.  ? 1 cup cereal.  ? ½ cup cooked rice, cereal, or pasta.  Protein  · One fourth of your plate should be protein.  · Eat a wide variety of protein foods, including meat, poultry, fish, eggs, beans, nuts, and tofu.  · For a 2,000 calorie daily food plan, eat 5½ oz of protein every day.  · 1 oz is equal to:  ? 1 oz meat, poultry, or fish.  ? ¼ cup cooked beans.  ? 1 egg.  ? ½ oz nuts or seeds.  ? 1 Tbsp peanut butter.  Dairy  · Drink fat-free or low-fat (1%) milk.  · Eat or drink dairy as a side to meals.  · For a 2,000 calorie daily food plan, eat or drink 3 cups of dairy every day.  · 1 cup is equal to:  ? 1 cup milk, yogurt, cottage cheese, or soy milk (soy beverage).  ? 2 oz processed cheese.  ? 1½ oz natural cheese.  Fats, oils, salt, and sugars  · Only small amounts of oils are recommended.  · Avoid foods that are high in calories and low in nutritional value (empty calories), like foods high in fat or added sugars.  · Choose foods that are low in salt (sodium). Choose foods that have less than 140 milligrams (mg) of sodium per serving.  · Drink water instead of sugary drinks. Drink enough water each day to keep your urine pale yellow.  Where to find support  · Work with your health care provider or a nutrition specialist (dietitian) to develop a customized eating plan that is right for you.  · Download an hannah (mobile application) to help you track your daily food intake.  Where to find more information  · Go to ChooseMyPlate.gov for more information.  · Learn more and log your daily food intake according to MyPlate using USDA's SuperTracker:  www.supertracker.usda.gov  Summary  · MyPlate is a general guideline for healthy eating from the USDA. It is based on the 2010 Dietary Guidelines for Americans.  · In general, fruits and vegetables should take up ½ of your plate, grains should take up ¼ of your plate, and protein should take up ¼ of your plate.  This information is not intended to replace advice given to you by your health care provider. Make sure you discuss any questions you have with your health care provider.  Document Released: 01/06/2009 Document Revised: 01/19/2019 Document Reviewed: 03/19/2018  [employer] Patient Education © 2020 [employer]

## 2020-07-02 NOTE — PROGRESS NOTES
Subjective   Holly Corona is a 74 y.o. female     Chief Complaint   Patient presents with   • Palpitations     H/R tachy and irregular   • Rapid Heart Rate       PROBLEM LIST:     1. Paroxysmal Atrial  fibrillation  1.1 Pulmonary vein isolation procedure with ablation of right atrial flutter by Dr. Mario, March 2013.  2. Severe pulmonary hypertension with pulmonary pressure 60-65% by most recent cath.  3. Hypertension  3.1 Echo 9/9/15 - mild LVH; EF > 65%; mild to mod MR; mod TR; PA 55-60; mild MT  3.2 recent ECHO 03/09/17, left ventricular chamber is mildly dilated. Mild concentric LVH. EF > 65%. Issa Grade ll diastolic dysfunction. Left atrium moderately dilated, right atrium mild to moderate dilated. Systolic pressure 55-60 mmHg.   3.3 recent stress 04/11/17 inferior ischemia, chest wall attenuation.   4. Dyslipidemia  5. History of DVT/PE  6. History of colon CA x 2 status post surgery 2014.  7. Carotid Artery Stenosis  7.1 Carotid U/S 3/8/16 - 16-49% MARYBEL and LICA; antegrade flow  8. HANK - CPAP   9. GOUT, recurrent flare-ups        Specialty Problems        Cardiology Problems    HTN (hypertension)        Paroxysmal atrial fibrillation (CMS/HCC)        Carotid artery stenosis        Carotid bruit        Deep venous thrombosis (CMS/HCC)        Diastolic dysfunction        Palpitations        Peripheral vascular disease (CMS/HCC)        Pulmonary embolus (CMS/HCC)        Pulmonary hypertension (CMS/HCC)        Syncope        Abnormal stress test                HPI:  Ms. Corona returns for follow-up on the above.    Holly did well until several weeks ago when she noticed palpitations, hypertension, and a mild increase in lower extremity edema which was temporally related to steroid use.  Nargis stop steroids 4 to 5 days ago but has had persistent hypertension and palpitations since.  She describes today that she has had little in the way of palpitations since pulmonary vein isolation years ago.    Currently Ms.  Cj denies chest pain orthopnea, PND, or change in lower extremity edema.  When she palpitates she has no chest pain, shortness of breath, or dizziness.  However, during palpitations afterward she feels weak and fatigued.    The patient has had no symptoms of TIA or stroke and she continued continues to be without bleeding on warfarin.                        PRIOR MEDICATIONS    Current Outpatient Medications on File Prior to Visit   Medication Sig Dispense Refill   • CloNIDine (CATAPRES) 0.2 MG tablet Take 1 tablet by mouth 3 (Three) Times a Day. (Patient taking differently: Take 0.2 mg by mouth 2 (Two) Times a Day.) 90 tablet 3   • furosemide (LASIX) 20 MG tablet Daily.     • hydrALAZINE (APRESOLINE) 25 MG tablet Take 1 tablet by mouth 3 (Three) Times a Day. (Patient taking differently: Take 50 mg by mouth 2 (Two) Times a Day.) 90 tablet 3   • isosorbide mononitrate (IMDUR) 30 MG 24 hr tablet Take 1 tablet by mouth Daily. 90 tablet 3   • levothyroxine (SYNTHROID, LEVOTHROID) 88 MCG tablet Take 1 tablet by mouth daily.     • O2 (OXYGEN) Inhale 2 L/min 1 (One) Time. Through c-pap     • pantoprazole (PROTONIX) 40 MG EC tablet Take 1 tablet by mouth Daily. 90 tablet 3   • RESTASIS 0.05 % ophthalmic emulsion Daily.     • telmisartan (MICARDIS) 80 MG tablet Take 1 tablet by mouth Daily. 90 tablet 3   • warfarin (COUMADIN) 5 MG tablet Daily. Takes 5 mg M/T/W, 9 MG R, 5 MG F, 2.5 SAT, 5 MG SUND.     • [DISCONTINUED] allopurinol (ZYLOPRIM) 100 MG tablet Take 100 mg by mouth Daily.     • [DISCONTINUED] allopurinol (ZYLOPRIM) 300 MG tablet Take 300 mg by mouth Every Night.     • [DISCONTINUED] FERREX 150 150 MG capsule Daily.  5   • [DISCONTINUED] furosemide (LASIX) 40 MG tablet Take 1 tablet by mouth Daily. (Patient taking differently: Take 20 mg by mouth Daily.) 90 tablet 3     No current facility-administered medications on file prior to visit.        ALLERGIES:    Ciprofloxacin; Ofloxacin; and Iodinated diagnostic  agents    PAST MEDICAL HISTORY:    Past Medical History:   Diagnosis Date   • Anemia    • Atrial fibrillation (CMS/HCC)     A.  History of prior pulmonary vein ablation 03/14/2013. B.  On chronic Coumadin and Tikosyn therapy.   • Carotid artery stenosis    • Carotid bruit    • Deep venous thrombosis (CMS/HCC)     A.  History of right lower extremity DVT treated with in therapy until October 2000.   • Diastolic dysfunction    • Diastolic dysfunction    • Dizziness    • Dyslipidemia    • Edema    • Exertional shortness of breath    • GERD (gastroesophageal reflux disease)    • Gout    • H/O echocardiogram     A.  Echocardiogram of 10/16/2013 reports an ejection fraction of 55-60%, a moderately to severely dilated left atrium, mild to moderately dilated right atrium, trace aortic regurgitation, mild mitral and tricuspid regurgitation with calculated    • Hiatal hernia    • Hypertension     A.  Echocardiogram 10/16/2013 reports a calculated RVSP of 50-55 mmHg.B.  Right heart catheterization 03/12/2009 at  reported RV of 72/19, PA 72/27 and PCWP of 24, this was felt to be     • Hypothyroidism    • Morbid obesity (CMS/HCC)    • Obstructive sleep apnea     A.  On CPAP each bedtime.   • Osteoarthritis    • Palpitations    • Peripheral vascular disease (CMS/HCC)    • Pulmonary embolus (CMS/HCC)     A.  Developed during chemotherapy in 2/2012. B.  Coumadin therapy reinitiated at that time.   • Pulmonary hypertension (CMS/HCC)    • Signet ring cell adenocarcinoma (CMS/HCC)     A.  Diagnosed on colonoscopy E. 10/14/2011, status post colon resection and 2 of 20 nodes positive, T3, N1b Stage IIIB. B.  Status post chemotherapy.    • Syncope        SURGICAL HISTORY:    Past Surgical History:   Procedure Laterality Date   • CARDIAC CATHETERIZATION     • FOOT SURGERY     • HERNIA REPAIR      2011   • HYSTERECTOMY      1993   • OTHER SURGICAL HISTORY      cardiac cath procedure summary, A.  Cardiac catheterization April 2007  "reported no significance coronary artery disease with markedly elevated LVEDP.   • OTHER SURGICAL HISTORY      catheter ablation atrial fibrillation, 2013   • OTHER SURGICAL HISTORY Left     knee replacement 2005   • OTHER SURGICAL HISTORY      neuroplasty decompression median nerve at carpal tunnel 1997   • OTHER SURGICAL HISTORY      partial colectomy , A.  Status post right hemicolectomy secondary to colon cancer in 2011.       SOCIAL HISTORY:    Social History     Socioeconomic History   • Marital status:      Spouse name: Not on file   • Number of children: Not on file   • Years of education: Not on file   • Highest education level: Not on file   Tobacco Use   • Smoking status: Former Smoker   • Smokeless tobacco: Never Used   Substance and Sexual Activity   • Alcohol use: No   • Drug use: No   • Sexual activity: Defer       FAMILY HISTORY:    Family History   Problem Relation Age of Onset   • Other Mother         MEDICAL PROBLEMS   • Other Sister         myocardial infarction.       Review of Systems   Constitutional: Positive for fatigue.   HENT: Positive for postnasal drip.    Eyes: Positive for visual disturbance (reading glasses).   Respiratory: Positive for shortness of breath.    Cardiovascular: Positive for chest pain (with palps), palpitations and leg swelling.   Gastrointestinal: Negative.    Endocrine: Negative.    Genitourinary: Negative.    Musculoskeletal: Positive for arthralgias and myalgias.   Skin: Negative.    Allergic/Immunologic: Positive for environmental allergies.   Neurological: Positive for light-headedness (mildly).   Hematological: Bruises/bleeds easily (bruise).   Psychiatric/Behavioral: Negative.        VISIT VITALS:  Vitals:    07/02/20 0939   BP: (!) 216/84   BP Location: Left arm   Patient Position: Sitting   Pulse: 74   Temp: 97.1 °F (36.2 °C)   SpO2: 93%   Weight: (!) 141 kg (311 lb 6.4 oz)   Height: 170.2 cm (67.01\")      BP (!) 216/84 (BP Location: Left arm, Patient " "Position: Sitting)   Pulse 74   Temp 97.1 °F (36.2 °C)   Ht 170.2 cm (67.01\")   Wt (!) 141 kg (311 lb 6.4 oz)   SpO2 93%   BMI 48.76 kg/m²     RECENT LABS:    Objective       Physical Exam   Constitutional: She is oriented to person, place, and time. She appears well-developed and well-nourished. No distress.   HENT:   Head: Normocephalic and atraumatic.   Eyes: Pupils are equal, round, and reactive to light. Conjunctivae and EOM are normal.   Neck: Normal range of motion. Neck supple. No hepatojugular reflux and no JVD present. Carotid bruit is present (transmitted murmur louder on lt. than rt. ). No tracheal deviation present.   Nl. Carotid upstrokes   Cardiovascular: Normal rate, regular rhythm and intact distal pulses. Exam reveals gallop and S4. Exam reveals no S3.   Murmur heard.   Systolic murmur is present.  S1 decreased  S2 barely splits  2-3/6 systolic ejection murmur louder LUSB than RUSB  No AI   Pulmonary/Chest: Effort normal and breath sounds normal. She has no wheezes. She has no rhonchi. She has no rales.   Nl. Expir. Phase  Nl. Breath sound intensity   Abdominal: Soft. Bowel sounds are normal. She exhibits no distension, no abdominal bruit and no mass. There is no tenderness. There is no rebound and no guarding.   No organomegaly   Musculoskeletal: Normal range of motion. She exhibits edema. She exhibits no tenderness or deformity.   LLE, 3+ edema to mid calf, mod.- severe [place]. Stasis changes, pedal pulses not checked due to edema  RLE, 2+ edema to mid calf, mild [place]. Stasis changes, pedal pulses not checked due to edema   Neurological: She is alert and oriented to person, place, and time.   Skin: Skin is warm and dry. No rash noted. No erythema. No pallor.   Psychiatric: She has a normal mood and affect. Her behavior is normal. Judgment and thought content normal.   Nursing note and vitals reviewed.        ECG 12 Lead  Date/Time: 7/2/2020 10:27 AM  Performed by: Sacha Romero, " MD  Authorized by: Sacha Romero MD   Comparison: compared with previous ECG from 2/13/2020  Similar to previous ECG              Assessment/Plan   #1.  Atrial fibrillation.  The patient has been asymptomatic after pulmonary vein isolation years ago until the last several weeks.  She is currently in a sinus mechanism.  I would like to have Ms. Corona see Dr. Mairo for his input on rhythm management and I believe the patient has an appointment with him in the near future.  We will make no changes directed specifically at rhythm at this time.  Holly was given precautions reference lightheadedness dizziness weakness chest pain etc. she will report any of these immediately.    2.  Systemic hypertension.  Blood pressures were suboptimally controlled in the past but are significantly worsened recently.  I would like to try to increase hydralazine to 50 mg 3 times daily and follow blood pressures at home.  I suspect pressures will improve off steroids and if dysrhythmia can be controlled as I believe dysrhythmia causes the patient have significant stress.    3.  DVT.  Warfarin will be continued.    4.  Moderate pulmonary hypertension with most recent PA pressures measured in the low to mid 50s in systole.  At the time of her last visit we discussed diet exercise and weight control.  Ms. Corona is lost 15 pounds since that time was actually feeling improved prior to onset of current symptoms.  She was encouraged to continue with efforts at increased cavity and weight loss.    5.  Peripheral arterial disease.  The patient had relatively mild nonobstructive carotid disease risk factor modification only is indicated at this time.    6.  Aortic stenosis.  Aortic valve area was was 1.2 to 1.3 cm greater when checked approximately 1 year ago.  Her physical exam is compatible with those values.  Therefore, we will not repeat echo at this time but will likely check parameters her next visit or on a as needed basis for change  in physical findings or symptoms.    7.  Ms. Corona will follow with Dr. Andrew per his instructions and we will plan on seeing her in follow-up in 6 months or on a as needed basis.  She will call blood pressure results to our office in the next several weeks.   Diagnosis Plan   1. Bilateral carotid artery stenosis     2. Acute deep vein thrombosis (DVT) of proximal vein of lower extremity, unspecified laterality (CMS/HCC)     3. Diastolic dysfunction     4. Essential hypertension     5. Palpitations     6. Paroxysmal atrial fibrillation (CMS/HCC)     7. Peripheral vascular disease (CMS/HCC)     8. Other acute pulmonary embolism without acute cor pulmonale (CMS/HCC)     9. Pulmonary hypertension (CMS/HCC)     10. Syncope, unspecified syncope type     11. Exertional shortness of breath     12. HANK on CPAP     13. Dizziness     14. Dyslipidemia     15. Edema, unspecified type         No follow-ups on file.         Holly Corona  reports that she has quit smoking. She has never used smokeless tobacco.. I have educated her on the risk of diseases from using tobacco products such as cancer, COPD and heart diease.               Patient's Body mass index is 48.76 kg/m². BMI is above normal parameters. Recommendations include: educational material and referral to primary care.       Nissa Qiu LPN    Scribed for Dr. Sacha Romero by Nissa Qiu LPN July 2, 2020 09:47         Electronically signed by:            This note is dictated utilizing voice recognition software.  Although this record has been proof read, transcriptional errors may still be present. If questions occur regarding the content of this record please do not hesitate to call our office.

## 2020-07-07 ENCOUNTER — TELEPHONE (OUTPATIENT)
Dept: CARDIOLOGY | Facility: CLINIC | Age: 75
End: 2020-07-07

## 2020-07-07 NOTE — TELEPHONE ENCOUNTER
PATIENT INQUIRING ABOUT EVENT MONITOR, STATES LU HASN'T CALLED HER. SPOKE WITH HORACE DELVALLE , WILL ENROLL NOW. ALFREDO TORREZ            ----- Message from Hiwot Crowe sent at 7/7/2020  4:48 PM EDT -----  Pt LVM requesting monitor results.         Per chart review, I don't see that results have been received yet.

## 2020-07-28 ENCOUNTER — OUTSIDE FACILITY SERVICE (OUTPATIENT)
Dept: CARDIOLOGY | Facility: CLINIC | Age: 75
End: 2020-07-28

## 2020-07-28 PROCEDURE — 93228 REMOTE 30 DAY ECG REV/REPORT: CPT | Performed by: INTERNAL MEDICINE

## 2020-07-31 ENCOUNTER — TELEPHONE (OUTPATIENT)
Dept: CARDIOLOGY | Facility: CLINIC | Age: 75
End: 2020-07-31

## 2020-07-31 RX ORDER — SOTALOL HYDROCHLORIDE 80 MG/1
80 TABLET ORAL 2 TIMES DAILY
Qty: 1 TABLET | Refills: 0
Start: 2020-07-31 | End: 2021-01-07 | Stop reason: SDUPTHER

## 2020-07-31 NOTE — TELEPHONE ENCOUNTER
Per Km, until we have the event monitor back or can capture the tachycardia on an EKG he would not recommend any medication changes at this time. Patient states she is doing okay right now and heart is not racing so she doesn't want to try to come in for an EKG. She wants to wait until we get the end of service report on event monitor but will call or come in sooner as needed. Med list updated with the Sotalol.  VICTOR MANUEL MEYER      Call from patient, states she continues to have tachycardia with heart rate of 107 to 139. She sent back event monitor yesterday and the end of service for this is not yet available. She is currently on Sotalol 80mg bid, Telmisartan 80mg qd, Hydralazine 50mg tid, Clonidine 0.2mg tid, Imdur 30mg qd and Lasix 20mg qam.  Our medication list doesn't show the Sotalol, but patient states she has been on this for several years. Will discuss with MARIELLA Mcconnell and call patient back.  VICTOR MANUEL MEYER

## 2020-08-10 ENCOUNTER — DOCUMENTATION (OUTPATIENT)
Dept: CARDIOLOGY | Facility: CLINIC | Age: 75
End: 2020-08-10

## 2020-08-10 NOTE — PROGRESS NOTES
Event monitor results briefly discussed with Mrs. Corona scheduled to see Dr. Mario on 8-. Results with be reviewed at that visit. Verbalized ok. PH,LPN

## 2020-08-14 ENCOUNTER — OFFICE VISIT (OUTPATIENT)
Dept: CARDIOLOGY | Facility: CLINIC | Age: 75
End: 2020-08-14

## 2020-08-14 VITALS
WEIGHT: 293 LBS | OXYGEN SATURATION: 98 % | SYSTOLIC BLOOD PRESSURE: 148 MMHG | HEART RATE: 66 BPM | HEIGHT: 67 IN | BODY MASS INDEX: 45.99 KG/M2 | DIASTOLIC BLOOD PRESSURE: 70 MMHG

## 2020-08-14 DIAGNOSIS — Z99.89 OSA ON CPAP: ICD-10-CM

## 2020-08-14 DIAGNOSIS — I48.0 PAROXYSMAL ATRIAL FIBRILLATION (HCC): Primary | ICD-10-CM

## 2020-08-14 DIAGNOSIS — G47.33 OSA ON CPAP: ICD-10-CM

## 2020-08-14 DIAGNOSIS — I10 ESSENTIAL HYPERTENSION: ICD-10-CM

## 2020-08-14 PROCEDURE — 93000 ELECTROCARDIOGRAM COMPLETE: CPT | Performed by: INTERNAL MEDICINE

## 2020-08-14 PROCEDURE — 99214 OFFICE O/P EST MOD 30 MIN: CPT | Performed by: INTERNAL MEDICINE

## 2020-08-14 NOTE — PROGRESS NOTES
Holly Corona  1945  [phone]    08/14/2020    [facility] CARDIOLOGY     Abdoul Andrew MD  [address] [place] [address]    Chief Complaint   Patient presents with   • Atrial Fibrillation       Problem List:   • Atrial Fibrillation   PROBLEM LIST:  1. Paroxysmal atrial fibrillation/atrial flutter:  a. Echocardiogram, 02/08/2006 with mild to moderate LVH, moderate MR, pulmonary HTN with RVSP at 55 mmHg; EF 65%.   b. GXT, 04/03/2006 with no ischemia, EF 65%.  c. Event recorder, April 2007 through May 2007 showing atrial flutter.  d. Echocardiogram, 05/07/2007 with pulmonary HTN, LA 4.5, EF 65%.  e. Cardiolite, 05/07/2007, normal study, EF 78%.  f. Left heart cath, 05/09/2007 with normal coronary arteries, EF 70%.  g. EKG, 10/21/2007 showing atrial fibrillation at 146 BPM.  h. Failed sotalol therapy.  i. Tikosyn initiated, 08/08/2012.  j. Echocardiogram, 09/17/2012 with an EF of 65%; mild LVH, moderate diastolic dysfunction, moderate to severe biatrial enlargement, mild MR, moderate TR with an RVSP of 65 mmHg. LA 4.7. Aortic valve sclerosis.  k. Pulmonary vein isolation procedure with ablation of right atrial flutter and nonsustained low posterior atrial tachycardia, 03/15/2013 complicated by dysphagia that lasted 24-48 hours.  l. MCOT two weeks 7/2/2020: NSR, SB, rare NSAT, rare NSVT  2. CP  a. Heart cath 6/9/17: Non obstructive CAD, LVEF 55%   3. HTN  4. Right leg deep venous thrombosis, treated with Coumadin therapy until October 2007.  5. Hiatal hernia/gastroesophageal reflux disease.  6. Carotid bruits: Normal carotid duplex, March 2006 and June 2007.  7. Osteoarthritis.  8. Hypothyroidism.  9. Sleep apnea, on CPAP.  10. Dyslipidemia, no current therapy.  11. Colon cancer, status post resection of 17 inches of the right ascending colon, undergoing chemotherapy.  12. Pulmonary embolism, February 2012, Coumadin initiated.  13. Pulmonary  hypertension:  a. Echocardiogram, 06/21/2011, showing PA systolic pressure estimated at 60-65 mmHg.  b. Moderate TR with an RVSP of 65 mmHg by echocardiogram, 09/17/2012.  14. Surgical history:  a. Hysterectomy.  b. Carpal tunnel repair.  c. Left knee replacement.    Allergies  Allergies   Allergen Reactions   • Ciprofloxacin Itching   • Ofloxacin    • Iodinated Diagnostic Agents Rash       Current Medications    Current Outpatient Medications:   •  CloNIDine (CATAPRES) 0.2 MG tablet, Take 1 tablet by mouth 3 (Three) Times a Day. (Patient taking differently: Take 0.2 mg by mouth 2 (Two) Times a Day.), Disp: 90 tablet, Rfl: 3  •  furosemide (LASIX) 20 MG tablet, Daily., Disp: , Rfl:   •  hydrALAZINE (APRESOLINE) 50 MG tablet, Take 1 tablet by mouth 3 (Three) Times a Day., Disp: 90 tablet, Rfl: 11  •  isosorbide mononitrate (IMDUR) 30 MG 24 hr tablet, Take 1 tablet by mouth Daily., Disp: 90 tablet, Rfl: 3  •  levothyroxine (SYNTHROID, LEVOTHROID) 88 MCG tablet, Take 1 tablet by mouth daily., Disp: , Rfl:   •  O2 (OXYGEN), Inhale 2 L/min 1 (One) Time. Through c-pap, Disp: , Rfl:   •  pantoprazole (PROTONIX) 40 MG EC tablet, Take 1 tablet by mouth Daily., Disp: 90 tablet, Rfl: 3  •  RESTASIS 0.05 % ophthalmic emulsion, Daily., Disp: , Rfl:   •  sotalol (BETAPACE) 80 MG tablet, Take 1 tablet by mouth 2 (Two) Times a Day., Disp: 1 tablet, Rfl: 0  •  telmisartan (MICARDIS) 80 MG tablet, Take 1 tablet by mouth Daily., Disp: 90 tablet, Rfl: 3  •  warfarin (COUMADIN) 5 MG tablet, Daily. Takes 5 mg M/T/W, 9 MG R, 5 MG F, 2.5 SAT, 5 MG SUND., Disp: , Rfl:     History of Present Illness     Pt presents for follow up of PAF/HTN. Since we last saw the pt, pt starting having fast HR two months ago after steroids shots for gout. Had MCOT two weeks with NSAT, NSVT , No further palps in August. No CP, LH, and dizziness. Denies any hospitalizations, ER visits, bleeding, or TIA/CVA symptoms. Overall feels better over past 4 weeks. +CPAP  "PT INR have been . BP stable at home    ROS:  General:  + fatigue, No weight gain or loss  Cardiovascular:  Denies CP, PND, syncope, near syncope, edema + palpitations.  Pulmonary:  + chronic MAGUIRE, No cough, or wheezing      Vitals:    08/14/20 1400   BP: 148/70   BP Location: Left arm   Patient Position: Sitting   Pulse: 66   SpO2: 98%   Weight: (!) 144 kg (317 lb)   Height: 170.2 cm (67\")     Body mass index is 49.65 kg/m².  PE:  General: NAD  Neck: no JVD, no carotid bruits, no TM  Heart RRR, NL S1, S2, S4 present, no rubs, + TR murmurs  Lungs: CTA, no wheezes, rhonchi, or rales  Abd: soft, non-tender, NL BS  Ext: No musculoskeletal deformities, no edema, cyanosis, or clubbing  Psych: normal mood and affect    Diagnostic Data:        ECG 12 Lead  Date/Time: 8/14/2020 2:22 PM  Performed by: Bala Mario MD  Authorized by: Bala Mario MD   Comparison: compared with previous ECG from 7/2/2020  Similar to previous ECG  Rhythm: sinus rhythm  BPM: 60              1. Paroxysmal atrial fibrillation (CMS/HCC)    2. Essential hypertension    3. HANK on CPAP            Plan:  1) PAF s/p RFA/PV: Breakthrough PAC/NSAT after steroid injection. Much better now on sotalol. No changes for now QTc stable  Continue present medications.   2) Anticoagulation  Continue warfarin  3) HTN- controlled  Wt loss, exercise, salt reduction       F/up in 6 months      "

## 2021-01-07 ENCOUNTER — OFFICE VISIT (OUTPATIENT)
Dept: CARDIOLOGY | Facility: CLINIC | Age: 76
End: 2021-01-07

## 2021-01-07 VITALS — OXYGEN SATURATION: 91 % | HEART RATE: 74 BPM | HEIGHT: 67 IN | BODY MASS INDEX: 45.99 KG/M2 | WEIGHT: 293 LBS

## 2021-01-07 DIAGNOSIS — R60.9 EDEMA, UNSPECIFIED TYPE: ICD-10-CM

## 2021-01-07 DIAGNOSIS — E78.5 DYSLIPIDEMIA: ICD-10-CM

## 2021-01-07 DIAGNOSIS — G47.33 OSA ON CPAP: ICD-10-CM

## 2021-01-07 DIAGNOSIS — Z99.89 OSA ON CPAP: ICD-10-CM

## 2021-01-07 DIAGNOSIS — R55 SYNCOPE, UNSPECIFIED SYNCOPE TYPE: ICD-10-CM

## 2021-01-07 DIAGNOSIS — I48.0 PAROXYSMAL ATRIAL FIBRILLATION (HCC): ICD-10-CM

## 2021-01-07 DIAGNOSIS — R00.2 PALPITATIONS: ICD-10-CM

## 2021-01-07 DIAGNOSIS — I82.4Y9 ACUTE DEEP VEIN THROMBOSIS (DVT) OF PROXIMAL VEIN OF LOWER EXTREMITY, UNSPECIFIED LATERALITY (HCC): ICD-10-CM

## 2021-01-07 DIAGNOSIS — I51.89 DIASTOLIC DYSFUNCTION: ICD-10-CM

## 2021-01-07 DIAGNOSIS — I65.23 BILATERAL CAROTID ARTERY STENOSIS: Primary | ICD-10-CM

## 2021-01-07 DIAGNOSIS — I27.20 PULMONARY HYPERTENSION (HCC): ICD-10-CM

## 2021-01-07 DIAGNOSIS — I73.9 PERIPHERAL VASCULAR DISEASE (HCC): ICD-10-CM

## 2021-01-07 DIAGNOSIS — R42 DIZZINESS: ICD-10-CM

## 2021-01-07 DIAGNOSIS — R06.02 SHORTNESS OF BREATH: ICD-10-CM

## 2021-01-07 DIAGNOSIS — I10 ESSENTIAL HYPERTENSION: ICD-10-CM

## 2021-01-07 PROCEDURE — 93000 ELECTROCARDIOGRAM COMPLETE: CPT | Performed by: INTERNAL MEDICINE

## 2021-01-07 PROCEDURE — 99214 OFFICE O/P EST MOD 30 MIN: CPT | Performed by: INTERNAL MEDICINE

## 2021-01-07 RX ORDER — ALLOPURINOL 100 MG/1
100 TABLET ORAL DAILY
COMMUNITY
Start: 2020-12-15

## 2021-01-07 RX ORDER — SOTALOL HYDROCHLORIDE 120 MG/1
120 TABLET ORAL 2 TIMES DAILY
Qty: 180 TABLET | Refills: 3 | Status: ON HOLD | OUTPATIENT
Start: 2021-01-07 | End: 2021-03-29 | Stop reason: SDUPTHER

## 2021-01-07 NOTE — PROGRESS NOTES
Subjective   Holly Corona is a 75 y.o. female     Chief Complaint   Patient presents with   • Follow-up     Here for 6 mo. f/u   • Atrial Fibrillation   • Hypertension   • Hyperlipidemia   • Sleep Apnea       PROBLEM LIST:     1. Paroxysmal Atrial  fibrillation  1.1 Pulmonary vein isolation procedure with ablation of right atrial flutter by Dr. Mario, March 2013.  2. Severe pulmonary hypertension with pulmonary pressure 60-65% by most recent cath.  3. Hypertension  3.1 Echo 9/9/15 - mild LVH; EF > 65%; mild to mod MR; mod TR; PA 55-60; mild DC  3.2 recent ECHO 03/09/17, left ventricular chamber is mildly dilated. Mild concentric LVH. EF > 65%. Issa Grade ll diastolic dysfunction. Left atrium moderately dilated, right atrium mild to moderate dilated. Systolic pressure 55-60 mmHg.   3.3 recent stress 04/11/17 inferior ischemia, chest wall attenuation.   4. Dyslipidemia  5. History of DVT/PE  6. History of colon CA x 2 status post surgery 2014.  7. Carotid Artery Stenosis  7.1 Carotid U/S 3/8/16 - 16-49% MARYBEL and LICA; antegrade flow  8. HANK - CPAP   9. GOUT, recurrent flare-ups      Specialty Problems        Cardiology Problems    HTN (hypertension)        Paroxysmal atrial fibrillation (CMS/HCC)        Carotid artery stenosis        Carotid bruit        Deep venous thrombosis (CMS/HCC)        Diastolic dysfunction        Palpitations        Peripheral vascular disease (CMS/HCC)        Pulmonary embolus (CMS/HCC)        Pulmonary hypertension (CMS/HCC)        Syncope        Abnormal stress test                HPI:  Ms. Corona returns for follow-up on the above.    She complains of increased palpitations which have been progressive over a period of months.  She describes both sensed extrasystoles as well as short runs of rapid regular heartbeat.  When her heart rate is very high she will have a sense of fullness in her forehead but she denies kathleen dizziness, presyncope, or syncope.  Symptoms are never associated  with chest pain or shortness of breath.  The patient brings in a blood pressure log that shows fairly regular association between high blood pressure and rapid heart rate.    Ms. Corona denies orthopnea, PND, and her chronic lower extremity edema has improved slightly.  Does not claudicate.  She has no bleeding on warfarin and she denies any symptoms of TIA or stroke.  Weight is down 13 pounds from the time of the patient's prior visit.  Ms. Corona was applauded in that regard.                      PRIOR MEDICATIONS    Current Outpatient Medications on File Prior to Visit   Medication Sig Dispense Refill   • allopurinol (ZYLOPRIM) 100 MG tablet 200 mg Daily.     • CloNIDine (CATAPRES) 0.2 MG tablet Take 1 tablet by mouth 3 (Three) Times a Day. (Patient taking differently: Take 0.2 mg by mouth 2 (Two) Times a Day.) 90 tablet 3   • furosemide (LASIX) 20 MG tablet Daily.     • hydrALAZINE (APRESOLINE) 50 MG tablet Take 1 tablet by mouth 3 (Three) Times a Day. 90 tablet 11   • isosorbide mononitrate (IMDUR) 30 MG 24 hr tablet Take 1 tablet by mouth Daily. 90 tablet 3   • levothyroxine (SYNTHROID, LEVOTHROID) 88 MCG tablet Take 1 tablet by mouth daily.     • O2 (OXYGEN) Inhale 2 L/min 1 (One) Time. Through c-pap     • pantoprazole (PROTONIX) 40 MG EC tablet Take 1 tablet by mouth Daily. 90 tablet 3   • RESTASIS 0.05 % ophthalmic emulsion Daily.     • sotalol (BETAPACE) 80 MG tablet Take 1 tablet by mouth 2 (Two) Times a Day. 1 tablet 0   • telmisartan (MICARDIS) 80 MG tablet Take 1 tablet by mouth Daily. 90 tablet 3   • warfarin (COUMADIN) 5 MG tablet Take  by mouth Daily. 5 mg x 6 days a week then none       No current facility-administered medications on file prior to visit.        ALLERGIES:    Ciprofloxacin, Ofloxacin, and Iodinated diagnostic agents    PAST MEDICAL HISTORY:    Past Medical History:   Diagnosis Date   • Anemia    • Atrial fibrillation (CMS/HCC)     A.  History of prior pulmonary vein ablation 03/14/2013.  B.  On chronic Coumadin and Tikosyn therapy.   • Carotid artery stenosis    • Carotid bruit    • Deep venous thrombosis (CMS/HCC)     A.  History of right lower extremity DVT treated with in therapy until October 2000.   • Diastolic dysfunction    • Diastolic dysfunction    • Dizziness    • Dyslipidemia    • Edema    • Exertional shortness of breath    • GERD (gastroesophageal reflux disease)    • Gout    • H/O echocardiogram     A.  Echocardiogram of 10/16/2013 reports an ejection fraction of 55-60%, a moderately to severely dilated left atrium, mild to moderately dilated right atrium, trace aortic regurgitation, mild mitral and tricuspid regurgitation with calculated    • Hiatal hernia    • Hypertension     A.  Echocardiogram 10/16/2013 reports a calculated RVSP of 50-55 mmHg.B.  Right heart catheterization 03/12/2009 at  reported RV of 72/19, PA 72/27 and PCWP of 24, this was felt to be     • Hypothyroidism    • Morbid obesity (CMS/HCC)    • Obstructive sleep apnea     A.  On CPAP each bedtime.   • Osteoarthritis    • Palpitations    • Peripheral vascular disease (CMS/HCC)    • Pulmonary embolus (CMS/HCC)     A.  Developed during chemotherapy in 2/2012. B.  Coumadin therapy reinitiated at that time.   • Pulmonary hypertension (CMS/HCC)    • Signet ring cell adenocarcinoma (CMS/HCC)     A.  Diagnosed on colonoscopy E. 10/14/2011, status post colon resection and 2 of 20 nodes positive, T3, N1b Stage IIIB. B.  Status post chemotherapy.    • Syncope        SURGICAL HISTORY:    Past Surgical History:   Procedure Laterality Date   • CARDIAC CATHETERIZATION     • FOOT SURGERY     • HERNIA REPAIR      2011   • HYSTERECTOMY      1993   • OTHER SURGICAL HISTORY      cardiac cath procedure summary, A.  Cardiac catheterization April 2007 reported no significance coronary artery disease with markedly elevated LVEDP.   • OTHER SURGICAL HISTORY      catheter ablation atrial fibrillation, 2013   • OTHER SURGICAL HISTORY Left    "   knee replacement 2005   • OTHER SURGICAL HISTORY      neuroplasty decompression median nerve at carpal tunnel 1997   • OTHER SURGICAL HISTORY      partial colectomy , A.  Status post right hemicolectomy secondary to colon cancer in 2011.       SOCIAL HISTORY:    Social History     Socioeconomic History   • Marital status:      Spouse name: Not on file   • Number of children: Not on file   • Years of education: Not on file   • Highest education level: Not on file   Tobacco Use   • Smoking status: Former Smoker   • Smokeless tobacco: Never Used   Substance and Sexual Activity   • Alcohol use: No   • Drug use: No   • Sexual activity: Defer       FAMILY HISTORY:    Family History   Problem Relation Age of Onset   • Other Mother         MEDICAL PROBLEMS   • Other Sister         myocardial infarction.       Review of Systems   Constitutional: Negative.    HENT: Negative.    Eyes: Positive for visual disturbance (glasses prn).   Respiratory: Positive for shortness of breath.    Cardiovascular: Positive for chest pain (with palps), palpitations and leg swelling.   Gastrointestinal: Negative.    Endocrine: Negative.    Genitourinary: Negative.    Musculoskeletal: Positive for arthralgias, gait problem (ambulates with walker) and myalgias.   Skin: Negative.    Allergic/Immunologic: Negative.    Neurological: Positive for light-headedness (mildly).   Hematological: Bruises/bleeds easily (on Coumadin).   Psychiatric/Behavioral: Negative.        VISIT VITALS:  Vitals:    01/07/21 0852   Pulse: 74   SpO2: 91%   Weight: (!) 138 kg (304 lb)   Height: 170.2 cm (67.01\")      Pulse 74   Ht 170.2 cm (67.01\")   Wt (!) 138 kg (304 lb)   SpO2 91%   BMI 47.60 kg/m²     RECENT LABS:    Objective       Physical Exam  Vitals signs and nursing note reviewed.   Constitutional:       General: She is not in acute distress.     Appearance: She is well-developed.   HENT:      Head: Normocephalic and atraumatic.   Eyes:      " Conjunctiva/sclera: Conjunctivae normal.      Pupils: Pupils are equal, round, and reactive to light.   Neck:      Musculoskeletal: Normal range of motion and neck supple.      Vascular: Carotid bruit (loud bruit vs transmitted murmur bilat. ) present. No hepatojugular reflux or JVD.      Trachea: No tracheal deviation.      Comments: Nl. Carotid upstrokes  Cardiovascular:      Rate and Rhythm: Normal rate and regular rhythm.      Heart sounds: Murmur present. Systolic murmur present.      Comments: S1 decreased  2/6 MR  2-3/6 systolic ejection murmur RUSB radiates to LUSB  No AI  Pulmonary:      Effort: Pulmonary effort is normal.      Breath sounds: Normal breath sounds. No wheezing, rhonchi or rales.      Comments: Nl. Expir. Phase  Mildly decreased in both bases  Abdominal:      General: Bowel sounds are normal. There is no distension or abdominal bruit.      Palpations: Abdomen is soft. There is no mass.      Tenderness: There is no abdominal tenderness. There is no guarding or rebound.      Comments: No organomegaly   Musculoskeletal: Normal range of motion.         General: No tenderness or deformity.      Right lower leg: Edema present.      Left lower leg: Edema present.      Comments: LLE, 3+ edema to lower calf, unable to palpate pedal pulses  RLE, 2+ edema, unable to palpate pedal pulses   Skin:     General: Skin is warm and dry.      Coloration: Skin is not pale.      Findings: No erythema or rash.   Neurological:      Mental Status: She is alert and oriented to person, place, and time.   Psychiatric:         Behavior: Behavior normal.         Thought Content: Thought content normal.         Judgment: Judgment normal.           ECG 12 Lead    Date/Time: 1/7/2021 9:16 AM  Performed by: Sacha Romero MD  Authorized by: Sacha Romero MD   Comments: Normal sinus rhythm at 61 bpm.  Within normal limits.  QTc is stable at 423 ms was 426 ms on prior EKG.              Assessment/Plan   #1.  Paroxysmal  atrial fibrillation.  The patient has had gradually progressive symptoms of palpitations as described above.  We will check EKG today.  If QTC will allow we will increase sotalol to 120 mg twice daily and follow daily EKGs x3.  Ms. Corona has no symptoms of bleeding, TIA or stroke on warfarin.  We will continue current therapy.    2.  Nonobstructive coronary artery disease.  The patient is asymptomatic of ischemia and heart failure.  If nonobstructive disease is confirmed the patient should be considered for high-dose statin therapy.    3.  Valvular heart disease.  Patient has a loud systolic ejection murmur compatible with aortic stenosis, he has significant TR by exam, and a moderate MR.  I would like to repeat echocardiogram to assess for progression and valve pathology based on physical exam findings and also to reassess pulmonary pressures.    4.  Pulmonary hypertension.  This is likely multifactorial in etiology.  The patient is compliant with her her positive pressure mask to treat sleep apnea.    5.  History of DVT.  The patient is asymptomatic in that regard.  She has no bleeding as above.    6.  We will perform daily EKGs x3 days and follow QTC closely.  If the patient has significant improvement we will have her follow-up as scheduled with Dr. Mario in April.  If symptoms persist to a significant degree we will arrange for reevaluation by EP at an earlier date.  Ms. Corona will follow with Dr. Andrew as instructed by his office.   Diagnosis Plan   1. Bilateral carotid artery stenosis     2. Acute deep vein thrombosis (DVT) of proximal vein of lower extremity, unspecified laterality (CMS/HCC)     3. Diastolic dysfunction     4. Dizziness     5. Dyslipidemia     6. Edema, unspecified type     7. Essential hypertension     8. HANK on CPAP     9. Palpitations     10. Paroxysmal atrial fibrillation (CMS/HCC)     11. Peripheral vascular disease (CMS/HCC)     12. Pulmonary hypertension (CMS/HCC)     13. Syncope,  unspecified syncope type         No follow-ups on file.         Holly Corona  reports that she has quit smoking. She has never used smokeless tobacco.. I have educated her on the risk of diseases from using tobacco products such as cancer, COPD and heart disease.     Advance Care Planning   ACP discussion was held with the patient during this visit. Patient does not have an advance directive, information provided.      Patient's Body mass index is 47.6 kg/m². BMI is above normal parameters. Recommendations include: educational material and referral to primary care.       Nissa Qiu LPN    Scribed for Dr. Sacha Romero by Nissa Qiu LPN January 7, 2021 08:56 EST         Electronically signed by:            This note is dictated utilizing voice recognition software.  Although this record has been proof read, transcriptional errors may still be present. If questions occur regarding the content of this record please do not hesitate to call our office.

## 2021-01-11 ENCOUNTER — CLINICAL SUPPORT (OUTPATIENT)
Dept: CARDIOLOGY | Facility: CLINIC | Age: 76
End: 2021-01-11

## 2021-01-11 VITALS
BODY MASS INDEX: 45.99 KG/M2 | HEART RATE: 66 BPM | WEIGHT: 293 LBS | OXYGEN SATURATION: 94 % | DIASTOLIC BLOOD PRESSURE: 59 MMHG | SYSTOLIC BLOOD PRESSURE: 141 MMHG | HEIGHT: 67 IN

## 2021-01-11 DIAGNOSIS — R00.2 PALPITATIONS: ICD-10-CM

## 2021-01-11 DIAGNOSIS — I48.0 PAROXYSMAL ATRIAL FIBRILLATION (HCC): Primary | ICD-10-CM

## 2021-01-11 PROCEDURE — 93000 ELECTROCARDIOGRAM COMPLETE: CPT | Performed by: PHYSICIAN ASSISTANT

## 2021-01-11 NOTE — PROGRESS NOTES
Holly CARBAJAL Cj  1945 1/11/2021   ?   Chief Complaint   Patient presents with   • Atrial Fibrillation     Here for nurse visit EKG, Sotalol increase.    • Palpitations      ?   HPI:   ?   ? Patient here for nurse visit EKG. She was seen on 1-7-2021 and c/o increased palps, with known history of parx. A-fib. Sotalol was increased from 80 mg po bid to 120 mg po bid. Stated 120 mg dosing yest. Am, so has had x 3 doses so far. She has tolerated well and has not noticed any change in sx's. EKG done and to be reviewed by Km Amezcua, PAC. PH,LPN  ?     Current Outpatient Medications:   •  allopurinol (ZYLOPRIM) 100 MG tablet, 200 mg Daily., Disp: , Rfl:   •  CloNIDine (CATAPRES) 0.2 MG tablet, Take 1 tablet by mouth 3 (Three) Times a Day. (Patient taking differently: Take 0.2 mg by mouth 2 (Two) Times a Day.), Disp: 90 tablet, Rfl: 3  •  furosemide (LASIX) 20 MG tablet, Daily., Disp: , Rfl:   •  hydrALAZINE (APRESOLINE) 50 MG tablet, Take 1 tablet by mouth 3 (Three) Times a Day., Disp: 90 tablet, Rfl: 11  •  isosorbide mononitrate (IMDUR) 30 MG 24 hr tablet, Take 1 tablet by mouth Daily., Disp: 90 tablet, Rfl: 3  •  levothyroxine (SYNTHROID, LEVOTHROID) 88 MCG tablet, Take 1 tablet by mouth daily., Disp: , Rfl:   •  O2 (OXYGEN), Inhale 2 L/min 1 (One) Time. Through c-pap, Disp: , Rfl:   •  pantoprazole (PROTONIX) 40 MG EC tablet, Take 1 tablet by mouth Daily., Disp: 90 tablet, Rfl: 3  •  RESTASIS 0.05 % ophthalmic emulsion, Daily., Disp: , Rfl:   •  sotalol (BETAPACE) 120 MG tablet, Take 1 tablet by mouth 2 (Two) Times a Day., Disp: 180 tablet, Rfl: 3  •  telmisartan (MICARDIS) 80 MG tablet, Take 1 tablet by mouth Daily., Disp: 90 tablet, Rfl: 3  •  warfarin (COUMADIN) 5 MG tablet, Take  by mouth Daily. 5 mg x 6 days a week then none, Disp: , Rfl:    ?   ?   Ciprofloxacin, Ofloxacin, and Iodinated diagnostic agents         ECG 12 Lead    Date/Time: 1/11/2021 1:46 PM  Performed by: Km Amezcua PA  Authorized by:  Km Amezcua, PA   Comparison: not compared with previous ECG                ?   Assessment/Plan    ?   ?1. Parox. A-Fib      2. Palpitations    EKG reviewed by Km Amezcua, PAC. V/O to continue medications as ordered and return yulissa. For day # 2 EKG. V/O's read back and confirmed per Km Amezuca, PAC. Patient aware. PH,LPN  ?

## 2021-01-12 ENCOUNTER — CLINICAL SUPPORT (OUTPATIENT)
Dept: CARDIOLOGY | Facility: CLINIC | Age: 76
End: 2021-01-12

## 2021-01-12 DIAGNOSIS — I48.0 PAROXYSMAL ATRIAL FIBRILLATION (HCC): ICD-10-CM

## 2021-01-12 DIAGNOSIS — R00.2 PALPITATIONS: ICD-10-CM

## 2021-01-12 NOTE — PROGRESS NOTES
"Holly Corona  1945 1/12/2021   ?   No chief complaint on file.     ?   HPI:   Per last nurse visit:  \"Patient here for nurse visit EKG. She was seen on 1-7-2021 and c/o increased palps, with known history of parx. A-fib. Sotalol was increased from 80 mg po bid to 120 mg po bid. Stated 120 mg dosing yest. Am, so has had x 3 doses so far. She has tolerated well and has not noticed any change in sx's. EKG done and to be reviewed by Km Amezcua, PAC. PH,LPN\"  ?   ?     Current Outpatient Medications:   •  allopurinol (ZYLOPRIM) 100 MG tablet, 200 mg Daily., Disp: , Rfl:   •  CloNIDine (CATAPRES) 0.2 MG tablet, Take 1 tablet by mouth 3 (Three) Times a Day. (Patient taking differently: Take 0.2 mg by mouth 2 (Two) Times a Day.), Disp: 90 tablet, Rfl: 3  •  furosemide (LASIX) 20 MG tablet, Daily., Disp: , Rfl:   •  hydrALAZINE (APRESOLINE) 50 MG tablet, Take 1 tablet by mouth 3 (Three) Times a Day., Disp: 90 tablet, Rfl: 11  •  isosorbide mononitrate (IMDUR) 30 MG 24 hr tablet, Take 1 tablet by mouth Daily., Disp: 90 tablet, Rfl: 3  •  levothyroxine (SYNTHROID, LEVOTHROID) 88 MCG tablet, Take 1 tablet by mouth daily., Disp: , Rfl:   •  O2 (OXYGEN), Inhale 2 L/min 1 (One) Time. Through c-pap, Disp: , Rfl:   •  pantoprazole (PROTONIX) 40 MG EC tablet, Take 1 tablet by mouth Daily., Disp: 90 tablet, Rfl: 3  •  RESTASIS 0.05 % ophthalmic emulsion, Daily., Disp: , Rfl:   •  sotalol (BETAPACE) 120 MG tablet, Take 1 tablet by mouth 2 (Two) Times a Day., Disp: 180 tablet, Rfl: 3  •  telmisartan (MICARDIS) 80 MG tablet, Take 1 tablet by mouth Daily., Disp: 90 tablet, Rfl: 3  •  warfarin (COUMADIN) 5 MG tablet, Take  by mouth Daily. 5 mg x 6 days a week then none, Disp: , Rfl:    ?   ?   Ciprofloxacin, Ofloxacin, and Iodinated diagnostic agents       Procedures     ?   Assessment/Plan    ?   1. Parox. A-Fib      2. Palpitations     EKG reviewed by Km Amezcua, PAC. V/O to continue medications as ordered and return yulissa. For " day # 2 EKG. V/O's read back and confirmed per Km Amezcua, PAC. Patient aware. PH,LPN?   ?

## 2021-01-13 ENCOUNTER — CLINICAL SUPPORT (OUTPATIENT)
Dept: CARDIOLOGY | Facility: CLINIC | Age: 76
End: 2021-01-13

## 2021-01-13 VITALS
WEIGHT: 293 LBS | DIASTOLIC BLOOD PRESSURE: 64 MMHG | HEART RATE: 59 BPM | HEIGHT: 67 IN | OXYGEN SATURATION: 93 % | BODY MASS INDEX: 45.99 KG/M2 | SYSTOLIC BLOOD PRESSURE: 142 MMHG

## 2021-01-13 DIAGNOSIS — I48.0 PAROXYSMAL ATRIAL FIBRILLATION (HCC): Primary | ICD-10-CM

## 2021-01-13 DIAGNOSIS — R00.2 PALPITATIONS: ICD-10-CM

## 2021-01-13 PROCEDURE — 93000 ELECTROCARDIOGRAM COMPLETE: CPT | Performed by: INTERNAL MEDICINE

## 2021-01-13 NOTE — PROGRESS NOTES
Holly CARBAJAL Cj  1945 1/13/2021   ?   No chief complaint on file.     ?   HPI:   ?   Patient here for nurse visit EKG. She was seen on 1-7-2021 and c/o increased palps, with known history of parx. A-fib. Sotalol was increased from 80 mg po bid to 120 mg po bid. Stated 120 mg dosing Sunday. Am, so has had x 7 doses so far. She has tolerated well and has not noticed any change in sx's. Still with occas. Palpitations. EKG done and to be reviewed by Dr. Romero. PH,LPN?   ?     Current Outpatient Medications:   •  allopurinol (ZYLOPRIM) 100 MG tablet, 200 mg Daily., Disp: , Rfl:   •  CloNIDine (CATAPRES) 0.2 MG tablet, Take 1 tablet by mouth 3 (Three) Times a Day. (Patient taking differently: Take 0.2 mg by mouth 2 (Two) Times a Day.), Disp: 90 tablet, Rfl: 3  •  furosemide (LASIX) 20 MG tablet, Daily., Disp: , Rfl:   •  hydrALAZINE (APRESOLINE) 50 MG tablet, Take 1 tablet by mouth 3 (Three) Times a Day., Disp: 90 tablet, Rfl: 11  •  isosorbide mononitrate (IMDUR) 30 MG 24 hr tablet, Take 1 tablet by mouth Daily., Disp: 90 tablet, Rfl: 3  •  levothyroxine (SYNTHROID, LEVOTHROID) 88 MCG tablet, Take 1 tablet by mouth daily., Disp: , Rfl:   •  O2 (OXYGEN), Inhale 2 L/min 1 (One) Time. Through c-pap, Disp: , Rfl:   •  pantoprazole (PROTONIX) 40 MG EC tablet, Take 1 tablet by mouth Daily., Disp: 90 tablet, Rfl: 3  •  RESTASIS 0.05 % ophthalmic emulsion, Daily., Disp: , Rfl:   •  sotalol (BETAPACE) 120 MG tablet, Take 1 tablet by mouth 2 (Two) Times a Day., Disp: 180 tablet, Rfl: 3  •  telmisartan (MICARDIS) 80 MG tablet, Take 1 tablet by mouth Daily., Disp: 90 tablet, Rfl: 3  •  warfarin (COUMADIN) 5 MG tablet, Take  by mouth Daily. 5 mg x 6 days a week then none, Disp: , Rfl:    ?   ?   Ciprofloxacin, Ofloxacin, and Iodinated diagnostic agents         ECG 12 Lead    Date/Time: 1/13/2021 12:07 PM  Performed by: Sacha Romero MD  Authorized by: Sacha Romero MD   Comparison: compared with previous ECG from  1/12/2021  Similar to previous ECG             ?   Assessment/Plan    ?   ? 1. Parox. A-Fib on Sotalol       2. Palpitations      EKG reviewed by Dr. Romero QTC has slowly  increased from initial on Monday till today's. Dr. Cameron called and spoke with Dr. Mario regarding increase in interval. Dr. Mario reviewed all 3 EKG's, manually calculated interval and relayed to Dr. Romero, interval fine and recommended continuing Sotalol at 120 mg bid dosing. V/O per Dr. Romero top continue meds as prescribed and schedule f/u appt. In 6 mo. Patient aware, verbalized ok. PH,LPN  ?

## 2021-02-02 ENCOUNTER — HOSPITAL ENCOUNTER (OUTPATIENT)
Dept: CARDIOLOGY | Facility: HOSPITAL | Age: 76
Discharge: HOME | End: 2021-02-02
Admitting: INTERNAL MEDICINE

## 2021-02-02 DIAGNOSIS — I48.0 PAROXYSMAL ATRIAL FIBRILLATION (HCC): ICD-10-CM

## 2021-02-02 DIAGNOSIS — I51.89 DIASTOLIC DYSFUNCTION: ICD-10-CM

## 2021-02-02 DIAGNOSIS — R06.02 SHORTNESS OF BREATH: ICD-10-CM

## 2021-02-02 DIAGNOSIS — I10 ESSENTIAL HYPERTENSION: ICD-10-CM

## 2021-02-02 DIAGNOSIS — R00.2 PALPITATIONS: ICD-10-CM

## 2021-02-02 DIAGNOSIS — I27.20 PULMONARY HYPERTENSION (HCC): ICD-10-CM

## 2021-02-02 DIAGNOSIS — R60.9 EDEMA, UNSPECIFIED TYPE: ICD-10-CM

## 2021-02-02 PROCEDURE — 93306 TTE W/DOPPLER COMPLETE: CPT

## 2021-02-02 PROCEDURE — 93356 MYOCRD STRAIN IMG SPCKL TRCK: CPT | Performed by: INTERNAL MEDICINE

## 2021-02-02 PROCEDURE — 93356 MYOCRD STRAIN IMG SPCKL TRCK: CPT

## 2021-02-02 PROCEDURE — 93306 TTE W/DOPPLER COMPLETE: CPT | Performed by: INTERNAL MEDICINE

## 2021-02-07 LAB
BH CV ECHO MEAS - ACS: 1.4 CM
BH CV ECHO MEAS - AI DEC SLOPE: 217.3 CM/SEC^2
BH CV ECHO MEAS - AI MAX PG: 44.5 MMHG
BH CV ECHO MEAS - AI MAX VEL: 332.8 CM/SEC
BH CV ECHO MEAS - AI P1/2T: 448.6 MSEC
BH CV ECHO MEAS - AO MAX PG (FULL): 45.5 MMHG
BH CV ECHO MEAS - AO MAX PG: 49.8 MMHG
BH CV ECHO MEAS - AO MEAN PG (FULL): 32 MMHG
BH CV ECHO MEAS - AO MEAN PG: 35 MMHG
BH CV ECHO MEAS - AO ROOT AREA (BSA CORRECTED): 1.2
BH CV ECHO MEAS - AO ROOT AREA: 6.4 CM^2
BH CV ECHO MEAS - AO ROOT DIAM: 2.9 CM
BH CV ECHO MEAS - AO V2 MAX: 353 CM/SEC
BH CV ECHO MEAS - AO V2 MEAN: 283 CM/SEC
BH CV ECHO MEAS - AO V2 VTI: 113 CM
BH CV ECHO MEAS - AVA(I,A): 0.83 CM^2
BH CV ECHO MEAS - AVA(I,D): 0.83 CM^2
BH CV ECHO MEAS - AVA(V,A): 0.83 CM^2
BH CV ECHO MEAS - AVA(V,D): 0.83 CM^2
BH CV ECHO MEAS - BSA(HAYCOCK): 2.6 M^2
BH CV ECHO MEAS - BSA: 2.4 M^2
BH CV ECHO MEAS - BZI_BMI: 47.6 KILOGRAMS/M^2
BH CV ECHO MEAS - BZI_METRIC_HEIGHT: 170.2 CM
BH CV ECHO MEAS - BZI_METRIC_WEIGHT: 137.9 KG
BH CV ECHO MEAS - EDV(CUBED): 151.4 ML
BH CV ECHO MEAS - EDV(MOD-SP4): 94.8 ML
BH CV ECHO MEAS - EDV(TEICH): 137.1 ML
BH CV ECHO MEAS - EF(CUBED): 92.4 %
BH CV ECHO MEAS - EF(MOD-SP2): 87.4 %
BH CV ECHO MEAS - EF(MOD-SP4): 58.8 %
BH CV ECHO MEAS - EF(TEICH): 87.4 %
BH CV ECHO MEAS - ESV(CUBED): 11.5 ML
BH CV ECHO MEAS - ESV(MOD-SP4): 39.1 ML
BH CV ECHO MEAS - ESV(TEICH): 17.3 ML
BH CV ECHO MEAS - FS: 57.6 %
BH CV ECHO MEAS - IVS/LVPW: 0.7
BH CV ECHO MEAS - IVSD: 0.91 CM
BH CV ECHO MEAS - LA DIMENSION: 5.8 CM
BH CV ECHO MEAS - LA/AO: 2
BH CV ECHO MEAS - LV DIASTOLIC VOL/BSA (35-75): 39.2 ML/M^2
BH CV ECHO MEAS - LV IVRT: 0.06 SEC
BH CV ECHO MEAS - LV MASS(C)D: 231.4 GRAMS
BH CV ECHO MEAS - LV MASS(C)DI: 95.8 GRAMS/M^2
BH CV ECHO MEAS - LV MAX PG: 4.3 MMHG
BH CV ECHO MEAS - LV MEAN PG: 3 MMHG
BH CV ECHO MEAS - LV SYSTOLIC VOL/BSA (12-30): 16.2 ML/M^2
BH CV ECHO MEAS - LV V1 MAX: 103.7 CM/SEC
BH CV ECHO MEAS - LV V1 MEAN: 81.8 CM/SEC
BH CV ECHO MEAS - LV V1 VTI: 32.9 CM
BH CV ECHO MEAS - LVIDD: 5.3 CM
BH CV ECHO MEAS - LVIDS: 2.3 CM
BH CV ECHO MEAS - LVLD AP4: 6 CM
BH CV ECHO MEAS - LVLS AP4: 5.4 CM
BH CV ECHO MEAS - LVOT AREA (M): 2.8 CM^2
BH CV ECHO MEAS - LVOT AREA: 2.8 CM^2
BH CV ECHO MEAS - LVOT DIAM: 1.9 CM
BH CV ECHO MEAS - LVPWD: 1.3 CM
BH CV ECHO MEAS - MV A MAX VEL: 53.6 CM/SEC
BH CV ECHO MEAS - MV DEC SLOPE: 725 CM/SEC^2
BH CV ECHO MEAS - MV E MAX VEL: 138 CM/SEC
BH CV ECHO MEAS - MV E/A: 2.6
BH CV ECHO MEAS - MV MAX PG: 10.8 MMHG
BH CV ECHO MEAS - MV MEAN PG: 2 MMHG
BH CV ECHO MEAS - MV P1/2T MAX VEL: 162 CM/SEC
BH CV ECHO MEAS - MV P1/2T: 65.4 MSEC
BH CV ECHO MEAS - MV V2 MAX: 164 CM/SEC
BH CV ECHO MEAS - MV V2 MEAN: 66.3 CM/SEC
BH CV ECHO MEAS - MV V2 VTI: 46.6 CM
BH CV ECHO MEAS - MVA P1/2T LCG: 1.4 CM^2
BH CV ECHO MEAS - MVA(P1/2T): 3.4 CM^2
BH CV ECHO MEAS - MVA(VTI): 2 CM^2
BH CV ECHO MEAS - RAP SYSTOLE: 10 MMHG
BH CV ECHO MEAS - RVDD: 2.3 CM
BH CV ECHO MEAS - RVSP: 61.3 MMHG
BH CV ECHO MEAS - SI(AO): 298.4 ML/M^2
BH CV ECHO MEAS - SI(CUBED): 57.9 ML/M^2
BH CV ECHO MEAS - SI(LVOT): 38.6 ML/M^2
BH CV ECHO MEAS - SI(MOD-SP4): 23.1 ML/M^2
BH CV ECHO MEAS - SI(TEICH): 49.6 ML/M^2
BH CV ECHO MEAS - SV(AO): 720.9 ML
BH CV ECHO MEAS - SV(CUBED): 139.9 ML
BH CV ECHO MEAS - SV(LVOT): 93.3 ML
BH CV ECHO MEAS - SV(MOD-SP4): 55.7 ML
BH CV ECHO MEAS - SV(TEICH): 119.8 ML
BH CV ECHO MEAS - TR MAX VEL: 358 CM/SEC
MAXIMAL PREDICTED HEART RATE: 143 BPM
STRESS TARGET HR: 122 BPM

## 2021-02-10 ENCOUNTER — TELEPHONE (OUTPATIENT)
Dept: CARDIOLOGY | Facility: CLINIC | Age: 76
End: 2021-02-10

## 2021-02-10 NOTE — TELEPHONE ENCOUNTER
RESULTS ONLY BRIEFLY DISCUSSED WITH MRS. WHITE. APPT. SCHEDULE FOR 2-18. PATIENT AWARE. ALFREDO TORREZ            ----- Message from Sacha Romero MD sent at 2/10/2021  9:24 AM EST -----  Needs appt.

## 2021-02-25 ENCOUNTER — OFFICE VISIT (OUTPATIENT)
Dept: CARDIOLOGY | Facility: CLINIC | Age: 76
End: 2021-02-25

## 2021-02-25 VITALS
HEIGHT: 67 IN | BODY MASS INDEX: 45.99 KG/M2 | OXYGEN SATURATION: 94 % | HEART RATE: 65 BPM | DIASTOLIC BLOOD PRESSURE: 65 MMHG | SYSTOLIC BLOOD PRESSURE: 152 MMHG | WEIGHT: 293 LBS

## 2021-02-25 DIAGNOSIS — G47.33 OSA ON CPAP: ICD-10-CM

## 2021-02-25 DIAGNOSIS — R06.02 EXERTIONAL SHORTNESS OF BREATH: ICD-10-CM

## 2021-02-25 DIAGNOSIS — I26.99 OTHER ACUTE PULMONARY EMBOLISM WITHOUT ACUTE COR PULMONALE (HCC): ICD-10-CM

## 2021-02-25 DIAGNOSIS — I48.0 PAROXYSMAL ATRIAL FIBRILLATION (HCC): ICD-10-CM

## 2021-02-25 DIAGNOSIS — R09.89 BILATERAL CAROTID BRUITS: ICD-10-CM

## 2021-02-25 DIAGNOSIS — E78.5 DYSLIPIDEMIA: ICD-10-CM

## 2021-02-25 DIAGNOSIS — Z99.89 OSA ON CPAP: ICD-10-CM

## 2021-02-25 DIAGNOSIS — I10 ESSENTIAL HYPERTENSION: ICD-10-CM

## 2021-02-25 DIAGNOSIS — R42 DIZZINESS: ICD-10-CM

## 2021-02-25 DIAGNOSIS — R00.2 PALPITATIONS: ICD-10-CM

## 2021-02-25 DIAGNOSIS — I65.23 BILATERAL CAROTID ARTERY STENOSIS: Primary | ICD-10-CM

## 2021-02-25 DIAGNOSIS — I27.20 PULMONARY HYPERTENSION (HCC): ICD-10-CM

## 2021-02-25 DIAGNOSIS — I73.9 PERIPHERAL VASCULAR DISEASE (HCC): ICD-10-CM

## 2021-02-25 DIAGNOSIS — R55 SYNCOPE, UNSPECIFIED SYNCOPE TYPE: ICD-10-CM

## 2021-02-25 DIAGNOSIS — I51.89 DIASTOLIC DYSFUNCTION: ICD-10-CM

## 2021-02-25 DIAGNOSIS — I35.0 AORTIC STENOSIS, SEVERE: ICD-10-CM

## 2021-02-25 DIAGNOSIS — I82.4Y9 ACUTE DEEP VEIN THROMBOSIS (DVT) OF PROXIMAL VEIN OF LOWER EXTREMITY, UNSPECIFIED LATERALITY (HCC): ICD-10-CM

## 2021-02-25 DIAGNOSIS — R60.9 EDEMA, UNSPECIFIED TYPE: ICD-10-CM

## 2021-02-25 PROCEDURE — 99214 OFFICE O/P EST MOD 30 MIN: CPT | Performed by: INTERNAL MEDICINE

## 2021-02-25 RX ORDER — BUSPIRONE HYDROCHLORIDE 5 MG/1
5 TABLET ORAL DAILY PRN
COMMUNITY
Start: 2021-01-15 | End: 2021-08-25

## 2021-02-25 NOTE — PATIENT INSTRUCTIONS
Obesity, Adult  Obesity is the condition of having too much total body fat. Being overweight or obese means that your weight is greater than what is considered healthy for your body size. Obesity is determined by a measurement called BMI. BMI is an estimate of body fat and is calculated from height and weight. For adults, a BMI of 30 or higher is considered obese.  Obesity can lead to other health concerns and major illnesses, including:  · Stroke.  · Coronary artery disease (CAD).  · Type 2 diabetes.  · Some types of cancer, including cancers of the colon, breast, uterus, and gallbladder.  · Osteoarthritis.  · High blood pressure (hypertension).  · High cholesterol.  · Sleep apnea.  · Gallbladder stones.  · Infertility problems.  What are the causes?  Common causes of this condition include:  · Eating daily meals that are high in calories, sugar, and fat.  · Being born with genes that may make you more likely to become obese.  · Having a medical condition that causes obesity, including:  ? Hypothyroidism.  ? Polycystic ovarian syndrome (PCOS).  ? Binge-eating disorder.  ? Cushing syndrome.  · Taking certain medicines, such as steroids, antidepressants, and seizure medicines.  · Not being physically active (sedentary lifestyle).  · Not getting enough sleep.  · Drinking high amounts of sugar-sweetened beverages, such as soft drinks.  What increases the risk?  The following factors may make you more likely to develop this condition:  · Having a family history of obesity.  · Being a woman of  descent.  · Being a man of  descent.  · Living in an area with limited access to:  ? [place], recreation centers, or sidewalks.  ? Healthy food choices, such as grocery stores and farmers' markets.  What are the signs or symptoms?  The main sign of this condition is having too much body fat.  How is this diagnosed?  This condition is diagnosed based on:  · Your BMI. If you are an adult with a BMI of 30 or  higher, you are considered obese.  · Your waist circumference. This measures the distance around your waistline.  · Your skinfold thickness. Your health care provider may gently pinch a fold of your skin and measure it.  You may have other tests to check for underlying conditions.  How is this treated?  Treatment for this condition often includes changing your lifestyle. Treatment may include some or all of the following:  · Dietary changes. This may include developing a healthy meal plan.  · Regular physical activity. This may include activity that causes your heart to beat faster (aerobic exercise) and strength training. Work with your health care provider to design an exercise program that works for you.  · Medicine to help you lose weight if you are unable to lose 1 pound a week after 6 weeks of healthy eating and more physical activity.  · Treating conditions that cause the obesity (underlying conditions).  · Surgery. Surgical options may include gastric banding and gastric bypass. Surgery may be done if:  ? Other treatments have not helped to improve your condition.  ? You have a BMI of 40 or higher.  ? You have life-threatening health problems related to obesity.  Follow these instructions at home:  Eating and drinking    · Follow recommendations from your health care provider about what you eat and drink. Your health care provider may advise you to:  ? Limit fast food, sweets, and processed snack foods.  ? Choose low-fat options, such as low-fat milk instead of whole milk.  ? Eat 5 or more servings of fruits or vegetables every day.  ? Eat at home more often. This gives you more control over what you eat.  ? Choose healthy foods when you eat out.  ? Learn to read food labels. This will help you understand how much food is considered 1 serving.  ? Learn what a healthy serving size is.  ? Keep low-fat snacks available.  ? Limit sugary drinks, such as soda, fruit juice, sweetened iced tea, and flavored  milk.  · Drink enough water to keep your urine pale yellow.  · Do not follow a fad diet. Fad diets can be unhealthy and even dangerous.  Physical activity  · Exercise regularly, as told by your health care provider.  ? Most adults should get up to 150 minutes of moderate-intensity exercise every week.  ? Ask your health care provider what types of exercise are safe for you and how often you should exercise.  · Warm up and stretch before being active.  · Cool down and stretch after being active.  · Rest between periods of activity.  Lifestyle  · Work with your health care provider and a dietitian to set a weight-loss goal that is healthy and reasonable for you.  · Limit your screen time.  · Find ways to reward yourself that do not involve food.  · Do not drink alcohol if:  ? Your health care provider tells you not to drink.  ? You are pregnant, may be pregnant, or are planning to become pregnant.  · If you drink alcohol:  ? Limit how much you use to:  § 0-1 drink a day for women.  § 0-2 drinks a day for men.  ? Be aware of how much alcohol is in your drink. In the [place] one drink equals one 12 oz bottle of beer (355 mL), one 5 oz glass of wine (148 mL), or one 1½ oz glass of hard liquor (44 mL).  General instructions  · Keep a weight-loss journal to keep track of the food you eat and how much exercise you get.  · Take over-the-counter and prescription medicines only as told by your health care provider.  · Take vitamins and supplements only as told by your health care provider.  · Consider joining a support group. Your health care provider may be able to recommend a support group.  · Keep all follow-up visits as told by your health care provider. This is important.  Contact a health care provider if:  · You are unable to meet your weight loss goal after 6 weeks of dietary and lifestyle changes.  Get help right away if you are having:  · Trouble breathing.  · Suicidal thoughts or behaviors.  Summary  · Obesity is the  condition of having too much total body fat.  · Being overweight or obese means that your weight is greater than what is considered healthy for your body size.  · Work with your health care provider and a dietitian to set a weight-loss goal that is healthy and reasonable for you.  · Exercise regularly, as told by your health care provider. Ask your health care provider what types of exercise are safe for you and how often you should exercise.  This information is not intended to replace advice given to you by your health care provider. Make sure you discuss any questions you have with your health care provider.  Document Revised: 08/22/2019 Document Reviewed: 08/22/2019  [employer] Patient Education © 2020 [employer]  MyPlate from [employer]    MyPlate is an outline of a general healthy diet based on the 2010 Dietary Guidelines for Americans, from the [place] Department of Agriculture (USDA). It sets guidelines for how much food you should eat from each food group based on your age, sex, and level of physical activity.  What are tips for following MyPlate?  To follow MyPlate recommendations:  · Eat a wide variety of fruits and vegetables, grains, and protein foods.  · Serve smaller portions and eat less food throughout the day.  · Limit portion sizes to avoid overeating.  · Enjoy your food.  · Get at least 150 minutes of exercise every week. This is about 30 minutes each day, 5 or more days per week.  It can be difficult to have every meal look like MyPlate. Think about MyPlate as eating guidelines for an entire day, rather than each individual meal.  Fruits and vegetables  · Make half of your plate fruits and vegetables.  · Eat many different colors of fruits and vegetables each day.  · For a 2,000 calorie daily food plan, eat:  ? 2½ cups of vegetables every day.  ? 2 cups of fruit every day.  · 1 cup is equal to:  ? 1 cup raw or cooked vegetables.  ? 1 cup raw fruit.  ? 1 medium-sized orange, apple, or banana.  ? 1 cup  100% fruit or vegetable juice.  ? 2 cups raw leafy greens, such as lettuce, spinach, or kale.  ? ½ cup dried fruit.  Grains  · One fourth of your plate should be grains.  · Make at least half of the grains you eat each day whole grains.  · For a 2,000 calorie daily food plan, eat 6 oz of grains every day.  · 1 oz is equal to:  ? 1 slice bread.  ? 1 cup cereal.  ? ½ cup cooked rice, cereal, or pasta.  Protein  · One fourth of your plate should be protein.  · Eat a wide variety of protein foods, including meat, poultry, fish, eggs, beans, nuts, and tofu.  · For a 2,000 calorie daily food plan, eat 5½ oz of protein every day.  · 1 oz is equal to:  ? 1 oz meat, poultry, or fish.  ? ¼ cup cooked beans.  ? 1 egg.  ? ½ oz nuts or seeds.  ? 1 Tbsp peanut butter.  Dairy  · Drink fat-free or low-fat (1%) milk.  · Eat or drink dairy as a side to meals.  · For a 2,000 calorie daily food plan, eat or drink 3 cups of dairy every day.  · 1 cup is equal to:  ? 1 cup milk, yogurt, cottage cheese, or soy milk (soy beverage).  ? 2 oz processed cheese.  ? 1½ oz natural cheese.  Fats, oils, salt, and sugars  · Only small amounts of oils are recommended.  · Avoid foods that are high in calories and low in nutritional value (empty calories), like foods high in fat or added sugars.  · Choose foods that are low in salt (sodium). Choose foods that have less than 140 milligrams (mg) of sodium per serving.  · Drink water instead of sugary drinks. Drink enough water each day to keep your urine pale yellow.  Where to find support  · Work with your health care provider or a nutrition specialist (dietitian) to develop a customized eating plan that is right for you.  · Download an hannah (mobile application) to help you track your daily food intake.  Where to find more information  · Go to ChooseMyPlate.gov for more information.  Summary  · MyPlate is a general guideline for healthy eating from the USDA. It is based on the 2010 Dietary Guidelines for  Americans.  · In general, fruits and vegetables should take up ½ of your plate, grains should take up ¼ of your plate, and protein should take up ¼ of your plate.  This information is not intended to replace advice given to you by your health care provider. Make sure you discuss any questions you have with your health care provider.  Document Revised: 05/21/2020 Document Reviewed: 03/19/2018  [employer] Patient Education © 2020 [employer].

## 2021-02-25 NOTE — PROGRESS NOTES
"Subjective   Holly Corona is a 75 y.o. female     Chief Complaint   Patient presents with   • Follow-up     Here for echo f/u   • Atrial Fibrillation   • Hypertension   • Hyperlipidemia   • Palpitations   • Sleep Apnea       PROBLEM LIST:     1. Paroxysmal Atrial  fibrillation  1.1 Pulmonary vein isolation procedure with ablation of right atrial flutter by Dr. Mario, March 2013.  2. Severe pulmonary hypertension with pulmonary pressure 60-65% by most recent cath.  3. Hypertension  3.1 Echo 9/9/15 - mild LVH; EF > 65%; mild to mod MR; mod TR; PA 55-60; mild DE  3.2 recent ECHO 03/09/17, left ventricular chamber is mildly dilated. Mild concentric LVH. EF > 65%. Issa Grade ll diastolic dysfunction. Left atrium moderately dilated, right atrium mild to moderate dilated. Systolic pressure 55-60 mmHg.   3.3 recent stress 04/11/17 inferior ischemia, chest wall attenuation.   4. Dyslipidemia  5. History of DVT/PE  6. History of colon CA x 2 status post surgery 2014.  7. Carotid Artery Stenosis  7.1 Carotid U/S 3/8/16 - 16-49% MARYBEL and LICA; antegrade flow  8. HANK - CPAP   9. GOUT, recurrent flare-ups      Specialty Problems        Cardiology Problems    HTN (hypertension)        Paroxysmal atrial fibrillation (CMS/HCC)        Carotid artery stenosis        Carotid bruit        Deep venous thrombosis (CMS/HCC)        Diastolic dysfunction        Palpitations        Peripheral vascular disease (CMS/HCC)        Pulmonary embolus (CMS/HCC)        Pulmonary hypertension (CMS/HCC)        Syncope        Abnormal stress test                HPI:  Ms. Corona returns for follow-up on test results.    She continues to palpitate.  She described an episode of a \"panic attack where she was short of breath, wheezing, and had chest tightness.  Symptoms improved with inhaled bronchodilator therapy.    Echo demonstrated preserved LV systolic function, grade 2-3 (moderate to severe) diastolic dysfunction with increased LV filling pressures.  " PA systolic pressures were estimated at approximately 60.  Valve parameters included a peak instantaneous gradient of 50, a mean aortic valve gradient of 35, and an aortic valve area of 0.8 to 0.9 cm² by continuity equation.                        PRIOR MEDICATIONS    Current Outpatient Medications on File Prior to Visit   Medication Sig Dispense Refill   • allopurinol (ZYLOPRIM) 100 MG tablet 200 mg Daily.     • busPIRone (BUSPAR) 5 MG tablet Takes occas.     • CloNIDine (CATAPRES) 0.2 MG tablet Take 1 tablet by mouth 3 (Three) Times a Day. (Patient taking differently: Take 0.2 mg by mouth 2 (Two) Times a Day.) 90 tablet 3   • furosemide (LASIX) 20 MG tablet Daily.     • hydrALAZINE (APRESOLINE) 50 MG tablet Take 1 tablet by mouth 3 (Three) Times a Day. (Patient taking differently: Take 50 mg by mouth 2 (two) times a day.) 90 tablet 11   • isosorbide mononitrate (IMDUR) 30 MG 24 hr tablet Take 1 tablet by mouth Daily. 90 tablet 3   • levothyroxine (SYNTHROID, LEVOTHROID) 88 MCG tablet Take 1 tablet by mouth daily.     • O2 (OXYGEN) Inhale 2 L/min 1 (One) Time. Through c-pap     • pantoprazole (PROTONIX) 40 MG EC tablet Take 1 tablet by mouth Daily. 90 tablet 3   • RESTASIS 0.05 % ophthalmic emulsion Daily.     • sotalol (BETAPACE) 120 MG tablet Take 1 tablet by mouth 2 (Two) Times a Day. 180 tablet 3   • telmisartan (MICARDIS) 80 MG tablet Take 1 tablet by mouth Daily. 90 tablet 3   • warfarin (COUMADIN) 5 MG tablet Take  by mouth Daily. 5 mg x 6 days a week then none       No current facility-administered medications on file prior to visit.        ALLERGIES:    Ciprofloxacin, Ofloxacin, and Iodinated diagnostic agents    PAST MEDICAL HISTORY:    Past Medical History:   Diagnosis Date   • Anemia    • Atrial fibrillation (CMS/HCC)     A.  History of prior pulmonary vein ablation 03/14/2013. B.  On chronic Coumadin and Tikosyn therapy.   • Carotid artery stenosis    • Carotid bruit    • Deep venous thrombosis  (CMS/HCC)     A.  History of right lower extremity DVT treated with in therapy until October 2000.   • Diastolic dysfunction    • Diastolic dysfunction    • Dizziness    • Dyslipidemia    • Edema    • Exertional shortness of breath    • GERD (gastroesophageal reflux disease)    • Gout    • H/O echocardiogram     A.  Echocardiogram of 10/16/2013 reports an ejection fraction of 55-60%, a moderately to severely dilated left atrium, mild to moderately dilated right atrium, trace aortic regurgitation, mild mitral and tricuspid regurgitation with calculated    • Hiatal hernia    • Hypertension     A.  Echocardiogram 10/16/2013 reports a calculated RVSP of 50-55 mmHg.B.  Right heart catheterization 03/12/2009 at  reported RV of 72/19, PA 72/27 and PCWP of 24, this was felt to be     • Hypothyroidism    • Morbid obesity (CMS/HCC)    • Obstructive sleep apnea     A.  On CPAP each bedtime.   • Osteoarthritis    • Palpitations    • Peripheral vascular disease (CMS/HCC)    • Pulmonary embolus (CMS/HCC)     A.  Developed during chemotherapy in 2/2012. B.  Coumadin therapy reinitiated at that time.   • Pulmonary hypertension (CMS/HCC)    • Signet ring cell adenocarcinoma (CMS/HCC)     A.  Diagnosed on colonoscopy E. 10/14/2011, status post colon resection and 2 of 20 nodes positive, T3, N1b Stage IIIB. B.  Status post chemotherapy.    • Syncope        SURGICAL HISTORY:    Past Surgical History:   Procedure Laterality Date   • CARDIAC CATHETERIZATION     • FOOT SURGERY     • HERNIA REPAIR      2011   • HYSTERECTOMY      1993   • OTHER SURGICAL HISTORY      cardiac cath procedure summary, A.  Cardiac catheterization April 2007 reported no significance coronary artery disease with markedly elevated LVEDP.   • OTHER SURGICAL HISTORY      catheter ablation atrial fibrillation, 2013   • OTHER SURGICAL HISTORY Left     knee replacement 2005   • OTHER SURGICAL HISTORY      neuroplasty decompression median nerve at carpal tunnel 1997    "  • OTHER SURGICAL HISTORY      partial colectomy , A.  Status post right hemicolectomy secondary to colon cancer in 2011.       SOCIAL HISTORY:    Social History     Socioeconomic History   • Marital status:      Spouse name: Not on file   • Number of children: Not on file   • Years of education: Not on file   • Highest education level: Not on file   Tobacco Use   • Smoking status: Former Smoker   • Smokeless tobacco: Never Used   Substance and Sexual Activity   • Alcohol use: No   • Drug use: No   • Sexual activity: Defer       FAMILY HISTORY:    Family History   Problem Relation Age of Onset   • Other Mother         MEDICAL PROBLEMS   • Other Sister         myocardial infarction.       Review of Systems   Constitutional: Positive for fatigue.   HENT: Positive for congestion and postnasal drip.    Eyes: Positive for visual disturbance (glasses prn).   Respiratory: Positive for shortness of breath.    Cardiovascular: Positive for chest pain (mildly occas.), palpitations and leg swelling.   Gastrointestinal: Negative.    Endocrine: Negative.    Genitourinary: Negative.    Musculoskeletal: Positive for arthralgias, gait problem (ambulates with rolling walker) and myalgias.   Skin: Negative.    Allergic/Immunologic: Positive for environmental allergies.   Neurological: Negative for syncope.   Hematological: Bruises/bleeds easily (bruise).   Psychiatric/Behavioral: Positive for sleep disturbance (off and on).       VISIT VITALS:  Vitals:    02/25/21 0914   BP: 152/65   BP Location: Left arm   Patient Position: Sitting   Pulse: 65   SpO2: 94%   Weight: (!) 139 kg (306 lb 3.2 oz)   Height: 170.2 cm (67.01\")      /65 (BP Location: Left arm, Patient Position: Sitting)   Pulse 65   Ht 170.2 cm (67.01\")   Wt (!) 139 kg (306 lb 3.2 oz)   SpO2 94%   BMI 47.95 kg/m²     RECENT LABS:    Objective       Physical Exam  Vitals signs and nursing note reviewed.   Constitutional:       General: She is not in acute " distress.     Appearance: She is well-developed.   HENT:      Head: Normocephalic and atraumatic.   Eyes:      Conjunctiva/sclera: Conjunctivae normal.      Pupils: Pupils are equal, round, and reactive to light.   Neck:      Musculoskeletal: Normal range of motion and neck supple.      Vascular: Carotid bruit (bilat. bruits v/s transmitted murmur) present. No hepatojugular reflux or JVD.      Trachea: No tracheal deviation.      Comments: Nl. Carotid upstrokes  Cardiovascular:      Rate and Rhythm: Normal rate and regular rhythm.      Heart sounds: Murmur present. Systolic murmur present. Gallop present. S4 sounds present.       Comments: S1 decreased  3/6 mid peaking systolic ejection murmur  No AI  No MR        Pulmonary:      Effort: Pulmonary effort is normal.      Breath sounds: Normal breath sounds. No wheezing, rhonchi or rales.      Comments: Nl. Expir. Phase  Nl. Breath sound intensity  Abdominal:      General: Bowel sounds are normal. There is no distension or abdominal bruit.      Palpations: Abdomen is soft. There is no mass.      Tenderness: There is no abdominal tenderness. There is no guarding or rebound.      Comments: No organomegaly   Musculoskeletal: Normal range of motion.         General: No tenderness or deformity.      Right lower leg: Edema present.      Left lower leg: Edema present.      Comments: LLE, 3-4+ edema, severe [place]. Stasis changes  RLE, 2+ edema, mild-mod. [place]. Stasis changes   Skin:     General: Skin is warm and dry.      Coloration: Skin is not pale.      Findings: No erythema or rash.   Neurological:      Mental Status: She is alert and oriented to person, place, and time.   Psychiatric:         Behavior: Behavior normal.         Thought Content: Thought content normal.         Judgment: Judgment normal.         Procedures      Assessment/Plan   #1.  Valvular heart disease.  Mrs. Corona has severe aortic stenosis by physical exam, by absolute pressure gradients by Doppler, and  by calculated aortic valve area via continuity equation.  We had an extended discussion today reference therapeutic options.  Ms. Corona functions at class III and IV levels of activity with intermittent dyspnea.  I believe there are multiple contributing factors to the patient's dyspnea and decreased functional capacity.  However, I feel that aortic valve replacement would be appropriate given data described above with the hopes that she would achieve some improvement in functional opacity.  I discussed this case with Dr. Storm العلي who will make arrangements to see Ms. Corona in the near future.    2.  Paroxysmal atrial fibrillation.  The patient is only mildly symptomatic from the standpoint of palpitations.  We will continue current sotalol dosing.  Of note, Ms. Corona has had multiple  ablation procedures in the past.    3.  Systemic hypertension.  The patient did not take blood pressure medications today.  Blood pressures are reasonably well controlled.    4.  Pulmonary hypertension.  I believe pulmonary hypertension is multifactorial in etiology with the possibility of multiembolic hypertension prior to anticoagulation use, valvular heart disease, significant diastolic dysfunction, and obesity hypoventilation syndrome while playing a role.  It appears that pulmonary pressures have been reasonably stable over period of several years.    5.  Ms. Corona had nonobstructive carotid disease by duplex current therapy will be continued.    We will repeat that study has transmitted murmur makes clinical evaluation difficult.  Of note, the patient has no symptoms of cerebral ischemia.    6.  .  I have asked Mr. Corona to follow with Dr. Andrew as instructed, with further recommendations from our office after evaluation for possible TAVR.  2 years ago.  We will repeat that study has transmitted murmur makes clinical evaluation difficult.  Of note, the patient has no symptoms of cerebral ischemia.   Diagnosis Plan   1. Bilateral  carotid artery stenosis     2. Diastolic dysfunction     3. Dyslipidemia     4. Exertional shortness of breath     5. Essential hypertension     6. Palpitations     7. Paroxysmal atrial fibrillation (CMS/HCC)     8. Peripheral vascular disease (CMS/HCC)     9. Acute deep vein thrombosis (DVT) of proximal vein of lower extremity, unspecified laterality (CMS/HCC)     10. Other acute pulmonary embolism without acute cor pulmonale (CMS/HCC)     11. Pulmonary hypertension (CMS/HCC)     12. HANK on CPAP     13. Dizziness     14. Edema, unspecified type     15. Syncope, unspecified syncope type         No follow-ups on file.         Holly Corona  reports that she has quit smoking. She has never used smokeless tobacco.. I have educated her on the risk of diseases from using tobacco products such as cancer, COPD and heart disease.          ACP discussion was held with the patient during this visit. Patient does not have an advance directive, information provided.           Patient's Body mass index is 47.95 kg/m². BMI is above normal parameters. Recommendations include: educational material and referral to primary care.       Nissa Qiu LPN    Scribed for Dr. Sacha Romero by Nissa Qiu LPN February 25, 2021 09:20 EST         Electronically signed by:            This note is dictated utilizing voice recognition software.  Although this record has been proof read, transcriptional errors may still be present. If questions occur regarding the content of this record please do not hesitate to call our office.

## 2021-03-01 ENCOUNTER — OFFICE VISIT (OUTPATIENT)
Dept: CARDIAC SURGERY | Facility: CLINIC | Age: 76
End: 2021-03-01

## 2021-03-01 VITALS
HEART RATE: 68 BPM | WEIGHT: 293 LBS | OXYGEN SATURATION: 98 % | BODY MASS INDEX: 45.99 KG/M2 | HEIGHT: 67 IN | TEMPERATURE: 97.5 F | DIASTOLIC BLOOD PRESSURE: 66 MMHG | SYSTOLIC BLOOD PRESSURE: 170 MMHG

## 2021-03-01 DIAGNOSIS — I35.9 AORTIC VALVE DISORDER: Primary | ICD-10-CM

## 2021-03-01 PROCEDURE — 99205 OFFICE O/P NEW HI 60 MIN: CPT | Performed by: THORACIC SURGERY (CARDIOTHORACIC VASCULAR SURGERY)

## 2021-03-01 RX ORDER — ALPRAZOLAM 0.25 MG/1
0.25 TABLET ORAL AS NEEDED
COMMUNITY

## 2021-03-01 NOTE — PROGRESS NOTES
03/01/2021  Patient Information  [place] [address]   1945  'PCP/Referring Physician'  Abdoul Andrew MD  [phone]  Sacha Romero MD  [phone]  Chief Complaint   Patient presents with   • Consult     NP per Dr. Romero for Aortic Valve Stenosis-Complains of SOB, Fatigue, Tachycardia       History of Present Illness:  The patient is a 75-year-old female who has been referred to me to evaluate for transcatheter aortic valve replacement.  Her cardiologist, Dr. Romero, believes this patient's symptomatology is worsening and it is primarily related to her aortic valve stenosis.  She does have significant obesity and she has known severe pulmonary hypertension with pulmonary pressures of 60-65 systolic.  She also has a history of DVT with pulmonary embolus and has pulmonary vein isolation and ablation for atrial flutter by Dr. Mario in 2013.  Her most recent echo demonstrates the peak aortic valve gradient of 49 with a velocity of 353/s with a valve area of 0.8 cm².  Although these numbers did not demonstrate critical disease, the patient is having worse shortness of breath with mild to syncope and Dr. Romero believes the valve to be truly worsening and symptomatic.      Patient Active Problem List   Diagnosis   • Paroxysmal atrial fibrillation (CMS/HCC)   • HTN (hypertension)   • HANK on CPAP   • Dyslipidemia   • Edema   • Palpitations   • Diastolic dysfunction   • GERD (gastroesophageal reflux disease)   • Hiatal hernia   • Deep venous thrombosis (CMS/HCC)   • Hypothyroidism   • Morbid obesity (CMS/HCC)   • Osteoarthritis   • Pulmonary embolus (CMS/HCC)   • Peripheral vascular disease (CMS/HCC)   • Pulmonary hypertension (CMS/HCC)   • Signet ring cell adenocarcinoma (CMS/HCC)   • Syncope   • Exertional shortness of breath   • Dizziness   • Carotid bruit   • Anemia   • Carotid  artery stenosis   • Abnormal stress test   • Elevated serum creatinine   • Acute gout of right shoulder   • Aortic valve disorder     Past Medical History:   Diagnosis Date   • Anemia    • Anxiety    • Aortic valve stenosis    • Atrial fibrillation (CMS/HCC)     A.  History of prior pulmonary vein ablation 03/14/2013. B.  On chronic Coumadin and Tikosyn therapy.   • Carotid artery stenosis    • Carotid bruit    • Chronic kidney disease    • COPD (chronic obstructive pulmonary disease) (CMS/HCC)    • Deep venous thrombosis (CMS/HCC)     A.  History of right lower extremity DVT treated with in therapy until October 2000.   • Diastolic dysfunction    • Diastolic dysfunction    • Dizziness    • Dyslipidemia    • Edema    • Exertional shortness of breath    • GERD (gastroesophageal reflux disease)    • Gout    • H/O echocardiogram     A.  Echocardiogram of 10/16/2013 reports an ejection fraction of 55-60%, a moderately to severely dilated left atrium, mild to moderately dilated right atrium, trace aortic regurgitation, mild mitral and tricuspid regurgitation with calculated    • Hiatal hernia    • History of blood transfusion    • Hypertension     A.  Echocardiogram 10/16/2013 reports a calculated RVSP of 50-55 mmHg.B.  Right heart catheterization 03/12/2009 at  reported RV of 72/19, PA 72/27 and PCWP of 24, this was felt to be     • Hypothyroidism    • Morbid obesity (CMS/HCC)    • Obstructive sleep apnea     A.  On CPAP each bedtime.   • Osteoarthritis    • Palpitations    • Peptic ulcer disease    • Peripheral vascular disease (CMS/HCC)    • Pulmonary embolus (CMS/HCC)     A.  Developed during chemotherapy in 2/2012. B.  Coumadin therapy reinitiated at that time.   • Pulmonary hypertension (CMS/HCC)    • Signet ring cell adenocarcinoma (CMS/HCC)     A.  Diagnosed on colonoscopy E. 10/14/2011, status post colon resection and 2 of 20 nodes positive, T3, N1b Stage IIIB. B.  Status post chemotherapy.    • Syncope       Past Surgical History:   Procedure Laterality Date   • CARDIAC CATHETERIZATION     • FOOT SURGERY Right    • HYSTERECTOMY      1993   • OTHER SURGICAL HISTORY      cardiac cath procedure summary, A.  Cardiac catheterization April 2007 reported no significance coronary artery disease with markedly elevated LVEDP.   • OTHER SURGICAL HISTORY      catheter ablation atrial fibrillation, 2013   • OTHER SURGICAL HISTORY Left     knee replacement 2005   • OTHER SURGICAL HISTORY      neuroplasty decompression median nerve at carpal tunnel 1997   • OTHER SURGICAL HISTORY      partial colectomy , A.  Status post right hemicolectomy secondary to colon cancer in 2011.       Current Outpatient Medications:   •  allopurinol (ZYLOPRIM) 100 MG tablet, 200 mg Daily., Disp: , Rfl:   •  ALPRAZolam (XANAX) 0.25 MG tablet, Take 0.25 mg by mouth As Needed for Anxiety., Disp: , Rfl:   •  CloNIDine (CATAPRES) 0.2 MG tablet, Take 1 tablet by mouth 3 (Three) Times a Day. (Patient taking differently: Take 0.2 mg by mouth 2 (Two) Times a Day.), Disp: 90 tablet, Rfl: 3  •  furosemide (LASIX) 20 MG tablet, Daily., Disp: , Rfl:   •  hydrALAZINE (APRESOLINE) 50 MG tablet, Take 1 tablet by mouth 3 (Three) Times a Day. (Patient taking differently: Take 50 mg by mouth 2 (two) times a day.), Disp: 90 tablet, Rfl: 11  •  isosorbide mononitrate (IMDUR) 30 MG 24 hr tablet, Take 1 tablet by mouth Daily., Disp: 90 tablet, Rfl: 3  •  levothyroxine (SYNTHROID, LEVOTHROID) 88 MCG tablet, Take 1 tablet by mouth daily., Disp: , Rfl:   •  O2 (OXYGEN), Inhale 2 L/min 1 (One) Time. Through c-pap, Disp: , Rfl:   •  pantoprazole (PROTONIX) 40 MG EC tablet, Take 1 tablet by mouth Daily., Disp: 90 tablet, Rfl: 3  •  RESTASIS 0.05 % ophthalmic emulsion, Daily., Disp: , Rfl:   •  sotalol (BETAPACE) 120 MG tablet, Take 1 tablet by mouth 2 (Two) Times a Day., Disp: 180 tablet, Rfl: 3  •  telmisartan (MICARDIS) 80 MG tablet, Take 1 tablet by mouth Daily., Disp: 90  tablet, Rfl: 3  •  Unable to find, 1 each 1 (One) Time. Albuterol via neb. PRN, Disp: , Rfl:   •  warfarin (COUMADIN) 5 MG tablet, Take  by mouth Daily. 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      Family History   Problem Relation Age of Onset   • Other Mother         MEDICAL PROBLEMS   • Other Sister         myocardial infarction.     Review of Systems   Constitution: Positive for malaise/fatigue. Negative for chills, fever, night sweats and weight loss.   HENT: Negative for hearing loss, odynophagia and sore throat.    Cardiovascular: Positive for claudication, dyspnea on exertion, leg swelling and palpitations. Negative for chest pain and orthopnea.   Respiratory: Positive for shortness of breath and wheezing. Negative for cough and hemoptysis.    Endocrine: Negative for cold intolerance, heat intolerance, polydipsia, polyphagia and polyuria.   Hematologic/Lymphatic: Bruises/bleeds easily.   Skin: Positive for rash (bilateral feet). Negative for itching.   Musculoskeletal: Positive for back pain and joint pain. Negative for joint swelling and myalgias.   Gastrointestinal:  "Positive for diarrhea. Negative for abdominal pain, constipation, hematemesis, hematochezia, melena, nausea and vomiting.   Genitourinary: Negative for dysuria, frequency and hematuria.   Neurological: Positive for dizziness and light-headedness. Negative for focal weakness, headaches, numbness and seizures.   Psychiatric/Behavioral: Negative for suicidal ideas. The patient is nervous/anxious.    All other systems reviewed and are negative.    Vitals:    03/01/21 0811   BP: 170/66   BP Location: Left arm   Patient Position: Sitting   Pulse: 68   Temp: 97.5 °F (36.4 °C)   SpO2: 98%   Weight: (!) 138 kg (304 lb)   Height: 170.2 cm (67\")      Physical Exam    CONSTITUTIONAL: Alert and conversant, Well dressed, in a wheelchair  EYES: Sclera clean, Anicteric, Pupils equal  ENT: No nasal deviation, Trachea midline  NECK: No neck masses, Supple  LUNGS: No wheezing, Cough, non-congested  HEART: No rubs, there is a murmur to the right of the sternal border GASTROINTESTINAL: Soft, non-distended, No masses, Non tender  to palpation, normal bowel sounds  NEURO: No motor deficits, No sensory deficits, Cranial Nerves 2 through 12 grossly intact  PSYCHIATRIC: Oriented to person, place and time, No memory deficits, Mood appropriate  VASCULAR: No carotid bruits, Femoral pulses palpable and symmetric  MUSKULOSKELETAL: No extremity trauma or extremity asymmetry    The ROS, past medical history, surgical history, family history, social history and vitals were reviewed by myself and corrected as needed.      Labs/Imaging:   I reviewed the echocardiogram raw data.    Assessment/Plan:  The patient is a 75-year-old female who has aortic valvular stenosis which Dr. Romero and I believe to be symptomatic.  Her peak gradient is 49 mmHg and the valve area 0.8 cm².  She has a number of other comorbidities including obesity and history of pulmonary embolus with pulmonary hypertension and I believe that she has a prohibitive risk for open aortic " valvular surgery repair.  I explained that it is always unknown as to how much her pulmonary hypertension is contributing to her shortness of breath.  However, her breathing has worsened significantly in the last 3 to 6 months and we believe this to be primarily valvular related.  At this time, I would like to get the opinion from cardiology and I have also explained that in this work-up procedure she will undergo a CT angiography as well as transesophageal echo which she will require cardiac catheterization.  She is very agreeable to proceed.    Patient Active Problem List   Diagnosis   • Paroxysmal atrial fibrillation (CMS/HCC)   • HTN (hypertension)   • HANK on CPAP   • Dyslipidemia   • Edema   • Palpitations   • Diastolic dysfunction   • GERD (gastroesophageal reflux disease)   • Hiatal hernia   • Deep venous thrombosis (CMS/HCC)   • Hypothyroidism   • Morbid obesity (CMS/HCC)   • Osteoarthritis   • Pulmonary embolus (CMS/HCC)   • Peripheral vascular disease (CMS/HCC)   • Pulmonary hypertension (CMS/HCC)   • Signet ring cell adenocarcinoma (CMS/HCC)   • Syncope   • Exertional shortness of breath   • Dizziness   • Carotid bruit   • Anemia   • Carotid artery stenosis   • Abnormal stress test   • Elevated serum creatinine   • Acute gout of right shoulder   • Aortic valve disorder       CC: MD Sacha Xavier MD Regina Fugate editing for Sacha العلي MD      I, Sacha العلي MD, have read and agree with the editing done by Latasha Albert, .

## 2021-03-02 ENCOUNTER — HOSPITAL ENCOUNTER (OUTPATIENT)
Dept: CARDIOLOGY | Facility: HOSPITAL | Age: 76
Discharge: HOME | End: 2021-03-02
Admitting: INTERNAL MEDICINE

## 2021-03-02 DIAGNOSIS — R09.89 BILATERAL CAROTID BRUITS: ICD-10-CM

## 2021-03-02 DIAGNOSIS — I35.9 AORTIC VALVE DISORDER: Primary | ICD-10-CM

## 2021-03-02 DIAGNOSIS — R93.1 ABNORMAL FINDINGS ON DIAGNOSTIC IMAGING OF HEART AND CORONARY CIRCULATION: ICD-10-CM

## 2021-03-02 DIAGNOSIS — Z88.8 ALLERGY TO IODINE COMPOUND: ICD-10-CM

## 2021-03-02 DIAGNOSIS — I65.23 BILATERAL CAROTID ARTERY STENOSIS: ICD-10-CM

## 2021-03-02 DIAGNOSIS — E78.5 DYSLIPIDEMIA: ICD-10-CM

## 2021-03-02 DIAGNOSIS — R94.31 ABNORMAL ELECTROCARDIOGRAM (ECG) (EKG): ICD-10-CM

## 2021-03-02 PROCEDURE — 93880 EXTRACRANIAL BILAT STUDY: CPT | Performed by: INTERNAL MEDICINE

## 2021-03-02 PROCEDURE — 93880 EXTRACRANIAL BILAT STUDY: CPT

## 2021-03-02 RX ORDER — PREDNISONE 50 MG/1
50 TABLET ORAL EVERY 6 HOURS
Status: DISCONTINUED | OUTPATIENT
Start: 2021-03-02 | End: 2021-03-02

## 2021-03-02 RX ORDER — DIPHENHYDRAMINE HCL 50 MG
50 CAPSULE ORAL ONCE
Status: DISCONTINUED | OUTPATIENT
Start: 2021-03-02 | End: 2021-03-02

## 2021-03-02 NOTE — PROGRESS NOTES
TAVR APRDAVID    New consult for TAVR evaluation per Dr. العلي 3/1/21.  Referred by Dr. Romero in [place].  See orders below for Cardiac cath, LEANDRO, CTA.  Scheduled for carotid tomorrow in [place].  Met briefly with patient/ daughter following Dr العلي's visit to explain pre-op testing and to review TAVR educational brochure/ [facility] Shared Decision Making Tool.      Will contact patient with scheduling details.  Contrast allergy noted.  Will order pre-meds for both CTA and cath.    Jacqueline PATEL

## 2021-03-03 ENCOUNTER — PREP FOR SURGERY (OUTPATIENT)
Dept: OTHER | Facility: HOSPITAL | Age: 76
End: 2021-03-03

## 2021-03-03 DIAGNOSIS — I35.9 AORTIC VALVE DISORDER: Primary | ICD-10-CM

## 2021-03-03 RX ORDER — SODIUM CHLORIDE 0.9 % (FLUSH) 0.9 %
3 SYRINGE (ML) INJECTION EVERY 12 HOURS SCHEDULED
Status: CANCELLED | OUTPATIENT
Start: 2021-03-03

## 2021-03-03 RX ORDER — SODIUM CHLORIDE 0.9 % (FLUSH) 0.9 %
10 SYRINGE (ML) INJECTION AS NEEDED
Status: CANCELLED | OUTPATIENT
Start: 2021-03-03

## 2021-03-03 RX ORDER — ASPIRIN 325 MG
325 TABLET ORAL ONCE
Status: CANCELLED | OUTPATIENT
Start: 2021-03-03 | End: 2021-03-03

## 2021-03-03 RX ORDER — ONDANSETRON 2 MG/ML
4 INJECTION INTRAMUSCULAR; INTRAVENOUS EVERY 8 HOURS PRN
Status: CANCELLED | OUTPATIENT
Start: 2021-03-03

## 2021-03-03 RX ORDER — NITROGLYCERIN 0.4 MG/1
0.4 TABLET SUBLINGUAL
Status: CANCELLED | OUTPATIENT
Start: 2021-03-03

## 2021-03-03 RX ORDER — ASPIRIN 325 MG
325 TABLET, DELAYED RELEASE (ENTERIC COATED) ORAL DAILY
Status: CANCELLED | OUTPATIENT
Start: 2021-03-04

## 2021-03-04 ENCOUNTER — TRANSCRIBE ORDERS (OUTPATIENT)
Dept: ADMINISTRATIVE | Facility: HOSPITAL | Age: 76
End: 2021-03-04

## 2021-03-04 DIAGNOSIS — Z01.818 PRE-OPERATIVE CLEARANCE: Primary | ICD-10-CM

## 2021-03-05 ENCOUNTER — TELEPHONE (OUTPATIENT)
Dept: CARDIOLOGY | Facility: HOSPITAL | Age: 76
End: 2021-03-05

## 2021-03-05 NOTE — TELEPHONE ENCOUNTER
Patient would like to speak to the provider about her procedural insructions scheduled on 3/10/2021.

## 2021-03-05 NOTE — TELEPHONE ENCOUNTER
TAVBRENDA PATEL    Spoke w/ daughter, Monica, regarding TAVR pre-op testing.      3/10/21:  Last dose warfarin 3/6/21.  Arrive 7 am NPO after MN.  Check in 2nd floor [facility].  Bring / one family member.  COVID test in [place] (their staff will call with date/time.  Pre-meds called to local pharmacy.      3/11/21: NPO after 12 noon.  No caffeine after MN.  Check in 1 PM @ [facility].  Pre-meds as directed.  May take warfarin as usual.      If patient is not wanting to travel to [place] two days in a row in order to be ready for TAVR on 3/25, advised she can have me change the CTA arrangements and be ready for TAVR on 4/19/21.        Jacqueline PATEL

## 2021-03-08 LAB
BH CV ECHO MEAS - BSA(HAYCOCK): 2.6 M^2
BH CV ECHO MEAS - BSA: 2.4 M^2
BH CV ECHO MEAS - BZI_BMI: 47.6 KILOGRAMS/M^2
BH CV ECHO MEAS - BZI_METRIC_HEIGHT: 170.2 CM
BH CV ECHO MEAS - BZI_METRIC_WEIGHT: 137.9 KG
BH CV XLRA MEAS LEFT BULB EDV: -13.8 CM/SEC
BH CV XLRA MEAS LEFT BULB PSV: -55.5 CM/SEC
BH CV XLRA MEAS LEFT CCA RATIO VEL: 73.7 CM/SEC
BH CV XLRA MEAS LEFT DIST CCA EDV: 9.3 CM/SEC
BH CV XLRA MEAS LEFT DIST CCA PSV: 74.2 CM/SEC
BH CV XLRA MEAS LEFT DIST ICA EDV: -34.2 CM/SEC
BH CV XLRA MEAS LEFT DIST ICA PSV: -92.2 CM/SEC
BH CV XLRA MEAS LEFT ICA RATIO VEL: -140 CM/SEC
BH CV XLRA MEAS LEFT ICA/CCA RATIO: -1.9
BH CV XLRA MEAS LEFT MID ICA EDV: -37 CM/SEC
BH CV XLRA MEAS LEFT MID ICA PSV: -141.1 CM/SEC
BH CV XLRA MEAS LEFT PROX CCA EDV: 13.3 CM/SEC
BH CV XLRA MEAS LEFT PROX CCA PSV: 89.9 CM/SEC
BH CV XLRA MEAS LEFT PROX ECA EDV: -8.4 CM/SEC
BH CV XLRA MEAS LEFT PROX ECA PSV: -139.7 CM/SEC
BH CV XLRA MEAS LEFT PROX ICA EDV: -30 CM/SEC
BH CV XLRA MEAS LEFT PROX ICA PSV: -117.3 CM/SEC
BH CV XLRA MEAS LEFT VERTEBRAL A EDV: 9.6 CM/SEC
BH CV XLRA MEAS LEFT VERTEBRAL A PSV: 42.8 CM/SEC
BH CV XLRA MEAS RIGHT BULB EDV: -9.8 CM/SEC
BH CV XLRA MEAS RIGHT BULB PSV: -67.3 CM/SEC
BH CV XLRA MEAS RIGHT CCA RATIO VEL: 75.4 CM/SEC
BH CV XLRA MEAS RIGHT DIST CCA EDV: 12.6 CM/SEC
BH CV XLRA MEAS RIGHT DIST CCA PSV: 76.2 CM/SEC
BH CV XLRA MEAS RIGHT DIST ICA EDV: -26.5 CM/SEC
BH CV XLRA MEAS RIGHT DIST ICA PSV: -90.8 CM/SEC
BH CV XLRA MEAS RIGHT ICA RATIO VEL: -116 CM/SEC
BH CV XLRA MEAS RIGHT ICA/CCA RATIO: -1.5
BH CV XLRA MEAS RIGHT MID ICA EDV: -31.4 CM/SEC
BH CV XLRA MEAS RIGHT MID ICA PSV: -116.6 CM/SEC
BH CV XLRA MEAS RIGHT PROX CCA EDV: 16.5 CM/SEC
BH CV XLRA MEAS RIGHT PROX CCA PSV: 97.4 CM/SEC
BH CV XLRA MEAS RIGHT PROX ECA EDV: -7.7 CM/SEC
BH CV XLRA MEAS RIGHT PROX ECA PSV: -118 CM/SEC
BH CV XLRA MEAS RIGHT PROX ICA EDV: -19.6 CM/SEC
BH CV XLRA MEAS RIGHT PROX ICA PSV: -96.4 CM/SEC
BH CV XLRA MEAS RIGHT VERTEBRAL A EDV: 18.2 CM/SEC
BH CV XLRA MEAS RIGHT VERTEBRAL A PSV: 64.9 CM/SEC

## 2021-03-09 ENCOUNTER — TELEPHONE (OUTPATIENT)
Dept: CARDIOLOGY | Facility: HOSPITAL | Age: 76
End: 2021-03-09

## 2021-03-09 NOTE — TELEPHONE ENCOUNTER
[facility] Telephone Note for:    Holly Corona, 1945  Home Phone [phone]   Mobile [phone]       Reason for Call:  Medication questions before procedure    Symptoms:        Onset::        Anything Tried?:        Other Pertinent Information (Weight, Vitals, etc.):  Patient has questions regarding medications before her procedure tomorrow. [phone] home phone    Returned call to patient and reviewed pre-meds for contrast allergy.  She is scheduled for Cath 3/10 and CTA on 3/11.  Patient read to me her Prednisone and Benadryl labels prepared by pharmacist and both were correct.  Using the protocol for 50 mg prednisone 13 hrs before, 7 hours before and one hour (benadryl + Prednisone) before contrast.    Jacqueline PATEL

## 2021-03-10 ENCOUNTER — HOSPITAL ENCOUNTER (OUTPATIENT)
Dept: CARDIOLOGY | Facility: HOSPITAL | Age: 76
Discharge: HOME | End: 2021-03-10

## 2021-03-10 ENCOUNTER — HOSPITAL ENCOUNTER (OUTPATIENT)
Facility: HOSPITAL | Age: 76
Discharge: HOME | End: 2021-03-10
Attending: INTERNAL MEDICINE | Admitting: INTERNAL MEDICINE

## 2021-03-10 VITALS — HEIGHT: 67 IN | BODY MASS INDEX: 45.99 KG/M2 | WEIGHT: 293 LBS

## 2021-03-10 VITALS
HEART RATE: 63 BPM | WEIGHT: 293 LBS | OXYGEN SATURATION: 98 % | RESPIRATION RATE: 18 BRPM | HEIGHT: 67 IN | TEMPERATURE: 98.1 F | BODY MASS INDEX: 45.99 KG/M2 | SYSTOLIC BLOOD PRESSURE: 165 MMHG | DIASTOLIC BLOOD PRESSURE: 62 MMHG

## 2021-03-10 DIAGNOSIS — Z88.8 ALLERGY TO IODINE COMPOUND: ICD-10-CM

## 2021-03-10 DIAGNOSIS — R93.1 ABNORMAL FINDINGS ON DIAGNOSTIC IMAGING OF HEART AND CORONARY CIRCULATION: ICD-10-CM

## 2021-03-10 DIAGNOSIS — I35.9 AORTIC VALVE DISORDER: ICD-10-CM

## 2021-03-10 DIAGNOSIS — R94.31 ABNORMAL ELECTROCARDIOGRAM (ECG) (EKG): ICD-10-CM

## 2021-03-10 PROBLEM — R79.89 ELEVATED SERUM CREATININE: Status: RESOLVED | Noted: 2017-05-02 | Resolved: 2021-03-10

## 2021-03-10 PROBLEM — R94.39 ABNORMAL STRESS TEST: Status: RESOLVED | Noted: 2017-04-17 | Resolved: 2021-03-10

## 2021-03-10 PROBLEM — M10.9: Status: RESOLVED | Noted: 2018-09-11 | Resolved: 2021-03-10

## 2021-03-10 LAB
ALBUMIN SERPL-MCNC: 4 G/DL (ref 3.5–5.2)
ALBUMIN/GLOB SERPL: 1.4 G/DL
ALP SERPL-CCNC: 142 U/L (ref 39–117)
ALT SERPL W P-5'-P-CCNC: 13 U/L (ref 1–33)
ANION GAP SERPL CALCULATED.3IONS-SCNC: 11 MMOL/L (ref 5–15)
AST SERPL-CCNC: 17 U/L (ref 1–32)
BH CV ECHO MEAS - AO MAX PG: 76 MMHG
BH CV ECHO MEAS - AO MEAN PG: 43 MMHG
BH CV ECHO MEAS - AO V2 MAX: 436 CM/SEC
BH CV VAS BP LEFT ARM: NORMAL MMHG
BILIRUB SERPL-MCNC: 0.5 MG/DL (ref 0–1.2)
BUN SERPL-MCNC: 31 MG/DL (ref 8–23)
BUN/CREAT SERPL: 24.8 (ref 7–25)
CALCIUM SPEC-SCNC: 8.9 MG/DL (ref 8.6–10.5)
CHLORIDE SERPL-SCNC: 103 MMOL/L (ref 98–107)
CHOLEST SERPL-MCNC: 183 MG/DL (ref 0–200)
CO2 SERPL-SCNC: 25 MMOL/L (ref 22–29)
CREAT SERPL-MCNC: 1.25 MG/DL (ref 0.57–1)
DEPRECATED RDW RBC AUTO: 52.4 FL (ref 37–54)
ERYTHROCYTE [DISTWIDTH] IN BLOOD BY AUTOMATED COUNT: 15.2 % (ref 12.3–15.4)
GFR SERPL CREATININE-BSD FRML MDRD: 42 ML/MIN/1.73
GLOBULIN UR ELPH-MCNC: 2.8 GM/DL
GLUCOSE SERPL-MCNC: 160 MG/DL (ref 65–99)
HBA1C MFR BLD: 5.1 % (ref 4.8–5.6)
HCT VFR BLD AUTO: 37.7 % (ref 34–46.6)
HDLC SERPL-MCNC: 64 MG/DL (ref 40–60)
HGB BLD-MCNC: 11.9 G/DL (ref 12–15.9)
INR PPP: 2.1 (ref 0.85–1.16)
LDLC SERPL CALC-MCNC: 109 MG/DL (ref 0–100)
LDLC/HDLC SERPL: 1.69 {RATIO}
MCH RBC QN AUTO: 29.8 PG (ref 26.6–33)
MCHC RBC AUTO-ENTMCNC: 31.6 G/DL (ref 31.5–35.7)
MCV RBC AUTO: 94.3 FL (ref 79–97)
PLATELET # BLD AUTO: 189 10*3/MM3 (ref 140–450)
PMV BLD AUTO: 10.7 FL (ref 6–12)
POTASSIUM SERPL-SCNC: 4.5 MMOL/L (ref 3.5–5.2)
PROT SERPL-MCNC: 6.8 G/DL (ref 6–8.5)
PROTHROMBIN TIME: 23.3 SECONDS (ref 11.5–14)
RBC # BLD AUTO: 4 10*6/MM3 (ref 3.77–5.28)
SODIUM SERPL-SCNC: 139 MMOL/L (ref 136–145)
TRIGL SERPL-MCNC: 54 MG/DL (ref 0–150)
VLDLC SERPL-MCNC: 10 MG/DL (ref 5–40)
WBC # BLD AUTO: 5.7 10*3/MM3 (ref 3.4–10.8)

## 2021-03-10 PROCEDURE — 93312 ECHO TRANSESOPHAGEAL: CPT

## 2021-03-10 PROCEDURE — 85610 PROTHROMBIN TIME: CPT

## 2021-03-10 PROCEDURE — 85027 COMPLETE CBC AUTOMATED: CPT | Performed by: INTERNAL MEDICINE

## 2021-03-10 PROCEDURE — C1894 INTRO/SHEATH, NON-LASER: HCPCS | Performed by: INTERNAL MEDICINE

## 2021-03-10 PROCEDURE — 93320 DOPPLER ECHO COMPLETE: CPT | Performed by: INTERNAL MEDICINE

## 2021-03-10 PROCEDURE — 25010000002 MIDAZOLAM PER 1 MG: Performed by: INTERNAL MEDICINE

## 2021-03-10 PROCEDURE — 93320 DOPPLER ECHO COMPLETE: CPT

## 2021-03-10 PROCEDURE — 93458 L HRT ARTERY/VENTRICLE ANGIO: CPT | Performed by: INTERNAL MEDICINE

## 2021-03-10 PROCEDURE — 93312 ECHO TRANSESOPHAGEAL: CPT | Performed by: INTERNAL MEDICINE

## 2021-03-10 PROCEDURE — 83036 HEMOGLOBIN GLYCOSYLATED A1C: CPT | Performed by: INTERNAL MEDICINE

## 2021-03-10 PROCEDURE — C1769 GUIDE WIRE: HCPCS | Performed by: INTERNAL MEDICINE

## 2021-03-10 PROCEDURE — 80053 COMPREHEN METABOLIC PANEL: CPT | Performed by: INTERNAL MEDICINE

## 2021-03-10 PROCEDURE — 0 IOPAMIDOL PER 1 ML: Performed by: INTERNAL MEDICINE

## 2021-03-10 PROCEDURE — 93325 DOPPLER ECHO COLOR FLOW MAPG: CPT

## 2021-03-10 PROCEDURE — 82565 ASSAY OF CREATININE: CPT

## 2021-03-10 PROCEDURE — 93325 DOPPLER ECHO COLOR FLOW MAPG: CPT | Performed by: INTERNAL MEDICINE

## 2021-03-10 PROCEDURE — 80061 LIPID PANEL: CPT | Performed by: INTERNAL MEDICINE

## 2021-03-10 PROCEDURE — S0260 H&P FOR SURGERY: HCPCS | Performed by: INTERNAL MEDICINE

## 2021-03-10 PROCEDURE — 25010000002 HEPARIN (PORCINE) PER 1000 UNITS: Performed by: INTERNAL MEDICINE

## 2021-03-10 RX ORDER — SODIUM CHLORIDE 9 MG/ML
100 INJECTION, SOLUTION INTRAVENOUS CONTINUOUS
Status: ACTIVE | OUTPATIENT
Start: 2021-03-10 | End: 2021-03-10

## 2021-03-10 RX ORDER — MIDAZOLAM HYDROCHLORIDE 1 MG/ML
INJECTION INTRAMUSCULAR; INTRAVENOUS AS NEEDED
Status: DISCONTINUED | OUTPATIENT
Start: 2021-03-10 | End: 2021-03-10 | Stop reason: HOSPADM

## 2021-03-10 RX ORDER — MIDAZOLAM HYDROCHLORIDE 1 MG/ML
INJECTION INTRAMUSCULAR; INTRAVENOUS
Status: DISCONTINUED
Start: 2021-03-10 | End: 2021-03-10 | Stop reason: HOSPADM

## 2021-03-10 RX ORDER — ONDANSETRON 2 MG/ML
4 INJECTION INTRAMUSCULAR; INTRAVENOUS EVERY 8 HOURS PRN
Status: DISCONTINUED | OUTPATIENT
Start: 2021-03-10 | End: 2021-03-10 | Stop reason: HOSPADM

## 2021-03-10 RX ORDER — SODIUM CHLORIDE 0.9 % (FLUSH) 0.9 %
10 SYRINGE (ML) INJECTION AS NEEDED
Status: DISCONTINUED | OUTPATIENT
Start: 2021-03-10 | End: 2021-03-10 | Stop reason: HOSPADM

## 2021-03-10 RX ORDER — SODIUM CHLORIDE 0.9 % (FLUSH) 0.9 %
3 SYRINGE (ML) INJECTION EVERY 12 HOURS SCHEDULED
Status: DISCONTINUED | OUTPATIENT
Start: 2021-03-10 | End: 2021-03-10 | Stop reason: HOSPADM

## 2021-03-10 RX ORDER — SODIUM CHLORIDE 9 MG/ML
1-3 INJECTION, SOLUTION INTRAVENOUS CONTINUOUS
Status: DISCONTINUED | OUTPATIENT
Start: 2021-03-10 | End: 2021-03-10 | Stop reason: HOSPADM

## 2021-03-10 RX ORDER — DIPHENHYDRAMINE HCL 50 MG
50 CAPSULE ORAL SEE ADMIN INSTRUCTIONS
COMMUNITY
End: 2021-03-19 | Stop reason: SDUPTHER

## 2021-03-10 RX ORDER — MIDAZOLAM HYDROCHLORIDE 1 MG/ML
INJECTION INTRAMUSCULAR; INTRAVENOUS
Status: COMPLETED | OUTPATIENT
Start: 2021-03-10 | End: 2021-03-10

## 2021-03-10 RX ORDER — NALOXONE HYDROCHLORIDE 0.4 MG/ML
INJECTION, SOLUTION INTRAMUSCULAR; INTRAVENOUS; SUBCUTANEOUS
Status: DISCONTINUED
Start: 2021-03-10 | End: 2021-03-10 | Stop reason: WASHOUT

## 2021-03-10 RX ORDER — ASPIRIN 325 MG
325 TABLET, DELAYED RELEASE (ENTERIC COATED) ORAL DAILY
Status: DISCONTINUED | OUTPATIENT
Start: 2021-03-11 | End: 2021-03-10

## 2021-03-10 RX ORDER — LIDOCAINE HYDROCHLORIDE 10 MG/ML
INJECTION, SOLUTION EPIDURAL; INFILTRATION; INTRACAUDAL; PERINEURAL AS NEEDED
Status: DISCONTINUED | OUTPATIENT
Start: 2021-03-10 | End: 2021-03-10 | Stop reason: HOSPADM

## 2021-03-10 RX ORDER — ASPIRIN 325 MG
325 TABLET ORAL ONCE
Status: DISCONTINUED | OUTPATIENT
Start: 2021-03-10 | End: 2021-03-10

## 2021-03-10 RX ORDER — FENTANYL CITRATE 50 UG/ML
INJECTION, SOLUTION INTRAMUSCULAR; INTRAVENOUS
Status: DISCONTINUED
Start: 2021-03-10 | End: 2021-03-10 | Stop reason: WASHOUT

## 2021-03-10 RX ORDER — ACETAMINOPHEN 325 MG/1
650 TABLET ORAL EVERY 4 HOURS PRN
Status: DISCONTINUED | OUTPATIENT
Start: 2021-03-10 | End: 2021-03-10 | Stop reason: HOSPADM

## 2021-03-10 RX ORDER — NITROGLYCERIN 0.4 MG/1
0.4 TABLET SUBLINGUAL
Status: DISCONTINUED | OUTPATIENT
Start: 2021-03-10 | End: 2021-03-10 | Stop reason: HOSPADM

## 2021-03-10 RX ORDER — FLUMAZENIL 0.1 MG/ML
INJECTION INTRAVENOUS
Status: DISCONTINUED
Start: 2021-03-10 | End: 2021-03-10 | Stop reason: WASHOUT

## 2021-03-10 RX ORDER — PREDNISONE 10 MG/1
10 TABLET ORAL SEE ADMIN INSTRUCTIONS
COMMUNITY
End: 2021-03-19

## 2021-03-10 RX ADMIN — SODIUM CHLORIDE 2.37 ML/KG/HR: 9 INJECTION, SOLUTION INTRAVENOUS at 08:10

## 2021-03-10 RX ADMIN — METHOHEXITAL SODIUM 40 MG: 500 INJECTION, POWDER, LYOPHILIZED, FOR SOLUTION INTRAMUSCULAR; INTRAVENOUS; RECTAL at 10:46

## 2021-03-10 RX ADMIN — MIDAZOLAM 2 MG: 1 INJECTION INTRAMUSCULAR; INTRAVENOUS at 10:46

## 2021-03-10 NOTE — H&P
[facility]    Date of Hospital Visit: 03/10/21    Place of Service: [facility]    Patient Name: Holly Corona  :1945      Primary Care Provider: Abdoul Andrew MD    Chief complaint/Reason for Consultation:  Heart Valve Disease    Problem List:    Aortic valve disorder    Paroxysmal atrial fibrillation (CMS/HCC)    Palpitations    Diastolic dysfunction    Pulmonary hypertension (CMS/HCC)    HTN (hypertension)    Dyslipidemia    HANK on CPAP    Peripheral vascular disease (CMS/HCC)    GERD (gastroesophageal reflux disease)    Deep venous thrombosis (CMS/HCC)    Pulmonary embolus (CMS/HCC)    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    History of Present Illness:  Is a 75-year-old hypertensive dyslipidemic female whose cardiac history is significant for known aortic valve stenosis, paroxysmal atrial fibrillation, diastolic dysfunction and pulmonary hypertension.  At recent follow-up with her primary cardiologist she was complaining of ongoing awareness of tachypalpitations.  An echocardiogram was obtained which showed worsening of aortic stenosis.  She has a history of cardiac catheterization in 2017 which showed nonobstructive CAD.  She has no complaint of exertional chest pain.  She has chronic dyspnea, uses oxygen at night and has known pulmonary hypertension.  Her chronic lower extremity edema is stable on her current medical regimen.  She states her blood pressure at home typically runs about 130 mmHg.  She is on long-term warfarin due to history of PE and DVT.      Allergies   Allergen Reactions   • Ciprofloxacin Itching   • Contrast Dye Rash   • Iodinated Diagnostic Agents Rash   • Ofloxacin Rash       Current Outpatient Medications   Medication Instructions   • albuterol (PROVENTIL) 2.5 mg, Nebulization, Every 6 Hours PRN   • allopurinol (ZYLOPRIM) 200 mg, Oral, Every Morning   • ALPRAZolam (XANAX) 0.25 mg, Oral, As Needed   • busPIRone (BUSPAR) 5 mg, Oral,  As Needed, Takes occas.   • cloNIDine (CATAPRES) 0.2 mg, Oral, 3 Times Daily   • diphenhydrAMINE (BENADRYL) 50 mg, Oral, See Admin Instructions, Pre med for IV Dye allergy:    • furosemide (LASIX) 20 mg, Oral, Daily   • hydrALAZINE (APRESOLINE) 50 mg, Oral, 3 Times Daily   • isosorbide mononitrate (IMDUR) 30 mg, Oral, Daily   • levothyroxine (SYNTHROID, LEVOTHROID) 88 MCG tablet 1 tablet, Oral, Every Morning   • O2 (OXYGEN) 2 L/min, Inhalation, Once, Through c-pap    • pantoprazole (PROTONIX) 40 mg, Oral, Daily   • predniSONE (DELTASONE) 10 mg, Oral, See Admin Instructions, Pre meds for IV Dye allergy:  Take 5 tablets on 3/9/21 at 8 pm, 3/10/21 at 0200, and at 0700 on 3/10/21    • RESTASIS 0.05 % ophthalmic emulsion 1 drop, Both Eyes, Nightly   • sotalol (BETAPACE) 120 mg, Oral, 2 Times Daily   • telmisartan (MICARDIS) 80 mg, Oral, Daily   • warfarin (COUMADIN) 5 MG tablet Oral, Daily, 5 mg DAILY, EXCEPT NONE ON            Social History     Socioeconomic History   • Marital status:      Spouse name: Not on file   • Number of children: 3   • Years of education: Not on file   • Highest education level: Not on file   Tobacco Use   • Smoking status: Former Smoker     Packs/day: 1.00     Years: 12.00     Pack years: 12.00     Types: Cigarettes     Quit date: 3/1/1981     Years since quittin.0   • Smokeless tobacco: Never Used   Substance and Sexual Activity   • Alcohol use: No   • Drug use: No   • Sexual activity: Defer       Family History   Problem Relation Age of Onset   • Other Mother         MEDICAL PROBLEMS   • Other Sister         myocardial infarction.       REVIEW OF SYSTEMS:   Review of Systems   Constitutional: Negative for diaphoresis, malaise/fatigue and weight gain.   Cardiovascular: Positive for dyspnea on exertion, leg swelling and palpitations. Negative for chest pain, claudication, irregular heartbeat, orthopnea, paroxysmal nocturnal dyspnea and syncope.   Respiratory: Positive for  "shortness of breath and sleep disturbances due to breathing. Negative for cough and wheezing.    Hematologic/Lymphatic: Negative for bleeding problem.   Musculoskeletal: Negative for muscle cramps, muscle weakness and myalgias.   Gastrointestinal: Negative for heartburn.   Neurological: Negative for weakness.            Objective:  Vitals:    03/10/21 0725 03/10/21 0732 03/10/21 0734   BP:  138/88 175/74   BP Location:  Right arm Left arm   Patient Position:  Lying Lying   Pulse:  68    Resp:  16    Temp:  98.1 °F (36.7 °C)    TempSrc:  Tympanic    SpO2:  94%    Weight: (!) 139 kg (305 lb 8.9 oz)     Height: 170.2 cm (67\")       Body mass index is 47.86 kg/m².  Flowsheet Rows      First Filed Value   Admission Height  170.2 cm (67\") Documented at 03/10/2021 0725   Admission Weight  (!) 139 kg (305 lb 8.9 oz) Documented at 03/10/2021 0725          Constitutional:       Appearance: Well-developed.   Pulmonary:      Effort: Pulmonary effort is normal. No respiratory distress.      Breath sounds: Normal breath sounds. No wheezing. No rales.      Comments: Bases clear  Chest:      Chest wall: Not tender to palpatation.   Cardiovascular:      Normal rate. Regular rhythm.      Murmurs: There is a harsh midsystolic murmur at the URSB, radiating to the neck.      No gallop. No click. No rub.   Pulses:     Intact distal pulses.   Edema:     Pretibial: bilateral trace edema of the pretibial area.     Ankle: bilateral trace edema of the ankle.  Musculoskeletal: Normal range of motion.         Barbaeu Test:  Left: Normal  (oxymetric Allens) Right: Not Assessed       Lab Review:           Results from last 7 days   Lab Units 03/10/21  0735   WBC 10*3/mm3 5.70   HEMOGLOBIN g/dL 11.9*   HEMATOCRIT % 37.7   PLATELETS 10*3/mm3 189     CrCl cannot be calculated (No successful lab value found.).    No results found for: CHOL, CHLPL, TRIG, HDL, LDL, LDLDIRECT    No results found for: COVID19      Assessment:   · Severe aortic " stenosis  · Paroxysmal atrial fibrillation, status post pulmonary vein isolation  · Nonobstructive CAD by cardiac catheterization 2017  · Pulmonary hypertension    Plan:   · Left heart catheterization possible catheter-based intervention  · Transesophageal echocardiogram    Electronically signed by MARIELLA Wayne, 03/10/21, 8:10 AM EST.    I have seen and examined the patient, case was discussed with the physician extender, reviewed the above note, necessary changes were made and I agree with the final note.   Milana Whitaker MD, FACC, [facility]

## 2021-03-11 ENCOUNTER — HOSPITAL ENCOUNTER (OUTPATIENT)
Dept: CT IMAGING | Facility: HOSPITAL | Age: 76
Discharge: HOME | End: 2021-03-11
Admitting: NURSE PRACTITIONER

## 2021-03-11 VITALS
BODY MASS INDEX: 47.65 KG/M2 | TEMPERATURE: 97.8 F | RESPIRATION RATE: 18 BRPM | SYSTOLIC BLOOD PRESSURE: 142 MMHG | OXYGEN SATURATION: 98 % | HEART RATE: 60 BPM | DIASTOLIC BLOOD PRESSURE: 78 MMHG | WEIGHT: 293 LBS

## 2021-03-11 DIAGNOSIS — Z88.8 ALLERGY TO IODINE COMPOUND: ICD-10-CM

## 2021-03-11 DIAGNOSIS — R93.1 ABNORMAL FINDINGS ON DIAGNOSTIC IMAGING OF HEART AND CORONARY CIRCULATION: ICD-10-CM

## 2021-03-11 DIAGNOSIS — I35.9 AORTIC VALVE DISORDER: ICD-10-CM

## 2021-03-11 LAB — CREAT BLDA-MCNC: 1.4 MG/DL (ref 0.6–1.3)

## 2021-03-11 PROCEDURE — 0 IOPAMIDOL PER 1 ML: Performed by: NURSE PRACTITIONER

## 2021-03-11 PROCEDURE — 99214 OFFICE O/P EST MOD 30 MIN: CPT | Performed by: NURSE PRACTITIONER

## 2021-03-11 PROCEDURE — 82565 ASSAY OF CREATININE: CPT

## 2021-03-11 PROCEDURE — 71275 CT ANGIOGRAPHY CHEST: CPT

## 2021-03-11 PROCEDURE — 74174 CTA ABD&PLVS W/CONTRAST: CPT

## 2021-03-11 RX ORDER — SODIUM CHLORIDE 0.9 % (FLUSH) 0.9 %
10 SYRINGE (ML) INJECTION AS NEEDED
Status: DISCONTINUED | OUTPATIENT
Start: 2021-03-11 | End: 2021-03-12 | Stop reason: HOSPADM

## 2021-03-11 RX ORDER — LIDOCAINE HYDROCHLORIDE 10 MG/ML
5 INJECTION, SOLUTION EPIDURAL; INFILTRATION; INTRACAUDAL; PERINEURAL AS NEEDED
Status: DISCONTINUED | OUTPATIENT
Start: 2021-03-11 | End: 2021-03-12 | Stop reason: HOSPADM

## 2021-03-11 RX ORDER — METOPROLOL TARTRATE 50 MG/1
50 TABLET, FILM COATED ORAL ONCE AS NEEDED
Status: DISCONTINUED | OUTPATIENT
Start: 2021-03-11 | End: 2021-03-12 | Stop reason: HOSPADM

## 2021-03-11 RX ORDER — SODIUM CHLORIDE 0.9 % (FLUSH) 0.9 %
3 SYRINGE (ML) INJECTION EVERY 12 HOURS SCHEDULED
Status: DISCONTINUED | OUTPATIENT
Start: 2021-03-11 | End: 2021-03-12 | Stop reason: HOSPADM

## 2021-03-11 RX ORDER — METOPROLOL TARTRATE 50 MG/1
100 TABLET, FILM COATED ORAL ONCE AS NEEDED
Status: DISCONTINUED | OUTPATIENT
Start: 2021-03-11 | End: 2021-03-12 | Stop reason: HOSPADM

## 2021-03-11 RX ORDER — NITROGLYCERIN 0.4 MG/1
0.4 TABLET SUBLINGUAL
Status: DISCONTINUED | OUTPATIENT
Start: 2021-03-11 | End: 2021-03-12 | Stop reason: HOSPADM

## 2021-03-11 RX ADMIN — IOPAMIDOL 99 ML: 755 INJECTION, SOLUTION INTRAVENOUS at 15:44

## 2021-03-11 NOTE — PROGRESS NOTES
TAVR APRN Evaluation    Holly Corona, 1945, [id number]     03/12/21    PCP: Abdoul Andrew MD  Primary Cardiologist: Sacha Romero MD    TAVR Team:  1.  Saad Estrada MD  2.  Jean Fernandez MD  3.  Marge Sol MD  4.  Milana Whitaker MD  5.  Dennis Menchaca MD  6.  Sacha العلي MD    Chief Complaint: Severe AS/ DHF    Identification: This is a 75 y.o. year old female from [address] [place] [address].    History of Present Illness: Ms. Corona was referred for consideration of TAVR due to worsening AS per serial echo accompanied by worsened MAGUIRE, chest tightness, and reduced exercise tolerance.  Her aortic valve indices are as follows:  BILLY 0.8 cm2, Aortic valve Vmax 4.36 m/sec, and AV mean gradient 43 mmHg.      Problem List:   1.  Paroxysmal atrial fibrillation/ flutter   A.  PVA right atrial flutter Dr. Mario 3/2013   B.  CHADS Vasc = 6   C.  Warfarin  2.  Aortic stenosis   A.  TTE 2/2/21: BILLY 0.8 cm2, AV mean 35 mm Hg, BILLY 3.53 m/sec   B.  LEANDRO 3/10/21: AV mean gradient 43 mm Hg, AV Vmax 4.36   m/sec, LVEF 65%, Aortic annulus 2.3 cm  3.  CAD, non-obstructive   A. Minor irregularities noted per cath 3/10/21 in LAD, LCX  4.  Severe pulmonary HTN    A.  TTE RVSP 2/2/21: 60 mm Hg  5.  HANK   A.  CPAP + O2  6.  Hx Pulmonary embolus   A.  2/2012 developed during chemotherapy for colon cancer   B.  Warfarin   7.  Colon cancer, signet ring cell adenocarcinoma   A.  Colon resection 10/2011  8.  Morbid obesity   A.  BMI 47.8  9.  Diastolic heart failure   A.  Diastolic dysfunction grade III per echo 2/2/21   B.  NYHA class III   10.  CKD stage 3  11.  Carotid stenosis   A. Mild per doppler 16-49% bilaterally 3/2/21  12.  DJD   A.  TKA left 2005   B.  Spinal stenosis  13.  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  Cardiac catheterization 2007 reported no significance coronary artery disease with markedly elevated LVEDP.   • OTHER SURGICAL HISTORY      catheter ablation atrial fibrillation,    • OTHER SURGICAL HISTORY Left     knee replacement    • OTHER SURGICAL HISTORY      neuroplasty decompression median nerve at carpal tunnel    • OTHER SURGICAL HISTORY      partial colectomy , A.  Status post right hemicolectomy secondary to colon cancer in .       Allergies:  Ciprofloxacin, Contrast dye, Iodinated diagnostic agents, and Ofloxacin    Social History:  Social History     Socioeconomic History   • Marital status:      Spouse name: Not on file   • Number of children: 3   • Years of education: HS   Tobacco Use   • Smoking status: Former Smoker     Packs/day: 1.00     Years: 12.00     Pack years: 12.00     Types: Cigarettes     Quit date: 3/1/1981     Years since quittin.0   • Smokeless tobacco: Never Used   Substance and Sexual Activity   • Alcohol use: No   • Drug use: No   • Sexual activity: Defer       Current Medications:    Current Outpatient Medications:   •  albuterol (PROVENTIL) (5 MG/ML) 0.5% nebulizer solution, Take 2.5  mg by nebulization Every 6 (Six) Hours As Needed for Wheezing., Disp: , Rfl:   •  allopurinol (ZYLOPRIM) 100 MG tablet, Take 200 mg by mouth Every Morning., Disp: , Rfl:   •  ALPRAZolam (XANAX) 0.25 MG tablet, Take 0.25 mg by mouth As Needed for Anxiety., Disp: , Rfl:   •  CloNIDine (CATAPRES) 0.2 MG tablet, Take 1 tablet by mouth 3 (Three) Times a Day. (Patient taking differently: Take 0.2 mg by mouth 2 (Two) Times a Day.), Disp: 90 tablet, Rfl: 3  •  diphenhydrAMINE (BENADRYL) 50 MG capsule, Take 50 mg by mouth See Admin Instructions. Pre med for IV Dye allergy:, Disp: , Rfl:   •  furosemide (LASIX) 20 MG tablet, Take 20 mg by mouth Daily., Disp: , Rfl:   •  hydrALAZINE (APRESOLINE) 50 MG tablet, Take 1 tablet by mouth 3 (Three) Times a Day. (Patient taking differently: Take 50 mg by mouth 2 (two) times a day.), Disp: 90 tablet, Rfl: 11  •  isosorbide mononitrate (IMDUR) 30 MG 24 hr tablet, Take 1 tablet by mouth Daily., Disp: 90 tablet, Rfl: 3  •  levothyroxine (SYNTHROID, LEVOTHROID) 88 MCG tablet, Take 1 tablet by mouth Every Morning., Disp: , Rfl:   •  pantoprazole (PROTONIX) 40 MG EC tablet, Take 1 tablet by mouth Daily., Disp: 90 tablet, Rfl: 3  •  predniSONE (DELTASONE) 10 MG tablet, Take 10 mg by mouth See Admin Instructions. Pre meds for IV Dye allergy:  Take 5 tablets on 3/9/21 at 8 pm, 3/10/21 at 0200, and at 0700 on 3/10/21, Disp: , Rfl:   •  sotalol (BETAPACE) 120 MG tablet, Take 1 tablet by mouth 2 (Two) Times a Day., Disp: 180 tablet, Rfl: 3  •  telmisartan (MICARDIS) 80 MG tablet, Take 1 tablet by mouth Daily. (Patient taking differently: Take 80 mg by mouth Every Morning.), Disp: 90 tablet, Rfl: 3  •  warfarin (COUMADIN) 5 MG tablet, Take  by mouth Daily. 5 mg DAILY, EXCEPT NONE ON SUNDAY, Disp: , Rfl:   •  busPIRone (BUSPAR) 5 MG tablet, Take 5 mg by mouth As Needed. Takes occas. , Disp: , Rfl:   •  O2 (OXYGEN), Inhale 2 L/min 1 (One) Time. Through c-pap, Disp: , Rfl:   •  RESTASIS 0.05 %  ophthalmic emulsion, Administer 1 drop to both eyes Every Night., Disp: , Rfl:         Review of Systems:  Review of Systems   Constitutional: Positive for malaise/fatigue.   HENT: Negative.    Eyes: Negative.    Cardiovascular: Positive for chest pain, dyspnea on exertion, leg swelling and palpitations. Negative for near-syncope, orthopnea, paroxysmal nocturnal dyspnea and syncope.   Respiratory: Positive for wheezing. Negative for cough and sleep disturbances due to breathing.    Endocrine: Positive for heat intolerance.   Hematologic/Lymphatic: Bruises/bleeds easily.   Skin: Negative.    Musculoskeletal: Positive for arthritis and myalgias.   Gastrointestinal: Negative.    Genitourinary: Negative.    Neurological: Positive for light-headedness and loss of balance.   Psychiatric/Behavioral: The patient is nervous/anxious.    Allergic/Immunologic: Negative.         Physical Exam:  Vitals reviewed.   Constitutional:       Appearance: Not in distress. Chronically ill-appearing.   Eyes:      Conjunctiva/sclera: Conjunctivae normal.      Pupils: Pupils are equal, round, and reactive to light.   Neck:      Thyroid: No thyromegaly.      Lymphadenopathy: No cervical adenopathy.   Pulmonary:      Effort: Pulmonary effort is normal.      Breath sounds: Normal breath sounds.   Cardiovascular:      PMI at left midclavicular line. Normal rate. Regular rhythm.      Murmurs: There is a grade 3/6 harsh midsystolic murmur at the URSB, radiating to the neck.   Pulses:     Intact distal pulses.   Edema:     Peripheral edema absent.   Abdominal:      General: Bowel sounds are normal.      Palpations: Abdomen is soft.   Musculoskeletal:         General: No deformity.      Extremities: No clubbing present.     Cervical back: Normal range of motion and neck supple. Skin:     General: Skin is warm and dry.   Neurological:      Mental Status: Alert and oriented to person, place and time.         Vitals:    03/11/21 1645   BP: 142/78   BP  Location:    Patient Position:    Pulse:    Resp:    Temp:    SpO2:    Weight:        Diagnostic Data:    Transthoracic echo: 2/2/21    1.  The left ventricle is mildly dilated, global LV systolic function is preserved.  Visually estimated ejection fraction of 55 to 60% is corroborated by 3D ejection fraction of 65%.  There are no focal wall motion abnormalities.  Global longitudinal strain of -19% is compatible with normal LV mechanical functioning.  Mild concentric left ventricular hypertrophy with grade 3 diastolic dysfunction.  Severe biatrial enlargement.  The right ventricle is at the upper limits of normal size.  RV systolic function is grossly preserved.  No septal defect or intracavitary mass or thrombus.     2.  The mitral leaflets are mildly thickened and there is sclerosis of the head of the papillary muscles.  There is no mitral stenosis and there is mild mitral regurgitation.     #3.  The aortic valve is thickened, sclerotic, and calcified.  Parameters include a peak instantaneous gradient of 50, mean gradient of 35 and an aortic valve area of 0.8 to 0.9 cm² compatible with severe AS.  There is mild to moderate AI.  There is moderate TR from a morphologically normal valve.     4.  No pericardial or great vessel pathology.     4.  Pulmonary artery systolic pressures are estimated at approximately 60 mmHg.       Transesophageal echo: 3/10/21    · Moderate biatrial enlargement.  · Concentric LVH with normal left regular systolic function, estimated EF 65%.  · Severe aortic stenosis with peak velocity 4.36 m/s, peak gradient 76 mmHg, mean gradient 43 mmHg.  · Aortic annulus measurement 2.3 cm, STJ 2.6 cm.  · Mild mitral regurgitation.  · Mild tricuspid regurgitation.         Cardiac Cath: 3/10/21    FINAL IMPRESSION:  · Severe aortic stenosis with mean gradient of 63 mmHg across the aortic valve.  · No evidence of hemodynamically significant coronary disease.  · Normal left ventricular systolic function,  estimated EF 65%.     RECOMMENDATIONS:  · Proceed to further evaluation and scheduling for TAVR.     Indications: Severe aortic stenosis, preaortic valve replacement evaluation.     Access: Right radial.     Procedures:   · Left heart catheterization.  · Left ventriculogram.  · Selective coronary angiography.  · Arterial site hemostasis with radial band.           Hemodynamic Findings:   Heart Rate: 70/minute.  LV pressure: 218/10-20 mmHg.  AO pressure: 128/40 with mean of 79 mmHg.  On pull back peak gradient of 90 mmHg and mean gradient of 63 mmHg was recorded across the aortic valve.     Angiographic Findings:  Right coronary dominance.  · LM: Angiographically normal.  · LAD: Minor irregularities, no significant disease.  · LCX: Minor irregularities and mild mid segment plaque without hemodynamically significant disease.  · RCA: Angiographically normal.  · LV: Left ventriculogram performed in 30 APPIAH projection revealed normal global and regional left ventricular systolic function with estimated ejection fraction of 65%.  No mitral regurgitation was noted.              CTA Chest, Abdomen, and Pelvis: today report pending    Carotid Doppler: 3/2/21    1.  Both common carotid arteries are widely patent with no significant atheroma or Doppler perturbation.     2.  Mild bifurcation disease bilaterally with no flow limitation by echo or Doppler sampling.     3.  16 to 49% stenosis in the mid right internal carotid artery and proximal and mid left internal carotid artery.     4.  Antegrade flow in both vertebral arteries.     Summary: Nonobstructive carotid disease bilaterally as above.  Antegrade flow in both vertebral arteries.            Functional Assessment Data:    KCCQ12 Questionnaire Score: (see scanned copy) 37/70      Nascimento Basic Activities of Daily Living (ADL) Scale    Bathing (sponge bath, tub bath, or shower).  Receives either no assistance,   or assistance with bathing only on body part.   Yes    Dressing  Gets clothes and dresses without any assistance, except for  tying shoes.        Yes    Toileting Goes to toilet room, uses toilet, arranges clothes, and returns  without any assistance (may use cane or walker for support and may use  bedpan / urinal at night.      Yes    Transferring Moves into and out of bed and chair without assistance  (may use cane or walker)      Yes    Continence Controls bowel and bladder completely without occasional  accidents        Yes    Feeding Feeds self without assistance (except for help with cutting meat  or buttering bread)       Yes        Total (Number of Yesses of 6) 6/6      Wily-Isaac Instrumental Activities of Daily Living Scale (IADL)    Ability to Use Telephone   · Operates telephone on own initiative.  Looks up and dials numbers, etc.         1  · Shopping  Needs to be accompanied on any shopping trip  0     Food Preparation  · Plans, prepares, and serves adequate meals independently          1  · Housekeeping  · Performs light daily tasks such as dish washing, bed making          1  · Laundry  · All laundry must be done by others   0    Mode of Transportation  · Travels independently on public transportation or drives own car          1  Responsibility for Own Medications  · Is responsible for taking medications in correct dosages at correct time          1  Ability to Handle Finances  · Manages financial matters independently (budgets, writes checks,   pays rent, bills, goes to bank), collects and keeps track of income          1   Total (Number of Instrumental Activities of 8) 6/8           Five Meter Walk Test    Utilized Walking Aid? No     Walk 1: 17.8 s/5m     Walk 2: 16.4 s/5m     Walk 3: 18.1 s/5m    Five Meter Walk Average: 17.4 s/5m    Gait Speed: Slow (Average > 6 s/5m)      STS risk of mortality with open AVR:  7.86%  http://riskcalc.sts.org/stswebriskcalc/calculate       Creatinine Clearance: 56 ml/min (adjusted for  obesity)  https://reference.ZenDeals.TalkTo/calculator/creatinine-clearance-cockcroft-gault    Assessment/ Plan:     ICD-10-CM ICD-9-CM   1. Severe symptomatic aortic stenosis.  High risk patient who is not a candidate for open AVR per Dr. العلي.  Patient is agreeable for TAVR I35.9 424.1   2. 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  Z88.8 V14.8   3. Diastolic heart failure.  NYHA class III     4. PAF s/p RFA.  Maintained on Sotalol and warfarin.  CHADS VASC = 6     5. Morbid obesity     6. DJD with reduced mobility     7. Severe pulmonary HTN with HANK/ CPAP + O2       TAVR education materials reviewed with patient/ daughter today.  This included printed brochure detailing pathophysiology of aortic stenosis, patient specific aortic stenosis symptoms, and treatment options (medical therapy, surgical aortic valve replacement, and transcatheter aortic valve replacement).  A model of the valve and procedural animation were also used to explain TAVR.  We discussed patient's goals of care:  Continue living and regain lost functional capacity.  We reviewed the [facility] Cardiosmart Shared Decision Making Tool for treatment of Aortic Stenosis to again compare/contrast the options of TAVR/ SAVR/ medical management.  Per Dr. العلي, due to co-morbid conditions, she is not a candidate for any open AVR procedure.    Our talk also included procedural details, procedure risks, anticipated pre-op and post-op expectations, as well as follow up visit schedule @ one month and one year.  Further discussion and final decision making will occur with Multi- Disciplinary Heart Team following the completion of pre-requisite testing.  Anticipate TAVR procedure 3/25/21.    JORGE Jauregui, 03/12/21, 12:47 EST

## 2021-03-11 NOTE — NURSING NOTE
All instructions reviewed with pt and daughter. IV dc'd. Ambulated to BR voided. To Vehicle in wheelchair. Discharged home in stable condition.

## 2021-03-12 LAB — CREAT BLDA-MCNC: 1.2 MG/DL (ref 0.6–1.3)

## 2021-03-18 ENCOUNTER — TELEPHONE (OUTPATIENT)
Dept: CARDIOLOGY | Facility: HOSPITAL | Age: 76
End: 2021-03-18

## 2021-03-18 DIAGNOSIS — I35.9 AORTIC VALVE DISORDER: Primary | ICD-10-CM

## 2021-03-18 NOTE — TELEPHONE ENCOUNTER
Patient wanted to know if you have heard from Dr. العلي on the date for her procedure.    Returned call an LM to say that I can see Dr. العلي's staff is working on her surgery posting for 3/25.  Please wait for instructions from them about PAT/ check in procedures.  I will order pre-meds for dye allergy accordingly.      Jacqueline PATEL

## 2021-03-19 ENCOUNTER — PREP FOR SURGERY (OUTPATIENT)
Dept: OTHER | Facility: HOSPITAL | Age: 76
End: 2021-03-19

## 2021-03-19 ENCOUNTER — TELEPHONE (OUTPATIENT)
Dept: CARDIOLOGY | Facility: HOSPITAL | Age: 76
End: 2021-03-19

## 2021-03-19 DIAGNOSIS — I35.9 AORTIC VALVE DISORDER: Primary | ICD-10-CM

## 2021-03-19 RX ORDER — PREDNISONE 50 MG/1
50 TABLET ORAL TAKE AS DIRECTED
Qty: 3 TABLET | Refills: 0 | Status: SHIPPED | OUTPATIENT
Start: 2021-03-19 | End: 2021-03-29 | Stop reason: HOSPADM

## 2021-03-19 RX ORDER — ACETAMINOPHEN 325 MG/1
650 TABLET ORAL EVERY 4 HOURS PRN
Status: CANCELLED | OUTPATIENT
Start: 2021-03-19

## 2021-03-19 RX ORDER — CHLORHEXIDINE GLUCONATE 0.12 MG/ML
15 RINSE ORAL ONCE
Status: CANCELLED | OUTPATIENT
Start: 2021-03-19 | End: 2021-03-19

## 2021-03-19 RX ORDER — ASPIRIN 325 MG
325 TABLET ORAL NIGHTLY
Status: CANCELLED | OUTPATIENT
Start: 2021-03-19 | End: 2021-03-20

## 2021-03-19 RX ORDER — CHLORHEXIDINE GLUCONATE 500 MG/1
1 CLOTH TOPICAL EVERY 12 HOURS PRN
Status: CANCELLED | OUTPATIENT
Start: 2021-03-19

## 2021-03-19 RX ORDER — DIPHENHYDRAMINE HCL 25 MG
25 TABLET ORAL
Qty: 1 TABLET | Refills: 0 | Status: SHIPPED | OUTPATIENT
Start: 2021-03-25 | End: 2021-03-29 | Stop reason: HOSPADM

## 2021-03-19 RX ORDER — NITROGLYCERIN 0.4 MG/1
0.4 TABLET SUBLINGUAL
Status: CANCELLED | OUTPATIENT
Start: 2021-03-19

## 2021-03-19 NOTE — TELEPHONE ENCOUNTER
TAVR JORGE    Spoke with daughter, Monica, to say pre-meds for contrast allergy sent to [facility] for TAVR procedure next week on 3/25.  Same directions as last time.  Dose times listed on prescription label.      Jacqueline PATEL

## 2021-03-24 ENCOUNTER — APPOINTMENT (OUTPATIENT)
Dept: PREADMISSION TESTING | Facility: HOSPITAL | Age: 76
End: 2021-03-24

## 2021-03-24 ENCOUNTER — HOSPITAL ENCOUNTER (OUTPATIENT)
Dept: GENERAL RADIOLOGY | Facility: HOSPITAL | Age: 76
Discharge: HOME | End: 2021-03-24

## 2021-03-24 ENCOUNTER — HOSPITAL ENCOUNTER (OUTPATIENT)
Dept: PULMONOLOGY | Facility: HOSPITAL | Age: 76
Discharge: HOME | End: 2021-03-24

## 2021-03-24 VITALS — OXYGEN SATURATION: 96 % | WEIGHT: 293 LBS | BODY MASS INDEX: 45.99 KG/M2 | HEIGHT: 67 IN

## 2021-03-24 DIAGNOSIS — I35.9 AORTIC VALVE DISORDER: ICD-10-CM

## 2021-03-24 LAB
ABO GROUP BLD: NORMAL
ALBUMIN SERPL-MCNC: 3.6 G/DL (ref 3.5–5.2)
ALBUMIN/GLOB SERPL: 1.4 G/DL
ALP SERPL-CCNC: 125 U/L (ref 39–117)
ALT SERPL W P-5'-P-CCNC: 14 U/L (ref 1–33)
AMPHET+METHAMPHET UR QL: NEGATIVE
AMPHETAMINES UR QL: NEGATIVE
ANION GAP SERPL CALCULATED.3IONS-SCNC: 9 MMOL/L (ref 5–15)
APTT PPP: 39.6 SECONDS (ref 24–37)
AST SERPL-CCNC: 17 U/L (ref 1–32)
BARBITURATES UR QL SCN: NEGATIVE
BASOPHILS # BLD AUTO: 0.04 10*3/MM3 (ref 0–0.2)
BASOPHILS NFR BLD AUTO: 0.7 % (ref 0–1.5)
BENZODIAZ UR QL SCN: NEGATIVE
BILIRUB SERPL-MCNC: 0.8 MG/DL (ref 0–1.2)
BLD GP AB SCN SERPL QL: NEGATIVE
BUN SERPL-MCNC: 21 MG/DL (ref 8–23)
BUN/CREAT SERPL: 16.4 (ref 7–25)
BUPRENORPHINE SERPL-MCNC: NEGATIVE NG/ML
CALCIUM SPEC-SCNC: 8 MG/DL (ref 8.6–10.5)
CANNABINOIDS SERPL QL: NEGATIVE
CHLORIDE SERPL-SCNC: 101 MMOL/L (ref 98–107)
CO2 SERPL-SCNC: 29 MMOL/L (ref 22–29)
COCAINE UR QL: NEGATIVE
CREAT SERPL-MCNC: 1.28 MG/DL (ref 0.57–1)
DEPRECATED RDW RBC AUTO: 55.8 FL (ref 37–54)
EOSINOPHIL # BLD AUTO: 0.3 10*3/MM3 (ref 0–0.4)
EOSINOPHIL NFR BLD AUTO: 5.2 % (ref 0.3–6.2)
ERYTHROCYTE [DISTWIDTH] IN BLOOD BY AUTOMATED COUNT: 15.9 % (ref 12.3–15.4)
GFR SERPL CREATININE-BSD FRML MDRD: 40 ML/MIN/1.73
GLOBULIN UR ELPH-MCNC: 2.5 GM/DL
GLUCOSE SERPL-MCNC: 114 MG/DL (ref 65–99)
HBA1C MFR BLD: 5.8 % (ref 4.8–5.6)
HCT VFR BLD AUTO: 35.1 % (ref 34–46.6)
HGB BLD-MCNC: 10.8 G/DL (ref 12–15.9)
IMM GRANULOCYTES # BLD AUTO: 0.01 10*3/MM3 (ref 0–0.05)
IMM GRANULOCYTES NFR BLD AUTO: 0.2 % (ref 0–0.5)
INR PPP: 2.7 (ref 0.85–1.16)
LYMPHOCYTES # BLD AUTO: 1 10*3/MM3 (ref 0.7–3.1)
LYMPHOCYTES NFR BLD AUTO: 17.4 % (ref 19.6–45.3)
MAGNESIUM SERPL-MCNC: 1.9 MG/DL (ref 1.6–2.4)
MCH RBC QN AUTO: 29.5 PG (ref 26.6–33)
MCHC RBC AUTO-ENTMCNC: 30.8 G/DL (ref 31.5–35.7)
MCV RBC AUTO: 95.9 FL (ref 79–97)
METHADONE UR QL SCN: NEGATIVE
MONOCYTES # BLD AUTO: 0.56 10*3/MM3 (ref 0.1–0.9)
MONOCYTES NFR BLD AUTO: 9.8 % (ref 5–12)
NEUTROPHILS NFR BLD AUTO: 3.83 10*3/MM3 (ref 1.7–7)
NEUTROPHILS NFR BLD AUTO: 66.7 % (ref 42.7–76)
NRBC BLD AUTO-RTO: 0 /100 WBC (ref 0–0.2)
OPIATES UR QL: NEGATIVE
OXYCODONE UR QL SCN: NEGATIVE
PA ADP PRP-ACNC: 234 PRU
PCP UR QL SCN: NEGATIVE
PLATELET # BLD AUTO: 168 10*3/MM3 (ref 140–450)
PMV BLD AUTO: 10.7 FL (ref 6–12)
POTASSIUM SERPL-SCNC: 4.3 MMOL/L (ref 3.5–5.2)
PROPOXYPH UR QL: NEGATIVE
PROT SERPL-MCNC: 6.1 G/DL (ref 6–8.5)
PROTHROMBIN TIME: 28.4 SECONDS (ref 11.5–14)
RBC # BLD AUTO: 3.66 10*6/MM3 (ref 3.77–5.28)
RH BLD: POSITIVE
SARS-COV-2 RNA RESP QL NAA+PROBE: NOT DETECTED
SODIUM SERPL-SCNC: 139 MMOL/L (ref 136–145)
T&S EXPIRATION DATE: NORMAL
TRICYCLICS UR QL SCN: NEGATIVE
WBC # BLD AUTO: 5.74 10*3/MM3 (ref 3.4–10.8)

## 2021-03-24 PROCEDURE — U0005 INFEC AGEN DETEC AMPLI PROBE: HCPCS

## 2021-03-24 PROCEDURE — 85610 PROTHROMBIN TIME: CPT

## 2021-03-24 PROCEDURE — 86901 BLOOD TYPING SEROLOGIC RH(D): CPT

## 2021-03-24 PROCEDURE — 86900 BLOOD TYPING SEROLOGIC ABO: CPT

## 2021-03-24 PROCEDURE — 85025 COMPLETE CBC W/AUTO DIFF WBC: CPT

## 2021-03-24 PROCEDURE — 80306 DRUG TEST PRSMV INSTRMNT: CPT

## 2021-03-24 PROCEDURE — 85730 THROMBOPLASTIN TIME PARTIAL: CPT

## 2021-03-24 PROCEDURE — 94010 BREATHING CAPACITY TEST: CPT

## 2021-03-24 PROCEDURE — 94010 BREATHING CAPACITY TEST: CPT | Performed by: INTERNAL MEDICINE

## 2021-03-24 PROCEDURE — 80053 COMPREHEN METABOLIC PANEL: CPT

## 2021-03-24 PROCEDURE — 36415 COLL VENOUS BLD VENIPUNCTURE: CPT

## 2021-03-24 PROCEDURE — C9803 HOPD COVID-19 SPEC COLLECT: HCPCS

## 2021-03-24 PROCEDURE — 86923 COMPATIBILITY TEST ELECTRIC: CPT

## 2021-03-24 PROCEDURE — 86850 RBC ANTIBODY SCREEN: CPT

## 2021-03-24 PROCEDURE — 83036 HEMOGLOBIN GLYCOSYLATED A1C: CPT

## 2021-03-24 PROCEDURE — 71046 X-RAY EXAM CHEST 2 VIEWS: CPT

## 2021-03-24 PROCEDURE — 83735 ASSAY OF MAGNESIUM: CPT

## 2021-03-24 PROCEDURE — 85576 BLOOD PLATELET AGGREGATION: CPT

## 2021-03-24 PROCEDURE — U0003 INFECTIOUS AGENT DETECTION BY NUCLEIC ACID (DNA OR RNA); SEVERE ACUTE RESPIRATORY SYNDROME CORONAVIRUS 2 (SARS-COV-2) (CORONAVIRUS DISEASE [COVID-19]), AMPLIFIED PROBE TECHNIQUE, MAKING USE OF HIGH THROUGHPUT TECHNOLOGIES AS DESCRIBED BY CMS-2020-01-R: HCPCS

## 2021-03-24 RX ORDER — CHLORHEXIDINE GLUCONATE 500 MG/1
1 CLOTH TOPICAL EVERY 12 HOURS PRN
Status: DISCONTINUED | OUTPATIENT
Start: 2021-03-24 | End: 2021-03-29

## 2021-03-24 RX ORDER — ASPIRIN 325 MG
325 TABLET ORAL NIGHTLY
Status: SHIPPED | OUTPATIENT
Start: 2021-03-24 | End: 2021-03-25

## 2021-03-25 ENCOUNTER — ANCILLARY PROCEDURE (OUTPATIENT)
Dept: PERIOP | Facility: HOSPITAL | Age: 76
End: 2021-03-25

## 2021-03-25 ENCOUNTER — HOSPITAL ENCOUNTER (INPATIENT)
Facility: HOSPITAL | Age: 76
LOS: 4 days | Discharge: HOME HEALTH | End: 2021-03-29
Attending: THORACIC SURGERY (CARDIOTHORACIC VASCULAR SURGERY) | Admitting: THORACIC SURGERY (CARDIOTHORACIC VASCULAR SURGERY)

## 2021-03-25 ENCOUNTER — ANESTHESIA EVENT (OUTPATIENT)
Dept: PERIOP | Facility: HOSPITAL | Age: 76
End: 2021-03-25

## 2021-03-25 ENCOUNTER — APPOINTMENT (OUTPATIENT)
Dept: GENERAL RADIOLOGY | Facility: HOSPITAL | Age: 76
End: 2021-03-25

## 2021-03-25 ENCOUNTER — ANESTHESIA (OUTPATIENT)
Dept: PERIOP | Facility: HOSPITAL | Age: 76
End: 2021-03-25

## 2021-03-25 DIAGNOSIS — Z95.3 S/P TAVR (TRANSCATHETER AORTIC VALVE REPLACEMENT), BIOPROSTHETIC: ICD-10-CM

## 2021-03-25 DIAGNOSIS — I35.9 AORTIC VALVE DISORDER: ICD-10-CM

## 2021-03-25 DIAGNOSIS — I48.0 PAROXYSMAL ATRIAL FIBRILLATION (HCC): ICD-10-CM

## 2021-03-25 DIAGNOSIS — I48.0 PAROXYSMAL ATRIAL FIBRILLATION (HCC): Primary | ICD-10-CM

## 2021-03-25 DIAGNOSIS — R00.2 PALPITATIONS: ICD-10-CM

## 2021-03-25 DIAGNOSIS — I82.4Y9 ACUTE DEEP VEIN THROMBOSIS (DVT) OF PROXIMAL VEIN OF LOWER EXTREMITY, UNSPECIFIED LATERALITY (HCC): ICD-10-CM

## 2021-03-25 LAB
ABO GROUP BLD: NORMAL
ACT BLD: 136 SECONDS (ref 82–152)
ACT BLD: 175 SECONDS (ref 82–152)
ANION GAP SERPL CALCULATED.3IONS-SCNC: 8 MMOL/L (ref 5–15)
APTT PPP: 132.2 SECONDS (ref 24–37)
APTT PPP: 28.5 SECONDS (ref 24–37)
APTT PPP: 30.8 SECONDS (ref 24–37)
ARTERIAL PATENCY WRIST A: ABNORMAL
ATMOSPHERIC PRESS: ABNORMAL MM[HG]
BASE EXCESS BLDA CALC-SCNC: -1 MMOL/L (ref 0–2)
BASE EXCESS BLDA CALC-SCNC: 0.1 MMOL/L (ref 0–2)
BASE EXCESS BLDA CALC-SCNC: 1.5 MMOL/L (ref 0–2)
BASE EXCESS BLDA CALC-SCNC: 2 MMOL/L (ref -5–5)
BDY SITE: ABNORMAL
BODY TEMPERATURE: 37 C
BUN SERPL-MCNC: 21 MG/DL (ref 8–23)
BUN/CREAT SERPL: 19.1 (ref 7–25)
CA-I BLDA-SCNC: 0.97 MMOL/L (ref 1.2–1.32)
CALCIUM SPEC-SCNC: 8.2 MG/DL (ref 8.6–10.5)
CHLORIDE SERPL-SCNC: 106 MMOL/L (ref 98–107)
CO2 BLDA-SCNC: 27.7 MMOL/L (ref 22–33)
CO2 BLDA-SCNC: 28.1 MMOL/L (ref 22–33)
CO2 BLDA-SCNC: 29 MMOL/L (ref 24–29)
CO2 BLDA-SCNC: 29.2 MMOL/L (ref 22–33)
CO2 SERPL-SCNC: 27 MMOL/L (ref 22–29)
COHGB MFR BLD: 0.8 % (ref 0–2)
COHGB MFR BLD: 1.1 % (ref 0–2)
COHGB MFR BLD: 1.1 % (ref 0–2)
CPAP: 20 CMH2O
CREAT SERPL-MCNC: 1.1 MG/DL (ref 0.57–1)
DEPRECATED RDW RBC AUTO: 56.3 FL (ref 37–54)
EPAP: 0
ERYTHROCYTE [DISTWIDTH] IN BLOOD BY AUTOMATED COUNT: 15.9 % (ref 12.3–15.4)
GFR SERPL CREATININE-BSD FRML MDRD: 48 ML/MIN/1.73
GLUCOSE BLDC GLUCOMTR-MCNC: 138 MG/DL (ref 70–130)
GLUCOSE BLDC GLUCOMTR-MCNC: 140 MG/DL (ref 70–130)
GLUCOSE BLDC GLUCOMTR-MCNC: 142 MG/DL (ref 70–130)
GLUCOSE SERPL-MCNC: 165 MG/DL (ref 65–99)
HCO3 BLDA-SCNC: 26.2 MMOL/L (ref 20–26)
HCO3 BLDA-SCNC: 26.4 MMOL/L (ref 20–26)
HCO3 BLDA-SCNC: 27.3 MMOL/L (ref 22–26)
HCO3 BLDA-SCNC: 27.6 MMOL/L (ref 20–26)
HCT VFR BLD AUTO: 36.6 % (ref 34–46.6)
HCT VFR BLD CALC: 32.7 %
HCT VFR BLD CALC: 33 %
HCT VFR BLD CALC: 34.6 %
HCT VFR BLDA CALC: 28 % (ref 38–51)
HGB BLD-MCNC: 11.2 G/DL (ref 12–15.9)
HGB BLDA-MCNC: 10.7 G/DL (ref 14–18)
HGB BLDA-MCNC: 10.8 G/DL (ref 14–18)
HGB BLDA-MCNC: 11.3 G/DL (ref 14–18)
HGB BLDA-MCNC: 9.5 G/DL (ref 12–17)
INHALED O2 CONCENTRATION: 100 %
INR PPP: 1.99 (ref 0.85–1.16)
INR PPP: 2.4 (ref 0.85–1.16)
IPAP: 0
MCH RBC QN AUTO: 29.6 PG (ref 26.6–33)
MCHC RBC AUTO-ENTMCNC: 30.6 G/DL (ref 31.5–35.7)
MCV RBC AUTO: 96.8 FL (ref 79–97)
METHGB BLD QL: 0.5 % (ref 0–1.5)
MODALITY: ABNORMAL
NOTE: ABNORMAL
OXYHGB MFR BLDV: 94 % (ref 94–99)
OXYHGB MFR BLDV: 94.1 % (ref 94–99)
OXYHGB MFR BLDV: 97.8 % (ref 94–99)
PAW @ PEAK INSP FLOW SETTING VENT: 0 CMH2O
PCO2 BLDA: 44.7 MM HG (ref 35–45)
PCO2 BLDA: 48.7 MM HG (ref 35–45)
PCO2 BLDA: 49.8 MM HG (ref 35–45)
PCO2 BLDA: 55.7 MM HG (ref 35–45)
PCO2 TEMP ADJ BLD: 48.7 MM HG (ref 35–45)
PCO2 TEMP ADJ BLD: 49.8 MM HG (ref 35–45)
PCO2 TEMP ADJ BLD: 55.7 MM HG (ref 35–45)
PEEP RESPIRATORY: 15 CM[H2O]
PEEP RESPIRATORY: 15 CM[H2O]
PH BLDA: 7.28 PH UNITS (ref 7.35–7.45)
PH BLDA: 7.34 PH UNITS (ref 7.35–7.45)
PH BLDA: 7.35 PH UNITS (ref 7.35–7.45)
PH BLDA: 7.39 PH UNITS (ref 7.35–7.6)
PH, TEMP CORRECTED: 7.28 PH UNITS
PH, TEMP CORRECTED: 7.34 PH UNITS
PH, TEMP CORRECTED: 7.35 PH UNITS
PLATELET # BLD AUTO: 197 10*3/MM3 (ref 140–450)
PMV BLD AUTO: 10.7 FL (ref 6–12)
PO2 BLDA: 125 MM HG (ref 83–108)
PO2 BLDA: 216 MMHG (ref 80–105)
PO2 BLDA: 75.1 MM HG (ref 83–108)
PO2 BLDA: 81.3 MM HG (ref 83–108)
PO2 TEMP ADJ BLD: 125 MM HG (ref 83–108)
PO2 TEMP ADJ BLD: 75.1 MM HG (ref 83–108)
PO2 TEMP ADJ BLD: 81.3 MM HG (ref 83–108)
POTASSIUM BLDA-SCNC: 4.1 MMOL/L (ref 3.5–4.9)
POTASSIUM SERPL-SCNC: 4.3 MMOL/L (ref 3.5–5.2)
PROTHROMBIN TIME: 22.3 SECONDS (ref 11.5–14)
PROTHROMBIN TIME: 25.8 SECONDS (ref 11.5–14)
RBC # BLD AUTO: 3.78 10*6/MM3 (ref 3.77–5.28)
RH BLD: POSITIVE
SAO2 % BLDA: 100 % (ref 95–98)
SODIUM BLD-SCNC: 139 MMOL/L (ref 138–146)
SODIUM SERPL-SCNC: 141 MMOL/L (ref 136–145)
TOTAL RATE: 0 BREATHS/MINUTE
VENTILATOR MODE: ABNORMAL
WBC # BLD AUTO: 15.47 10*3/MM3 (ref 3.4–10.8)

## 2021-03-25 PROCEDURE — 25010000002 CHLOROTHIAZIDE PER 500 MG: Performed by: INTERNAL MEDICINE

## 2021-03-25 PROCEDURE — C1769 GUIDE WIRE: HCPCS | Performed by: THORACIC SURGERY (CARDIOTHORACIC VASCULAR SURGERY)

## 2021-03-25 PROCEDURE — 85347 COAGULATION TIME ACTIVATED: CPT

## 2021-03-25 PROCEDURE — 94640 AIRWAY INHALATION TREATMENT: CPT

## 2021-03-25 PROCEDURE — 86900 BLOOD TYPING SEROLOGIC ABO: CPT

## 2021-03-25 PROCEDURE — P9017 PLASMA 1 DONOR FRZ W/IN 8 HR: HCPCS

## 2021-03-25 PROCEDURE — 25010000002 ONDANSETRON PER 1 MG: Performed by: NURSE ANESTHETIST, CERTIFIED REGISTERED

## 2021-03-25 PROCEDURE — 36430 TRANSFUSION BLD/BLD COMPNT: CPT

## 2021-03-25 PROCEDURE — 25010000002 PROPOFOL 10 MG/ML EMULSION: Performed by: NURSE ANESTHETIST, CERTIFIED REGISTERED

## 2021-03-25 PROCEDURE — 25010000002 DEXAMETHASONE PER 1 MG: Performed by: NURSE ANESTHETIST, CERTIFIED REGISTERED

## 2021-03-25 PROCEDURE — 25010000002 FENTANYL CITRATE (PF) 100 MCG/2ML SOLUTION: Performed by: NURSE ANESTHETIST, CERTIFIED REGISTERED

## 2021-03-25 PROCEDURE — 94799 UNLISTED PULMONARY SVC/PX: CPT

## 2021-03-25 PROCEDURE — 85610 PROTHROMBIN TIME: CPT | Performed by: THORACIC SURGERY (CARDIOTHORACIC VASCULAR SURGERY)

## 2021-03-25 PROCEDURE — 82375 ASSAY CARBOXYHB QUANT: CPT

## 2021-03-25 PROCEDURE — 85610 PROTHROMBIN TIME: CPT | Performed by: NURSE PRACTITIONER

## 2021-03-25 PROCEDURE — 25010000002 PROTAMINE SULFATE PER 10 MG: Performed by: NURSE ANESTHETIST, CERTIFIED REGISTERED

## 2021-03-25 PROCEDURE — 25010000002 FUROSEMIDE PER 20 MG: Performed by: INTERNAL MEDICINE

## 2021-03-25 PROCEDURE — 33361 REPLACE AORTIC VALVE PERQ: CPT | Performed by: THORACIC SURGERY (CARDIOTHORACIC VASCULAR SURGERY)

## 2021-03-25 PROCEDURE — 93005 ELECTROCARDIOGRAM TRACING: CPT | Performed by: PHYSICIAN ASSISTANT

## 2021-03-25 PROCEDURE — 85027 COMPLETE CBC AUTOMATED: CPT | Performed by: PHYSICIAN ASSISTANT

## 2021-03-25 PROCEDURE — 85014 HEMATOCRIT: CPT

## 2021-03-25 PROCEDURE — S0260 H&P FOR SURGERY: HCPCS | Performed by: THORACIC SURGERY (CARDIOTHORACIC VASCULAR SURGERY)

## 2021-03-25 PROCEDURE — 33361 REPLACE AORTIC VALVE PERQ: CPT | Performed by: INTERNAL MEDICINE

## 2021-03-25 PROCEDURE — 25010000002 HEPARIN (PORCINE) PER 1000 UNITS: Performed by: THORACIC SURGERY (CARDIOTHORACIC VASCULAR SURGERY)

## 2021-03-25 PROCEDURE — 86927 PLASMA FRESH FROZEN: CPT

## 2021-03-25 PROCEDURE — C1894 INTRO/SHEATH, NON-LASER: HCPCS | Performed by: THORACIC SURGERY (CARDIOTHORACIC VASCULAR SURGERY)

## 2021-03-25 PROCEDURE — 82803 BLOOD GASES ANY COMBINATION: CPT

## 2021-03-25 PROCEDURE — 85730 THROMBOPLASTIN TIME PARTIAL: CPT | Performed by: THORACIC SURGERY (CARDIOTHORACIC VASCULAR SURGERY)

## 2021-03-25 PROCEDURE — 02RF38Z REPLACEMENT OF AORTIC VALVE WITH ZOOPLASTIC TISSUE, PERCUTANEOUS APPROACH: ICD-10-PCS | Performed by: THORACIC SURGERY (CARDIOTHORACIC VASCULAR SURGERY)

## 2021-03-25 PROCEDURE — 99291 CRITICAL CARE FIRST HOUR: CPT | Performed by: INTERNAL MEDICINE

## 2021-03-25 PROCEDURE — 94660 CPAP INITIATION&MGMT: CPT

## 2021-03-25 PROCEDURE — 83050 HGB METHEMOGLOBIN QUAN: CPT

## 2021-03-25 PROCEDURE — 25010000002 PROTAMINE SULFATE PER 10 MG: Performed by: THORACIC SURGERY (CARDIOTHORACIC VASCULAR SURGERY)

## 2021-03-25 PROCEDURE — 84132 ASSAY OF SERUM POTASSIUM: CPT

## 2021-03-25 PROCEDURE — 84295 ASSAY OF SERUM SODIUM: CPT

## 2021-03-25 PROCEDURE — 85730 THROMBOPLASTIN TIME PARTIAL: CPT | Performed by: PHYSICIAN ASSISTANT

## 2021-03-25 PROCEDURE — 82805 BLOOD GASES W/O2 SATURATION: CPT

## 2021-03-25 PROCEDURE — C1887 CATHETER, GUIDING: HCPCS | Performed by: THORACIC SURGERY (CARDIOTHORACIC VASCULAR SURGERY)

## 2021-03-25 PROCEDURE — 99222 1ST HOSP IP/OBS MODERATE 55: CPT | Performed by: INTERNAL MEDICINE

## 2021-03-25 PROCEDURE — 25010000002 HEPARIN (PORCINE) PER 1000 UNITS: Performed by: NURSE ANESTHETIST, CERTIFIED REGISTERED

## 2021-03-25 PROCEDURE — 80048 BASIC METABOLIC PNL TOTAL CA: CPT | Performed by: PHYSICIAN ASSISTANT

## 2021-03-25 PROCEDURE — 25010000002 MORPHINE PER 10 MG: Performed by: THORACIC SURGERY (CARDIOTHORACIC VASCULAR SURGERY)

## 2021-03-25 PROCEDURE — 82330 ASSAY OF CALCIUM: CPT

## 2021-03-25 PROCEDURE — C1889 IMPLANT/INSERT DEVICE, NOC: HCPCS | Performed by: THORACIC SURGERY (CARDIOTHORACIC VASCULAR SURGERY)

## 2021-03-25 PROCEDURE — 82947 ASSAY GLUCOSE BLOOD QUANT: CPT

## 2021-03-25 PROCEDURE — 25010000002 MIDAZOLAM PER 1 MG: Performed by: ANESTHESIOLOGY

## 2021-03-25 PROCEDURE — 25010000002 CEFUROXIME: Performed by: PHYSICIAN ASSISTANT

## 2021-03-25 PROCEDURE — 71045 X-RAY EXAM CHEST 1 VIEW: CPT

## 2021-03-25 PROCEDURE — 86901 BLOOD TYPING SEROLOGIC RH(D): CPT

## 2021-03-25 PROCEDURE — C1760 CLOSURE DEV, VASC: HCPCS | Performed by: THORACIC SURGERY (CARDIOTHORACIC VASCULAR SURGERY)

## 2021-03-25 PROCEDURE — 93355 ECHO TRANSESOPHAGEAL (TEE): CPT

## 2021-03-25 DEVICE — VLV HEART TRNSCATH SAPIEN3 23MM: Type: IMPLANTABLE DEVICE | Site: AORTA | Status: FUNCTIONAL

## 2021-03-25 RX ORDER — FAMOTIDINE 10 MG/ML
20 INJECTION, SOLUTION INTRAVENOUS ONCE
Status: DISCONTINUED | OUTPATIENT
Start: 2021-03-25 | End: 2021-03-25 | Stop reason: HOSPADM

## 2021-03-25 RX ORDER — ALLOPURINOL 100 MG/1
200 TABLET ORAL DAILY
Status: DISCONTINUED | OUTPATIENT
Start: 2021-03-26 | End: 2021-03-29 | Stop reason: HOSPADM

## 2021-03-25 RX ORDER — IPRATROPIUM BROMIDE AND ALBUTEROL SULFATE 2.5; .5 MG/3ML; MG/3ML
3 SOLUTION RESPIRATORY (INHALATION)
Status: DISCONTINUED | OUTPATIENT
Start: 2021-03-25 | End: 2021-03-25

## 2021-03-25 RX ORDER — FUROSEMIDE 20 MG/1
20 TABLET ORAL DAILY
Status: DISCONTINUED | OUTPATIENT
Start: 2021-03-25 | End: 2021-03-25

## 2021-03-25 RX ORDER — ETOMIDATE 2 MG/ML
INJECTION INTRAVENOUS AS NEEDED
Status: DISCONTINUED | OUTPATIENT
Start: 2021-03-25 | End: 2021-03-25 | Stop reason: SURG

## 2021-03-25 RX ORDER — PROPOFOL 10 MG/ML
VIAL (ML) INTRAVENOUS AS NEEDED
Status: DISCONTINUED | OUTPATIENT
Start: 2021-03-25 | End: 2021-03-25 | Stop reason: SURG

## 2021-03-25 RX ORDER — CYCLOSPORINE 0.5 MG/ML
1 EMULSION OPHTHALMIC NIGHTLY
Status: DISCONTINUED | OUTPATIENT
Start: 2021-03-25 | End: 2021-03-29 | Stop reason: HOSPADM

## 2021-03-25 RX ORDER — LIDOCAINE HYDROCHLORIDE 10 MG/ML
0.5 INJECTION, SOLUTION EPIDURAL; INFILTRATION; INTRACAUDAL; PERINEURAL ONCE AS NEEDED
Status: COMPLETED | OUTPATIENT
Start: 2021-03-25 | End: 2021-03-25

## 2021-03-25 RX ORDER — FUROSEMIDE 10 MG/ML
40 INJECTION INTRAMUSCULAR; INTRAVENOUS ONCE
Status: COMPLETED | OUTPATIENT
Start: 2021-03-25 | End: 2021-03-25

## 2021-03-25 RX ORDER — DEXAMETHASONE SODIUM PHOSPHATE 4 MG/ML
INJECTION, SOLUTION INTRA-ARTICULAR; INTRALESIONAL; INTRAMUSCULAR; INTRAVENOUS; SOFT TISSUE AS NEEDED
Status: DISCONTINUED | OUTPATIENT
Start: 2021-03-25 | End: 2021-03-25 | Stop reason: SURG

## 2021-03-25 RX ORDER — ONDANSETRON 2 MG/ML
4 INJECTION INTRAMUSCULAR; INTRAVENOUS ONCE AS NEEDED
Status: DISCONTINUED | OUTPATIENT
Start: 2021-03-25 | End: 2021-03-25 | Stop reason: HOSPADM

## 2021-03-25 RX ORDER — FAMOTIDINE 20 MG/1
20 TABLET, FILM COATED ORAL ONCE
Status: DISCONTINUED | OUTPATIENT
Start: 2021-03-25 | End: 2021-03-25 | Stop reason: HOSPADM

## 2021-03-25 RX ORDER — NITROGLYCERIN 20 MG/100ML
5-200 INJECTION INTRAVENOUS
Status: DISCONTINUED | OUTPATIENT
Start: 2021-03-25 | End: 2021-03-29

## 2021-03-25 RX ORDER — ALPRAZOLAM 0.25 MG/1
0.25 TABLET ORAL NIGHTLY PRN
Status: DISCONTINUED | OUTPATIENT
Start: 2021-03-25 | End: 2021-03-29 | Stop reason: HOSPADM

## 2021-03-25 RX ORDER — MIDAZOLAM HYDROCHLORIDE 1 MG/ML
0.5 INJECTION INTRAMUSCULAR; INTRAVENOUS
Status: DISCONTINUED | OUTPATIENT
Start: 2021-03-25 | End: 2021-03-25 | Stop reason: HOSPADM

## 2021-03-25 RX ORDER — CHLORHEXIDINE GLUCONATE 0.12 MG/ML
15 RINSE ORAL ONCE
Status: DISCONTINUED | OUTPATIENT
Start: 2021-03-25 | End: 2021-03-25 | Stop reason: HOSPADM

## 2021-03-25 RX ORDER — PREDNISONE 50 MG/1
50 TABLET ORAL TAKE AS DIRECTED
Status: DISCONTINUED | OUTPATIENT
Start: 2021-03-25 | End: 2021-03-25

## 2021-03-25 RX ORDER — HYDRALAZINE HYDROCHLORIDE 20 MG/ML
10 INJECTION INTRAMUSCULAR; INTRAVENOUS EVERY 6 HOURS PRN
Status: DISCONTINUED | OUTPATIENT
Start: 2021-03-25 | End: 2021-03-26

## 2021-03-25 RX ORDER — ONDANSETRON 2 MG/ML
4 INJECTION INTRAMUSCULAR; INTRAVENOUS EVERY 6 HOURS PRN
Status: DISCONTINUED | OUTPATIENT
Start: 2021-03-25 | End: 2021-03-29 | Stop reason: HOSPADM

## 2021-03-25 RX ORDER — SODIUM CHLORIDE 9 MG/ML
250 INJECTION, SOLUTION INTRAVENOUS ONCE AS NEEDED
Status: DISCONTINUED | OUTPATIENT
Start: 2021-03-25 | End: 2021-03-29

## 2021-03-25 RX ORDER — LEVOTHYROXINE SODIUM 88 UG/1
88 TABLET ORAL
Status: DISCONTINUED | OUTPATIENT
Start: 2021-03-26 | End: 2021-03-29 | Stop reason: HOSPADM

## 2021-03-25 RX ORDER — PHENYLEPHRINE HCL IN 0.9% NACL 0.5 MG/5ML
.5-3 SYRINGE (ML) INTRAVENOUS ONCE
Status: DISCONTINUED | OUTPATIENT
Start: 2021-03-25 | End: 2021-03-25 | Stop reason: HOSPADM

## 2021-03-25 RX ORDER — ACETAMINOPHEN 325 MG/1
650 TABLET ORAL EVERY 4 HOURS PRN
Status: DISCONTINUED | OUTPATIENT
Start: 2021-03-25 | End: 2021-03-29 | Stop reason: HOSPADM

## 2021-03-25 RX ORDER — LABETALOL HYDROCHLORIDE 5 MG/ML
10 INJECTION, SOLUTION INTRAVENOUS
Status: DISCONTINUED | OUTPATIENT
Start: 2021-03-25 | End: 2021-03-29 | Stop reason: HOSPADM

## 2021-03-25 RX ORDER — ASPIRIN 81 MG/1
81 TABLET ORAL DAILY
Status: DISCONTINUED | OUTPATIENT
Start: 2021-03-26 | End: 2021-03-29 | Stop reason: HOSPADM

## 2021-03-25 RX ORDER — SODIUM CHLORIDE 9 MG/ML
INJECTION, SOLUTION INTRAVENOUS AS NEEDED
Status: DISCONTINUED | OUTPATIENT
Start: 2021-03-25 | End: 2021-03-25 | Stop reason: HOSPADM

## 2021-03-25 RX ORDER — MIDAZOLAM HYDROCHLORIDE 1 MG/ML
1 INJECTION INTRAMUSCULAR; INTRAVENOUS
Status: DISCONTINUED | OUTPATIENT
Start: 2021-03-25 | End: 2021-03-25 | Stop reason: HOSPADM

## 2021-03-25 RX ORDER — BISACODYL 10 MG
10 SUPPOSITORY, RECTAL RECTAL DAILY PRN
Status: DISCONTINUED | OUTPATIENT
Start: 2021-03-25 | End: 2021-03-29 | Stop reason: HOSPADM

## 2021-03-25 RX ORDER — SODIUM CHLORIDE, SODIUM LACTATE, POTASSIUM CHLORIDE, CALCIUM CHLORIDE 600; 310; 30; 20 MG/100ML; MG/100ML; MG/100ML; MG/100ML
9 INJECTION, SOLUTION INTRAVENOUS CONTINUOUS
Status: DISCONTINUED | OUTPATIENT
Start: 2021-03-25 | End: 2021-03-27

## 2021-03-25 RX ORDER — PANTOPRAZOLE SODIUM 40 MG/1
40 TABLET, DELAYED RELEASE ORAL DAILY
Status: DISCONTINUED | OUTPATIENT
Start: 2021-03-26 | End: 2021-03-29 | Stop reason: HOSPADM

## 2021-03-25 RX ORDER — LIDOCAINE HYDROCHLORIDE 10 MG/ML
INJECTION, SOLUTION EPIDURAL; INFILTRATION; INTRACAUDAL; PERINEURAL AS NEEDED
Status: DISCONTINUED | OUTPATIENT
Start: 2021-03-25 | End: 2021-03-25 | Stop reason: SURG

## 2021-03-25 RX ORDER — ACETAMINOPHEN 325 MG/1
650 TABLET ORAL EVERY 4 HOURS PRN
Status: DISCONTINUED | OUTPATIENT
Start: 2021-03-25 | End: 2021-03-25 | Stop reason: HOSPADM

## 2021-03-25 RX ORDER — IPRATROPIUM BROMIDE AND ALBUTEROL SULFATE 2.5; .5 MG/3ML; MG/3ML
3 SOLUTION RESPIRATORY (INHALATION) EVERY 4 HOURS PRN
Status: DISCONTINUED | OUTPATIENT
Start: 2021-03-25 | End: 2021-03-29 | Stop reason: HOSPADM

## 2021-03-25 RX ORDER — HYDRALAZINE HYDROCHLORIDE 25 MG/1
50 TABLET, FILM COATED ORAL 3 TIMES DAILY
Status: DISCONTINUED | OUTPATIENT
Start: 2021-03-25 | End: 2021-03-29 | Stop reason: HOSPADM

## 2021-03-25 RX ORDER — CHLORHEXIDINE GLUCONATE 500 MG/1
1 CLOTH TOPICAL EVERY 12 HOURS PRN
Status: DISCONTINUED | OUTPATIENT
Start: 2021-03-25 | End: 2021-03-25 | Stop reason: HOSPADM

## 2021-03-25 RX ORDER — SOTALOL HYDROCHLORIDE 120 MG/1
120 TABLET ORAL 2 TIMES DAILY
Status: DISCONTINUED | OUTPATIENT
Start: 2021-03-25 | End: 2021-03-25

## 2021-03-25 RX ORDER — ONDANSETRON 4 MG/1
4 TABLET, FILM COATED ORAL EVERY 6 HOURS PRN
Status: DISCONTINUED | OUTPATIENT
Start: 2021-03-25 | End: 2021-03-29 | Stop reason: HOSPADM

## 2021-03-25 RX ORDER — HYDROCODONE BITARTRATE AND ACETAMINOPHEN 5; 325 MG/1; MG/1
1 TABLET ORAL EVERY 6 HOURS PRN
Status: DISCONTINUED | OUTPATIENT
Start: 2021-03-25 | End: 2021-03-29 | Stop reason: HOSPADM

## 2021-03-25 RX ORDER — ROCURONIUM BROMIDE 10 MG/ML
INJECTION, SOLUTION INTRAVENOUS AS NEEDED
Status: DISCONTINUED | OUTPATIENT
Start: 2021-03-25 | End: 2021-03-25 | Stop reason: SURG

## 2021-03-25 RX ORDER — IPRATROPIUM BROMIDE AND ALBUTEROL SULFATE 2.5; .5 MG/3ML; MG/3ML
3 SOLUTION RESPIRATORY (INHALATION)
Status: DISCONTINUED | OUTPATIENT
Start: 2021-03-25 | End: 2021-03-29 | Stop reason: HOSPADM

## 2021-03-25 RX ORDER — FENTANYL CITRATE 50 UG/ML
INJECTION, SOLUTION INTRAMUSCULAR; INTRAVENOUS AS NEEDED
Status: DISCONTINUED | OUTPATIENT
Start: 2021-03-25 | End: 2021-03-25 | Stop reason: SURG

## 2021-03-25 RX ORDER — HEPARIN SODIUM 1000 [USP'U]/ML
INJECTION, SOLUTION INTRAVENOUS; SUBCUTANEOUS AS NEEDED
Status: DISCONTINUED | OUTPATIENT
Start: 2021-03-25 | End: 2021-03-25 | Stop reason: SURG

## 2021-03-25 RX ORDER — BUSPIRONE HYDROCHLORIDE 5 MG/1
5 TABLET ORAL DAILY PRN
Status: DISCONTINUED | OUTPATIENT
Start: 2021-03-25 | End: 2021-03-29 | Stop reason: HOSPADM

## 2021-03-25 RX ORDER — PROTAMINE SULFATE 10 MG/ML
50 INJECTION, SOLUTION INTRAVENOUS ONCE
Status: COMPLETED | OUTPATIENT
Start: 2021-03-25 | End: 2021-03-25

## 2021-03-25 RX ORDER — SODIUM CHLORIDE 0.9 % (FLUSH) 0.9 %
10 SYRINGE (ML) INJECTION EVERY 12 HOURS SCHEDULED
Status: DISCONTINUED | OUTPATIENT
Start: 2021-03-25 | End: 2021-03-25 | Stop reason: HOSPADM

## 2021-03-25 RX ORDER — SODIUM CHLORIDE 0.9 % (FLUSH) 0.9 %
10 SYRINGE (ML) INJECTION AS NEEDED
Status: DISCONTINUED | OUTPATIENT
Start: 2021-03-25 | End: 2021-03-25 | Stop reason: HOSPADM

## 2021-03-25 RX ORDER — MORPHINE SULFATE 2 MG/ML
2 INJECTION, SOLUTION INTRAMUSCULAR; INTRAVENOUS EVERY 4 HOURS PRN
Status: DISCONTINUED | OUTPATIENT
Start: 2021-03-25 | End: 2021-03-29 | Stop reason: HOSPADM

## 2021-03-25 RX ORDER — IPRATROPIUM BROMIDE AND ALBUTEROL SULFATE 2.5; .5 MG/3ML; MG/3ML
3 SOLUTION RESPIRATORY (INHALATION) ONCE AS NEEDED
Status: DISCONTINUED | OUTPATIENT
Start: 2021-03-25 | End: 2021-03-25 | Stop reason: HOSPADM

## 2021-03-25 RX ORDER — PROTAMINE SULFATE 10 MG/ML
INJECTION, SOLUTION INTRAVENOUS AS NEEDED
Status: DISCONTINUED | OUTPATIENT
Start: 2021-03-25 | End: 2021-03-25 | Stop reason: SURG

## 2021-03-25 RX ORDER — NITROGLYCERIN 0.4 MG/1
0.4 TABLET SUBLINGUAL
Status: DISCONTINUED | OUTPATIENT
Start: 2021-03-25 | End: 2021-03-25 | Stop reason: HOSPADM

## 2021-03-25 RX ORDER — SODIUM CHLORIDE 9 MG/ML
100 INJECTION, SOLUTION INTRAVENOUS CONTINUOUS
Status: DISCONTINUED | OUTPATIENT
Start: 2021-03-25 | End: 2021-03-25

## 2021-03-25 RX ORDER — AMOXICILLIN 250 MG
2 CAPSULE ORAL NIGHTLY
Status: DISCONTINUED | OUTPATIENT
Start: 2021-03-25 | End: 2021-03-29 | Stop reason: HOSPADM

## 2021-03-25 RX ORDER — CLONIDINE HYDROCHLORIDE 0.2 MG/1
0.2 TABLET ORAL 3 TIMES DAILY
Status: DISCONTINUED | OUTPATIENT
Start: 2021-03-25 | End: 2021-03-25

## 2021-03-25 RX ORDER — ONDANSETRON 2 MG/ML
INJECTION INTRAMUSCULAR; INTRAVENOUS AS NEEDED
Status: DISCONTINUED | OUTPATIENT
Start: 2021-03-25 | End: 2021-03-25 | Stop reason: SURG

## 2021-03-25 RX ADMIN — FENTANYL CITRATE 50 MCG: 50 INJECTION, SOLUTION INTRAMUSCULAR; INTRAVENOUS at 13:37

## 2021-03-25 RX ADMIN — CLONIDINE HYDROCHLORIDE 0.2 MG: 0.2 TABLET ORAL at 15:10

## 2021-03-25 RX ADMIN — ALPRAZOLAM 0.25 MG: 0.25 TABLET ORAL at 20:03

## 2021-03-25 RX ADMIN — MORPHINE SULFATE 2 MG: 2 INJECTION, SOLUTION INTRAMUSCULAR; INTRAVENOUS at 16:19

## 2021-03-25 RX ADMIN — SODIUM CHLORIDE 100 ML/HR: 9 INJECTION, SOLUTION INTRAVENOUS at 14:59

## 2021-03-25 RX ADMIN — PROTAMINE SULFATE 50 MG: 10 INJECTION, SOLUTION INTRAVENOUS at 13:47

## 2021-03-25 RX ADMIN — NICARDIPINE HYDROCHLORIDE 15 MG/HR: 0.1 INJECTION, SOLUTION INTRAVENOUS at 15:20

## 2021-03-25 RX ADMIN — SODIUM CHLORIDE, POTASSIUM CHLORIDE, SODIUM LACTATE AND CALCIUM CHLORIDE 9 ML/HR: 600; 310; 30; 20 INJECTION, SOLUTION INTRAVENOUS at 09:54

## 2021-03-25 RX ADMIN — FENTANYL CITRATE 50 MCG: 50 INJECTION, SOLUTION INTRAMUSCULAR; INTRAVENOUS at 13:01

## 2021-03-25 RX ADMIN — FUROSEMIDE 40 MG: 10 INJECTION, SOLUTION INTRAVENOUS at 17:32

## 2021-03-25 RX ADMIN — LIDOCAINE HYDROCHLORIDE 100 MG: 10 INJECTION, SOLUTION EPIDURAL; INFILTRATION; INTRACAUDAL; PERINEURAL at 13:01

## 2021-03-25 RX ADMIN — HYDROCODONE BITARTRATE AND ACETAMINOPHEN 1 TABLET: 5; 325 TABLET ORAL at 20:03

## 2021-03-25 RX ADMIN — IPRATROPIUM BROMIDE AND ALBUTEROL SULFATE 3 ML: 2.5; .5 SOLUTION RESPIRATORY (INHALATION) at 16:14

## 2021-03-25 RX ADMIN — MIDAZOLAM 1 MG: 1 INJECTION INTRAMUSCULAR; INTRAVENOUS at 09:54

## 2021-03-25 RX ADMIN — HEPARIN SODIUM 18000 UNITS: 1000 INJECTION, SOLUTION INTRAVENOUS; SUBCUTANEOUS at 13:23

## 2021-03-25 RX ADMIN — ROCURONIUM BROMIDE 10 MG: 10 INJECTION INTRAVENOUS at 13:30

## 2021-03-25 RX ADMIN — PROPOFOL 50 MG: 10 INJECTION, EMULSION INTRAVENOUS at 13:01

## 2021-03-25 RX ADMIN — CYCLOSPORINE 1 DROP: 0.5 EMULSION OPHTHALMIC at 22:12

## 2021-03-25 RX ADMIN — MUPIROCIN 1 APPLICATION: 20 OINTMENT TOPICAL at 10:05

## 2021-03-25 RX ADMIN — ONDANSETRON 4 MG: 2 INJECTION INTRAMUSCULAR; INTRAVENOUS at 13:48

## 2021-03-25 RX ADMIN — FUROSEMIDE 40 MG: 10 INJECTION, SOLUTION INTRAVENOUS at 16:53

## 2021-03-25 RX ADMIN — MORPHINE SULFATE 2 MG: 2 INJECTION, SOLUTION INTRAMUSCULAR; INTRAVENOUS at 20:03

## 2021-03-25 RX ADMIN — ETOMIDATE 20 MG: 2 INJECTION, SOLUTION INTRAVENOUS at 13:01

## 2021-03-25 RX ADMIN — LABETALOL 20 MG/4 ML (5 MG/ML) INTRAVENOUS SYRINGE 10 MG: at 17:22

## 2021-03-25 RX ADMIN — NICARDIPINE HYDROCHLORIDE 15 MG/HR: 0.1 INJECTION, SOLUTION INTRAVENOUS at 16:18

## 2021-03-25 RX ADMIN — IPRATROPIUM BROMIDE AND ALBUTEROL SULFATE 3 ML: 2.5; .5 SOLUTION RESPIRATORY (INHALATION) at 19:03

## 2021-03-25 RX ADMIN — DEXAMETHASONE SODIUM PHOSPHATE 8 MG: 4 INJECTION, SOLUTION INTRA-ARTICULAR; INTRALESIONAL; INTRAMUSCULAR; INTRAVENOUS; SOFT TISSUE at 13:17

## 2021-03-25 RX ADMIN — PROTAMINE SULFATE 50 MG: 10 INJECTION, SOLUTION INTRAVENOUS at 15:52

## 2021-03-25 RX ADMIN — DEXAMETHASONE SODIUM PHOSPHATE 8 MG: 4 INJECTION, SOLUTION INTRA-ARTICULAR; INTRALESIONAL; INTRAMUSCULAR; INTRAVENOUS; SOFT TISSUE at 13:01

## 2021-03-25 RX ADMIN — IPRATROPIUM BROMIDE AND ALBUTEROL SULFATE 3 ML: 2.5; .5 SOLUTION RESPIRATORY (INHALATION) at 09:43

## 2021-03-25 RX ADMIN — HYDRALAZINE HYDROCHLORIDE 50 MG: 25 TABLET ORAL at 20:03

## 2021-03-25 RX ADMIN — HYDRALAZINE HYDROCHLORIDE 50 MG: 25 TABLET ORAL at 15:10

## 2021-03-25 RX ADMIN — LIDOCAINE HYDROCHLORIDE 0.5 ML: 10 INJECTION, SOLUTION EPIDURAL; INFILTRATION; INTRACAUDAL; PERINEURAL at 09:52

## 2021-03-25 RX ADMIN — CEFUROXIME 1.5 G: 1.5 INJECTION, POWDER, FOR SOLUTION INTRAVENOUS at 13:01

## 2021-03-25 RX ADMIN — SUGAMMADEX 200 MG: 100 INJECTION, SOLUTION INTRAVENOUS at 14:00

## 2021-03-25 RX ADMIN — CHLOROTHIAZIDE SODIUM 500 MG: 500 INJECTION, POWDER, LYOPHILIZED, FOR SOLUTION INTRAVENOUS at 17:40

## 2021-03-25 RX ADMIN — ROCURONIUM BROMIDE 10 MG: 10 INJECTION INTRAVENOUS at 13:17

## 2021-03-25 RX ADMIN — NITROGLYCERIN 20 MCG/MIN: 20 INJECTION INTRAVENOUS at 14:57

## 2021-03-25 NOTE — ANESTHESIA PROCEDURE NOTES
Arterial Line    Pre-sedation assessment completed: 3/25/2021 12:37 PM    Patient reassessed immediately prior to procedure    Patient location during procedure: pre-op  Stop Time:3/25/2021 12:37 PM       Line placed for hemodynamic monitoring.  Performed By   Anesthesiologist: Robson Burns MD  Preanesthetic Checklist  Completed: patient identified, IV checked, site marked, risks and benefits discussed, surgical consent, monitors and equipment checked, pre-op evaluation and timeout performed  Arterial Line Prep   Sterile Tech: cap, gloves and sterile barriers  Prep: ChloraPrep  Patient monitoring: blood pressure monitoring, continuous pulse oximetry and EKG  Arterial Line Procedure   Laterality:right  Location:  radial artery  Catheter size: 20 G   Guidance: palpation technique  Number of attempts: 1  Successful placement: yes  Post Assessment   Dressing Type: line sutured, occlusive dressing applied, secured with tape and wrist guard applied.   Complications no  Circ/Move/Sens Assessment: normal and unchanged.   Patient Tolerance: patient tolerated the procedure well with no apparent complications

## 2021-03-25 NOTE — ANESTHESIA PROCEDURE NOTES
Airway  Urgency: elective    Date/Time: 3/25/2021 1:02 PM  Airway not difficult    General Information and Staff    Patient location during procedure: OR  CRNA: Maged Meza CRNA    Indications and Patient Condition  Indications for airway management: airway protection    Preoxygenated: yes  MILS not maintained throughout  Mask difficulty assessment: 1 - vent by mask    Final Airway Details  Final airway type: endotracheal airway      Successful airway: ETT  Cuffed: yes   Successful intubation technique: direct laryngoscopy  Endotracheal tube insertion site: oral  Blade: Gonzalez  Blade size: 3  ETT size (mm): 7.0  Cormack-Lehane Classification: grade I - full view of glottis  Placement verified by: chest auscultation and capnometry   Measured from: lips  ETT/EBT  to lips (cm): 20  Number of attempts at approach: 1  Assessment: lips, teeth, and gum same as pre-op and atraumatic intubation    Additional Comments  Negative epigastric sounds, Breath sound equal bilaterally with symmetric chest rise and fall. Grade III view w mac 3

## 2021-03-25 NOTE — ANESTHESIA PREPROCEDURE EVALUATION
Anesthesia Evaluation     Patient summary reviewed and Nursing notes reviewed   NPO Solid Status: > 8 hours  NPO Liquid Status: > 8 hours           Airway   Mallampati: I  TM distance: >3 FB  Neck ROM: full  No difficulty expected  Dental    (+) upper dentures    Pulmonary    (+) decreased breath sounds,   Cardiovascular   Exercise tolerance: poor (<4 METS)    Rhythm: regular  Rate: normal    (+) murmur,       Neuro/Psych  GI/Hepatic/Renal/Endo      Musculoskeletal     Abdominal    Substance History      OB/GYN          Other                        Anesthesia Plan    ASA 4     general     intravenous induction     Anesthetic plan, all risks, benefits, and alternatives have been provided, discussed and informed consent has been obtained with: patient.    a line jamin   Return to icu post op

## 2021-03-25 NOTE — ANESTHESIA POSTPROCEDURE EVALUATION
Patient: Holly Corona    Procedure Summary     Date: 03/25/21 Room / Location: [facility] OR 01 / [facility] HYBRID DEJUAN    Anesthesia Start: 1251 Anesthesia Stop:     Procedures:       TRANSCATHETER AORTIC VALVE REPLACEMENT (N/A Chest)      TRANSESOPHAGEAL ECHOCARDIOGRAM WITH ANESTHESIA (N/A Chest)      TRANSCATHETER AORTIC VALVE INSERTION (N/A ) Diagnosis:       Aortic valve disorder      (Aortic valve disorder [I35.9])    Surgeons: Sacha العلي MD; Milana Whitaker MD Provider: Salo Diaz MD    Anesthesia Type: general ASA Status: 4          Anesthesia Type: general    Vitals  No vitals data found for the desired time range.    HR 73  T 97F  /54  RR 12  SpO2 94%      Post Anesthesia Care and Evaluation    Patient location during evaluation: PACU  Patient participation: complete - patient participated  Level of consciousness: awake and alert  Pain score: 0  Pain management: adequate  Airway patency: patent  Anesthetic complications: No anesthetic complications  PONV Status: none  Cardiovascular status: hemodynamically stable and acceptable  Respiratory status: nonlabored ventilation, acceptable, nasal cannula and spontaneous ventilation  Hydration status: acceptable    Comments: Handoff given to ICU RN at bedside.

## 2021-03-25 NOTE — ANESTHESIA PROCEDURE NOTES
General Procedure Information  Location performed:  OR  Not intubated  Heart not visualized  Probe Type:  Multiplane  Modalities:  2D only, color flow mapping, continuous wave Doppler and pulse wave Doppler    Echocardiographic and Doppler Measurements    Ventricles    Right Ventricle:  Cavity size normal.    Left Ventricle:  Cavity size normal.      Ventricular Regional Function:  1- Basal Anteroseptal:  normal  2- Basal Anterior:  normal  3- Basal Anterolateral:  normal  4- Basal Inferolateral:  normal  5- Basal Inferior:  normal  6- Basal Inferoseptal:  normal  7- Mid Anteroseptal:  normal  8- Mid Anterior:  normal  9- Mid Anterolateral:  normal  10- Mid Inferolateral:  normal  11- Mid Inferior:  normal  12- Mid Inferoseptal:  normal  13- Apical Anterior:  normal  14- Apical Lateral:  normal  15- Apical Inferior:  normal  16- Apical Septal:  normal  17- Redford:  normal      Valves    Aortic Valve:  Annulus calcified.  Stenosis severe.  Area: 1.01 cm².  Regurgitation mild.  Leaflets calcified.  Leaflet motions restricted.      Mitral Valve:  Annulus normal and calcified.  Stenosis not present.  Regurgitation trace.  Leaflets normal.      Tricuspid Valve:  Annulus normal.  Stenosis not present.  Regurgitation trace.  Leaflets normal.  Leaflet motions normal.    Pulmonic Valve:  Annulus normal.  Stenosis not present.  Regurgitation absent.          Aorta    Ascending Aorta:  Size normal.  Dissection not present.  Plaque thickness less than 3 mm.  Mobile plaque not present.    Aortic Arch:  Size normal.  Dissection not present.  Plaque thickness less than 3 mm.  Mobile plaque not present.    Descending Aorta:  Size normal.  Dissection not present.  Plaque thickness less than 3 mm.  Mobile plaque not present.          Atria    Right Atrium:  Size normal.  Spontaneous echo contrast not present.  Thrombus not present.  Tumor not present.  Device not present.      Left Atrium:  Size normal.  Spontaneous echo contrast  not present.  Thrombus not present.  Tumor not present.  Device not present.    Left atrial appendage normal.      Septa    Atrial Septum:  Intra-atrial septal morphology normal.      Ventricular Septum:  Intra-ventricular septum morphology normal.          Other Findings  Pericardium:  normal  Pulmonary Arteries:  normal  Pulmonary Venous Flow:  normal    Anesthesia Information      Echocardiogram Comments:       Tricuspid aortic valve morteza 1.02  Mild/moderate ai   Good lvf  lvh      Post 23mm bioprosthesis placement - LV function preserved, no new WMA.  Small Posterior PVL, smaller anterior PVL.  No new effusions.  Prosthetic valve gradient max 8 / mean 4 , max henrietta 1.4m/s, area 2.8.  Surgeon updated.

## 2021-03-26 ENCOUNTER — APPOINTMENT (OUTPATIENT)
Dept: GENERAL RADIOLOGY | Facility: HOSPITAL | Age: 76
End: 2021-03-26

## 2021-03-26 LAB
ANION GAP SERPL CALCULATED.3IONS-SCNC: 9 MMOL/L (ref 5–15)
BH BB BLOOD EXPIRATION DATE: NORMAL
BH BB BLOOD TYPE BARCODE: 600
BH BB BLOOD TYPE BARCODE: 6200
BH BB DISPENSE STATUS: NORMAL
BH BB PRODUCT CODE: NORMAL
BH BB UNIT NUMBER: NORMAL
BUN SERPL-MCNC: 22 MG/DL (ref 8–23)
BUN/CREAT SERPL: 17.1 (ref 7–25)
CALCIUM SPEC-SCNC: 8.4 MG/DL (ref 8.6–10.5)
CHLORIDE SERPL-SCNC: 104 MMOL/L (ref 98–107)
CO2 SERPL-SCNC: 29 MMOL/L (ref 22–29)
CREAT SERPL-MCNC: 1.29 MG/DL (ref 0.57–1)
DEPRECATED RDW RBC AUTO: 55.1 FL (ref 37–54)
ERYTHROCYTE [DISTWIDTH] IN BLOOD BY AUTOMATED COUNT: 15.8 % (ref 12.3–15.4)
GFR SERPL CREATININE-BSD FRML MDRD: 40 ML/MIN/1.73
GLUCOSE SERPL-MCNC: 150 MG/DL (ref 65–99)
HCT VFR BLD AUTO: 31.1 % (ref 34–46.6)
HGB BLD-MCNC: 9.6 G/DL (ref 12–15.9)
MAGNESIUM SERPL-MCNC: 1.9 MG/DL (ref 1.6–2.4)
MCH RBC QN AUTO: 29.6 PG (ref 26.6–33)
MCHC RBC AUTO-ENTMCNC: 30.9 G/DL (ref 31.5–35.7)
MCV RBC AUTO: 96 FL (ref 79–97)
PHOSPHATE SERPL-MCNC: 4 MG/DL (ref 2.5–4.5)
PLATELET # BLD AUTO: 170 10*3/MM3 (ref 140–450)
PMV BLD AUTO: 10.7 FL (ref 6–12)
POTASSIUM SERPL-SCNC: 4.7 MMOL/L (ref 3.5–5.2)
QT INTERVAL: 444 MS
QTC INTERVAL: 472 MS
RBC # BLD AUTO: 3.24 10*6/MM3 (ref 3.77–5.28)
SODIUM SERPL-SCNC: 142 MMOL/L (ref 136–145)
UNIT  ABO: NORMAL
UNIT  RH: NORMAL
WBC # BLD AUTO: 12.41 10*3/MM3 (ref 3.4–10.8)

## 2021-03-26 PROCEDURE — 85027 COMPLETE CBC AUTOMATED: CPT | Performed by: PHYSICIAN ASSISTANT

## 2021-03-26 PROCEDURE — 94799 UNLISTED PULMONARY SVC/PX: CPT

## 2021-03-26 PROCEDURE — 93005 ELECTROCARDIOGRAM TRACING: CPT | Performed by: PHYSICIAN ASSISTANT

## 2021-03-26 PROCEDURE — 99232 SBSQ HOSP IP/OBS MODERATE 35: CPT | Performed by: THORACIC SURGERY (CARDIOTHORACIC VASCULAR SURGERY)

## 2021-03-26 PROCEDURE — 25010000002 HYDRALAZINE PER 20 MG: Performed by: NURSE PRACTITIONER

## 2021-03-26 PROCEDURE — 83735 ASSAY OF MAGNESIUM: CPT | Performed by: INTERNAL MEDICINE

## 2021-03-26 PROCEDURE — 84100 ASSAY OF PHOSPHORUS: CPT | Performed by: INTERNAL MEDICINE

## 2021-03-26 PROCEDURE — 93010 ELECTROCARDIOGRAM REPORT: CPT | Performed by: INTERNAL MEDICINE

## 2021-03-26 PROCEDURE — 99232 SBSQ HOSP IP/OBS MODERATE 35: CPT | Performed by: INTERNAL MEDICINE

## 2021-03-26 PROCEDURE — 99233 SBSQ HOSP IP/OBS HIGH 50: CPT | Performed by: INTERNAL MEDICINE

## 2021-03-26 PROCEDURE — 80048 BASIC METABOLIC PNL TOTAL CA: CPT | Performed by: PHYSICIAN ASSISTANT

## 2021-03-26 PROCEDURE — 71045 X-RAY EXAM CHEST 1 VIEW: CPT

## 2021-03-26 PROCEDURE — 97162 PT EVAL MOD COMPLEX 30 MIN: CPT

## 2021-03-26 PROCEDURE — 25010000002 HYDRALAZINE PER 20 MG: Performed by: PHYSICIAN ASSISTANT

## 2021-03-26 PROCEDURE — 25010000003 MAGNESIUM SULFATE 4 GM/100ML SOLUTION: Performed by: THORACIC SURGERY (CARDIOTHORACIC VASCULAR SURGERY)

## 2021-03-26 PROCEDURE — 25010000002 MORPHINE PER 10 MG: Performed by: THORACIC SURGERY (CARDIOTHORACIC VASCULAR SURGERY)

## 2021-03-26 PROCEDURE — 25010000002 MORPHINE PER 10 MG: Performed by: NURSE PRACTITIONER

## 2021-03-26 RX ORDER — HYDRALAZINE HYDROCHLORIDE 20 MG/ML
20 INJECTION INTRAMUSCULAR; INTRAVENOUS EVERY 4 HOURS PRN
Status: DISCONTINUED | OUTPATIENT
Start: 2021-03-26 | End: 2021-03-29 | Stop reason: HOSPADM

## 2021-03-26 RX ORDER — SOTALOL HYDROCHLORIDE 80 MG/1
80 TABLET ORAL EVERY 12 HOURS SCHEDULED
Status: DISCONTINUED | OUTPATIENT
Start: 2021-03-26 | End: 2021-03-29 | Stop reason: HOSPADM

## 2021-03-26 RX ORDER — BUMETANIDE 0.25 MG/ML
2 INJECTION INTRAMUSCULAR; INTRAVENOUS ONCE
Status: COMPLETED | OUTPATIENT
Start: 2021-03-26 | End: 2021-03-26

## 2021-03-26 RX ORDER — MORPHINE SULFATE 2 MG/ML
2 INJECTION, SOLUTION INTRAMUSCULAR; INTRAVENOUS ONCE
Status: COMPLETED | OUTPATIENT
Start: 2021-03-26 | End: 2021-03-26

## 2021-03-26 RX ORDER — MAGNESIUM SULFATE HEPTAHYDRATE 40 MG/ML
4 INJECTION, SOLUTION INTRAVENOUS AS NEEDED
Status: DISCONTINUED | OUTPATIENT
Start: 2021-03-26 | End: 2021-03-29 | Stop reason: HOSPADM

## 2021-03-26 RX ORDER — MAGNESIUM SULFATE HEPTAHYDRATE 40 MG/ML
2 INJECTION, SOLUTION INTRAVENOUS AS NEEDED
Status: DISCONTINUED | OUTPATIENT
Start: 2021-03-26 | End: 2021-03-29 | Stop reason: HOSPADM

## 2021-03-26 RX ORDER — HYDRALAZINE HYDROCHLORIDE 20 MG/ML
10 INJECTION INTRAMUSCULAR; INTRAVENOUS ONCE
Status: COMPLETED | OUTPATIENT
Start: 2021-03-26 | End: 2021-03-26

## 2021-03-26 RX ADMIN — HYDRALAZINE HYDROCHLORIDE 50 MG: 25 TABLET ORAL at 08:31

## 2021-03-26 RX ADMIN — IPRATROPIUM BROMIDE AND ALBUTEROL SULFATE 3 ML: 2.5; .5 SOLUTION RESPIRATORY (INHALATION) at 15:43

## 2021-03-26 RX ADMIN — MAGNESIUM SULFATE 4 G: 4 INJECTION INTRAVENOUS at 03:11

## 2021-03-26 RX ADMIN — IPRATROPIUM BROMIDE AND ALBUTEROL SULFATE 3 ML: 2.5; .5 SOLUTION RESPIRATORY (INHALATION) at 07:27

## 2021-03-26 RX ADMIN — LEVOTHYROXINE SODIUM 88 MCG: 88 TABLET ORAL at 05:29

## 2021-03-26 RX ADMIN — BUMETANIDE 2 MG: 0.25 INJECTION INTRAMUSCULAR; INTRAVENOUS at 06:26

## 2021-03-26 RX ADMIN — HYDROCODONE BITARTRATE AND ACETAMINOPHEN 1 TABLET: 5; 325 TABLET ORAL at 10:55

## 2021-03-26 RX ADMIN — HYDROCODONE BITARTRATE AND ACETAMINOPHEN 1 TABLET: 5; 325 TABLET ORAL at 20:08

## 2021-03-26 RX ADMIN — CYCLOSPORINE 1 DROP: 0.5 EMULSION OPHTHALMIC at 22:12

## 2021-03-26 RX ADMIN — MORPHINE SULFATE 2 MG: 2 INJECTION, SOLUTION INTRAMUSCULAR; INTRAVENOUS at 00:04

## 2021-03-26 RX ADMIN — NITROGLYCERIN 200 MCG/MIN: 20 INJECTION INTRAVENOUS at 00:41

## 2021-03-26 RX ADMIN — HYDRALAZINE HYDROCHLORIDE 50 MG: 25 TABLET ORAL at 15:52

## 2021-03-26 RX ADMIN — MORPHINE SULFATE 2 MG: 2 INJECTION, SOLUTION INTRAMUSCULAR; INTRAVENOUS at 02:18

## 2021-03-26 RX ADMIN — HYDROCODONE BITARTRATE AND ACETAMINOPHEN 1 TABLET: 5; 325 TABLET ORAL at 01:57

## 2021-03-26 RX ADMIN — HYDRALAZINE HYDROCHLORIDE 10 MG: 20 INJECTION INTRAMUSCULAR; INTRAVENOUS at 00:40

## 2021-03-26 RX ADMIN — DOCUSATE SODIUM 50MG AND SENNOSIDES 8.6MG 2 TABLET: 8.6; 5 TABLET, FILM COATED ORAL at 20:08

## 2021-03-26 RX ADMIN — NITROGLYCERIN 100 MCG/MIN: 20 INJECTION INTRAVENOUS at 03:24

## 2021-03-26 RX ADMIN — ALLOPURINOL 200 MG: 100 TABLET ORAL at 08:32

## 2021-03-26 RX ADMIN — IPRATROPIUM BROMIDE AND ALBUTEROL SULFATE 3 ML: 2.5; .5 SOLUTION RESPIRATORY (INHALATION) at 11:57

## 2021-03-26 RX ADMIN — ALPRAZOLAM 0.25 MG: 0.25 TABLET ORAL at 23:04

## 2021-03-26 RX ADMIN — IPRATROPIUM BROMIDE AND ALBUTEROL SULFATE 3 ML: 2.5; .5 SOLUTION RESPIRATORY (INHALATION) at 18:33

## 2021-03-26 RX ADMIN — SOTALOL HYDROCHLORIDE 80 MG: 80 TABLET ORAL at 20:08

## 2021-03-26 RX ADMIN — ASPIRIN 81 MG: 81 TABLET, COATED ORAL at 08:31

## 2021-03-26 RX ADMIN — HYDRALAZINE HYDROCHLORIDE 10 MG: 20 INJECTION INTRAMUSCULAR; INTRAVENOUS at 00:15

## 2021-03-26 RX ADMIN — PANTOPRAZOLE SODIUM 40 MG: 40 TABLET, DELAYED RELEASE ORAL at 08:31

## 2021-03-26 RX ADMIN — HYDRALAZINE HYDROCHLORIDE 50 MG: 25 TABLET ORAL at 20:08

## 2021-03-27 ENCOUNTER — APPOINTMENT (OUTPATIENT)
Dept: GENERAL RADIOLOGY | Facility: HOSPITAL | Age: 76
End: 2021-03-27

## 2021-03-27 LAB
ANION GAP SERPL CALCULATED.3IONS-SCNC: 5 MMOL/L (ref 5–15)
BUN SERPL-MCNC: 30 MG/DL (ref 8–23)
BUN/CREAT SERPL: 21.3 (ref 7–25)
CALCIUM SPEC-SCNC: 7.6 MG/DL (ref 8.6–10.5)
CHLORIDE SERPL-SCNC: 101 MMOL/L (ref 98–107)
CO2 SERPL-SCNC: 32 MMOL/L (ref 22–29)
CREAT SERPL-MCNC: 1.41 MG/DL (ref 0.57–1)
DEPRECATED RDW RBC AUTO: 56 FL (ref 37–54)
ERYTHROCYTE [DISTWIDTH] IN BLOOD BY AUTOMATED COUNT: 15.9 % (ref 12.3–15.4)
GFR SERPL CREATININE-BSD FRML MDRD: 36 ML/MIN/1.73
GLUCOSE SERPL-MCNC: 118 MG/DL (ref 65–99)
HCT VFR BLD AUTO: 24.8 % (ref 34–46.6)
HCT VFR BLD AUTO: 26.2 % (ref 34–46.6)
HGB BLD-MCNC: 7.5 G/DL (ref 12–15.9)
HGB BLD-MCNC: 8.2 G/DL (ref 12–15.9)
MCH RBC QN AUTO: 29.2 PG (ref 26.6–33)
MCHC RBC AUTO-ENTMCNC: 30.2 G/DL (ref 31.5–35.7)
MCV RBC AUTO: 96.5 FL (ref 79–97)
PLATELET # BLD AUTO: 113 10*3/MM3 (ref 140–450)
PMV BLD AUTO: 11 FL (ref 6–12)
POTASSIUM SERPL-SCNC: 4.5 MMOL/L (ref 3.5–5.2)
RBC # BLD AUTO: 2.57 10*6/MM3 (ref 3.77–5.28)
SODIUM SERPL-SCNC: 138 MMOL/L (ref 136–145)
WBC # BLD AUTO: 9.58 10*3/MM3 (ref 3.4–10.8)

## 2021-03-27 PROCEDURE — 93010 ELECTROCARDIOGRAM REPORT: CPT | Performed by: INTERNAL MEDICINE

## 2021-03-27 PROCEDURE — 80048 BASIC METABOLIC PNL TOTAL CA: CPT | Performed by: PHYSICIAN ASSISTANT

## 2021-03-27 PROCEDURE — 93005 ELECTROCARDIOGRAM TRACING: CPT | Performed by: PHYSICIAN ASSISTANT

## 2021-03-27 PROCEDURE — 85018 HEMOGLOBIN: CPT | Performed by: THORACIC SURGERY (CARDIOTHORACIC VASCULAR SURGERY)

## 2021-03-27 PROCEDURE — 94799 UNLISTED PULMONARY SVC/PX: CPT

## 2021-03-27 PROCEDURE — 85027 COMPLETE CBC AUTOMATED: CPT | Performed by: PHYSICIAN ASSISTANT

## 2021-03-27 PROCEDURE — 99232 SBSQ HOSP IP/OBS MODERATE 35: CPT | Performed by: THORACIC SURGERY (CARDIOTHORACIC VASCULAR SURGERY)

## 2021-03-27 PROCEDURE — 25010000002 HYDRALAZINE PER 20 MG: Performed by: NURSE PRACTITIONER

## 2021-03-27 PROCEDURE — 97530 THERAPEUTIC ACTIVITIES: CPT

## 2021-03-27 PROCEDURE — 99232 SBSQ HOSP IP/OBS MODERATE 35: CPT | Performed by: INTERNAL MEDICINE

## 2021-03-27 PROCEDURE — 71045 X-RAY EXAM CHEST 1 VIEW: CPT

## 2021-03-27 PROCEDURE — 85014 HEMATOCRIT: CPT | Performed by: THORACIC SURGERY (CARDIOTHORACIC VASCULAR SURGERY)

## 2021-03-27 RX ORDER — FUROSEMIDE 20 MG/1
20 TABLET ORAL DAILY
Status: DISCONTINUED | OUTPATIENT
Start: 2021-03-28 | End: 2021-03-29 | Stop reason: HOSPADM

## 2021-03-27 RX ORDER — FUROSEMIDE 20 MG/1
20 TABLET ORAL DAILY
Status: DISCONTINUED | OUTPATIENT
Start: 2021-03-27 | End: 2021-03-27

## 2021-03-27 RX ADMIN — ALLOPURINOL 200 MG: 100 TABLET ORAL at 08:33

## 2021-03-27 RX ADMIN — NITROGLYCERIN 55 MCG/MIN: 20 INJECTION INTRAVENOUS at 01:05

## 2021-03-27 RX ADMIN — HYDROCODONE BITARTRATE AND ACETAMINOPHEN 1 TABLET: 5; 325 TABLET ORAL at 22:32

## 2021-03-27 RX ADMIN — ASPIRIN 81 MG: 81 TABLET, COATED ORAL at 08:33

## 2021-03-27 RX ADMIN — HYDRALAZINE HYDROCHLORIDE 50 MG: 25 TABLET ORAL at 22:31

## 2021-03-27 RX ADMIN — IPRATROPIUM BROMIDE AND ALBUTEROL SULFATE 3 ML: 2.5; .5 SOLUTION RESPIRATORY (INHALATION) at 16:19

## 2021-03-27 RX ADMIN — HYDRALAZINE HYDROCHLORIDE 50 MG: 25 TABLET ORAL at 16:53

## 2021-03-27 RX ADMIN — SOTALOL HYDROCHLORIDE 80 MG: 80 TABLET ORAL at 08:33

## 2021-03-27 RX ADMIN — PANTOPRAZOLE SODIUM 40 MG: 40 TABLET, DELAYED RELEASE ORAL at 08:33

## 2021-03-27 RX ADMIN — DOCUSATE SODIUM 50MG AND SENNOSIDES 8.6MG 2 TABLET: 8.6; 5 TABLET, FILM COATED ORAL at 22:31

## 2021-03-27 RX ADMIN — SOTALOL HYDROCHLORIDE 80 MG: 80 TABLET ORAL at 22:31

## 2021-03-27 RX ADMIN — HYDROCODONE BITARTRATE AND ACETAMINOPHEN 1 TABLET: 5; 325 TABLET ORAL at 08:47

## 2021-03-27 RX ADMIN — LEVOTHYROXINE SODIUM 88 MCG: 88 TABLET ORAL at 05:26

## 2021-03-27 RX ADMIN — CYCLOSPORINE 1 DROP: 0.5 EMULSION OPHTHALMIC at 20:57

## 2021-03-27 RX ADMIN — HYDRALAZINE HYDROCHLORIDE 20 MG: 20 INJECTION INTRAMUSCULAR; INTRAVENOUS at 05:25

## 2021-03-27 RX ADMIN — HYDRALAZINE HYDROCHLORIDE 50 MG: 25 TABLET ORAL at 08:33

## 2021-03-27 RX ADMIN — IPRATROPIUM BROMIDE AND ALBUTEROL SULFATE 3 ML: 2.5; .5 SOLUTION RESPIRATORY (INHALATION) at 18:20

## 2021-03-27 RX ADMIN — IPRATROPIUM BROMIDE AND ALBUTEROL SULFATE 3 ML: 2.5; .5 SOLUTION RESPIRATORY (INHALATION) at 12:15

## 2021-03-28 ENCOUNTER — APPOINTMENT (OUTPATIENT)
Dept: GENERAL RADIOLOGY | Facility: HOSPITAL | Age: 76
End: 2021-03-28

## 2021-03-28 PROBLEM — Z95.3 S/P TAVR (TRANSCATHETER AORTIC VALVE REPLACEMENT), BIOPROSTHETIC: Status: ACTIVE | Noted: 2021-03-28

## 2021-03-28 PROBLEM — J81.0 ACUTE PULMONARY EDEMA (HCC): Status: ACTIVE | Noted: 2021-03-28

## 2021-03-28 LAB
ALBUMIN SERPL-MCNC: 2.8 G/DL (ref 3.5–5.2)
ANION GAP SERPL CALCULATED.3IONS-SCNC: 7 MMOL/L (ref 5–15)
BASOPHILS # BLD AUTO: 0.02 10*3/MM3 (ref 0–0.2)
BASOPHILS NFR BLD AUTO: 0.3 % (ref 0–1.5)
BH BB BLOOD EXPIRATION DATE: NORMAL
BH BB BLOOD EXPIRATION DATE: NORMAL
BH BB BLOOD TYPE BARCODE: 6200
BH BB BLOOD TYPE BARCODE: 6200
BH BB DISPENSE STATUS: NORMAL
BH BB DISPENSE STATUS: NORMAL
BH BB PRODUCT CODE: NORMAL
BH BB PRODUCT CODE: NORMAL
BH BB UNIT NUMBER: NORMAL
BH BB UNIT NUMBER: NORMAL
BUN SERPL-MCNC: 30 MG/DL (ref 8–23)
BUN/CREAT SERPL: 25.6 (ref 7–25)
CALCIUM SPEC-SCNC: 7.9 MG/DL (ref 8.6–10.5)
CHLORIDE SERPL-SCNC: 101 MMOL/L (ref 98–107)
CO2 SERPL-SCNC: 30 MMOL/L (ref 22–29)
CREAT SERPL-MCNC: 1.17 MG/DL (ref 0.57–1)
CROSSMATCH INTERPRETATION: NORMAL
CROSSMATCH INTERPRETATION: NORMAL
DEPRECATED RDW RBC AUTO: 54.1 FL (ref 37–54)
EOSINOPHIL # BLD AUTO: 0.18 10*3/MM3 (ref 0–0.4)
EOSINOPHIL NFR BLD AUTO: 2.7 % (ref 0.3–6.2)
ERYTHROCYTE [DISTWIDTH] IN BLOOD BY AUTOMATED COUNT: 15.7 % (ref 12.3–15.4)
GFR SERPL CREATININE-BSD FRML MDRD: 45 ML/MIN/1.73
GLUCOSE SERPL-MCNC: 98 MG/DL (ref 65–99)
HCT VFR BLD AUTO: 24.2 % (ref 34–46.6)
HGB BLD-MCNC: 7.5 G/DL (ref 12–15.9)
IMM GRANULOCYTES # BLD AUTO: 0.02 10*3/MM3 (ref 0–0.05)
IMM GRANULOCYTES NFR BLD AUTO: 0.3 % (ref 0–0.5)
LYMPHOCYTES # BLD AUTO: 1.06 10*3/MM3 (ref 0.7–3.1)
LYMPHOCYTES NFR BLD AUTO: 15.9 % (ref 19.6–45.3)
MAGNESIUM SERPL-MCNC: 2.1 MG/DL (ref 1.6–2.4)
MCH RBC QN AUTO: 29.6 PG (ref 26.6–33)
MCHC RBC AUTO-ENTMCNC: 31 G/DL (ref 31.5–35.7)
MCV RBC AUTO: 95.7 FL (ref 79–97)
MONOCYTES # BLD AUTO: 0.8 10*3/MM3 (ref 0.1–0.9)
MONOCYTES NFR BLD AUTO: 12 % (ref 5–12)
NEUTROPHILS NFR BLD AUTO: 4.6 10*3/MM3 (ref 1.7–7)
NEUTROPHILS NFR BLD AUTO: 68.8 % (ref 42.7–76)
NRBC BLD AUTO-RTO: 0 /100 WBC (ref 0–0.2)
PHOSPHATE SERPL-MCNC: 2.8 MG/DL (ref 2.5–4.5)
PLATELET # BLD AUTO: 90 10*3/MM3 (ref 140–450)
PMV BLD AUTO: 11.5 FL (ref 6–12)
POTASSIUM SERPL-SCNC: 4.3 MMOL/L (ref 3.5–5.2)
RBC # BLD AUTO: 2.53 10*6/MM3 (ref 3.77–5.28)
SODIUM SERPL-SCNC: 138 MMOL/L (ref 136–145)
UNIT  ABO: NORMAL
UNIT  ABO: NORMAL
UNIT  RH: NORMAL
UNIT  RH: NORMAL
WBC # BLD AUTO: 6.68 10*3/MM3 (ref 3.4–10.8)

## 2021-03-28 PROCEDURE — 25010000002 HYDRALAZINE PER 20 MG: Performed by: NURSE PRACTITIONER

## 2021-03-28 PROCEDURE — 99231 SBSQ HOSP IP/OBS SF/LOW 25: CPT | Performed by: THORACIC SURGERY (CARDIOTHORACIC VASCULAR SURGERY)

## 2021-03-28 PROCEDURE — 94799 UNLISTED PULMONARY SVC/PX: CPT

## 2021-03-28 PROCEDURE — 99232 SBSQ HOSP IP/OBS MODERATE 35: CPT | Performed by: INTERNAL MEDICINE

## 2021-03-28 PROCEDURE — 80069 RENAL FUNCTION PANEL: CPT | Performed by: INTERNAL MEDICINE

## 2021-03-28 PROCEDURE — 83735 ASSAY OF MAGNESIUM: CPT | Performed by: INTERNAL MEDICINE

## 2021-03-28 PROCEDURE — 85025 COMPLETE CBC W/AUTO DIFF WBC: CPT | Performed by: INTERNAL MEDICINE

## 2021-03-28 PROCEDURE — 71045 X-RAY EXAM CHEST 1 VIEW: CPT

## 2021-03-28 RX ORDER — LOSARTAN POTASSIUM 50 MG/1
50 TABLET ORAL
Status: DISCONTINUED | OUTPATIENT
Start: 2021-03-28 | End: 2021-03-29 | Stop reason: HOSPADM

## 2021-03-28 RX ADMIN — ALLOPURINOL 200 MG: 100 TABLET ORAL at 08:31

## 2021-03-28 RX ADMIN — ASPIRIN 81 MG: 81 TABLET, COATED ORAL at 08:31

## 2021-03-28 RX ADMIN — HYDRALAZINE HYDROCHLORIDE 50 MG: 25 TABLET ORAL at 16:40

## 2021-03-28 RX ADMIN — LEVOTHYROXINE SODIUM 88 MCG: 88 TABLET ORAL at 06:45

## 2021-03-28 RX ADMIN — DOCUSATE SODIUM 50MG AND SENNOSIDES 8.6MG 2 TABLET: 8.6; 5 TABLET, FILM COATED ORAL at 21:08

## 2021-03-28 RX ADMIN — IPRATROPIUM BROMIDE AND ALBUTEROL SULFATE 3 ML: 2.5; .5 SOLUTION RESPIRATORY (INHALATION) at 09:49

## 2021-03-28 RX ADMIN — HYDRALAZINE HYDROCHLORIDE 25 MG: 25 TABLET ORAL at 09:25

## 2021-03-28 RX ADMIN — CYCLOSPORINE 1 DROP: 0.5 EMULSION OPHTHALMIC at 21:09

## 2021-03-28 RX ADMIN — HYDROCODONE BITARTRATE AND ACETAMINOPHEN 1 TABLET: 5; 325 TABLET ORAL at 21:08

## 2021-03-28 RX ADMIN — HYDRALAZINE HYDROCHLORIDE 25 MG: 25 TABLET ORAL at 08:32

## 2021-03-28 RX ADMIN — SOTALOL HYDROCHLORIDE 80 MG: 80 TABLET ORAL at 08:32

## 2021-03-28 RX ADMIN — LOSARTAN POTASSIUM 50 MG: 50 TABLET, FILM COATED ORAL at 09:28

## 2021-03-28 RX ADMIN — FUROSEMIDE 20 MG: 20 TABLET ORAL at 08:32

## 2021-03-28 RX ADMIN — HYDRALAZINE HYDROCHLORIDE 20 MG: 20 INJECTION INTRAMUSCULAR; INTRAVENOUS at 22:27

## 2021-03-28 RX ADMIN — IPRATROPIUM BROMIDE AND ALBUTEROL SULFATE 3 ML: 2.5; .5 SOLUTION RESPIRATORY (INHALATION) at 16:47

## 2021-03-28 RX ADMIN — PANTOPRAZOLE SODIUM 40 MG: 40 TABLET, DELAYED RELEASE ORAL at 08:32

## 2021-03-28 RX ADMIN — IPRATROPIUM BROMIDE AND ALBUTEROL SULFATE 3 ML: 2.5; .5 SOLUTION RESPIRATORY (INHALATION) at 18:37

## 2021-03-28 RX ADMIN — SOTALOL HYDROCHLORIDE 80 MG: 80 TABLET ORAL at 21:08

## 2021-03-28 RX ADMIN — HYDRALAZINE HYDROCHLORIDE 50 MG: 25 TABLET ORAL at 21:08

## 2021-03-29 ENCOUNTER — APPOINTMENT (OUTPATIENT)
Dept: GENERAL RADIOLOGY | Facility: HOSPITAL | Age: 76
End: 2021-03-29

## 2021-03-29 VITALS
BODY MASS INDEX: 45.99 KG/M2 | DIASTOLIC BLOOD PRESSURE: 47 MMHG | WEIGHT: 293 LBS | HEIGHT: 67 IN | SYSTOLIC BLOOD PRESSURE: 139 MMHG | HEART RATE: 63 BPM | TEMPERATURE: 98.6 F | OXYGEN SATURATION: 92 % | RESPIRATION RATE: 16 BRPM

## 2021-03-29 PROBLEM — Z95.3 S/P TAVR (TRANSCATHETER AORTIC VALVE REPLACEMENT), BIOPROSTHETIC: Status: RESOLVED | Noted: 2021-03-28 | Resolved: 2021-03-29

## 2021-03-29 LAB
ANION GAP SERPL CALCULATED.3IONS-SCNC: 6 MMOL/L (ref 5–15)
BASOPHILS # BLD AUTO: 0.02 10*3/MM3 (ref 0–0.2)
BASOPHILS NFR BLD AUTO: 0.3 % (ref 0–1.5)
BUN SERPL-MCNC: 26 MG/DL (ref 8–23)
BUN/CREAT SERPL: 24.8 (ref 7–25)
CALCIUM SPEC-SCNC: 7.9 MG/DL (ref 8.6–10.5)
CHLORIDE SERPL-SCNC: 100 MMOL/L (ref 98–107)
CO2 SERPL-SCNC: 33 MMOL/L (ref 22–29)
CREAT SERPL-MCNC: 1.05 MG/DL (ref 0.57–1)
DEPRECATED RDW RBC AUTO: 53 FL (ref 37–54)
EOSINOPHIL # BLD AUTO: 0.41 10*3/MM3 (ref 0–0.4)
EOSINOPHIL NFR BLD AUTO: 5.8 % (ref 0.3–6.2)
ERYTHROCYTE [DISTWIDTH] IN BLOOD BY AUTOMATED COUNT: 15.5 % (ref 12.3–15.4)
GFR SERPL CREATININE-BSD FRML MDRD: 51 ML/MIN/1.73
GLUCOSE SERPL-MCNC: 102 MG/DL (ref 65–99)
HCT VFR BLD AUTO: 25.8 % (ref 34–46.6)
HGB BLD-MCNC: 7.8 G/DL (ref 12–15.9)
IMM GRANULOCYTES # BLD AUTO: 0.03 10*3/MM3 (ref 0–0.05)
IMM GRANULOCYTES NFR BLD AUTO: 0.4 % (ref 0–0.5)
LYMPHOCYTES # BLD AUTO: 1.11 10*3/MM3 (ref 0.7–3.1)
LYMPHOCYTES NFR BLD AUTO: 15.8 % (ref 19.6–45.3)
MCH RBC QN AUTO: 29 PG (ref 26.6–33)
MCHC RBC AUTO-ENTMCNC: 30.2 G/DL (ref 31.5–35.7)
MCV RBC AUTO: 95.9 FL (ref 79–97)
MONOCYTES # BLD AUTO: 0.78 10*3/MM3 (ref 0.1–0.9)
MONOCYTES NFR BLD AUTO: 11.1 % (ref 5–12)
NEUTROPHILS NFR BLD AUTO: 4.67 10*3/MM3 (ref 1.7–7)
NEUTROPHILS NFR BLD AUTO: 66.6 % (ref 42.7–76)
NRBC BLD AUTO-RTO: 0 /100 WBC (ref 0–0.2)
PLATELET # BLD AUTO: 95 10*3/MM3 (ref 140–450)
PMV BLD AUTO: 11.5 FL (ref 6–12)
POTASSIUM SERPL-SCNC: 4.6 MMOL/L (ref 3.5–5.2)
QT INTERVAL: 402 MS
QTC INTERVAL: 430 MS
RBC # BLD AUTO: 2.69 10*6/MM3 (ref 3.77–5.28)
SODIUM SERPL-SCNC: 139 MMOL/L (ref 136–145)
WBC # BLD AUTO: 7.02 10*3/MM3 (ref 3.4–10.8)

## 2021-03-29 PROCEDURE — 97110 THERAPEUTIC EXERCISES: CPT

## 2021-03-29 PROCEDURE — 99238 HOSP IP/OBS DSCHRG MGMT 30/<: CPT | Performed by: NURSE PRACTITIONER

## 2021-03-29 PROCEDURE — 94799 UNLISTED PULMONARY SVC/PX: CPT

## 2021-03-29 PROCEDURE — 80048 BASIC METABOLIC PNL TOTAL CA: CPT | Performed by: INTERNAL MEDICINE

## 2021-03-29 PROCEDURE — 71045 X-RAY EXAM CHEST 1 VIEW: CPT

## 2021-03-29 PROCEDURE — 85025 COMPLETE CBC W/AUTO DIFF WBC: CPT | Performed by: INTERNAL MEDICINE

## 2021-03-29 PROCEDURE — 99232 SBSQ HOSP IP/OBS MODERATE 35: CPT | Performed by: THORACIC SURGERY (CARDIOTHORACIC VASCULAR SURGERY)

## 2021-03-29 PROCEDURE — 99233 SBSQ HOSP IP/OBS HIGH 50: CPT | Performed by: INTERNAL MEDICINE

## 2021-03-29 PROCEDURE — 97530 THERAPEUTIC ACTIVITIES: CPT

## 2021-03-29 RX ORDER — BISACODYL 10 MG
10 SUPPOSITORY, RECTAL RECTAL DAILY PRN
Status: CANCELLED | OUTPATIENT
Start: 2021-03-29

## 2021-03-29 RX ORDER — SODIUM CHLORIDE 0.9 % (FLUSH) 0.9 %
10 SYRINGE (ML) INJECTION EVERY 12 HOURS SCHEDULED
Status: CANCELLED | OUTPATIENT
Start: 2021-03-29

## 2021-03-29 RX ORDER — ASPIRIN 81 MG/1
81 TABLET ORAL DAILY
Qty: 30 TABLET | Refills: 6 | Status: SHIPPED | OUTPATIENT
Start: 2021-03-30 | End: 2021-07-06

## 2021-03-29 RX ORDER — WARFARIN SODIUM 5 MG/1
5 TABLET ORAL TAKE AS DIRECTED
Qty: 30 TABLET | Refills: 3
Start: 2021-03-31

## 2021-03-29 RX ORDER — SOTALOL HYDROCHLORIDE 80 MG/1
80 TABLET ORAL 2 TIMES DAILY
Qty: 60 TABLET | Refills: 3 | Status: SHIPPED | OUTPATIENT
Start: 2021-03-29 | End: 2021-03-29 | Stop reason: HOSPADM

## 2021-03-29 RX ORDER — SOTALOL HYDROCHLORIDE 80 MG/1
80 TABLET ORAL DAILY
Qty: 180 TABLET | Refills: 1 | Status: SHIPPED | OUTPATIENT
Start: 2021-03-29 | End: 2021-03-29

## 2021-03-29 RX ORDER — DOCUSATE SODIUM 100 MG/1
100 CAPSULE, LIQUID FILLED ORAL 2 TIMES DAILY PRN
Status: CANCELLED | OUTPATIENT
Start: 2021-03-29

## 2021-03-29 RX ORDER — SODIUM CHLORIDE 0.9 % (FLUSH) 0.9 %
10 SYRINGE (ML) INJECTION EVERY 8 HOURS SCHEDULED
Status: CANCELLED | OUTPATIENT
Start: 2021-03-29

## 2021-03-29 RX ORDER — SOTALOL HYDROCHLORIDE 80 MG/1
80 TABLET ORAL EVERY 12 HOURS SCHEDULED
Qty: 180 TABLET | Refills: 1 | Status: SHIPPED | OUTPATIENT
Start: 2021-03-29 | End: 2021-03-29

## 2021-03-29 RX ORDER — SOTALOL HYDROCHLORIDE 80 MG/1
80 TABLET ORAL EVERY 12 HOURS SCHEDULED
Qty: 180 TABLET | Refills: 1 | Status: SHIPPED | OUTPATIENT
Start: 2021-03-29 | End: 2021-07-06 | Stop reason: SDUPTHER

## 2021-03-29 RX ORDER — AMOXICILLIN 250 MG
2 CAPSULE ORAL 2 TIMES DAILY PRN
Status: CANCELLED | OUTPATIENT
Start: 2021-03-29

## 2021-03-29 RX ORDER — BISACODYL 5 MG/1
10 TABLET, DELAYED RELEASE ORAL DAILY PRN
Status: CANCELLED | OUTPATIENT
Start: 2021-03-29

## 2021-03-29 RX ADMIN — HYDRALAZINE HYDROCHLORIDE 50 MG: 25 TABLET ORAL at 10:11

## 2021-03-29 RX ADMIN — LEVOTHYROXINE SODIUM 88 MCG: 88 TABLET ORAL at 05:39

## 2021-03-29 RX ADMIN — ASPIRIN 81 MG: 81 TABLET, COATED ORAL at 08:19

## 2021-03-29 RX ADMIN — IPRATROPIUM BROMIDE AND ALBUTEROL SULFATE 3 ML: 2.5; .5 SOLUTION RESPIRATORY (INHALATION) at 11:54

## 2021-03-29 RX ADMIN — LOSARTAN POTASSIUM 50 MG: 50 TABLET, FILM COATED ORAL at 10:11

## 2021-03-29 RX ADMIN — IPRATROPIUM BROMIDE AND ALBUTEROL SULFATE 3 ML: 2.5; .5 SOLUTION RESPIRATORY (INHALATION) at 07:07

## 2021-03-29 RX ADMIN — PANTOPRAZOLE SODIUM 40 MG: 40 TABLET, DELAYED RELEASE ORAL at 08:19

## 2021-03-29 RX ADMIN — SOTALOL HYDROCHLORIDE 80 MG: 80 TABLET ORAL at 10:11

## 2021-03-29 RX ADMIN — FUROSEMIDE 20 MG: 20 TABLET ORAL at 08:19

## 2021-03-29 RX ADMIN — ALLOPURINOL 200 MG: 100 TABLET ORAL at 08:18

## 2021-03-30 ENCOUNTER — READMISSION MANAGEMENT (OUTPATIENT)
Dept: CALL CENTER | Facility: HOSPITAL | Age: 76
End: 2021-03-30

## 2021-03-30 LAB — ACT BLD: 318 SECONDS (ref 82–152)

## 2021-03-30 NOTE — OUTREACH NOTE
Prep Survey      Responses   [facility] facility patient discharged from?  [facility]   Is LACE score < 7 ?  No   Emergency Room discharge w/ pulse ox?  No   Eligibility  Readm Mgmt   Discharge diagnosis  Severe Aortic Stenosis s/p TAVR   Does the patient have one of the following disease processes/diagnoses(primary or secondary)?  Other   Does the patient have Home health ordered?  Yes   What is the Home health agency?   [facility]   Is there a DME ordered?  No   Prep survey completed?  Yes          Maria De Jesus Fernando RN

## 2021-03-31 LAB — ACT BLD: 142 SECONDS (ref 82–152)

## 2021-04-01 ENCOUNTER — TELEMEDICINE (OUTPATIENT)
Dept: CARDIOLOGY | Facility: HOSPITAL | Age: 76
End: 2021-04-01

## 2021-04-01 VITALS
SYSTOLIC BLOOD PRESSURE: 152 MMHG | DIASTOLIC BLOOD PRESSURE: 59 MMHG | TEMPERATURE: 98.3 F | HEIGHT: 67 IN | BODY MASS INDEX: 45.99 KG/M2 | OXYGEN SATURATION: 96 % | HEART RATE: 58 BPM | WEIGHT: 293 LBS

## 2021-04-01 DIAGNOSIS — I48.0 PAROXYSMAL ATRIAL FIBRILLATION (HCC): ICD-10-CM

## 2021-04-01 DIAGNOSIS — I35.9 AORTIC VALVE DISORDER: Primary | ICD-10-CM

## 2021-04-01 PROCEDURE — 99442 PR PHYS/QHP TELEPHONE EVALUATION 11-20 MIN: CPT | Performed by: NURSE PRACTITIONER

## 2021-04-01 NOTE — PROGRESS NOTES
"You have chosen to receive care through the use of telemedicine. Telemedicine enables health care providers at different locations to provide safe, effective, and convenient care through the use of technology. As with any health care service, there are risks associated with the use of telemedicine, including equipment failure, poor connections, and  issues.    • Do you understand the risks and benefits of telemedicine as I have explained them to you? Yes  • Have your questions regarding telemedicine been answered? Yes  • Do you consent to the use of telemedicine in your medical care today? Yes      Chief Complaint  Follow-up (TAVR)    Subjective    History of Present Illness {CC  Problem List  Visit  Diagnosis   Encounters  Notes  Medications  Labs  Result Review Imaging  Media :23}     Holly Corona presents to [facility] for   Mrs. Corona is s/p TAVR on 3/25/21.  Today she attends a one week post op telemedicine visit.      She states she is feeling good.  She is off O2 except for HS with CPAP.  Walking inside and some outside.  Feels exercise tolerance is improved.  Home health is visiting and RN checking groins.  Right groin is \"raw\" and barrier cream is being applied.  Site is monitored regularly.       She experienced one eposide of feeling near faint while sitting in her chair before bed last evening.  This lasted 1-2 minutes and resolved without intervention.  No palpitations reported.  SBP is running 140-150's.         Objective     Vital Signs:   Vitals:    04/01/21 0833   BP: 152/59   BP Location: Left arm   Patient Position: Sitting   Cuff Size: Adult   Pulse: 58   Temp: 98.3 °F (36.8 °C)   TempSrc: Temporal   SpO2: 96%   Weight: (!) 139 kg (307 lb)   Height: 170.2 cm (67\")     Body mass index is 48.08 kg/m².  Physical Exam  Vitals reviewed.   Constitutional:       General: She is not in acute distress.     Appearance: She is obese.   Pulmonary:     "  Effort: Pulmonary effort is normal.   Neurological:      General: No focal deficit present.      Mental Status: She is alert and oriented to person, place, and time.   Psychiatric:         Mood and Affect: Mood normal.         Behavior: Behavior normal.        Result Review  Data Reviewed:{ Labs  Result Review  Imaging  Med Tab  Media :23}     NA            Assessment and Plan {CC Problem List  Visit Diagnosis  ROS  Review (Popup)  Health Maintenance  Quality  BestPractice  Medications  SmartSets  SnapShot Encounters  Media :23}   1. Aortic valve stenosis s/p TAVR  - recovering well at home without evidence of ongoing pulmonary edema  - Asa, lasix, hyeralazine, imdur, micardis, catapres, O2 at HS with CPAP  - Scheduled for CT Surgery follow up 4/19 and echo 4/22 (when on campus for EP), Cardiology 5/6/21  - TAVR APRN to see her 4/19 for KCCQ12 and 5 meter walk  - Continue home health for close monitoring of groin sites    2. PAF on chronic coumadin (CMS/[facility])  - PT/INRtomorrow per PCP  - Re-started warfarin 3/31  - no palps reported on lower dose Sotalol 80 mg BID      I spent 20 minutes caring for Holly on this date of service. This time includes time spent by me in the following activities:preparing for the visit, reviewing tests and performing a medically appropriate examination and/or evaluation     Follow Up {Instructions Charge Capture  Follow-up Communications :23}   Return in about 1 year (around 4/1/2022) for TAVR one year with echo.    Patient was given instructions and counseling regarding her condition or for health maintenance advice. Please see specific information pulled into the AVS if appropriate.  Patient was instructed to call the [facility] with any questions, concerns, or worsening symptoms.    *Please note that portions of this note were completed with a voice recognition program. Efforts were made to edit the dictations, but occasionally words are mistranscribed.

## 2021-04-04 ENCOUNTER — READMISSION MANAGEMENT (OUTPATIENT)
Dept: CALL CENTER | Facility: HOSPITAL | Age: 76
End: 2021-04-04

## 2021-04-04 NOTE — OUTREACH NOTE
Medical Week 1 Survey      Responses   [facility] patient discharged from?  [place]   Does the patient have one of the following disease processes/diagnoses(primary or secondary)?  Other   Week 1 attempt successful?  Yes   Call start time  1448   Call end time  1451   Discharge diagnosis  Severe Aortic Stenosis s/p TAVR   Is patient permission given to speak with other caregiver?  Yes   List who call center can speak with  camille James   Meds reviewed with patient/caregiver?  Yes   Is the patient having any side effects they believe may be caused by any medication additions or changes?  No   Does the patient have all medications ordered at discharge?  Yes   Is the patient taking all medications as directed (includes completed medication regime)?  Yes   Does the patient have a primary care provider?   Yes   Does the patient have an appointment with their PCP within 7 days of discharge?  Yes   Has the patient kept scheduled appointments due by today?  N/A   What is the Home health agency?   [facility]   Has home health visited the patient within 72 hours of discharge?  Yes   Psychosocial issues?  No   Did the patient receive a copy of their discharge instructions?  Yes   Nursing interventions  Reviewed instructions with patient   What is the patient's perception of their health status since discharge?  Improving   Is the patient/caregiver able to teach back signs and symptoms related to disease process for when to call PCP?  Yes   Is the patient/caregiver able to teach back signs and symptoms related to disease process for when to call 911?  Yes   Is the patient/caregiver able to teach back the hierarchy of who to call/visit for symptoms/problems? PCP, Specialist, Home health nurse, Urgent Care, ED, 911  Yes   If the patient is a current smoker, are they able to teach back resources for cessation?  Not a smoker   Week 1 call completed?  Yes   Wrap up additional comments  Still tires easily. Eating good.   HH has started visits. /70.           Antonietta Rincon RN

## 2021-04-08 ENCOUNTER — TELEPHONE (OUTPATIENT)
Dept: CARDIOLOGY | Facility: HOSPITAL | Age: 76
End: 2021-04-08

## 2021-04-08 NOTE — TELEPHONE ENCOUNTER
Patient called because she is having some wound care issues since her TAVR. Patient stated the incision site is very tender and is still slightly bleeding. Patient stated she needs a medication prescribed that will help heal the incision. [facility]

## 2021-04-08 NOTE — TELEPHONE ENCOUNTER
"Returned call to patient/ daughter.  Both state no gaping or discharge from groin site.  It is simply \"moist\".  Home health had recommended a barrier cream or eucerin cream.  Advised to stop using topical cream and simply wash with soap/water twice daily.  Pat dry.  Apply dry moisture absorbing sheet between skin folds.  Call me back on Monday to report progress.      Jacqueline Paulino  "

## 2021-04-09 LAB — LV EF 2D ECHO EST: 60 %

## 2021-04-11 ENCOUNTER — APPOINTMENT (OUTPATIENT)
Dept: ULTRASOUND IMAGING | Facility: HOSPITAL | Age: 76
End: 2021-04-11

## 2021-04-11 ENCOUNTER — HOSPITAL ENCOUNTER (EMERGENCY)
Facility: HOSPITAL | Age: 76
Discharge: LEFT BEFORE DISCHARGE | End: 2021-04-11
Attending: EMERGENCY MEDICINE | Admitting: EMERGENCY MEDICINE

## 2021-04-11 VITALS
WEIGHT: 293 LBS | RESPIRATION RATE: 14 BRPM | HEIGHT: 67 IN | HEART RATE: 56 BPM | TEMPERATURE: 98.7 F | BODY MASS INDEX: 45.99 KG/M2 | OXYGEN SATURATION: 93 % | SYSTOLIC BLOOD PRESSURE: 189 MMHG | DIASTOLIC BLOOD PRESSURE: 83 MMHG

## 2021-04-11 DIAGNOSIS — T14.8XXA HEMATOMA: Primary | ICD-10-CM

## 2021-04-11 LAB
ALBUMIN SERPL-MCNC: 3.37 G/DL (ref 3.5–5.2)
ALBUMIN/GLOB SERPL: 1.1 G/DL
ALP SERPL-CCNC: 131 U/L (ref 39–117)
ALT SERPL W P-5'-P-CCNC: 19 U/L (ref 1–33)
ANION GAP SERPL CALCULATED.3IONS-SCNC: 8.3 MMOL/L (ref 5–15)
APTT PPP: 38 SECONDS (ref 25.6–35.3)
AST SERPL-CCNC: 24 U/L (ref 1–32)
BASOPHILS # BLD AUTO: 0.03 10*3/MM3 (ref 0–0.2)
BASOPHILS NFR BLD AUTO: 0.6 % (ref 0–1.5)
BILIRUB SERPL-MCNC: 0.9 MG/DL (ref 0–1.2)
BUN SERPL-MCNC: 24 MG/DL (ref 8–23)
BUN/CREAT SERPL: 19 (ref 7–25)
CALCIUM SPEC-SCNC: 9 MG/DL (ref 8.6–10.5)
CHLORIDE SERPL-SCNC: 105 MMOL/L (ref 98–107)
CO2 SERPL-SCNC: 26.7 MMOL/L (ref 22–29)
CREAT SERPL-MCNC: 1.26 MG/DL (ref 0.57–1)
CRP SERPL-MCNC: 1.47 MG/DL (ref 0–0.5)
DEPRECATED RDW RBC AUTO: 66.5 FL (ref 37–54)
EOSINOPHIL # BLD AUTO: 0.24 10*3/MM3 (ref 0–0.4)
EOSINOPHIL NFR BLD AUTO: 4.8 % (ref 0.3–6.2)
ERYTHROCYTE [DISTWIDTH] IN BLOOD BY AUTOMATED COUNT: 18.1 % (ref 12.3–15.4)
ERYTHROCYTE [SEDIMENTATION RATE] IN BLOOD: >100 MM/HR (ref 0–30)
GFR SERPL CREATININE-BSD FRML MDRD: 41 ML/MIN/1.73
GLOBULIN UR ELPH-MCNC: 3 GM/DL
GLUCOSE SERPL-MCNC: 107 MG/DL (ref 65–99)
HCT VFR BLD AUTO: 27.4 % (ref 34–46.6)
HGB BLD-MCNC: 8 G/DL (ref 12–15.9)
IMM GRANULOCYTES # BLD AUTO: 0.03 10*3/MM3 (ref 0–0.05)
IMM GRANULOCYTES NFR BLD AUTO: 0.6 % (ref 0–0.5)
INR PPP: 1.82 (ref 0.9–1.1)
LYMPHOCYTES # BLD AUTO: 0.92 10*3/MM3 (ref 0.7–3.1)
LYMPHOCYTES NFR BLD AUTO: 18.5 % (ref 19.6–45.3)
MCH RBC QN AUTO: 29.5 PG (ref 26.6–33)
MCHC RBC AUTO-ENTMCNC: 29.2 G/DL (ref 31.5–35.7)
MCV RBC AUTO: 101.1 FL (ref 79–97)
MONOCYTES # BLD AUTO: 0.45 10*3/MM3 (ref 0.1–0.9)
MONOCYTES NFR BLD AUTO: 9.1 % (ref 5–12)
NEUTROPHILS NFR BLD AUTO: 3.29 10*3/MM3 (ref 1.7–7)
NEUTROPHILS NFR BLD AUTO: 66.4 % (ref 42.7–76)
NRBC BLD AUTO-RTO: 0 /100 WBC (ref 0–0.2)
PLATELET # BLD AUTO: 206 10*3/MM3 (ref 140–450)
PMV BLD AUTO: 9.6 FL (ref 6–12)
POTASSIUM SERPL-SCNC: 5.4 MMOL/L (ref 3.5–5.2)
PROT SERPL-MCNC: 6.4 G/DL (ref 6–8.5)
PROTHROMBIN TIME: 20.9 SECONDS (ref 11.9–14.1)
RBC # BLD AUTO: 2.71 10*6/MM3 (ref 3.77–5.28)
SODIUM SERPL-SCNC: 140 MMOL/L (ref 136–145)
TROPONIN T SERPL-MCNC: <0.01 NG/ML (ref 0–0.03)
TROPONIN T SERPL-MCNC: <0.01 NG/ML (ref 0–0.03)
WBC # BLD AUTO: 4.96 10*3/MM3 (ref 3.4–10.8)

## 2021-04-11 PROCEDURE — 93005 ELECTROCARDIOGRAM TRACING: CPT | Performed by: NURSE PRACTITIONER

## 2021-04-11 PROCEDURE — 84484 ASSAY OF TROPONIN QUANT: CPT | Performed by: NURSE PRACTITIONER

## 2021-04-11 PROCEDURE — 80053 COMPREHEN METABOLIC PANEL: CPT | Performed by: NURSE PRACTITIONER

## 2021-04-11 PROCEDURE — 85025 COMPLETE CBC W/AUTO DIFF WBC: CPT | Performed by: NURSE PRACTITIONER

## 2021-04-11 PROCEDURE — 85652 RBC SED RATE AUTOMATED: CPT | Performed by: NURSE PRACTITIONER

## 2021-04-11 PROCEDURE — 93010 ELECTROCARDIOGRAM REPORT: CPT | Performed by: INTERNAL MEDICINE

## 2021-04-11 PROCEDURE — 85730 THROMBOPLASTIN TIME PARTIAL: CPT | Performed by: NURSE PRACTITIONER

## 2021-04-11 PROCEDURE — 99283 EMERGENCY DEPT VISIT LOW MDM: CPT

## 2021-04-11 PROCEDURE — 85610 PROTHROMBIN TIME: CPT | Performed by: NURSE PRACTITIONER

## 2021-04-11 PROCEDURE — 93970 EXTREMITY STUDY: CPT

## 2021-04-11 PROCEDURE — 86140 C-REACTIVE PROTEIN: CPT | Performed by: NURSE PRACTITIONER

## 2021-04-11 RX ORDER — METHYLPREDNISOLONE SODIUM SUCCINATE 40 MG/ML
40 INJECTION, POWDER, LYOPHILIZED, FOR SOLUTION INTRAMUSCULAR; INTRAVENOUS EVERY 4 HOURS
Status: DISCONTINUED | OUTPATIENT
Start: 2021-04-11 | End: 2021-04-11 | Stop reason: HOSPADM

## 2021-04-11 RX ORDER — DIPHENHYDRAMINE HYDROCHLORIDE 50 MG/ML
50 INJECTION INTRAMUSCULAR; INTRAVENOUS ONCE
Status: DISCONTINUED | OUTPATIENT
Start: 2021-04-11 | End: 2021-04-11 | Stop reason: HOSPADM

## 2021-04-11 NOTE — ED NOTES
Patient states that they are tired and dont want to wait around any longer, provider at bedside discussing risks and benefits of leaving AMA and staying for further treatment. Pt is A&O X4 at this time and states they wish to leave AMA.     Washington Borges RN  04/11/21 0949

## 2021-04-11 NOTE — ED NOTES
Patient states they dont want to be hooked up to the monitor for vital signs at this time, pt states they are ready to leave.     Washington Borges RN  04/11/21 3619

## 2021-04-11 NOTE — ED PROVIDER NOTES
Subjective     Leg Swelling  Location:  Bilateral  Quality:  Swelling  Severity:  Moderate  Onset quality:  Gradual  Duration:  2 days  Timing:  Constant  Progression:  Worsening  Chronicity:  Recurrent  Context:  Patient had aortic valve replacement 2 days ago  Relieved by:  Nothing  Worsened by:  Nothing  Ineffective treatments:  None tried  Associated symptoms: no abdominal pain, no chest pain, no congestion, no diarrhea, no fatigue, no nausea, no rash, no shortness of breath, no sore throat and no vomiting    Risk factors:  Recent aortic placement      Review of Systems   Constitutional: Negative.  Negative for chills, fatigue and unexpected weight change.   HENT: Negative.  Negative for congestion, dental problem, sinus pressure and sore throat.    Eyes: Negative.  Negative for discharge.   Respiratory: Negative.  Negative for apnea, choking, shortness of breath and stridor.    Cardiovascular: Positive for leg swelling. Negative for chest pain and palpitations.   Gastrointestinal: Negative.  Negative for abdominal pain, diarrhea, nausea and vomiting.   Endocrine: Negative.    Genitourinary: Negative.  Negative for difficulty urinating and enuresis.   Musculoskeletal: Positive for gait problem. Negative for back pain, neck pain and neck stiffness.   Skin: Negative.  Negative for rash.   Allergic/Immunologic: Negative.    Neurological: Negative for speech difficulty.   Hematological: Negative.    Psychiatric/Behavioral: Negative.    All other systems reviewed and are negative.      Past Medical History:   Diagnosis Date   • Abnormal stress test 4/17/2017   • Acute gout of right shoulder 9/11/2018   • Anemia    • Anxiety    • Aortic valve stenosis    • Atrial fibrillation (CMS/HCC)     A.  History of prior pulmonary vein ablation 03/14/2013. B.  On chronic Coumadin and Tikosyn therapy.   • Carotid artery stenosis    • Carotid bruit    • Chronic kidney disease    • COPD (chronic obstructive pulmonary disease)  (CMS/HCC)    • Deep venous thrombosis (CMS/HCC)     A.  History of right lower extremity DVT treated with in therapy until October 2000.   • Diastolic dysfunction    • Diastolic dysfunction    • Dizziness    • Dyslipidemia    • Edema    • Exertional shortness of breath    • GERD (gastroesophageal reflux disease)    • Gout    • H/O echocardiogram     A.  Echocardiogram of 10/16/2013 reports an ejection fraction of 55-60%, a moderately to severely dilated left atrium, mild to moderately dilated right atrium, trace aortic regurgitation, mild mitral and tricuspid regurgitation with calculated    • Hiatal hernia    • History of blood transfusion     AFTER COLON SURGERY, NO REACTION    • Hypertension     A.  Echocardiogram 10/16/2013 reports a calculated RVSP of 50-55 mmHg.B.  Right heart catheterization 03/12/2009 at  reported RV of 72/19, PA 72/27 and PCWP of 24, this was felt to be     • Hypothyroidism    • Morbid obesity (CMS/HCC)    • MRSA infection ~2005    LEFT HAND TREATED AT [facility] WITH ORAL ANTICIOTICS ~2005    • Obstructive sleep apnea     A.  On CPAP AND 2L NC  each bedtime.   • Osteoarthritis    • Palpitations    • Peptic ulcer disease    • Peripheral vascular disease (CMS/HCC)    • Pulmonary embolus (CMS/HCC)     A.  Developed during chemotherapy in 2/2012. B.  Coumadin therapy reinitiated at that time.   • Pulmonary hypertension (CMS/HCC)    • S/P cardiac cath     3-10-21   • S/P transesophageal echocardiogram (LEANDRO)     3-10-21   • Signet ring cell adenocarcinoma (CMS/HCC)     A.  Diagnosed on colonoscopy E. 10/14/2011, status post colon resection and 2 of 20 nodes positive, T3, N1b Stage IIIB. B.  Status post chemotherapy.    • Staph infection     LEG ~2019 TREATED WITH ORAL ANTIBIOTICS    • Syncope    • Syncope    • Wears dentures    • Wears glasses        Allergies   Allergen Reactions   • Ciprofloxacin Itching   • Contrast Dye Rash     Patient usually takes prednisone prior to contrast dye.    • Iodinated Diagnostic Agents Rash     Patient usually takes prednisone prior to contrast dye.   • Ofloxacin Rash       Past Surgical History:   Procedure Laterality Date   • AORTIC VALVE REPAIR/REPLACEMENT N/A 3/25/2021    Procedure: TRANSCATHETER AORTIC VALVE REPLACEMENT;  Surgeon: Sacha العلي MD;  Location: [facility];  Service: Cardiothoracic;  Laterality: N/A;  flouro 8  dose 1005mGy  contrast 65   • AORTIC VALVE REPAIR/REPLACEMENT N/A 3/25/2021    Procedure: TRANSCATHETER AORTIC VALVE INSERTION;  Surgeon: Milana Whitaker MD;  Location: [facility];  Service: Cardiovascular;  Laterality: N/A;   • CARDIAC ABLATION      catheter ablation atrial fibrillation, 2013 PER [facility]ER    • CARDIAC CATHETERIZATION     • CARDIAC CATHETERIZATION N/A 3/10/2021    Procedure: LEFT HEART CATH;  Surgeon: Milana Whitaker MD;  Location: [facility] CATH INVASIVE LOC[facility];  Service: Cardiology;  Laterality: N/A;   • CARDIAC CATHETERIZATION      cardiac cath procedure summary, A.  Cardiac catheterization April 2007 reported no significance coronary artery disease with markedly elevated LVEDP.   • CARPAL TUNNEL RELEASE Bilateral     neuroplasty decompression median nerve at carpal tunnel 1997   • COLONOSCOPY      SEVERAL MOST RECENT ~2018   • FOOT SURGERY Right    • HEMICOLECTOMY      partial colectomy , A.  Status post right hemicolectomy secondary to colon cancer in 2011.   • HYSTERECTOMY      1993   • KNEE ARTHROPLASTY Left     knee replacement 2005   • TRANSESOPHAGEAL ECHOCARDIOGRAM (LEANDRO) N/A 3/25/2021    Procedure: TRANSESOPHAGEAL ECHOCARDIOGRAM WITH ANESTHESIA;  Surgeon: Sacha العلي MD;  Location: [facility];  Service: Cardiothoracic;  Laterality: N/A;       Family History   Problem Relation Age of Onset   • Other Mother         MEDICAL PROBLEMS   • Other Sister         myocardial infarction.       Social History     Socioeconomic History   • Marital status:      Spouse name: Not on file   •  Number of children: 3   • Years of education: HS   • Highest education level: Not on file   Tobacco Use   • Smoking status: Former Smoker     Packs/day: 1.00     Years: 12.00     Pack years: 12.00     Types: Cigarettes     Quit date: 3/1/1981     Years since quittin.1   • Smokeless tobacco: Never Used   Vaping Use   • Vaping Use: Never used   Substance and Sexual Activity   • Alcohol use: No   • Drug use: No   • Sexual activity: Defer           Objective   Physical Exam  Vitals and nursing note reviewed.   Constitutional:       Appearance: She is well-developed.   HENT:      Head: Normocephalic.      Right Ear: External ear normal.      Left Ear: External ear normal.   Eyes:      Conjunctiva/sclera: Conjunctivae normal.      Pupils: Pupils are equal, round, and reactive to light.   Cardiovascular:      Rate and Rhythm: Normal rate and regular rhythm.      Heart sounds: Normal heart sounds.   Pulmonary:      Effort: Pulmonary effort is normal.      Breath sounds: Normal breath sounds.   Abdominal:      General: Bowel sounds are normal.      Palpations: Abdomen is soft.   Musculoskeletal:         General: Normal range of motion.      Cervical back: Normal range of motion and neck supple.      Right lower leg: Edema present.      Left lower leg: Edema present.   Skin:     General: Skin is warm and dry.      Capillary Refill: Capillary refill takes less than 2 seconds.   Neurological:      Mental Status: She is alert and oriented to person, place, and time.   Psychiatric:         Behavior: Behavior normal.         Thought Content: Thought content normal.         Procedures           ED Course  ED Course as of 1709   Sun 2021   1743 EKG performed at 1521 shows sinus bradycardia with a rate of 52 bpm, normal axis, normal intervals, normal ST segments, normal T waves    [JG]   1854 Spoke with patient about necessity for ct angiogram to rule out pseudoaneurysm.  Patient wanted us to consult her surgeon.  Had them paged and now she wants to leave without test. Discussed risks of leaving without test including threat to life and limb. Patient wants to sign out anyway.     [TRISH]      ED Course User Index  [TRISH] Albert Broderick APRN  [EVERTG] Ken Knott,                                            [facility]    Final diagnoses:   Hematoma       ED Disposition  ED Disposition     ED Disposition Condition Comment    Sacha Godwin MD  [address] [facility]  [address] [place] [address]  [phone]      For further evaluation         Medication List      Changed    cloNIDine 0.2 MG tablet  Commonly known as: CATAPRES  Take 1 tablet by mouth 3 (Three) Times a Day.  What changed: when to take this     hydrALAZINE 50 MG tablet  Commonly known as: APRESOLINE  Take 1 tablet by mouth 3 (Three) Times a Day.  What changed: when to take this     telmisartan 80 MG tablet  Commonly known as: Micardis  Take 1 tablet by mouth Daily.  What changed: when to take this             Albert Broderick APRN  04/17/21 5578

## 2021-04-11 NOTE — ED NOTES
Called [facility] for consult on pt, deborah advised she would Kaur on call doc for , she advised  would call us back.     Vamshi Olivas  04/11/21 4448

## 2021-04-12 ENCOUNTER — TELEPHONE (OUTPATIENT)
Dept: CARDIOLOGY | Facility: HOSPITAL | Age: 76
End: 2021-04-12

## 2021-04-12 ENCOUNTER — READMISSION MANAGEMENT (OUTPATIENT)
Dept: CALL CENTER | Facility: HOSPITAL | Age: 76
End: 2021-04-12

## 2021-04-12 NOTE — OUTREACH NOTE
Medical Week 2 Survey      Responses   [facility] patient discharged from?  [place]   Does the patient have one of the following disease processes/diagnoses(primary or secondary)?  Other   Week 2 attempt successful?  Yes   Call start time  1226   Discharge diagnosis  Severe Aortic Stenosis s/p TAVR   Rescheduled  Rescheduled-pt requested [No answer to patient phone numbers. Contacted daughter and she asked call to be reschedued, to patient.]   Person spoke with today (if not patient) and relationship  Monica, daughter          Sosa Menjivar RN

## 2021-04-12 NOTE — TELEPHONE ENCOUNTER
TAVR JORGE    Patient called to say that she went to ER at [facility] related to right thigh swelling and discomfort in the right groin.  She is s/p TAVR via left femoral approach on 3/25/21.  DC home on 3/29 and re-started warfarin on 4/1/21.      Patient states a CT was suggested following a ultrasound of both lower extremities.  She has hx of DVT.      No DVT noted by doppler.  However, an area 15.1 x 3.4x12.9 cm was identified as possible hematoma on the right.  Patient left AMA from ER.      She states she can ambulate with her walker (baseline function) without discomfort.  Mild swelling in thigh remains.  MAGUIRE is improved.  No chest pain or other new symptoms.  Reviewed labs from ER including H/H (sl improved) and INR which was 1.8.    Since she is clinically improved, should be OK to follow up with PCP, Dr. Andrew, on Wednesday this week.  Definitely recommend re-check exam and H/H.  A hematoma in this area is not uncommon after TAVR, but if H/H is dropping, CT Surgery needs to know immediately.  If any sudden change in right groin swelling or pain occurs, promptly go to nearest ER.      With regard to skin excoriation right groin, patient states she stopped using barrier cream and this area is much improved.      Jacqueline PATEL

## 2021-04-14 ENCOUNTER — READMISSION MANAGEMENT (OUTPATIENT)
Dept: CALL CENTER | Facility: HOSPITAL | Age: 76
End: 2021-04-14

## 2021-04-14 NOTE — OUTREACH NOTE
Medical Week 2 Survey      Responses   [facility] patient discharged from?  [place]   Does the patient have one of the following disease processes/diagnoses(primary or secondary)?  Other   Week 2 attempt successful?  No   Rescheduled  Revoked [Called all 3 numbers no answer- Disenrolled. ]          Emma Escobedo RN

## 2021-04-15 ENCOUNTER — TELEPHONE (OUTPATIENT)
Dept: CARDIOLOGY | Facility: HOSPITAL | Age: 76
End: 2021-04-15

## 2021-04-15 LAB
QT INTERVAL: 486 MS
QTC INTERVAL: 451 MS

## 2021-04-15 NOTE — TELEPHONE ENCOUNTER
Patient called and stated she would like you to know she will be following up at her local hospital today for a CT scan. Patient stated she will have the results tomorrow and will call then to discuss the results.

## 2021-04-19 ENCOUNTER — OFFICE VISIT (OUTPATIENT)
Dept: CARDIAC SURGERY | Facility: CLINIC | Age: 76
End: 2021-04-19

## 2021-04-19 VITALS
BODY MASS INDEX: 45.99 KG/M2 | HEIGHT: 67 IN | WEIGHT: 293 LBS | DIASTOLIC BLOOD PRESSURE: 78 MMHG | TEMPERATURE: 98 F | HEART RATE: 60 BPM | OXYGEN SATURATION: 97 % | SYSTOLIC BLOOD PRESSURE: 160 MMHG

## 2021-04-19 DIAGNOSIS — I35.9 AORTIC VALVE DISORDER: Primary | ICD-10-CM

## 2021-04-19 DIAGNOSIS — Z95.2 S/P TAVR (TRANSCATHETER AORTIC VALVE REPLACEMENT): ICD-10-CM

## 2021-04-19 PROCEDURE — 99214 OFFICE O/P EST MOD 30 MIN: CPT | Performed by: NURSE PRACTITIONER

## 2021-04-19 PROCEDURE — 71046 X-RAY EXAM CHEST 2 VIEWS: CPT | Performed by: NURSE PRACTITIONER

## 2021-04-19 NOTE — PROGRESS NOTES
[facility] Cardiothoracic Surgery Office Follow Up Note     Date of Encounter: 04/19/2021     MRN Number: [id number]  Name: Holly Corona  Phone Number: [phone]     Referred By: No ref. provider found  PCP: Abdoul Andrew MD    Chief Complaint:    Chief Complaint   Patient presents with   • Aortic Stenosis     Hospital follow up s/p TAVR on 3/25/21. Patient states that she has a hematoma on the right incision area       History of Present Illness:    Holly Corona is a 75 y.o. female with a history of pretension, dyslipidemia, PAF, DVT/PE, pulmonary hypertension, morbid obesity, and symptomatic aortic stenosis s/p TAVR on 3/25/2021 with Dr. العلي.  Presents today in post procedure follow-up.  Since discharge home, patient developed right groin hematoma.  She was seen in the emergency room on 4/11 with H&H 8/27.4 with venous duplex performed showing fluid collection approximately 15.1 x 3.4 by 12.9 cm. This is now improving and she denies any pain.  She was seen by primary care provider and CT pelvis was ordered showing a 18 x 14 x 5 cm hematoma.  Her Coumadin therapy has been held.  She denies any flank or back pain.  Blood pressures have been stable.  She has not had repeat blood work since emergency room visit per patient report.  Plans for follow up with Dr. Mario and Dr. Romero with repeat TTE 4/22.    Review of Systems:  Review of Systems   Constitutional: Negative for chills, decreased appetite, diaphoresis, fever, malaise/fatigue, night sweats and weight loss.   HENT: Negative for congestion, hoarse voice, sore throat and stridor.    Cardiovascular: Positive for dyspnea on exertion. Negative for chest pain, claudication, irregular heartbeat, leg swelling, near-syncope, orthopnea, palpitations, paroxysmal nocturnal dyspnea and syncope.   Respiratory: Negative for cough, hemoptysis, shortness of breath, sleep disturbances due to breathing, snoring, sputum production and wheezing.       Hematologic/Lymphatic: Negative for adenopathy and bleeding problem. Does not bruise/bleed easily.   Skin: Negative for color change, dry skin, itching, poor wound healing and rash.   Musculoskeletal: Negative for arthritis, back pain, falls, joint pain and muscle weakness.   Gastrointestinal: Negative for abdominal pain, anorexia, constipation, diarrhea, hematochezia, melena, nausea and vomiting.   Neurological: Positive for dizziness (ocassionally) and light-headedness (ocassionally). Negative for difficulty with concentration, disturbances in coordination, focal weakness, loss of balance, numbness, paresthesias, seizures, vertigo and weakness.   Psychiatric/Behavioral: Negative for altered mental status, depression, memory loss and substance abuse. The patient does not have insomnia and is not nervous/anxious.    Allergic/Immunologic: Negative for persistent infections.       I have reviewed the review of systems as entered by my clinical support staff and have updated it as appropriate.       Allergies:  Allergies   Allergen Reactions   • Ciprofloxacin Itching   • Contrast Dye Rash     Patient usually takes prednisone prior to contrast dye.   • Iodinated Diagnostic Agents Rash     Patient usually takes prednisone prior to contrast dye.   • Ofloxacin Rash       Medications:      Current Outpatient Medications:   •  albuterol (PROVENTIL) (5 MG/ML) 0.5% nebulizer solution, Take 2.5 mg by nebulization Every 6 (Six) Hours As Needed for Wheezing., Disp: , Rfl:   •  allopurinol (ZYLOPRIM) 100 MG tablet, Take 200 mg by mouth Every Morning., Disp: , Rfl:   •  ALPRAZolam (XANAX) 0.25 MG tablet, Take 0.25 mg by mouth As Needed for Anxiety., Disp: , Rfl:   •  aspirin 81 MG EC tablet, Take 1 tablet by mouth Daily., Disp: 30 tablet, Rfl: 6  •  busPIRone (BUSPAR) 5 MG tablet, Take 5 mg by mouth Daily As Needed (ANXIETY). Takes occas. , Disp: , Rfl:   •  CloNIDine (CATAPRES) 0.2 MG tablet, Take 1 tablet by mouth 3 (Three)  Times a Day. (Patient taking differently: Take 0.2 mg by mouth 2 (Two) Times a Day.), Disp: 90 tablet, Rfl: 3  •  furosemide (LASIX) 20 MG tablet, Take 20 mg by mouth Daily., Disp: , Rfl:   •  hydrALAZINE (APRESOLINE) 50 MG tablet, Take 1 tablet by mouth 3 (Three) Times a Day. (Patient taking differently: Take 50 mg by mouth 2 (two) times a day.), Disp: 90 tablet, Rfl: 11  •  isosorbide mononitrate (IMDUR) 30 MG 24 hr tablet, Take 1 tablet by mouth Daily., Disp: 90 tablet, Rfl: 3  •  levothyroxine (SYNTHROID, LEVOTHROID) 88 MCG tablet, Take 88 mcg by mouth Every Morning., Disp: , Rfl:   •  O2 (OXYGEN), Inhale 2 L/min 1 (One) Time. Through c-pap, Disp: , Rfl:   •  pantoprazole (PROTONIX) 40 MG EC tablet, Take 1 tablet by mouth Daily., Disp: 90 tablet, Rfl: 3  •  RESTASIS 0.05 % ophthalmic emulsion, Administer 1 drop to both eyes Every Night., Disp: , Rfl:   •  sotalol (BETAPACE) 80 MG tablet, Take 1 tablet by mouth Every 12 (Twelve) Hours. **Change in therapy from daily to BID**, Disp: 180 tablet, Rfl: 1  •  telmisartan (MICARDIS) 80 MG tablet, Take 1 tablet by mouth Daily. (Patient taking differently: Take 80 mg by mouth Every Morning.), Disp: 90 tablet, Rfl: 3  •  warfarin (COUMADIN) 5 MG tablet, Take 1 tablet by mouth Take As Directed. Restart Warfarin on Wedensday 3/31/21.   Same schedule as pre-op:  5 mg DAILY, EXCEPT NONE ON ., Disp: 30 tablet, Rfl: 3    Social History     Socioeconomic History   • Marital status:      Spouse name: Not on file   • Number of children: 3   • Years of education: HS   • Highest education level: Not on file   Tobacco Use   • Smoking status: Former Smoker     Packs/day: 1.00     Years: 12.00     Pack years: 12.00     Types: Cigarettes     Quit date: 3/1/1981     Years since quittin.1   • Smokeless tobacco: Never Used   Vaping Use   • Vaping Use: Never used   Substance and Sexual Activity   • Alcohol use: No   • Drug use: No   • Sexual activity: Defer       Family  History   Problem Relation Age of Onset   • Other Mother         MEDICAL PROBLEMS   • Other Sister         myocardial infarction.       Past Medical History:   Diagnosis Date   • Abnormal stress test 4/17/2017   • Acute gout of right shoulder 9/11/2018   • Anemia    • Anxiety    • Aortic valve stenosis    • Atrial fibrillation (CMS/HCC)     A.  History of prior pulmonary vein ablation 03/14/2013. B.  On chronic Coumadin and Tikosyn therapy.   • Carotid artery stenosis    • Carotid bruit    • Chronic kidney disease    • COPD (chronic obstructive pulmonary disease) (CMS/HCC)    • Deep venous thrombosis (CMS/HCC)     A.  History of right lower extremity DVT treated with in therapy until October 2000.   • Diastolic dysfunction    • Diastolic dysfunction    • Dizziness    • Dyslipidemia    • Edema    • Exertional shortness of breath    • GERD (gastroesophageal reflux disease)    • Gout    • H/O echocardiogram     A.  Echocardiogram of 10/16/2013 reports an ejection fraction of 55-60%, a moderately to severely dilated left atrium, mild to moderately dilated right atrium, trace aortic regurgitation, mild mitral and tricuspid regurgitation with calculated    • Hiatal hernia    • History of blood transfusion     AFTER COLON SURGERY, NO REACTION    • Hypertension     A.  Echocardiogram 10/16/2013 reports a calculated RVSP of 50-55 mmHg.B.  Right heart catheterization 03/12/2009 at  reported RV of 72/19, PA 72/27 and PCWP of 24, this was felt to be     • Hypothyroidism    • Morbid obesity (CMS/HCC)    • MRSA infection ~2005    LEFT HAND TREATED AT [facility] WITH ORAL ANTICIOTICS ~2005    • Obstructive sleep apnea     A.  On CPAP AND 2L NC  each bedtime.   • Osteoarthritis    • Palpitations    • Peptic ulcer disease    • Peripheral vascular disease (CMS/HCC)    • Pulmonary embolus (CMS/HCC)     A.  Developed during chemotherapy in 2/2012. B.  Coumadin therapy reinitiated at that time.   • Pulmonary hypertension  (CMS/HCC)    • S/P cardiac cath     3-10-21   • S/P transesophageal echocardiogram (LEANDRO)     3-10-21   • Signet ring cell adenocarcinoma (CMS/HCC)     A.  Diagnosed on colonoscopy E. 10/14/2011, status post colon resection and 2 of 20 nodes positive, T3, N1b Stage IIIB. B.  Status post chemotherapy.    • Staph infection     LEG ~2019 TREATED WITH ORAL ANTIBIOTICS    • Syncope    • Syncope    • Wears dentures    • Wears glasses        Past Surgical History:   Procedure Laterality Date   • AORTIC VALVE REPAIR/REPLACEMENT N/A 3/25/2021    Procedure: TRANSCATHETER AORTIC VALVE REPLACEMENT;  Surgeon: Sacha العلي MD;  Location:  [facility];  Service: Cardiothoracic;  Laterality: N/A;  flouro 8  dose 1005mGy  contrast 65   • AORTIC VALVE REPAIR/REPLACEMENT N/A 3/25/2021    Procedure: TRANSCATHETER AORTIC VALVE INSERTION;  Surgeon: Milana Whitaker MD;  Location:  [facility];  Service: Cardiovascular;  Laterality: N/A;   • CARDIAC ABLATION      catheter ablation atrial fibrillation, 2013 PER [facility]    • CARDIAC CATHETERIZATION     • CARDIAC CATHETERIZATION N/A 3/10/2021    Procedure: LEFT HEART CATH;  Surgeon: Milana Whitaker MD;  Location:  [facility]ASIVE [facility];  Service: Cardiology;  Laterality: N/A;   • CARDIAC CATHETERIZATION      cardiac cath procedure summary, A.  Cardiac catheterization April 2007 reported no significance coronary artery disease with markedly elevated LVEDP.   • CARPAL TUNNEL RELEASE Bilateral     neuroplasty decompression median nerve at carpal tunnel 1997   • COLONOSCOPY      SEVERAL MOST RECENT ~2018   • FOOT SURGERY Right    • HEMICOLECTOMY      partial colectomy , A.  Status post right hemicolectomy secondary to colon cancer in 2011.   • HYSTERECTOMY      1993   • KNEE ARTHROPLASTY Left     knee replacement 2005   • TRANSESOPHAGEAL ECHOCARDIOGRAM (LEANDRO) N/A 3/25/2021    Procedure: TRANSESOPHAGEAL ECHOCARDIOGRAM WITH ANESTHESIA;  Surgeon: Sacha العلي MD;   "Location: [facility];  Service: Cardiothoracic;  Laterality: N/A;       Physical Exam:  Vital Signs:    Vitals:    04/19/21 1028   BP: 160/78   BP Location: Left arm   Patient Position: Sitting   Pulse: 60   Temp: 98 °F (36.7 °C)   SpO2: 97%   Weight: (!) 144 kg (316 lb 12.8 oz)   Height: 170.2 cm (67\")     Body mass index is 49.62 kg/m².     Physical Exam  Vitals and nursing note reviewed.   Constitutional:       Appearance: Normal appearance. She is well-developed and well-groomed.   HENT:      Head: Normocephalic and atraumatic.   Cardiovascular:      Rate and Rhythm: Normal rate and regular rhythm.      Heart sounds: Normal heart sounds, S1 normal and S2 normal. No murmur heard.   No friction rub.   Pulmonary:      Comments: Unlabored, Clear to auscultation bilaterally  Abdominal:      General: Bowel sounds are normal.      Palpations: Abdomen is soft.      Tenderness: There is no abdominal tenderness.   Musculoskeletal:      Cervical back: Neck supple.      Right lower leg: Edema present.      Left lower leg: Edema present.   Skin:     General: Skin is warm and dry.      Comments: Left groin puncture site intact  No surrounding erythema or induration    Right groin puncture site with no erythema or induration.  She does have ecchymosis and resolving hematoma that extends from her pubic area to her right hip.  This was marked in the office today   Neurological:      Mental Status: She is alert and oriented to person, place, and time.   Psychiatric:         Attention and Perception: Attention normal.         Mood and Affect: Mood normal.         Speech: Speech normal.         Behavior: Behavior is cooperative.         Labs/Imaging:    XR Chest 1 View    Result Date: 3/29/2021  No significant interval change.  D:  03/29/2021 E:  03/29/2021  This report was finalized on 3/29/2021 3:16 PM by Dr. Huan Keller.      XR Chest 1 View    Result Date: 3/29/2021  Mild decrease in perihilar lung markings from prior " comparison without new consolidation or effusion.  DICTATED:   03/28/2021 EDITED/ls :   03/28/2021  This report was finalized on 3/29/2021 3:15 PM by Dr. Huan Keller.      XR Chest 1 View    Result Date: 3/27/2021  Improving lung aeration with continued decrease in upper lobe airspace opacities. Persistent interstitial markings with small left pleural effusion.  This report was finalized on 3/27/2021 9:12 AM by Canelo Steve.      XR Chest 1 View    Result Date: 3/26/2021  Improving pulmonary interstitial disease and improving left basilar atelectasis. No pneumothorax or other new chest disease is seen.  D:  03/26/2021 E:  03/26/2021  This report was finalized on 3/26/2021 10:59 PM by Dr. Jesus Mack MD.      XR Chest 1 View    Result Date: 3/25/2021  1. Interval development of extensive bilateral pulmonary interstitial disease resembling ARDS, possibly interstitial edema instead.  2. Moderate left lower lobe atelectasis noted.  DICTATED:   03/25/2021 EDITED/ls :   03/25/2021    This report was finalized on 3/25/2021 9:58 PM by Dr. Jesus Mack MD.      US Venous Doppler Lower Extremity Bilateral (duplex)    Result Date: 4/11/2021  There is a large anechoic fluid collection with internal echoes in the right groin. This may represent a hematoma. This limits visualization of the right common femoral vein and right proximal superficial femoral veins. No evidence of deep venous thrombosis in the other visualized veins of the bilateral lower extremities. Signer Name: Johanna Neff MD  Signed: 4/11/2021 4:29 PM  Workstation Name: [id number]  [facility]    Chest X-Ray PA & Lateral    Result Date: 3/25/2021  The heart is enlarged with prominence of the pulmonary vascularity. No focal parenchymal opacification present. Moderate sized hiatal hernia.  D:  03/25/2021 E:  03/25/2021  This report was finalized on 3/25/2021 4:30 PM by Dr. Edith Olivas MD.       Chest x-ray in office today: Lungs fully  expanded.  Sternal wires intact.  No pleural effusion/PTX.  Personally reviewed.  Official radiology read pending    Viewed emergency room/imaging records    Assessment / Plan:  Diagnoses and all orders for this visit:    1. Severe Aortic Stenosis (Primary)    2. S/P TAVR (transcatheter aortic valve replacement)         Holly Corona is a 75 y.o. female with a history of pretension, dyslipidemia, PAF, DVT/PE, pulmonary hypertension, morbid obesity, and symptomatic aortic stenosis s/p TAVR on 3/25/2021 with Dr. العلي.  Patient with improving right groin hematoma.  Chest x-ray is stable in office.  Will have patient to follow-up with primary care provider for repeat H&H.  Plans to follow-up with Dr. Romero in the near future and Dr. Mario with repeat TTE on 4/22/21.  Have asked patient to contact our office with any worsening right groin pain, flank pain, back pain, erythema or drainage.  We will follow-up with patient in 1 month for right groin check.      JORGE Del Rosario  [facility] Cardiothoracic Surgery    Please note that portions of this note may have been completed with a voice recognition program. Efforts were made to edit the dictations, but occasionally words are mistranscribed.

## 2021-04-20 DIAGNOSIS — Z00.6 EXAMINATION FOR NORMAL COMPARISON FOR CLINICAL RESEARCH: Primary | ICD-10-CM

## 2021-04-22 ENCOUNTER — HOSPITAL ENCOUNTER (OUTPATIENT)
Dept: CARDIOLOGY | Facility: HOSPITAL | Age: 76
End: 2021-04-22

## 2021-04-27 ENCOUNTER — OFFICE VISIT (OUTPATIENT)
Dept: CARDIAC SURGERY | Facility: CLINIC | Age: 76
End: 2021-04-27

## 2021-04-27 VITALS
SYSTOLIC BLOOD PRESSURE: 141 MMHG | OXYGEN SATURATION: 97 % | HEART RATE: 60 BPM | DIASTOLIC BLOOD PRESSURE: 68 MMHG | TEMPERATURE: 97.8 F | HEIGHT: 67 IN | BODY MASS INDEX: 45.99 KG/M2 | WEIGHT: 293 LBS

## 2021-04-27 DIAGNOSIS — I35.9 AORTIC VALVE DISORDER: Primary | ICD-10-CM

## 2021-04-27 PROCEDURE — 99213 OFFICE O/P EST LOW 20 MIN: CPT | Performed by: NURSE PRACTITIONER

## 2021-04-27 NOTE — PROGRESS NOTES
[facility] Cardiothoracic Surgery Office Follow Up Note     Date of Encounter: 04/27/2021     MRN Number: [id number]  Name: Holly Corona  Phone Number: [phone]     Referred By: No ref. provider found  PCP: Abdoul Andrew MD    Chief Complaint:    Chief Complaint   Patient presents with   • Follow-up     1 wk f/u per ROM. Eval of hematoma needing ok to restart coumadin        History of Present Illness:    Holly Corona is a 75 y.o. female with a history of hypertension, dyslipidemia, PAF, DVT/PE, pulmonary hypertension, morbid obesity and symptomatic aortic stenosis s/p TAVR on 3/25/2021 with Dr. العلي.  Postprocedural right groin hematoma with holding of Coumadin therapy.  Presents today for follow-up of her right groin hematoma and discussion of restarting of her warfarin.  Last seen in the office on 4/19/2021.  Since last office visit, right groin hematoma has improved.  She denies any groin pain or flank pain.  H&H has improved with most recent on 4/19/2021 of 9.6 and 31.7.  She is followed closely by Dr. Andrew and plans to follow-up with him for her INRs.  Follows closely with Dr. Romero's office.  Plans for follow-up TTE tomorrow.    Review of Systems:  Review of Systems   Constitutional: Negative for chills, decreased appetite, diaphoresis, fever, malaise/fatigue, night sweats and weight loss.   HENT: Negative for congestion, hoarse voice, sore throat and stridor.    Cardiovascular: Negative for chest pain, claudication, dyspnea on exertion, irregular heartbeat, leg swelling, near-syncope, orthopnea, palpitations, paroxysmal nocturnal dyspnea and syncope.   Respiratory: Negative for cough, hemoptysis, shortness of breath, sleep disturbances due to breathing, snoring, sputum production and wheezing.    Hematologic/Lymphatic: Negative for adenopathy and bleeding problem. Does not bruise/bleed easily.   Skin: Negative for color change, dry skin, itching, poor wound healing and rash.      Musculoskeletal: Negative for arthritis, back pain, falls and muscle weakness.   Gastrointestinal: Negative for abdominal pain, anorexia, constipation, diarrhea, hematochezia, melena, nausea and vomiting.   Neurological: Negative for difficulty with concentration, disturbances in coordination, dizziness, loss of balance, numbness, seizures, vertigo and weakness.   Psychiatric/Behavioral: Negative for altered mental status, depression, memory loss and substance abuse. The patient does not have insomnia and is not nervous/anxious.    Allergic/Immunologic: Negative for persistent infections.       I have reviewed the review of systems as entered by my clinical support staff and have updated it as appropriate.         Allergies:  Allergies   Allergen Reactions   • Ciprofloxacin Itching   • Contrast Dye Rash     Patient usually takes prednisone prior to contrast dye.   • Iodinated Diagnostic Agents Rash     Patient usually takes prednisone prior to contrast dye.   • Ofloxacin Rash       Medications:      Current Outpatient Medications:   •  albuterol (PROVENTIL) (5 MG/ML) 0.5% nebulizer solution, Take 2.5 mg by nebulization Every 6 (Six) Hours As Needed for Wheezing., Disp: , Rfl:   •  allopurinol (ZYLOPRIM) 100 MG tablet, Take 200 mg by mouth Every Morning., Disp: , Rfl:   •  ALPRAZolam (XANAX) 0.25 MG tablet, Take 0.25 mg by mouth As Needed for Anxiety., Disp: , Rfl:   •  aspirin 81 MG EC tablet, Take 1 tablet by mouth Daily., Disp: 30 tablet, Rfl: 6  •  busPIRone (BUSPAR) 5 MG tablet, Take 5 mg by mouth Daily As Needed (ANXIETY). Takes occas. , Disp: , Rfl:   •  CloNIDine (CATAPRES) 0.2 MG tablet, Take 1 tablet by mouth 3 (Three) Times a Day. (Patient taking differently: Take 0.2 mg by mouth 2 (Two) Times a Day.), Disp: 90 tablet, Rfl: 3  •  furosemide (LASIX) 20 MG tablet, Take 20 mg by mouth Daily., Disp: , Rfl:   •  hydrALAZINE (APRESOLINE) 50 MG tablet, Take 1 tablet by mouth 3 (Three) Times a Day. (Patient  taking differently: Take 50 mg by mouth 2 (two) times a day.), Disp: 90 tablet, Rfl: 11  •  isosorbide mononitrate (IMDUR) 30 MG 24 hr tablet, Take 1 tablet by mouth Daily., Disp: 90 tablet, Rfl: 3  •  levothyroxine (SYNTHROID, LEVOTHROID) 88 MCG tablet, Take 88 mcg by mouth Every Morning., Disp: , Rfl:   •  O2 (OXYGEN), Inhale 2 L/min 1 (One) Time. Through c-pap, Disp: , Rfl:   •  pantoprazole (PROTONIX) 40 MG EC tablet, Take 1 tablet by mouth Daily., Disp: 90 tablet, Rfl: 3  •  RESTASIS 0.05 % ophthalmic emulsion, Administer 1 drop to both eyes Every Night., Disp: , Rfl:   •  sotalol (BETAPACE) 80 MG tablet, Take 1 tablet by mouth Every 12 (Twelve) Hours. **Change in therapy from daily to BID**, Disp: 180 tablet, Rfl: 1  •  telmisartan (MICARDIS) 80 MG tablet, Take 1 tablet by mouth Daily. (Patient taking differently: Take 80 mg by mouth Every Morning.), Disp: 90 tablet, Rfl: 3  •  warfarin (COUMADIN) 5 MG tablet, Take 1 tablet by mouth Take As Directed. Restart Warfarin on Wedensday 3/31/21.   Same schedule as pre-op:  5 mg DAILY, EXCEPT NONE ON ., Disp: 30 tablet, Rfl: 3    Social History     Socioeconomic History   • Marital status:      Spouse name: Not on file   • Number of children: 3   • Years of education: HS   • Highest education level: Not on file   Tobacco Use   • Smoking status: Former Smoker     Packs/day: 1.00     Years: 12.00     Pack years: 12.00     Types: Cigarettes     Quit date: 3/1/1981     Years since quittin.1   • Smokeless tobacco: Never Used   Vaping Use   • Vaping Use: Never used   Substance and Sexual Activity   • Alcohol use: No   • Drug use: No   • Sexual activity: Defer       Family History   Problem Relation Age of Onset   • Other Mother         MEDICAL PROBLEMS   • Other Sister         myocardial infarction.       Past Medical History:   Diagnosis Date   • Abnormal stress test 2017   • Acute gout of right shoulder 2018   • Anemia    • Anxiety    • Aortic  valve stenosis    • Atrial fibrillation (CMS/HCC)     A.  History of prior pulmonary vein ablation 03/14/2013. B.  On chronic Coumadin and Tikosyn therapy.   • Carotid artery stenosis    • Carotid bruit    • Chronic kidney disease    • COPD (chronic obstructive pulmonary disease) (CMS/HCC)    • Deep venous thrombosis (CMS/HCC)     A.  History of right lower extremity DVT treated with in therapy until October 2000.   • Diastolic dysfunction    • Diastolic dysfunction    • Dizziness    • Dyslipidemia    • Edema    • Exertional shortness of breath    • GERD (gastroesophageal reflux disease)    • Gout    • H/O echocardiogram     A.  Echocardiogram of 10/16/2013 reports an ejection fraction of 55-60%, a moderately to severely dilated left atrium, mild to moderately dilated right atrium, trace aortic regurgitation, mild mitral and tricuspid regurgitation with calculated    • Hiatal hernia    • History of blood transfusion     AFTER COLON SURGERY, NO REACTION    • Hypertension     A.  Echocardiogram 10/16/2013 reports a calculated RVSP of 50-55 mmHg.B.  Right heart catheterization 03/12/2009 at  reported RV of 72/19, PA 72/27 and PCWP of 24, this was felt to be     • Hypothyroidism    • Morbid obesity (CMS/HCC)    • MRSA infection ~2005    LEFT HAND TREATED AT [facility] WITH ORAL ANTICIOTICS ~2005    • Obstructive sleep apnea     A.  On CPAP AND 2L NC  each bedtime.   • Osteoarthritis    • Palpitations    • Peptic ulcer disease    • Peripheral vascular disease (CMS/HCC)    • Pulmonary embolus (CMS/HCC)     A.  Developed during chemotherapy in 2/2012. B.  Coumadin therapy reinitiated at that time.   • Pulmonary hypertension (CMS/HCC)    • S/P cardiac cath     3-10-21   • S/P transesophageal echocardiogram (LEANDRO)     3-10-21   • Signet ring cell adenocarcinoma (CMS/HCC)     A.  Diagnosed on colonoscopy E. 10/14/2011, status post colon resection and 2 of 20 nodes positive, T3, N1b Stage IIIB. B.  Status post  "chemotherapy.    • Staph infection     LEG ~2019 TREATED WITH ORAL ANTIBIOTICS    • Syncope    • Syncope    • Wears dentures    • Wears glasses        Past Surgical History:   Procedure Laterality Date   • AORTIC VALVE REPAIR/REPLACEMENT N/A 3/25/2021    Procedure: TRANSCATHETER AORTIC VALVE REPLACEMENT;  Surgeon: Sacha العلي MD;  Location: [facility];  Service: Cardiothoracic;  Laterality: N/A;  flouro 8  dose 1005mGy  contrast 65   • AORTIC VALVE REPAIR/REPLACEMENT N/A 3/25/2021    Procedure: TRANSCATHETER AORTIC VALVE INSERTION;  Surgeon: Milana Whitaker MD;  Location: [facility];  Service: Cardiovascular;  Laterality: N/A;   • CARDIAC ABLATION      catheter ablation atrial fibrillation, 2013 PER ESTER    • CARDIAC CATHETERIZATION     • CARDIAC CATHETERIZATION N/A 3/10/2021    Procedure: LEFT HEART CATH;  Surgeon: Milana Whitaker MD;  Location: [facility] CATH INVASIVE LOCATION;  Service: Cardiology;  Laterality: N/A;   • CARDIAC CATHETERIZATION      cardiac cath procedure summary, A.  Cardiac catheterization April 2007 reported no significance coronary artery disease with markedly elevated LVEDP.   • CARPAL TUNNEL RELEASE Bilateral     neuroplasty decompression median nerve at carpal tunnel 1997   • COLONOSCOPY      SEVERAL MOST RECENT ~2018   • FOOT SURGERY Right    • HEMICOLECTOMY      partial colectomy , A.  Status post right hemicolectomy secondary to colon cancer in 2011.   • HYSTERECTOMY      1993   • KNEE ARTHROPLASTY Left     knee replacement 2005   • TRANSESOPHAGEAL ECHOCARDIOGRAM (LEANDRO) N/A 3/25/2021    Procedure: TRANSESOPHAGEAL ECHOCARDIOGRAM WITH ANESTHESIA;  Surgeon: Sacha العلي MD;  Location: [facility];  Service: Cardiothoracic;  Laterality: N/A;       Physical Exam:  Vital Signs:    Vitals:    04/27/21 1202   BP: 141/68   BP Location: Left arm   Pulse: 60   Temp: 97.8 °F (36.6 °C)   SpO2: 97%   Weight: (!) 139 kg (307 lb)   Height: 170.2 cm (67\")     Body mass " index is 48.08 kg/m².     Physical Exam  Skin:     Comments: Right groin with improvement of her hematoma.           Labs/Imaging:    XR Chest 2 View    Result Date: 4/19/2021  Slight prominence of the pulmonary vascularity. The heart is enlarged. No focal parenchymal opacification is present.  D:  04/19/2021 E:  04/19/2021  This report was finalized on 4/19/2021 5:44 PM by Dr. Edith Olivas MD.      XR Chest 1 View    Result Date: 3/29/2021  No significant interval change.  D:  03/29/2021 E:  03/29/2021  This report was finalized on 3/29/2021 3:16 PM by Dr. Huan Keller.      XR Chest 1 View    Result Date: 3/29/2021  Mild decrease in perihilar lung markings from prior comparison without new consolidation or effusion.  DICTATED:   03/28/2021 EDITED/ls :   03/28/2021  This report was finalized on 3/29/2021 3:15 PM by Dr. Huan Keller.      US Venous Doppler Lower Extremity Bilateral (duplex)    Result Date: 4/11/2021  There is a large anechoic fluid collection with internal echoes in the right groin. This may represent a hematoma. This limits visualization of the right common femoral vein and right proximal superficial femoral veins. No evidence of deep venous thrombosis in the other visualized veins of the bilateral lower extremities. Signer Name: Johanna Neff MD  Signed: 4/11/2021 4:29 PM  Workstation Name: [id number]  [facility]       Assessment / Plan:  Diagnoses and all orders for this visit:    1. Severe Aortic Stenosis (Primary)       Holly Corona is a 75 y.o. female with a history of hypertension, dyslipidemia, PAF, DVT/PE, pulmonary hypertension, morbid obesity and symptomatic aortic stenosis s/p TAVR on 3/25/2021 with Dr. العلي.  Right groin hematoma has improved significantly since last office visit.  She denies any right groin or flank pain.  Will have patient to reinitiate Coumadin with dose today.  She will contact her PCP Dr. Andrew to begin following her INRs again and  for dose adjustments.  She follows closely with Dr. Romero's office and plans for TTE tomorrow.  We will have patient to return to clinic in approximately 1 month for right groin wound check.  At that time, if patient remains stable we will follow-up with her on an as-needed basis.    Josefa Bustamante, [facility] Cardiothoracic Surgery    Please note that portions of this note may have been completed with a voice recognition program. Efforts were made to edit the dictations, but occasionally words are mistranscribed.

## 2021-04-28 ENCOUNTER — TELEPHONE (OUTPATIENT)
Dept: CARDIOLOGY | Facility: HOSPITAL | Age: 76
End: 2021-04-28

## 2021-04-28 NOTE — TELEPHONE ENCOUNTER
TAVR JORGE    Collected KCCQ12 and 5 meter walk after patient seen in [facility] 4/19/21.      Gait speed improved to 10/2 m/sec.  KCCQ 12 score improved to 42/70.  Fatigue still affected by anemia and ongoing hematoma left groin.  Following up again in one month with Dr. العلي's group.      Will see patient in TAVR clinic one year.    Jacqueline PATEL

## 2021-04-29 ENCOUNTER — APPOINTMENT (OUTPATIENT)
Dept: CARDIOLOGY | Facility: HOSPITAL | Age: 76
End: 2021-04-29

## 2021-04-29 ENCOUNTER — HOSPITAL ENCOUNTER (OUTPATIENT)
Dept: CARDIOLOGY | Facility: HOSPITAL | Age: 76
Discharge: HOME | End: 2021-04-29
Admitting: NURSE PRACTITIONER

## 2021-04-29 VITALS — HEIGHT: 67 IN | BODY MASS INDEX: 45.99 KG/M2 | WEIGHT: 293 LBS

## 2021-04-29 DIAGNOSIS — I48.0 PAROXYSMAL ATRIAL FIBRILLATION (HCC): ICD-10-CM

## 2021-04-29 DIAGNOSIS — I35.9 AORTIC VALVE DISORDER: ICD-10-CM

## 2021-04-29 PROCEDURE — 93306 TTE W/DOPPLER COMPLETE: CPT

## 2021-04-29 PROCEDURE — 93306 TTE W/DOPPLER COMPLETE: CPT | Performed by: INTERNAL MEDICINE

## 2021-05-04 ENCOUNTER — TELEPHONE (OUTPATIENT)
Dept: CARDIOLOGY | Facility: HOSPITAL | Age: 76
End: 2021-05-04

## 2021-05-04 NOTE — TELEPHONE ENCOUNTER
Returned call to patient.  She called to say BP has been elevated since Sunday afternoon.  Running 160-200 systolic and 80+ diastolic.      She called PCP and he recommended increase of hydralazine to 50 TID instead of BID.      PCP is still monitoring PT/INR and HCT regularly. She states groin hematoma is stable/ starting to resolve.      Keep follow up with Dr. Romero on 5/6 and take meds / BP log to review/discuss.      Jacqueline PATEL

## 2021-05-06 ENCOUNTER — OFFICE VISIT (OUTPATIENT)
Dept: CARDIOLOGY | Facility: CLINIC | Age: 76
End: 2021-05-06

## 2021-05-06 VITALS
SYSTOLIC BLOOD PRESSURE: 175 MMHG | OXYGEN SATURATION: 93 % | HEIGHT: 67 IN | DIASTOLIC BLOOD PRESSURE: 71 MMHG | BODY MASS INDEX: 45.99 KG/M2 | HEART RATE: 68 BPM | WEIGHT: 293 LBS

## 2021-05-06 DIAGNOSIS — I48.0 PAROXYSMAL ATRIAL FIBRILLATION (HCC): ICD-10-CM

## 2021-05-06 DIAGNOSIS — I51.89 DIASTOLIC DYSFUNCTION: Primary | ICD-10-CM

## 2021-05-06 DIAGNOSIS — I73.9 PERIPHERAL VASCULAR DISEASE (HCC): ICD-10-CM

## 2021-05-06 DIAGNOSIS — E78.5 DYSLIPIDEMIA: ICD-10-CM

## 2021-05-06 DIAGNOSIS — R00.2 PALPITATIONS: ICD-10-CM

## 2021-05-06 DIAGNOSIS — I35.9 AORTIC VALVE DISORDER: ICD-10-CM

## 2021-05-06 DIAGNOSIS — I27.20 PULMONARY HYPERTENSION (HCC): ICD-10-CM

## 2021-05-06 DIAGNOSIS — G47.33 OSA ON CPAP: ICD-10-CM

## 2021-05-06 DIAGNOSIS — I10 ESSENTIAL HYPERTENSION: ICD-10-CM

## 2021-05-06 DIAGNOSIS — I82.4Y9 ACUTE DEEP VEIN THROMBOSIS (DVT) OF PROXIMAL VEIN OF LOWER EXTREMITY, UNSPECIFIED LATERALITY (HCC): ICD-10-CM

## 2021-05-06 DIAGNOSIS — Z99.89 OSA ON CPAP: ICD-10-CM

## 2021-05-06 DIAGNOSIS — R42 DIZZINESS: ICD-10-CM

## 2021-05-06 LAB
AORTIC DIMENSIONLESS INDEX: 0.5 (DI)
BH CV ECHO MEAS - AO MAX PG (FULL): 25.9 MMHG
BH CV ECHO MEAS - AO MAX PG: 32 MMHG
BH CV ECHO MEAS - AO MEAN PG (FULL): 13 MMHG
BH CV ECHO MEAS - AO MEAN PG: 16 MMHG
BH CV ECHO MEAS - AO ROOT AREA (BSA CORRECTED): 1.1
BH CV ECHO MEAS - AO ROOT AREA: 5.3 CM^2
BH CV ECHO MEAS - AO ROOT DIAM: 2.6 CM
BH CV ECHO MEAS - AO V2 MAX: 283 CM/SEC
BH CV ECHO MEAS - AO V2 MEAN: 183 CM/SEC
BH CV ECHO MEAS - AO V2 VTI: 71.8 CM
BH CV ECHO MEAS - AVA(I,A): 1.5 CM^2
BH CV ECHO MEAS - AVA(I,D): 1.6 CM^2
BH CV ECHO MEAS - AVA(V,A): 1.4 CM^2
BH CV ECHO MEAS - AVA(V,D): 1.4 CM^2
BH CV ECHO MEAS - BSA(HAYCOCK): 2.6 M^2
BH CV ECHO MEAS - BSA: 2.4 M^2
BH CV ECHO MEAS - BZI_BMI: 48.1 KILOGRAMS/M^2
BH CV ECHO MEAS - BZI_METRIC_HEIGHT: 170.2 CM
BH CV ECHO MEAS - BZI_METRIC_WEIGHT: 139.3 KG
BH CV ECHO MEAS - EDV(CUBED): 126.5 ML
BH CV ECHO MEAS - EDV(TEICH): 119.3 ML
BH CV ECHO MEAS - EF(CUBED): 67.5 %
BH CV ECHO MEAS - EF(TEICH): 58.8 %
BH CV ECHO MEAS - EF_3D-VOL: 60 %
BH CV ECHO MEAS - ESV(CUBED): 41.1 ML
BH CV ECHO MEAS - ESV(TEICH): 49.1 ML
BH CV ECHO MEAS - FS: 31.3 %
BH CV ECHO MEAS - IVS/LVPW: 1.2
BH CV ECHO MEAS - IVSD: 1.5 CM
BH CV ECHO MEAS - LA DIMENSION(2D): 5.6 CM
BH CV ECHO MEAS - LA DIMENSION: 5.3 CM
BH CV ECHO MEAS - LA/AO: 2
BH CV ECHO MEAS - LAT PEAK E' VEL: 3.1 CM/SEC
BH CV ECHO MEAS - LV IVRT: 0.13 SEC
BH CV ECHO MEAS - LV MASS(C)D: 284.1 GRAMS
BH CV ECHO MEAS - LV MASS(C)DI: 117.1 GRAMS/M^2
BH CV ECHO MEAS - LV MAX PG: 6.2 MMHG
BH CV ECHO MEAS - LV MEAN PG: 3 MMHG
BH CV ECHO MEAS - LV V1 MAX: 124 CM/SEC
BH CV ECHO MEAS - LV V1 MEAN: 84.3 CM/SEC
BH CV ECHO MEAS - LV V1 VTI: 34 CM
BH CV ECHO MEAS - LVIDD: 5 CM
BH CV ECHO MEAS - LVIDS: 3.5 CM
BH CV ECHO MEAS - LVOT AREA (M): 3.1 CM^2
BH CV ECHO MEAS - LVOT AREA: 3.1 CM^2
BH CV ECHO MEAS - LVOT DIAM: 2 CM
BH CV ECHO MEAS - LVPWD: 1.3 CM
BH CV ECHO MEAS - MED PEAK E' VEL: 5.8 CM/SEC
BH CV ECHO MEAS - MR MAX PG: 82.1 MMHG
BH CV ECHO MEAS - MR MAX VEL: 453 CM/SEC
BH CV ECHO MEAS - MV A MAX VEL: 41.2 CM/SEC
BH CV ECHO MEAS - MV DEC SLOPE: 763 CM/SEC^2
BH CV ECHO MEAS - MV DEC TIME: 0.33 SEC
BH CV ECHO MEAS - MV E MAX VEL: 138 CM/SEC
BH CV ECHO MEAS - MV E/A: 3.3
BH CV ECHO MEAS - MV MAX PG: 11.7 MMHG
BH CV ECHO MEAS - MV MEAN PG: 3 MMHG
BH CV ECHO MEAS - MV P1/2T MAX VEL: 167 CM/SEC
BH CV ECHO MEAS - MV P1/2T: 64.1 MSEC
BH CV ECHO MEAS - MV V2 MAX: 171 CM/SEC
BH CV ECHO MEAS - MV V2 MEAN: 75.4 CM/SEC
BH CV ECHO MEAS - MV V2 VTI: 56 CM
BH CV ECHO MEAS - MVA P1/2T LCG: 1.3 CM^2
BH CV ECHO MEAS - MVA(P1/2T): 3.4 CM^2
BH CV ECHO MEAS - MVA(VTI): 1.9 CM^2
BH CV ECHO MEAS - PA MAX PG: 4.2 MMHG
BH CV ECHO MEAS - PA MEAN PG: 2 MMHG
BH CV ECHO MEAS - PA V2 MAX: 103 CM/SEC
BH CV ECHO MEAS - PA V2 MEAN: 69.5 CM/SEC
BH CV ECHO MEAS - PA V2 VTI: 26.9 CM
BH CV ECHO MEAS - RAP SYSTOLE: 10 MMHG
BH CV ECHO MEAS - RVDD: 3.6 CM
BH CV ECHO MEAS - RVSP: 60 MMHG
BH CV ECHO MEAS - SI(AO): 157.1 ML/M^2
BH CV ECHO MEAS - SI(CUBED): 35.2 ML/M^2
BH CV ECHO MEAS - SI(LVOT): 44 ML/M^2
BH CV ECHO MEAS - SI(TEICH): 28.9 ML/M^2
BH CV ECHO MEAS - SV(AO): 381.2 ML
BH CV ECHO MEAS - SV(CUBED): 85.4 ML
BH CV ECHO MEAS - SV(LVOT): 106.8 ML
BH CV ECHO MEAS - SV(TEICH): 70.2 ML
BH CV ECHO MEAS - TR MAX VEL: 353 CM/SEC
BH CV ECHO MEASUREMENTS AVERAGE E/E' RATIO: 31.01
MAXIMAL PREDICTED HEART RATE: 143 BPM
STRESS TARGET HR: 122 BPM

## 2021-05-06 PROCEDURE — 99214 OFFICE O/P EST MOD 30 MIN: CPT | Performed by: INTERNAL MEDICINE

## 2021-05-06 RX ORDER — IRON ASPGLY,PS/C/B12/FA/CA/SUC 150-25-1
CAPSULE ORAL
COMMUNITY
End: 2021-11-10

## 2021-05-06 NOTE — PATIENT INSTRUCTIONS
Obesity, Adult  Obesity is the condition of having too much total body fat. Being overweight or obese means that your weight is greater than what is considered healthy for your body size. Obesity is determined by a measurement called BMI. BMI is an estimate of body fat and is calculated from height and weight. For adults, a BMI of 30 or higher is considered obese.  Obesity can lead to other health concerns and major illnesses, including:  · Stroke.  · Coronary artery disease (CAD).  · Type 2 diabetes.  · Some types of cancer, including cancers of the colon, breast, uterus, and gallbladder.  · Osteoarthritis.  · High blood pressure (hypertension).  · High cholesterol.  · Sleep apnea.  · Gallbladder stones.  · Infertility problems.  What are the causes?  Common causes of this condition include:  · Eating daily meals that are high in calories, sugar, and fat.  · Being born with genes that may make you more likely to become obese.  · Having a medical condition that causes obesity, including:  ? Hypothyroidism.  ? Polycystic ovarian syndrome (PCOS).  ? Binge-eating disorder.  ? Cushing syndrome.  · Taking certain medicines, such as steroids, antidepressants, and seizure medicines.  · Not being physically active (sedentary lifestyle).  · Not getting enough sleep.  · Drinking high amounts of sugar-sweetened beverages, such as soft drinks.  What increases the risk?  The following factors may make you more likely to develop this condition:  · Having a family history of obesity.  · Being a woman of  descent.  · Being a man of  descent.  · Living in an area with limited access to:  ? [place], recreation centers, or sidewalks.  ? Healthy food choices, such as grocery stores and farmers' markets.  What are the signs or symptoms?  The main sign of this condition is having too much body fat.  How is this diagnosed?  This condition is diagnosed based on:  · Your BMI. If you are an adult with a BMI of 30 or  higher, you are considered obese.  · Your waist circumference. This measures the distance around your waistline.  · Your skinfold thickness. Your health care provider may gently pinch a fold of your skin and measure it.  You may have other tests to check for underlying conditions.  How is this treated?  Treatment for this condition often includes changing your lifestyle. Treatment may include some or all of the following:  · Dietary changes. This may include developing a healthy meal plan.  · Regular physical activity. This may include activity that causes your heart to beat faster (aerobic exercise) and strength training. Work with your health care provider to design an exercise program that works for you.  · Medicine to help you lose weight if you are unable to lose 1 pound a week after 6 weeks of healthy eating and more physical activity.  · Treating conditions that cause the obesity (underlying conditions).  · Surgery. Surgical options may include gastric banding and gastric bypass. Surgery may be done if:  ? Other treatments have not helped to improve your condition.  ? You have a BMI of 40 or higher.  ? You have life-threatening health problems related to obesity.  Follow these instructions at home:  Eating and drinking    · Follow recommendations from your health care provider about what you eat and drink. Your health care provider may advise you to:  ? Limit fast food, sweets, and processed snack foods.  ? Choose low-fat options, such as low-fat milk instead of whole milk.  ? Eat 5 or more servings of fruits or vegetables every day.  ? Eat at home more often. This gives you more control over what you eat.  ? Choose healthy foods when you eat out.  ? Learn to read food labels. This will help you understand how much food is considered 1 serving.  ? Learn what a healthy serving size is.  ? Keep low-fat snacks available.  ? Limit sugary drinks, such as soda, fruit juice, sweetened iced tea, and flavored  milk.  · Drink enough water to keep your urine pale yellow.  · Do not follow a fad diet. Fad diets can be unhealthy and even dangerous.  Physical activity  · Exercise regularly, as told by your health care provider.  ? Most adults should get up to 150 minutes of moderate-intensity exercise every week.  ? Ask your health care provider what types of exercise are safe for you and how often you should exercise.  · Warm up and stretch before being active.  · Cool down and stretch after being active.  · Rest between periods of activity.  Lifestyle  · Work with your health care provider and a dietitian to set a weight-loss goal that is healthy and reasonable for you.  · Limit your screen time.  · Find ways to reward yourself that do not involve food.  · Do not drink alcohol if:  ? Your health care provider tells you not to drink.  ? You are pregnant, may be pregnant, or are planning to become pregnant.  · If you drink alcohol:  ? Limit how much you use to:  § 0-1 drink a day for women.  § 0-2 drinks a day for men.  ? Be aware of how much alcohol is in your drink. In the [place] one drink equals one 12 oz bottle of beer (355 mL), one 5 oz glass of wine (148 mL), or one 1½ oz glass of hard liquor (44 mL).  General instructions  · Keep a weight-loss journal to keep track of the food you eat and how much exercise you get.  · Take over-the-counter and prescription medicines only as told by your health care provider.  · Take vitamins and supplements only as told by your health care provider.  · Consider joining a support group. Your health care provider may be able to recommend a support group.  · Keep all follow-up visits as told by your health care provider. This is important.  Contact a health care provider if:  · You are unable to meet your weight loss goal after 6 weeks of dietary and lifestyle changes.  Get help right away if you are having:  · Trouble breathing.  · Suicidal thoughts or behaviors.  Summary  · Obesity is the  condition of having too much total body fat.  · Being overweight or obese means that your weight is greater than what is considered healthy for your body size.  · Work with your health care provider and a dietitian to set a weight-loss goal that is healthy and reasonable for you.  · Exercise regularly, as told by your health care provider. Ask your health care provider what types of exercise are safe for you and how often you should exercise.  This information is not intended to replace advice given to you by your health care provider. Make sure you discuss any questions you have with your health care provider.  Document Revised: 08/22/2019 Document Reviewed: 08/22/2019  [employer] Patient Education © 2021 [employer]  MyPlate from [employer]    MyPlate is an outline of a general healthy diet based on the 2010 Dietary Guidelines for Americans, from the [place] Department of Agriculture (USDA). It sets guidelines for how much food you should eat from each food group based on your age, sex, and level of physical activity.  What are tips for following MyPlate?  To follow MyPlate recommendations:  · Eat a wide variety of fruits and vegetables, grains, and protein foods.  · Serve smaller portions and eat less food throughout the day.  · Limit portion sizes to avoid overeating.  · Enjoy your food.  · Get at least 150 minutes of exercise every week. This is about 30 minutes each day, 5 or more days per week.  It can be difficult to have every meal look like MyPlate. Think about MyPlate as eating guidelines for an entire day, rather than each individual meal.  Fruits and vegetables  · Make half of your plate fruits and vegetables.  · Eat many different colors of fruits and vegetables each day.  · For a 2,000 calorie daily food plan, eat:  ? 2½ cups of vegetables every day.  ? 2 cups of fruit every day.  · 1 cup is equal to:  ? 1 cup raw or cooked vegetables.  ? 1 cup raw fruit.  ? 1 medium-sized orange, apple, or banana.  ? 1 cup  100% fruit or vegetable juice.  ? 2 cups raw leafy greens, such as lettuce, spinach, or kale.  ? ½ cup dried fruit.  Grains  · One fourth of your plate should be grains.  · Make at least half of the grains you eat each day whole grains.  · For a 2,000 calorie daily food plan, eat 6 oz of grains every day.  · 1 oz is equal to:  ? 1 slice bread.  ? 1 cup cereal.  ? ½ cup cooked rice, cereal, or pasta.  Protein  · One fourth of your plate should be protein.  · Eat a wide variety of protein foods, including meat, poultry, fish, eggs, beans, nuts, and tofu.  · For a 2,000 calorie daily food plan, eat 5½ oz of protein every day.  · 1 oz is equal to:  ? 1 oz meat, poultry, or fish.  ? ¼ cup cooked beans.  ? 1 egg.  ? ½ oz nuts or seeds.  ? 1 Tbsp peanut butter.  Dairy  · Drink fat-free or low-fat (1%) milk.  · Eat or drink dairy as a side to meals.  · For a 2,000 calorie daily food plan, eat or drink 3 cups of dairy every day.  · 1 cup is equal to:  ? 1 cup milk, yogurt, cottage cheese, or soy milk (soy beverage).  ? 2 oz processed cheese.  ? 1½ oz natural cheese.  Fats, oils, salt, and sugars  · Only small amounts of oils are recommended.  · Avoid foods that are high in calories and low in nutritional value (empty calories), like foods high in fat or added sugars.  · Choose foods that are low in salt (sodium). Choose foods that have less than 140 milligrams (mg) of sodium per serving.  · Drink water instead of sugary drinks. Drink enough water each day to keep your urine pale yellow.  Where to find support  · Work with your health care provider or a nutrition specialist (dietitian) to develop a customized eating plan that is right for you.  · Download an hannah (mobile application) to help you track your daily food intake.  Where to find more information  · Go to ChooseMyPlate.gov for more information.  Summary  · MyPlate is a general guideline for healthy eating from the USDA. It is based on the 2010 Dietary Guidelines for  Americans.  · In general, fruits and vegetables should take up ½ of your plate, grains should take up ¼ of your plate, and protein should take up ¼ of your plate.  This information is not intended to replace advice given to you by your health care provider. Make sure you discuss any questions you have with your health care provider.  Document Revised: 05/21/2020 Document Reviewed: 03/19/2018  [employer] Patient Education © 2021 [employer]

## 2021-05-06 NOTE — PROGRESS NOTES
Subjective   Holly Corona is a 75 y.o. female     Chief Complaint   Patient presents with   • Follow-up     Here for f/u on TAVR on 3- with Dr. العلي   • Aortic Stenosis   • Atrial Fibrillation   • Hypertension   • Hyperlipidemia       PROBLEM LIST:     1. Paroxysmal Atrial  fibrillation  1.1 Pulmonary vein isolation procedure with ablation of right atrial flutter by Dr. Mario, March 2013.  2. Severe pulmonary hypertension with pulmonary pressure 60-65% by most recent cath.  3. Hypertension  3.1 Echo 9/9/15 - mild LVH; EF > 65%; mild to mod MR; mod TR; PA 55-60; mild ND  3.2 recent ECHO 03/09/17, left ventricular chamber is mildly dilated. Mild concentric LVH. EF > 65%. Issa Grade ll diastolic dysfunction. Left atrium moderately dilated, right atrium mild to moderate dilated. Systolic pressure 55-60 mmHg.   3.3 recent stress 04/11/17 inferior ischemia, chest wall attenuation.   4. Dyslipidemia  5. History of DVT/PE  6. History of colon CA x 2 status post surgery 2014.  7. Carotid Artery Stenosis  7.1 Carotid U/S 3/8/16 - 16-49% MARYBEL and LICA; antegrade flow  8. HANK - CPAP   9. GOUT, recurrent flare-ups      Specialty Problems        Cardiology Problems    HTN (hypertension)        PAF on chronic coumadin (CMS/HCC)        Diastolic dysfunction        Hx DVT and PE (CMS/HCC)        Palpitations        Peripheral vascular disease (CMS/HCC)        Pulmonary embolus (CMS/HCC)        Pulmonary hypertension with PA pressure >55mmhg (CMS/HCC)        Severe Aortic Stenosis                HPI:  Ms. Corona returns for follow-up after TAVR.    She states that she is somewhat less short of breath with physical activity.  She denies orthopnea, PND, or change in lower extremity edema.  She has occasional episodes of palpitations and occasional episodes of lightheadedness.  Rarely the symptoms are contemporaneous.    Her TAVR procedure was successful she had difficulty with groin hematoma thereafter.  She currently  denies chest pain, groin pain, chills or fevers, or symptoms of embolic events.  She states that Dr. Andrew is having difficulty regulating her warfarin.    Echo was reviewed.  Global LV systolic function was preserved despite mild LV dilation.  There is mild concentric left ventricular hypertrophy with grade 2-3 diastolic dysfunction.  There is a normally functioning aortic valve prosthesis with an aortic valve area of 1.6 to 1.7 cm² and dimensionless index of 0.5.  Pulmonary pressures remain elevated at approximately 60 mmHg in systole.                      PRIOR MEDICATIONS    Current Outpatient Medications on File Prior to Visit   Medication Sig Dispense Refill   • albuterol (PROVENTIL) (5 MG/ML) 0.5% nebulizer solution Take 2.5 mg by nebulization Every 6 (Six) Hours As Needed for Wheezing.     • allopurinol (ZYLOPRIM) 100 MG tablet Take 200 mg by mouth Every Morning.     • ALPRAZolam (XANAX) 0.25 MG tablet Take 0.25 mg by mouth As Needed for Anxiety.     • Ascorbic Acid (VITAMIN C GUMMIE PO) Take  by mouth 2 (Two) Times a Day.     • aspirin 81 MG EC tablet Take 1 tablet by mouth Daily. 30 tablet 6   • busPIRone (BUSPAR) 5 MG tablet Take 5 mg by mouth Daily As Needed (ANXIETY). Takes occas.      • CloNIDine (CATAPRES) 0.2 MG tablet Take 1 tablet by mouth 3 (Three) Times a Day. (Patient taking differently: Take 0.2 mg by mouth 2 (Two) Times a Day.) 90 tablet 3   • furosemide (LASIX) 20 MG tablet Take 20 mg by mouth Daily.     • hydrALAZINE (APRESOLINE) 50 MG tablet Take 1 tablet by mouth 3 (Three) Times a Day. 90 tablet 11   • iron polysacch complex-B12-VitC-FA-Succ (Ferrex 150 Forte Plus)  MG capsule capsule Take  by mouth Daily With Breakfast.     • isosorbide mononitrate (IMDUR) 30 MG 24 hr tablet Take 1 tablet by mouth Daily. 90 tablet 3   • levothyroxine (SYNTHROID, LEVOTHROID) 88 MCG tablet Take 88 mcg by mouth Every Morning.     • O2 (OXYGEN) Inhale 2 L/min 1 (One) Time. Through c-pap     •  pantoprazole (PROTONIX) 40 MG EC tablet Take 1 tablet by mouth Daily. 90 tablet 3   • RESTASIS 0.05 % ophthalmic emulsion Administer 1 drop to both eyes Every Night.     • sotalol (BETAPACE) 80 MG tablet Take 1 tablet by mouth Every 12 (Twelve) Hours. **Change in therapy from daily to BID** 180 tablet 1   • telmisartan (MICARDIS) 80 MG tablet Take 1 tablet by mouth Daily. (Patient taking differently: Take 80 mg by mouth Every Morning.) 90 tablet 3   • warfarin (COUMADIN) 5 MG tablet Take 1 tablet by mouth Take As Directed. Restart Warfarin on Wedensday 3/31/21.   Same schedule as pre-op:  5 mg DAILY, EXCEPT NONE ON Sunday. (Patient taking differently: Take 5 mg by mouth Take As Directed. Taking 7.5 mg x2 days then resume 5 mg daily until INR on Monday) 30 tablet 3     No current facility-administered medications on file prior to visit.       ALLERGIES:    Ciprofloxacin, Contrast dye, Iodinated diagnostic agents, and Ofloxacin    PAST MEDICAL HISTORY:    Past Medical History:   Diagnosis Date   • Abnormal stress test 4/17/2017   • Acute gout of right shoulder 9/11/2018   • Anemia    • Anxiety    • Aortic valve stenosis    • Atrial fibrillation (CMS/HCC)     A.  History of prior pulmonary vein ablation 03/14/2013. B.  On chronic Coumadin and Tikosyn therapy.   • Carotid artery stenosis    • Carotid bruit    • Chronic kidney disease    • COPD (chronic obstructive pulmonary disease) (CMS/HCC)    • Deep venous thrombosis (CMS/HCC)     A.  History of right lower extremity DVT treated with in therapy until October 2000.   • Diastolic dysfunction    • Diastolic dysfunction    • Dizziness    • Dyslipidemia    • Edema    • Exertional shortness of breath    • GERD (gastroesophageal reflux disease)    • Gout    • H/O echocardiogram     A.  Echocardiogram of 10/16/2013 reports an ejection fraction of 55-60%, a moderately to severely dilated left atrium, mild to moderately dilated right atrium, trace aortic regurgitation, mild  mitral and tricuspid regurgitation with calculated    • Hiatal hernia    • History of blood transfusion     AFTER COLON SURGERY, NO REACTION    • Hypertension     A.  Echocardiogram 10/16/2013 reports a calculated RVSP of 50-55 mmHg.B.  Right heart catheterization 03/12/2009 at  reported RV of 72/19, PA 72/27 and PCWP of 24, this was felt to be     • Hypothyroidism    • Morbid obesity (CMS/HCC)    • MRSA infection ~2005    LEFT HAND TREATED AT [facility] WITH ORAL ANTICIOTICS ~2005    • Obstructive sleep apnea     A.  On CPAP AND 2L NC  each bedtime.   • Osteoarthritis    • Palpitations    • Peptic ulcer disease    • Peripheral vascular disease (CMS/HCC)    • Pulmonary embolus (CMS/HCC)     A.  Developed during chemotherapy in 2/2012. B.  Coumadin therapy reinitiated at that time.   • Pulmonary hypertension (CMS/HCC)    • S/P cardiac cath     3-10-21   • S/P transesophageal echocardiogram (LEANDRO)     3-10-21   • Signet ring cell adenocarcinoma (CMS/HCC)     A.  Diagnosed on colonoscopy E. 10/14/2011, status post colon resection and 2 of 20 nodes positive, T3, N1b Stage IIIB. B.  Status post chemotherapy.    • Staph infection     LEG ~2019 TREATED WITH ORAL ANTIBIOTICS    • Syncope    • Syncope    • Wears dentures    • Wears glasses        SURGICAL HISTORY:    Past Surgical History:   Procedure Laterality Date   • AORTIC VALVE REPAIR/REPLACEMENT N/A 3/25/2021    Procedure: TRANSCATHETER AORTIC VALVE REPLACEMENT;  Surgeon: Sacha العلي MD;  Location: [facility];  Service: Cardiothoracic;  Laterality: N/A;  flouro 8  dose 1005mGy  contrast 65   • AORTIC VALVE REPAIR/REPLACEMENT N/A 3/25/2021    Procedure: TRANSCATHETER AORTIC VALVE INSERTION;  Surgeon: Milana Whitaker MD;  Location: [facility];  Service: Cardiovascular;  Laterality: N/A;   • CARDIAC ABLATION      catheter ablation atrial fibrillation, 2013 PER [facility]    • CARDIAC CATHETERIZATION     • CARDIAC CATHETERIZATION N/A 3/10/2021       Procedure: LEFT HEART CATH;  Surgeon: Milana Whitaker MD;  Location:  [facility];  Service: Cardiology;  Laterality: N/A;   • CARDIAC CATHETERIZATION      cardiac cath procedure summary, A.  Cardiac catheterization 2007 reported no significance coronary artery disease with markedly elevated LVEDP.   • CARPAL TUNNEL RELEASE Bilateral     neuroplasty decompression median nerve at carpal tunnel    • COLONOSCOPY      SEVERAL MOST RECENT ~2018   • FOOT SURGERY Right    • HEMICOLECTOMY      partial colectomy , A.  Status post right hemicolectomy secondary to colon cancer in .   • HYSTERECTOMY         • KNEE ARTHROPLASTY Left     knee replacement    • TRANSESOPHAGEAL ECHOCARDIOGRAM (LEANDRO) N/A 3/25/2021    Procedure: TRANSESOPHAGEAL ECHOCARDIOGRAM WITH ANESTHESIA;  Surgeon: Sacha العلي MD;  Location:  [facility];  Service: Cardiothoracic;  Laterality: N/A;       SOCIAL HISTORY:    Social History     Socioeconomic History   • Marital status:      Spouse name: Not on file   • Number of children: 3   • Years of education: HS   • Highest education level: Not on file   Tobacco Use   • Smoking status: Former Smoker     Packs/day: 1.00     Years: 12.00     Pack years: 12.00     Types: Cigarettes     Quit date: 3/1/1981     Years since quittin.2   • Smokeless tobacco: Never Used   Vaping Use   • Vaping Use: Never used   Substance and Sexual Activity   • Alcohol use: No   • Drug use: No   • Sexual activity: Defer       FAMILY HISTORY:    Family History   Problem Relation Age of Onset   • Other Mother         MEDICAL PROBLEMS   • Other Sister         myocardial infarction.       Review of Systems   Constitutional: Negative.  Negative for chills and fever.   HENT: Negative.    Eyes: Positive for visual disturbance (glasses prn).   Respiratory: Positive for shortness of breath (usual).    Cardiovascular: Positive for palpitations and leg swelling (usual). Negative for  "chest pain.   Gastrointestinal: Negative.    Endocrine: Negative.    Genitourinary: Negative.    Musculoskeletal: Positive for arthralgias and myalgias.   Skin: Negative.    Allergic/Immunologic: Positive for environmental allergies.   Neurological: Positive for light-headedness (5-6 episodes of \"wooziness\" since TAVR). Negative for syncope.   Hematological: Bruises/bleeds easily (on coumadin).   Psychiatric/Behavioral: Negative.        VISIT VITALS:  Vitals:    05/06/21 0948   BP: (!) 199/75   BP Location: Left arm   Patient Position: Sitting   Pulse: 68   SpO2: 93%   Weight: (!) 138 kg (304 lb 9.6 oz)   Height: 170 cm (66.93\")      BP (!) 199/75 (BP Location: Left arm, Patient Position: Sitting) Comment: Just took am meds here at appt.  Pulse 68   Ht 170 cm (66.93\")   Wt (!) 138 kg (304 lb 9.6 oz)   LMP  (LMP Unknown)   SpO2 93%   BMI 47.81 kg/m²     RECENT LABS:    Objective       Physical Exam  Vitals and nursing note reviewed.   Constitutional:       General: She is not in acute distress.     Appearance: She is well-developed.   HENT:      Head: Normocephalic and atraumatic.   Eyes:      Conjunctiva/sclera: Conjunctivae normal.      Pupils: Pupils are equal, round, and reactive to light.   Neck:      Vascular: Carotid bruit (rt. bruit) present. No hepatojugular reflux or JVD.      Trachea: No tracheal deviation.      Comments: Nl. Carotid upstrokes  Cardiovascular:      Rate and Rhythm: Normal rate and regular rhythm.      Pulses:           Radial pulses are 2+ on the right side and 2+ on the left side.      Heart sounds: Murmur heard.   No S3 sounds.       Comments: Quadrigeminal beats  S1 decreased  2/6 early to mid peaking LUSB and RUSB  No AI  Pulmonary:      Effort: Pulmonary effort is normal.      Breath sounds: Normal breath sounds. No wheezing, rhonchi or rales.      Comments: Nl. Expir. Phase  Nl. Breath sound intensity  Abdominal:      General: Bowel sounds are normal. There is no distension or " abdominal bruit.      Palpations: Abdomen is soft. There is no mass.      Tenderness: There is no abdominal tenderness. There is no guarding or rebound.      Comments: No organomegaly   Musculoskeletal:         General: No tenderness or deformity. Normal range of motion.      Cervical back: Normal range of motion and neck supple.      Right lower leg: Edema present.      Left lower leg: Edema present.      Comments: LLE, 2-3+ edema to lower calf, mod. [place]. Stasis changes  RLE, 1+ edema to lower calf, mild [place]. Stasis changes   Skin:     General: Skin is warm and dry.      Coloration: Skin is not pale.      Findings: No erythema or rash.   Neurological:      Mental Status: She is alert and oriented to person, place, and time.   Psychiatric:         Behavior: Behavior normal.         Thought Content: Thought content normal.         Judgment: Judgment normal.         Procedures      Assessment/Plan   #1.  Aortic stenosis treated with recent TAVR.  Normally functioning valve with a valve area of 1.6 to 1.7 cm² and no significant AI.    2.  Palpitations occasionally associated with dizziness.  I like to perform a 2-week event monitor to ensure no malignant dysrhythmia.  Hopefully symptoms correspond to the patient's known paroxysmal atrial fibrillation.    3.  Moderately severe pulmonary hypertension.  Pulmonary pressures have not changed post TAVR.  However, Ms. Corona has persistent grade 2-3 diastolic dysfunction.  Hopefully, with optimal blood pressure control LV diastolic function will improve with a resultant decrease in pulmonary pressures.    4.  Class III exertional dyspnea.  I admonished Ms. Corona to try to increase her physical activity as able.    5.  Systemic hypertension.  Blood pressures are being addressed by Dr. Andrew's office.    6.  We discussed today symptoms of endocarditis, embolism, ischemia, heart failure and the patient will report any of these immediately.  She will follow with Dr. Andrew's office  with regard to regulating warfarin dosing and blood pressures.  We will plan on seeing Ms. Corona in follow-up in 6 months or on a as needed basis as discussed.   Diagnosis Plan   1. Diastolic dysfunction     2. Dyslipidemia     3. Essential hypertension     4. PAF on chronic coumadin (CMS/HCC)     5. Palpitations     6. Peripheral vascular disease (CMS/HCC)     7. Severe Aortic Stenosis     8. Acute deep vein thrombosis (DVT) of proximal vein of lower extremity, unspecified laterality (CMS/HCC)     9. HANK on CPAP     10. Pulmonary hypertension with PA pressure >55mmhg (CMS/HCC)         No follow-ups on file.         Holly Corona  reports that she quit smoking about 40 years ago. Her smoking use included cigarettes. She has a 12.00 pack-year smoking history. She has never used smokeless tobacco.. I have educated her on the risk of diseases from using tobacco products such as cancer, COPD and heart disease.             Patient's (Body mass index is 47.81 kg/m².) indicates that they are morbidly obese (BMI > 40 or > 35 with obesity - related health condition) with obesity-related health conditions that include hypertension, dyslipidemias and a-fin, aortic stenosis, valvular heart disease . Obesity is improving with treatment. BMI is pcp addressing. We discussed portion control and increasing exercise.       Nissa Qiu LPN    Scribed for Dr. Sacha Romero by Nissa Qiu LPN May 6, 2021 09:57 EDT         Electronically signed by:            This note is dictated utilizing voice recognition software.  Although this record has been proof read, transcriptional errors may still be present. If questions occur regarding the content of this record please do not hesitate to call our office.

## 2021-05-10 LAB
QT INTERVAL: 468 MS
QTC INTERVAL: 526 MS

## 2021-05-12 ENCOUNTER — TELEPHONE (OUTPATIENT)
Dept: CARDIOLOGY | Facility: HOSPITAL | Age: 76
End: 2021-05-12

## 2021-05-12 NOTE — TELEPHONE ENCOUNTER
Mrs. Corona called to update recent changes to BP meds made by PCP, Dr. Andrew.  Her hydralazine recently increased to 50 TID (from 50 BID).  She continues to monitor her BP at least twice per day.  She saw Dr. Romero earlier this month and extended holter monitor applied to evaluate Afib burden and assess for other rhythm issues.      Advised to review BP log with Dr. Andrew @ next PT/INR follow up Monday 5/17/21 for further medication management.  Keep CT Surgery follow up appointment re: groin hematoma later this month as well.    Jacqueline PATEL

## 2021-05-24 ENCOUNTER — OFFICE VISIT (OUTPATIENT)
Dept: CARDIAC SURGERY | Facility: CLINIC | Age: 76
End: 2021-05-24

## 2021-05-24 VITALS
DIASTOLIC BLOOD PRESSURE: 88 MMHG | HEART RATE: 62 BPM | HEIGHT: 67 IN | TEMPERATURE: 97.8 F | SYSTOLIC BLOOD PRESSURE: 148 MMHG | WEIGHT: 293 LBS | BODY MASS INDEX: 45.99 KG/M2 | OXYGEN SATURATION: 98 %

## 2021-05-24 DIAGNOSIS — I35.9 AORTIC VALVE DISORDER: Primary | ICD-10-CM

## 2021-05-24 PROCEDURE — 99213 OFFICE O/P EST LOW 20 MIN: CPT | Performed by: NURSE PRACTITIONER

## 2021-05-24 NOTE — PROGRESS NOTES
[facility] Follow Up Note     Date of Encounter: 05/24/2021     MRN Number: [id number]  Name: Holly Corona  Phone Number: [phone]     Referred By: No ref. provider found  PCP: Abdoul Andrew MD    Chief Complaint:    Chief Complaint   Patient presents with   • Follow-up     1 month follow up TAVR 3/25/2021. Pt states that she is still about the same some dizziness, lightheadedness. Some SOB and fatigue,. Eval of hematoma right grion area.        History of Present Illness:    Holly Corona is a 75 y.o. female with a history of hypertension, dyslipidemia, PAF, DVT/PE, pulmonary hypertension, morbid obesity and symptomatic aortic stenosis s/p TAVR on 3/25/2021 with Dr. العلي.  Patient reports today for evaluation of her right groin hematoma.  Last seen office on 4/27/2021.  She has been seen by Dr. Romero with follow-up TTE in which prosthetic AV was stable with noted pulmonary hypertension, RVSP approximately 60 mmHg.  She continues to have some shortness of breath, but denies any recent activity.  She has resumed her Coumadin therapy and her right groin is now healed.  Pt reports recent issues with BP control and Dr. Andrew is adjusting medications.      Review of Systems:  Review of Systems   Constitutional: Positive for malaise/fatigue. Negative for chills, decreased appetite, diaphoresis, fever, night sweats and weight loss.   HENT: Negative for hoarse voice, sore throat and stridor.    Cardiovascular: Positive for dyspnea on exertion, irregular heartbeat, leg swelling (lower  leg swelling bilateral ) and palpitations. Negative for chest pain, claudication, near-syncope, orthopnea, paroxysmal nocturnal dyspnea and syncope.   Respiratory: Negative for cough, hemoptysis, shortness of breath, sleep disturbances due to breathing, snoring, sputum production and wheezing.    Hematologic/Lymphatic: Negative for adenopathy and bleeding problem. Bruises/bleeds easily.      Skin: Positive for poor wound healing (right grion surgery incision ). Negative for color change, dry skin, itching and rash.   Musculoskeletal: Positive for arthritis and back pain. Negative for falls and muscle weakness.   Gastrointestinal: Negative for abdominal pain, anorexia, constipation, diarrhea, hematochezia, melena, nausea and vomiting.   Neurological: Positive for dizziness and light-headedness. Negative for difficulty with concentration, disturbances in coordination, loss of balance, numbness, seizures, vertigo and weakness.   Psychiatric/Behavioral: Negative for altered mental status, depression, memory loss and substance abuse. The patient is nervous/anxious. The patient does not have insomnia.    Allergic/Immunologic: Positive for environmental allergies. Negative for persistent infections.       Allergies:  Allergies   Allergen Reactions   • Ciprofloxacin Itching   • Contrast Dye Rash     Patient usually takes prednisone prior to contrast dye.   • Iodinated Diagnostic Agents Rash     Patient usually takes prednisone prior to contrast dye.   • Ofloxacin Rash       Medications:      Current Outpatient Medications:   •  albuterol (PROVENTIL) (5 MG/ML) 0.5% nebulizer solution, Take 2.5 mg by nebulization Every 6 (Six) Hours As Needed for Wheezing., Disp: , Rfl:   •  allopurinol (ZYLOPRIM) 100 MG tablet, Take 200 mg by mouth Every Morning., Disp: , Rfl:   •  ALPRAZolam (XANAX) 0.25 MG tablet, Take 0.25 mg by mouth As Needed for Anxiety., Disp: , Rfl:   •  Ascorbic Acid (VITAMIN C GUMMIE PO), Take  by mouth 2 (Two) Times a Day., Disp: , Rfl:   •  aspirin 81 MG EC tablet, Take 1 tablet by mouth Daily., Disp: 30 tablet, Rfl: 6  •  busPIRone (BUSPAR) 5 MG tablet, Take 5 mg by mouth Daily As Needed (ANXIETY). Takes occas. , Disp: , Rfl:   •  CloNIDine (CATAPRES) 0.2 MG tablet, Take 1 tablet by mouth 3 (Three) Times a Day. (Patient taking differently: Take 0.2 mg by mouth 2 (Two) Times a Day.), Disp: 90  tablet, Rfl: 3  •  furosemide (LASIX) 20 MG tablet, Take 20 mg by mouth Daily., Disp: , Rfl:   •  hydrALAZINE (APRESOLINE) 50 MG tablet, Take 1 tablet by mouth 3 (Three) Times a Day. (Patient taking differently: Take 50 mg by mouth 3 (Three) Times a Day. 2 in the morning, 2 in the evening and 1 pill at night), Disp: 90 tablet, Rfl: 11  •  iron polysacch complex-B12-VitC-FA-Succ (Ferrex 150 Forte Plus)  MG capsule capsule, Take  by mouth Daily With Breakfast., Disp: , Rfl:   •  isosorbide mononitrate (IMDUR) 30 MG 24 hr tablet, Take 1 tablet by mouth Daily., Disp: 90 tablet, Rfl: 3  •  levothyroxine (SYNTHROID, LEVOTHROID) 88 MCG tablet, Take 88 mcg by mouth Every Morning., Disp: , Rfl:   •  O2 (OXYGEN), Inhale 2 L/min 1 (One) Time. Through c-pap, Disp: , Rfl:   •  pantoprazole (PROTONIX) 40 MG EC tablet, Take 1 tablet by mouth Daily., Disp: 90 tablet, Rfl: 3  •  RESTASIS 0.05 % ophthalmic emulsion, Administer 1 drop to both eyes Every Night., Disp: , Rfl:   •  sotalol (BETAPACE) 80 MG tablet, Take 1 tablet by mouth Every 12 (Twelve) Hours. **Change in therapy from daily to BID**, Disp: 180 tablet, Rfl: 1  •  telmisartan (MICARDIS) 80 MG tablet, Take 1 tablet by mouth Daily. (Patient taking differently: Take 80 mg by mouth Every Morning.), Disp: 90 tablet, Rfl: 3  •  warfarin (COUMADIN) 5 MG tablet, Take 1 tablet by mouth Take As Directed. Restart Warfarin on Wedensday 3/31/21.   Same schedule as pre-op:  5 mg DAILY, EXCEPT NONE ON Sunday. (Patient taking differently: Take 5 mg by mouth Take As Directed. Taking 7.5 mg x2 days then resume 5 mg daily until INR on Monday), Disp: 30 tablet, Rfl: 3    Social History     Socioeconomic History   • Marital status:      Spouse name: Not on file   • Number of children: 3   • Years of education: HS   • Highest education level: Not on file   Tobacco Use   • Smoking status: Former Smoker     Packs/day: 1.00     Years: 12.00     Pack years: 12.00     Types: Cigarettes      Quit date: 3/1/1971     Years since quittin.2   • Smokeless tobacco: Never Used   Vaping Use   • Vaping Use: Never used   Substance and Sexual Activity   • Alcohol use: No   • Drug use: No   • Sexual activity: Defer       Family History   Problem Relation Age of Onset   • Other Mother         MEDICAL PROBLEMS   • Other Sister         myocardial infarction.       Past Medical History:   Diagnosis Date   • Abnormal stress test 2017   • Acute gout of right shoulder 2018   • Anemia    • Anxiety    • Aortic valve stenosis    • Atrial fibrillation (CMS/HCC)     A.  History of prior pulmonary vein ablation 2013. B.  On chronic Coumadin and Tikosyn therapy.   • Carotid artery stenosis    • Carotid bruit    • Chronic kidney disease    • COPD (chronic obstructive pulmonary disease) (CMS/formerly [facility])    • Deep venous thrombosis (CMS/formerly [facility])     A.  History of right lower extremity DVT treated with in therapy until 2000.   • Diastolic dysfunction    • Diastolic dysfunction    • Dizziness    • Dyslipidemia    • Edema    • Exertional shortness of breath    • GERD (gastroesophageal reflux disease)    • Gout    • H/O echocardiogram     A.  Echocardiogram of 10/16/2013 reports an ejection fraction of 55-60%, a moderately to severely dilated left atrium, mild to moderately dilated right atrium, trace aortic regurgitation, mild mitral and tricuspid regurgitation with calculated    • Hiatal hernia    • History of blood transfusion     AFTER COLON SURGERY, NO REACTION    • Hypertension     A.  Echocardiogram 10/16/2013 reports a calculated RVSP of 50-55 mmHg.B.  Right heart catheterization 2009 at  reported RV of 72/19, PA 72/27 and PCWP of 24, this was felt to be     • Hypothyroidism    • Morbid obesity (CMS/formerly [facility])    • MRSA infection ~    LEFT HAND TREATED AT [facility] WITH ORAL ANTICIOTICS ~    • Obstructive sleep apnea     A.  On CPAP AND 2L NC  each bedtime.   • Osteoarthritis    •  Palpitations    • Peptic ulcer disease    • Peripheral vascular disease (CMS/HCC)    • Pulmonary embolus (CMS/HCC)     A.  Developed during chemotherapy in 2/2012. B.  Coumadin therapy reinitiated at that time.   • Pulmonary hypertension (CMS/HCC)    • S/P cardiac cath     3-10-21   • S/P transesophageal echocardiogram (LEANDRO)     3-10-21   • Signet ring cell adenocarcinoma (CMS/HCC)     A.  Diagnosed on colonoscopy E. 10/14/2011, status post colon resection and 2 of 20 nodes positive, T3, N1b Stage IIIB. B.  Status post chemotherapy.    • Staph infection     LEG ~2019 TREATED WITH ORAL ANTIBIOTICS    • Syncope    • Syncope    • Wears dentures    • Wears glasses        Past Surgical History:   Procedure Laterality Date   • AORTIC VALVE REPAIR/REPLACEMENT N/A 3/25/2021    Procedure: TRANSCATHETER AORTIC VALVE REPLACEMENT;  Surgeon: Sacha العلي MD;  Location:  [facility];  Service: Cardiothoracic;  Laterality: N/A;  flouro 8  dose 1005mGy  contrast 65   • AORTIC VALVE REPAIR/REPLACEMENT N/A 3/25/2021    Procedure: TRANSCATHETER AORTIC VALVE INSERTION;  Surgeon: Milana Whitaker MD;  Location:  [facility] [facility] [employer];  Service: Cardiovascular;  Laterality: N/A;   • CARDIAC ABLATION      catheter ablation atrial fibrillation, 2013 PER [facility]    • CARDIAC CATHETERIZATION     • CARDIAC CATHETERIZATION N/A 3/10/2021    Procedure: LEFT HEART CATH;  Surgeon: Milana Whitaker MD;  Location:  [facility];  Service: Cardiology;  Laterality: N/A;   • CARDIAC CATHETERIZATION      cardiac cath procedure summary, A.  Cardiac catheterization April 2007 reported no significance coronary artery disease with markedly elevated LVEDP.   • CARPAL TUNNEL RELEASE Bilateral     neuroplasty decompression median nerve at carpal tunnel 1997   • COLONOSCOPY      SEVERAL MOST RECENT ~2018   • FOOT SURGERY Right    • HEMICOLECTOMY      partial colectomy , A.  Status post right hemicolectomy secondary to colon cancer in 2011.    "  • HYSTERECTOMY      1993   • KNEE ARTHROPLASTY Left     knee replacement 2005   • TRANSESOPHAGEAL ECHOCARDIOGRAM (LEANDRO) N/A 3/25/2021    Procedure: TRANSESOPHAGEAL ECHOCARDIOGRAM WITH ANESTHESIA;  Surgeon: Sacha العلي MD;  Location: [facility];  Service: Cardiothoracic;  Laterality: N/A;       I have reviewed the following portions of the patient's history: allergies, current medications, past family history, past medical history, past social history, past surgical history and problem list and confirm it's accurate.    Physical Exam:  Vital Signs:    Vitals:    05/24/21 1147   BP: 148/88   Pulse: 62   Temp: 97.8 °F (36.6 °C)   SpO2: 98%   Weight: (!) 140 kg (308 lb)   Height: 170.2 cm (67\")     Body mass index is 48.24 kg/m².     Physical Exam  Vitals and nursing note reviewed.   Constitutional:       Appearance: Normal appearance. She is well-developed and well-groomed.   HENT:      Head: Normocephalic and atraumatic.   Cardiovascular:      Rate and Rhythm: Normal rate.   Pulmonary:      Comments: Unlabored  Musculoskeletal:      Cervical back: Neck supple.   Skin:     General: Skin is warm and dry.      Comments: Right groin hematoma now resolved  No surrounding erythema, hematoma or induration   Neurological:      Mental Status: She is alert and oriented to person, place, and time.   Psychiatric:         Attention and Perception: Attention normal.         Mood and Affect: Mood normal.         Speech: Speech normal.         Behavior: Behavior is cooperative.         Labs/Imaging:    No radiology results for the last 30 days.   Results for orders placed during the hospital encounter of 04/29/21    Adult Transthoracic Echo Complete W/ Cont if Necessary Per Protocol    Interpretation Summary  Significantly technically limited study.    1.  The left ventricle appears mildly dilated and global LV systolic function is in the low normal range with a visually estimated ejection fraction of 50 to 55%.  3D " ejection fraction is 60%.  Mild concentric left ventricular hypertrophy.  Formal regional wall motion assessment cannot be performed.  Grade 2-3 diastolic dysfunction with moderate left atrial and mild to moderate right atrial enlargement.  The RV is dilated, RV systolic function appears grossly preserved on limited imaging.    2.  The mitral valve is thickened and sclerotic.  There is no mitral stenosis and there is mild MR.  There is an aortic valve prosthesis in place with no unusual sewing ring motion.  Aortic valve area is 1.6 cm² by continuity equation with a dimensionless index of 0.5.  There is no significant aortic insufficiency.  Mild tricuspid regurgitation from a morphologically normal valve.    3.  No pericardial or great vessel pathology.    4.  Pulmonary artery systolic pressures are estimated at approximately 60 mmHg      Time Spent: I spent 25 minutes caring for Holly on this date of service. This time includes time spent by me in the following activities: preparing for the visit, reviewing tests, obtaining and/or reviewing a separately obtained history, performing a medically appropriate examination and/or evaluation, counseling and educating the patient/family/caregiver and documenting information in the medical record.     Assessment / Plan:  Diagnoses and all orders for this visit:    1. Severe Aortic Stenosis (Primary)     Holly Corona is a 75 y.o. female with a history of hypertension, dyslipidemia, PAF, DVT/PE, pulmonary hypertension, morbid obesity and symptomatic aortic stenosis s/p TAVR on 3/25/2021 with Dr. العلي.  She is now stable from a post procedure standpoint.  She is following closely with Dr. Romero's office as well as her primary care provider, Dr. Andrew.  She is on aspirin and Coumadin therapy.  She was initiated on aspirin post procedurally secondary to the holding of her Coumadin with right groin hematoma.  Patient's right groin now healed and tolerating Coumadin well.   We will discontinue aspirin therapy.  We will plan to follow-up on an as-needed basis.       Josefa Bustamante [facility]

## 2021-06-17 ENCOUNTER — TELEPHONE (OUTPATIENT)
Dept: CARDIOLOGY | Facility: CLINIC | Age: 76
End: 2021-06-17

## 2021-06-17 NOTE — TELEPHONE ENCOUNTER
Patient called and left a message. She has been having some problems recently and would like to be seen. I tried to call her back. No answer. I left a message.

## 2021-07-06 ENCOUNTER — OFFICE VISIT (OUTPATIENT)
Dept: CARDIOLOGY | Facility: CLINIC | Age: 76
End: 2021-07-06

## 2021-07-06 VITALS
HEIGHT: 67 IN | HEART RATE: 57 BPM | BODY MASS INDEX: 45.99 KG/M2 | DIASTOLIC BLOOD PRESSURE: 65 MMHG | SYSTOLIC BLOOD PRESSURE: 161 MMHG | WEIGHT: 293 LBS | OXYGEN SATURATION: 97 %

## 2021-07-06 DIAGNOSIS — I73.9 PERIPHERAL VASCULAR DISEASE (HCC): ICD-10-CM

## 2021-07-06 DIAGNOSIS — E78.5 DYSLIPIDEMIA: ICD-10-CM

## 2021-07-06 DIAGNOSIS — I10 ESSENTIAL HYPERTENSION: ICD-10-CM

## 2021-07-06 DIAGNOSIS — I27.20 PULMONARY HYPERTENSION (HCC): ICD-10-CM

## 2021-07-06 DIAGNOSIS — Z99.89 OSA ON CPAP: ICD-10-CM

## 2021-07-06 DIAGNOSIS — I82.4Y9 ACUTE DEEP VEIN THROMBOSIS (DVT) OF PROXIMAL VEIN OF LOWER EXTREMITY, UNSPECIFIED LATERALITY (HCC): ICD-10-CM

## 2021-07-06 DIAGNOSIS — I35.9 AORTIC VALVE DISORDER: ICD-10-CM

## 2021-07-06 DIAGNOSIS — R00.2 PALPITATIONS: ICD-10-CM

## 2021-07-06 DIAGNOSIS — G47.33 OSA ON CPAP: ICD-10-CM

## 2021-07-06 DIAGNOSIS — I51.89 DIASTOLIC DYSFUNCTION: Primary | ICD-10-CM

## 2021-07-06 DIAGNOSIS — I48.0 PAROXYSMAL ATRIAL FIBRILLATION (HCC): ICD-10-CM

## 2021-07-06 PROCEDURE — 99214 OFFICE O/P EST MOD 30 MIN: CPT | Performed by: INTERNAL MEDICINE

## 2021-07-06 PROCEDURE — 93000 ELECTROCARDIOGRAM COMPLETE: CPT | Performed by: INTERNAL MEDICINE

## 2021-07-06 RX ORDER — AMLODIPINE BESYLATE 2.5 MG/1
2.5 TABLET ORAL DAILY
Qty: 30 TABLET | Refills: 11 | Status: SHIPPED | OUTPATIENT
Start: 2021-07-06 | End: 2021-08-05 | Stop reason: SDUPTHER

## 2021-07-06 RX ORDER — SOTALOL HYDROCHLORIDE 120 MG/1
120 TABLET ORAL EVERY 12 HOURS SCHEDULED
Qty: 60 TABLET | Refills: 11
Start: 2021-07-06

## 2021-07-06 NOTE — PATIENT INSTRUCTIONS
Obesity, Adult  Obesity is the condition of having too much total body fat. Being overweight or obese means that your weight is greater than what is considered healthy for your body size. Obesity is determined by a measurement called BMI. BMI is an estimate of body fat and is calculated from height and weight. For adults, a BMI of 30 or higher is considered obese.  Obesity can lead to other health concerns and major illnesses, including:  · Stroke.  · Coronary artery disease (CAD).  · Type 2 diabetes.  · Some types of cancer, including cancers of the colon, breast, uterus, and gallbladder.  · Osteoarthritis.  · High blood pressure (hypertension).  · High cholesterol.  · Sleep apnea.  · Gallbladder stones.  · Infertility problems.  What are the causes?  Common causes of this condition include:  · Eating daily meals that are high in calories, sugar, and fat.  · Being born with genes that may make you more likely to become obese.  · Having a medical condition that causes obesity, including:  ? Hypothyroidism.  ? Polycystic ovarian syndrome (PCOS).  ? Binge-eating disorder.  ? Cushing syndrome.  · Taking certain medicines, such as steroids, antidepressants, and seizure medicines.  · Not being physically active (sedentary lifestyle).  · Not getting enough sleep.  · Drinking high amounts of sugar-sweetened beverages, such as soft drinks.  What increases the risk?  The following factors may make you more likely to develop this condition:  · Having a family history of obesity.  · Being a woman of  descent.  · Being a man of  descent.  · Living in an area with limited access to:  ? [place], recreation centers, or sidewalks.  ? Healthy food choices, such as grocery stores and farmers' markets.  What are the signs or symptoms?  The main sign of this condition is having too much body fat.  How is this diagnosed?  This condition is diagnosed based on:  · Your BMI. If you are an adult with a BMI of 30 or  higher, you are considered obese.  · Your waist circumference. This measures the distance around your waistline.  · Your skinfold thickness. Your health care provider may gently pinch a fold of your skin and measure it.  You may have other tests to check for underlying conditions.  How is this treated?  Treatment for this condition often includes changing your lifestyle. Treatment may include some or all of the following:  · Dietary changes. This may include developing a healthy meal plan.  · Regular physical activity. This may include activity that causes your heart to beat faster (aerobic exercise) and strength training. Work with your health care provider to design an exercise program that works for you.  · Medicine to help you lose weight if you are unable to lose 1 pound a week after 6 weeks of healthy eating and more physical activity.  · Treating conditions that cause the obesity (underlying conditions).  · Surgery. Surgical options may include gastric banding and gastric bypass. Surgery may be done if:  ? Other treatments have not helped to improve your condition.  ? You have a BMI of 40 or higher.  ? You have life-threatening health problems related to obesity.  Follow these instructions at home:  Eating and drinking    · Follow recommendations from your health care provider about what you eat and drink. Your health care provider may advise you to:  ? Limit fast food, sweets, and processed snack foods.  ? Choose low-fat options, such as low-fat milk instead of whole milk.  ? Eat 5 or more servings of fruits or vegetables every day.  ? Eat at home more often. This gives you more control over what you eat.  ? Choose healthy foods when you eat out.  ? Learn to read food labels. This will help you understand how much food is considered 1 serving.  ? Learn what a healthy serving size is.  ? Keep low-fat snacks available.  ? Limit sugary drinks, such as soda, fruit juice, sweetened iced tea, and flavored  milk.  · Drink enough water to keep your urine pale yellow.  · Do not follow a fad diet. Fad diets can be unhealthy and even dangerous.  Physical activity  · Exercise regularly, as told by your health care provider.  ? Most adults should get up to 150 minutes of moderate-intensity exercise every week.  ? Ask your health care provider what types of exercise are safe for you and how often you should exercise.  · Warm up and stretch before being active.  · Cool down and stretch after being active.  · Rest between periods of activity.  Lifestyle  · Work with your health care provider and a dietitian to set a weight-loss goal that is healthy and reasonable for you.  · Limit your screen time.  · Find ways to reward yourself that do not involve food.  · Do not drink alcohol if:  ? Your health care provider tells you not to drink.  ? You are pregnant, may be pregnant, or are planning to become pregnant.  · If you drink alcohol:  ? Limit how much you use to:  § 0-1 drink a day for women.  § 0-2 drinks a day for men.  ? Be aware of how much alcohol is in your drink. In the [place] one drink equals one 12 oz bottle of beer (355 mL), one 5 oz glass of wine (148 mL), or one 1½ oz glass of hard liquor (44 mL).  General instructions  · Keep a weight-loss journal to keep track of the food you eat and how much exercise you get.  · Take over-the-counter and prescription medicines only as told by your health care provider.  · Take vitamins and supplements only as told by your health care provider.  · Consider joining a support group. Your health care provider may be able to recommend a support group.  · Keep all follow-up visits as told by your health care provider. This is important.  Contact a health care provider if:  · You are unable to meet your weight loss goal after 6 weeks of dietary and lifestyle changes.  Get help right away if you are having:  · Trouble breathing.  · Suicidal thoughts or behaviors.  Summary  · Obesity is the  condition of having too much total body fat.  · Being overweight or obese means that your weight is greater than what is considered healthy for your body size.  · Work with your health care provider and a dietitian to set a weight-loss goal that is healthy and reasonable for you.  · Exercise regularly, as told by your health care provider. Ask your health care provider what types of exercise are safe for you and how often you should exercise.  This information is not intended to replace advice given to you by your health care provider. Make sure you discuss any questions you have with your health care provider.  Document Revised: 08/22/2019 Document Reviewed: 08/22/2019  [employer] Patient Education © 2021 [employer]  MyPlate from [employer]    MyPlate is an outline of a general healthy diet based on the 2010 Dietary Guidelines for Americans, from the [place] Department of Agriculture (USDA). It sets guidelines for how much food you should eat from each food group based on your age, sex, and level of physical activity.  What are tips for following MyPlate?  To follow MyPlate recommendations:  · Eat a wide variety of fruits and vegetables, grains, and protein foods.  · Serve smaller portions and eat less food throughout the day.  · Limit portion sizes to avoid overeating.  · Enjoy your food.  · Get at least 150 minutes of exercise every week. This is about 30 minutes each day, 5 or more days per week.  It can be difficult to have every meal look like MyPlate. Think about MyPlate as eating guidelines for an entire day, rather than each individual meal.  Fruits and vegetables  · Make half of your plate fruits and vegetables.  · Eat many different colors of fruits and vegetables each day.  · For a 2,000 calorie daily food plan, eat:  ? 2½ cups of vegetables every day.  ? 2 cups of fruit every day.  · 1 cup is equal to:  ? 1 cup raw or cooked vegetables.  ? 1 cup raw fruit.  ? 1 medium-sized orange, apple, or banana.  ? 1 cup  100% fruit or vegetable juice.  ? 2 cups raw leafy greens, such as lettuce, spinach, or kale.  ? ½ cup dried fruit.  Grains  · One fourth of your plate should be grains.  · Make at least half of the grains you eat each day whole grains.  · For a 2,000 calorie daily food plan, eat 6 oz of grains every day.  · 1 oz is equal to:  ? 1 slice bread.  ? 1 cup cereal.  ? ½ cup cooked rice, cereal, or pasta.  Protein  · One fourth of your plate should be protein.  · Eat a wide variety of protein foods, including meat, poultry, fish, eggs, beans, nuts, and tofu.  · For a 2,000 calorie daily food plan, eat 5½ oz of protein every day.  · 1 oz is equal to:  ? 1 oz meat, poultry, or fish.  ? ¼ cup cooked beans.  ? 1 egg.  ? ½ oz nuts or seeds.  ? 1 Tbsp peanut butter.  Dairy  · Drink fat-free or low-fat (1%) milk.  · Eat or drink dairy as a side to meals.  · For a 2,000 calorie daily food plan, eat or drink 3 cups of dairy every day.  · 1 cup is equal to:  ? 1 cup milk, yogurt, cottage cheese, or soy milk (soy beverage).  ? 2 oz processed cheese.  ? 1½ oz natural cheese.  Fats, oils, salt, and sugars  · Only small amounts of oils are recommended.  · Avoid foods that are high in calories and low in nutritional value (empty calories), like foods high in fat or added sugars.  · Choose foods that are low in salt (sodium). Choose foods that have less than 140 milligrams (mg) of sodium per serving.  · Drink water instead of sugary drinks. Drink enough water each day to keep your urine pale yellow.  Where to find support  · Work with your health care provider or a nutrition specialist (dietitian) to develop a customized eating plan that is right for you.  · Download an hannah (mobile application) to help you track your daily food intake.  Where to find more information  · Go to ChooseMyPlate.gov for more information.  Summary  · MyPlate is a general guideline for healthy eating from the USDA. It is based on the 2010 Dietary Guidelines for  Americans.  · In general, fruits and vegetables should take up ½ of your plate, grains should take up ¼ of your plate, and protein should take up ¼ of your plate.  This information is not intended to replace advice given to you by your health care provider. Make sure you discuss any questions you have with your health care provider.  Document Revised: 05/21/2020 Document Reviewed: 03/19/2018  [employer] Patient Education © 2021 [employer]

## 2021-07-06 NOTE — PROGRESS NOTES
"Subjective   Holly Corona is a 75 y.o. female     Chief Complaint   Patient presents with   • Follow-up     Here for monitor f/u   • Atrial Fibrillation   • Hyperlipidemia   • Hypertension   • Palpitations       PROBLEM LIST:     1. Paroxysmal Atrial  fibrillation  1.1 Pulmonary vein isolation procedure with ablation of right atrial flutter by Dr. Mario, March 2013.  2. Severe pulmonary hypertension with pulmonary pressure 60-65% by most recent cath.  3. Hypertension  3.1 Echo 9/9/15 - mild LVH; EF > 65%; mild to mod MR; mod TR; PA 55-60; mild MO  3.2 recent ECHO 03/09/17, left ventricular chamber is mildly dilated. Mild concentric LVH. EF > 65%. Issa Grade ll diastolic dysfunction. Left atrium moderately dilated, right atrium mild to moderate dilated. Systolic pressure 55-60 mmHg.   3.3 recent stress 04/11/17 inferior ischemia, chest wall attenuation.   4. Dyslipidemia  5. History of DVT/PE  6. History of colon CA x 2 status post surgery 2014.  7. Carotid Artery Stenosis  7.1 Carotid U/S 3/8/16 - 16-49% MARYBEL and LICA; antegrade flow  8. HANK - CPAP   9. GOUT, recurrent flare-ups      Specialty Problems        Cardiology Problems    HTN (hypertension)        PAF on chronic coumadin (CMS/HCC)        Diastolic dysfunction        Hx DVT and PE (CMS/HCC)        Palpitations        Peripheral vascular disease (CMS/HCC)        Pulmonary embolus (CMS/HCC)        Pulmonary hypertension with PA pressure >55mmhg (CMS/HCC)        Severe Aortic Stenosis                HPI:  Ms. Corona returns for follow-up after recent event monitor demonstrated multiple episodes of atrial fibrillation with rapid ventricular response.  Ms. Corona has episodes of unexplained \"wooziness\" but no symptoms were recorded during her event monitor.  She states that she would have symptoms but would sometimes forget to activate the device or that symptoms would resolve before she could.    Strength, stamina, dyspnea, and edema have remained relatively " unchanged and are definitely unchanged from the time of the patient's prior evaluation here.  She has had no bleeding and she has had no symptoms of TIA or stroke.  Holly brings in a blood pressure log that demonstrates persistently elevated blood pressures despite an increase in hydralazine through Dr. Andrew's office.                      PRIOR MEDICATIONS    Current Outpatient Medications on File Prior to Visit   Medication Sig Dispense Refill   • albuterol (PROVENTIL) (5 MG/ML) 0.5% nebulizer solution Take 2.5 mg by nebulization Every 6 (Six) Hours As Needed for Wheezing.     • allopurinol (ZYLOPRIM) 100 MG tablet Take 200 mg by mouth Every Morning.     • ALPRAZolam (XANAX) 0.25 MG tablet Take 0.25 mg by mouth As Needed for Anxiety.     • Ascorbic Acid (VITAMIN C GUMMIE PO) Take  by mouth 2 (Two) Times a Day.     • busPIRone (BUSPAR) 5 MG tablet Take 5 mg by mouth Daily As Needed (ANXIETY). Takes occas.      • CloNIDine (CATAPRES) 0.2 MG tablet Take 1 tablet by mouth 3 (Three) Times a Day. (Patient taking differently: Take 0.2 mg by mouth 2 (Two) Times a Day.) 90 tablet 3   • furosemide (LASIX) 20 MG tablet Take 20 mg by mouth Daily.     • hydrALAZINE (APRESOLINE) 50 MG tablet Take 1 tablet by mouth 3 (Three) Times a Day. (Patient taking differently: Take 100 mg by mouth 2 (two) times a day.) 90 tablet 11   • iron polysacch complex-B12-VitC-FA-Succ (Ferrex 150 Forte Plus)  MG capsule capsule Take  by mouth Daily With Breakfast.     • isosorbide mononitrate (IMDUR) 30 MG 24 hr tablet Take 1 tablet by mouth Daily. 90 tablet 3   • levothyroxine (SYNTHROID, LEVOTHROID) 88 MCG tablet Take 88 mcg by mouth Every Morning.     • O2 (OXYGEN) Inhale 2 L/min 1 (One) Time. Through c-pap     • pantoprazole (PROTONIX) 40 MG EC tablet Take 1 tablet by mouth Daily. 90 tablet 3   • RESTASIS 0.05 % ophthalmic emulsion Administer 1 drop to both eyes Every Night.     • sotalol (BETAPACE) 80 MG tablet Take 1 tablet by mouth  Every 12 (Twelve) Hours. **Change in therapy from daily to BID** 180 tablet 1   • telmisartan (MICARDIS) 80 MG tablet Take 1 tablet by mouth Daily. (Patient taking differently: Take 80 mg by mouth Every Morning.) 90 tablet 3   • warfarin (COUMADIN) 5 MG tablet Take 1 tablet by mouth Take As Directed. Restart Warfarin on Wedensday 3/31/21.   Same schedule as pre-op:  5 mg DAILY, EXCEPT NONE ON Sunday. (Patient taking differently: Take 5 mg by mouth Every Night.) 30 tablet 3   • [DISCONTINUED] aspirin 81 MG EC tablet Take 1 tablet by mouth Daily. 30 tablet 6     No current facility-administered medications on file prior to visit.       ALLERGIES:    Ciprofloxacin, Contrast dye, Iodinated diagnostic agents, and Ofloxacin    PAST MEDICAL HISTORY:    Past Medical History:   Diagnosis Date   • Abnormal stress test 4/17/2017   • Acute gout of right shoulder 9/11/2018   • Anemia    • Anxiety    • Aortic valve stenosis    • Atrial fibrillation (CMS/HCC)     A.  History of prior pulmonary vein ablation 03/14/2013. B.  On chronic Coumadin and Tikosyn therapy.   • Carotid artery stenosis    • Carotid bruit    • Chronic kidney disease    • COPD (chronic obstructive pulmonary disease) (CMS/HCC)    • Deep venous thrombosis (CMS/HCC)     A.  History of right lower extremity DVT treated with in therapy until October 2000.   • Diastolic dysfunction    • Diastolic dysfunction    • Dizziness    • Dyslipidemia    • Edema    • Exertional shortness of breath    • GERD (gastroesophageal reflux disease)    • Gout    • H/O echocardiogram     A.  Echocardiogram of 10/16/2013 reports an ejection fraction of 55-60%, a moderately to severely dilated left atrium, mild to moderately dilated right atrium, trace aortic regurgitation, mild mitral and tricuspid regurgitation with calculated    • Hiatal hernia    • History of blood transfusion     AFTER COLON SURGERY, NO REACTION    • Hypertension     A.  Echocardiogram 10/16/2013 reports a calculated  RVSP of 50-55 mmHg.B.  Right heart catheterization 03/12/2009 at  reported RV of 72/19, PA 72/27 and PCWP of 24, this was felt to be     • Hypothyroidism    • Morbid obesity (CMS/HCC)    • MRSA infection ~2005    LEFT HAND TREATED AT [facility] WITH ORAL ANTICIOTICS ~2005    • Obstructive sleep apnea     A.  On CPAP AND 2L NC  each bedtime.   • Osteoarthritis    • Palpitations    • Peptic ulcer disease    • Peripheral vascular disease (CMS/HCC)    • Pulmonary embolus (CMS/HCC)     A.  Developed during chemotherapy in 2/2012. B.  Coumadin therapy reinitiated at that time.   • Pulmonary hypertension (CMS/HCC)    • S/P cardiac cath     3-10-21   • S/P transesophageal echocardiogram (LEANDRO)     3-10-21   • Signet ring cell adenocarcinoma (CMS/HCC)     A.  Diagnosed on colonoscopy E. 10/14/2011, status post colon resection and 2 of 20 nodes positive, T3, N1b Stage IIIB. B.  Status post chemotherapy.    • Staph infection     LEG ~2019 TREATED WITH ORAL ANTIBIOTICS    • Syncope    • Syncope    • Wears dentures    • Wears glasses        SURGICAL HISTORY:    Past Surgical History:   Procedure Laterality Date   • AORTIC VALVE REPAIR/REPLACEMENT N/A 3/25/2021    Procedure: TRANSCATHETER AORTIC VALVE REPLACEMENT;  Surgeon: Sacha العلي MD;  Location:  [facility];  Service: Cardiothoracic;  Laterality: N/A;  flouro 8  dose 1005mGy  contrast 65   • AORTIC VALVE REPAIR/REPLACEMENT N/A 3/25/2021    Procedure: TRANSCATHETER AORTIC VALVE INSERTION;  Surgeon: Milana Whitaker MD;  Location:  [facility];  Service: Cardiovascular;  Laterality: N/A;   • CARDIAC ABLATION      catheter ablation atrial fibrillation, 2013 PER ESTER    • CARDIAC CATHETERIZATION     • CARDIAC CATHETERIZATION N/A 3/10/2021    Procedure: LEFT HEART CATH;  Surgeon: Milana Whitaker MD;  Location:  [facility] CATH INVASIVE LOCATION;  Service: Cardiology;  Laterality: N/A;   • CARDIAC CATHETERIZATION      cardiac cath procedure summary, A.   Cardiac catheterization 2007 reported no significance coronary artery disease with markedly elevated LVEDP.   • CARPAL TUNNEL RELEASE Bilateral     neuroplasty decompression median nerve at carpal tunnel    • COLONOSCOPY      SEVERAL MOST RECENT ~2018   • FOOT SURGERY Right    • HEMICOLECTOMY      partial colectomy , A.  Status post right hemicolectomy secondary to colon cancer in .   • HYSTERECTOMY         • KNEE ARTHROPLASTY Left     knee replacement    • TRANSESOPHAGEAL ECHOCARDIOGRAM (LEANDRO) N/A 3/25/2021    Procedure: TRANSESOPHAGEAL ECHOCARDIOGRAM WITH ANESTHESIA;  Surgeon: Sacha العلي MD;  Location: [facility];  Service: Cardiothoracic;  Laterality: N/A;       SOCIAL HISTORY:    Social History     Socioeconomic History   • Marital status:      Spouse name: Not on file   • Number of children: 3   • Years of education: HS   • Highest education level: Not on file   Tobacco Use   • Smoking status: Former Smoker     Packs/day: 1.00     Years: 12.00     Pack years: 12.00     Types: Cigarettes     Quit date: 3/1/1971     Years since quittin.3   • Smokeless tobacco: Never Used   Vaping Use   • Vaping Use: Never used   Substance and Sexual Activity   • Alcohol use: No   • Drug use: No   • Sexual activity: Defer       FAMILY HISTORY:    Family History   Problem Relation Age of Onset   • Other Mother         MEDICAL PROBLEMS   • Other Sister         myocardial infarction.       Review of Systems   Constitutional: Positive for fatigue.   HENT: Negative.    Eyes: Positive for visual disturbance (glasses prn).   Respiratory: Positive for shortness of breath (some improvement).    Cardiovascular: Positive for palpitations and leg swelling. Negative for chest pain.   Gastrointestinal: Negative.    Endocrine: Negative.    Genitourinary: Negative.    Musculoskeletal: Positive for arthralgias and myalgias.   Skin: Negative.    Allergic/Immunologic: Negative.    Neurological:  "Positive for dizziness and light-headedness. Negative for syncope.   Hematological: Negative.    Psychiatric/Behavioral: Negative.        VISIT VITALS:  Vitals:    07/06/21 1333   BP: 161/65   BP Location: Left arm   Patient Position: Sitting   Pulse: 57   SpO2: 97%   Weight: (!) 139 kg (306 lb)   Height: 170.2 cm (67.01\")      /65 (BP Location: Left arm, Patient Position: Sitting)   Pulse 57   Ht 170.2 cm (67.01\")   Wt (!) 139 kg (306 lb)   LMP  (LMP Unknown)   SpO2 97%   BMI 47.91 kg/m²     RECENT LABS:    Objective       Physical Exam      ECG 12 Lead    Date/Time: 7/6/2021 2:09 PM  Performed by: Sacha Romero MD  Authorized by: Sacha Romero MD   Comparison: compared with previous ECG from 4/11/2021  Similar to previous ECG  Comments: Sinus bradycardia at 53 bpm.  QTC is 448 ms.  Unchanged from prior.              Assessment/Plan   #1.  Paroxysmal atrial fibrillation.  Although no direct correlation could be established on event monitoring I am suspicious that atrial fibrillation may be the cause of the patient's episodes of \"wooziness\".  Ms. Corona had severe aortic valve disease, left ventricular hypertrophy, and diastolic dysfunction.  I think that loss of atrial kick, and increase in ventricular response rate, and variable LV filling periods may all be combining to result in a suboptimal forward cardiac output.  Therefore, with a QTC of 448 and despite a sinus rate of 53 I would like to trial increasing sotalol to 120 mg twice daily.  We will follow EKGs closely over the next 3 days.  If the patient cannot tolerate that medication her symptoms do not controlled we will arrange for EP follow-up.    2.  Suboptimally controlled systemic hypertension.  I believe that Dr. Andrew was planning on starting amlodipine if increased hydralazine dosing was ineffective.  Therefore, we will start amlodipine 2.5 mg daily and up titration to tolerance and effect through Dr. Andrew's office.    3.  Status " post recent TAVR.  Postoperative echo demonstrated normal valve function.  We will continue routine clinical follow-up.    5.  Severe exogenous obesity.  Holly and I have discussed increased physical activity and weight loss over the years.  We have had no real success in that regard.  I wonder if some of the newer weight loss medications that are associated with cardiac and renal protection might be appropriate to assist in weight loss.  I would defer to Dr. Andrew in that regard.    6.  Pulmonary hypertension.  PA pressures have not changed significantly since TAVR.  We will continue to follow.    7.  Ms. Corona will follow up over the next 3 days for EKGs.  If she tolerates increased dose of sotalol will follow for its effect on her symptoms of wooziness.  She is stable clinically we will plan on seeing her in follow-up in November as previously scheduled.  Otherwise, we will see her on a as needed basis and she will follow with Dr. Andrew as instructed.   Diagnosis Plan   1. Diastolic dysfunction     2. Dyslipidemia     3. Essential hypertension     4. PAF on chronic coumadin (CMS/HCC)     5. Palpitations     6. Peripheral vascular disease (CMS/HCC)     7. Severe Aortic Stenosis     8. Acute deep vein thrombosis (DVT) of proximal vein of lower extremity, unspecified laterality (CMS/HCC)     9. HANK on CPAP     10. Pulmonary hypertension with PA pressure >55mmhg (CMS/HCC)         No follow-ups on file.         Holly Corona  reports that she quit smoking about 50 years ago. Her smoking use included cigarettes. She has a 12.00 pack-year smoking history. She has never used smokeless tobacco.. I have educated her on the risk of diseases from using tobacco products such as cancer, COPD and heart disease.       Advance Care Planning   ACP discussion was held with the patient during this visit. info. already given at previous visit        Patient's Body mass index is 47.91 kg/m². indicating that she is morbidly obese (BMI  > 40 or > 35 with obesity - related health condition). Obesity-related health conditions include the following: obstructive sleep apnea, hypertension, dyslipidemias and PAF, valvular disease. Obesity is to be assessed at today's visit. BMI is pcp addressing. We discussed portion control and increasing exercise..       Nissa Qiu LPN    Scribed for Dr. Sacha Romero by Nissa Qiu LPN July 6, 2021 13:40 EDT         Electronically signed by:            This note is dictated utilizing voice recognition software.  Although this record has been proof read, transcriptional errors may still be present. If questions occur regarding the content of this record please do not hesitate to call our office.

## 2021-07-07 ENCOUNTER — CLINICAL SUPPORT (OUTPATIENT)
Dept: CARDIOLOGY | Facility: CLINIC | Age: 76
End: 2021-07-07

## 2021-07-07 VITALS
WEIGHT: 293 LBS | HEIGHT: 67 IN | HEART RATE: 64 BPM | OXYGEN SATURATION: 97 % | SYSTOLIC BLOOD PRESSURE: 200 MMHG | BODY MASS INDEX: 45.99 KG/M2 | DIASTOLIC BLOOD PRESSURE: 77 MMHG

## 2021-07-07 DIAGNOSIS — I48.0 PAROXYSMAL ATRIAL FIBRILLATION (HCC): Primary | ICD-10-CM

## 2021-07-07 PROCEDURE — 93000 ELECTROCARDIOGRAM COMPLETE: CPT | Performed by: INTERNAL MEDICINE

## 2021-07-07 NOTE — PROGRESS NOTES
"Holly Corona  1945 7/7/2021   ?   Chief Complaint   Patient presents with   • Atrial Fibrillation     Here for nurse visit, Day # 1 EKG, increased Sotalol dosing      ?   HPI:   ?   ? Patient with known HX of PAF and has been taking Sotalol 80 mg po bid. She was seen yest. Per Dr. Romero with described episodes of \"wooziness\" although nothing definitive noted on recent event monitoring. Sotalol was increased to 120 mg po bid, first dose yest. Evening around 10:30 pm  per patient. Relays approx. 10 minutes after taking Sotalol her heart was \"beating out of my chest\" rate approx. 119 per home machine, for approx. 2 hours and around 1:30 am \"went back into rhythm\". She also relays taking first dose of Norvasc yest. Also. EKG done per orders and to be reviewed by Dr. Romero. PH,LPN  ?     Current Outpatient Medications:   •  albuterol (PROVENTIL) (5 MG/ML) 0.5% nebulizer solution, Take 2.5 mg by nebulization Every 6 (Six) Hours As Needed for Wheezing., Disp: , Rfl:   •  allopurinol (ZYLOPRIM) 100 MG tablet, Take 200 mg by mouth Every Morning., Disp: , Rfl:   •  ALPRAZolam (XANAX) 0.25 MG tablet, Take 0.25 mg by mouth As Needed for Anxiety., Disp: , Rfl:   •  amLODIPine (NORVASC) 2.5 MG tablet, Take 1 tablet by mouth Daily., Disp: 30 tablet, Rfl: 11  •  Ascorbic Acid (VITAMIN C GUMMIE PO), Take  by mouth 2 (Two) Times a Day., Disp: , Rfl:   •  busPIRone (BUSPAR) 5 MG tablet, Take 5 mg by mouth Daily As Needed (ANXIETY). Takes occas. , Disp: , Rfl:   •  CloNIDine (CATAPRES) 0.2 MG tablet, Take 1 tablet by mouth 3 (Three) Times a Day. (Patient taking differently: Take 0.2 mg by mouth 2 (Two) Times a Day.), Disp: 90 tablet, Rfl: 3  •  furosemide (LASIX) 20 MG tablet, Take 20 mg by mouth Daily., Disp: , Rfl:   •  hydrALAZINE (APRESOLINE) 50 MG tablet, Take 1 tablet by mouth 3 (Three) Times a Day. (Patient taking differently: Take 100 mg by mouth 2 (two) times a day.), Disp: 90 tablet, Rfl: 11  •  iron polysacch " complex-B12-VitC-FA-Succ (Ferrex 150 Forte Plus)  MG capsule capsule, Take  by mouth Daily With Breakfast., Disp: , Rfl:   •  isosorbide mononitrate (IMDUR) 30 MG 24 hr tablet, Take 1 tablet by mouth Daily., Disp: 90 tablet, Rfl: 3  •  levothyroxine (SYNTHROID, LEVOTHROID) 88 MCG tablet, Take 88 mcg by mouth Every Morning., Disp: , Rfl:   •  O2 (OXYGEN), Inhale 2 L/min 1 (One) Time. Through c-pap, Disp: , Rfl:   •  pantoprazole (PROTONIX) 40 MG EC tablet, Take 1 tablet by mouth Daily., Disp: 90 tablet, Rfl: 3  •  RESTASIS 0.05 % ophthalmic emulsion, Administer 1 drop to both eyes Every Night., Disp: , Rfl:   •  sotalol (BETAPACE) 120 MG tablet, Take 1 tablet by mouth Every 12 (Twelve) Hours., Disp: 60 tablet, Rfl: 11  •  telmisartan (MICARDIS) 80 MG tablet, Take 1 tablet by mouth Daily. (Patient taking differently: Take 80 mg by mouth Every Morning.), Disp: 90 tablet, Rfl: 3  •  warfarin (COUMADIN) 5 MG tablet, Take 1 tablet by mouth Take As Directed. Restart Warfarin on Wedensday 3/31/21.   Same schedule as pre-op:  5 mg DAILY, EXCEPT NONE ON Sunday. (Patient taking differently: Take 5 mg by mouth Every Night.), Disp: 30 tablet, Rfl: 3   ?   ?   Ciprofloxacin, Contrast dye, Iodinated diagnostic agents, and Ofloxacin         ECG 12 Lead    Date/Time: 7/7/2021 3:16 PM  Performed by: Sacha Romero MD  Authorized by: Sacha Romero MD   Comparison: compared with previous ECG from 7/6/2021  Similar to previous ECG  Comments: Sinus rhythm at 55 bpm.  Borderline first-degree AV block, PVCs.  QTc 443.  No significant change from prior.             ?   Assessment/Plan    ?   ? 1. PAF, on Sotalol therapy      EKG reviewed by Dr. Romero. Per Dr. Romero continue Sotalol as prescribed and return yulissa. For Day # 2 EKG. Patient aware, appt. Scheduled. PH,LPN  ?

## 2021-07-08 ENCOUNTER — CLINICAL SUPPORT (OUTPATIENT)
Dept: CARDIOLOGY | Facility: CLINIC | Age: 76
End: 2021-07-08

## 2021-07-08 VITALS
SYSTOLIC BLOOD PRESSURE: 146 MMHG | BODY MASS INDEX: 45.99 KG/M2 | HEIGHT: 67 IN | DIASTOLIC BLOOD PRESSURE: 51 MMHG | OXYGEN SATURATION: 95 % | HEART RATE: 60 BPM | WEIGHT: 293 LBS

## 2021-07-08 DIAGNOSIS — I48.0 PAROXYSMAL ATRIAL FIBRILLATION (HCC): Primary | ICD-10-CM

## 2021-07-08 PROCEDURE — 93000 ELECTROCARDIOGRAM COMPLETE: CPT | Performed by: INTERNAL MEDICINE

## 2021-07-08 NOTE — PROGRESS NOTES
Holly CARBAJAL Rice  1945 7/8/2021   ?   Chief Complaint   Patient presents with   • Atrial Fibrillation     Here for nurse visit, Day # 2 EKG, increased Sotalol dosing      ?   HPI:   ?   ? Patient with known HX of PAF. She was seen 7-6-2021 and Sotalol was increased to 120 mg po bid, first dose that evening. She has had x 3 doses so far and tolerating increased dosing well.  EKG done per orders and to be reviewed by Dr. Romero. PH,LPN  ?     Current Outpatient Medications:   •  albuterol (PROVENTIL) (5 MG/ML) 0.5% nebulizer solution, Take 2.5 mg by nebulization Every 6 (Six) Hours As Needed for Wheezing., Disp: , Rfl:   •  allopurinol (ZYLOPRIM) 100 MG tablet, Take 200 mg by mouth Every Morning., Disp: , Rfl:   •  ALPRAZolam (XANAX) 0.25 MG tablet, Take 0.25 mg by mouth As Needed for Anxiety., Disp: , Rfl:   •  amLODIPine (NORVASC) 2.5 MG tablet, Take 1 tablet by mouth Daily., Disp: 30 tablet, Rfl: 11  •  Ascorbic Acid (VITAMIN C GUMMIE PO), Take  by mouth 2 (Two) Times a Day., Disp: , Rfl:   •  busPIRone (BUSPAR) 5 MG tablet, Take 5 mg by mouth Daily As Needed (ANXIETY). Takes occas. , Disp: , Rfl:   •  CloNIDine (CATAPRES) 0.2 MG tablet, Take 1 tablet by mouth 3 (Three) Times a Day. (Patient taking differently: Take 0.2 mg by mouth 2 (Two) Times a Day.), Disp: 90 tablet, Rfl: 3  •  furosemide (LASIX) 20 MG tablet, Take 20 mg by mouth Daily., Disp: , Rfl:   •  hydrALAZINE (APRESOLINE) 50 MG tablet, Take 1 tablet by mouth 3 (Three) Times a Day. (Patient taking differently: Take 100 mg by mouth 2 (two) times a day.), Disp: 90 tablet, Rfl: 11  •  iron polysacch complex-B12-VitC-FA-Succ (Ferrex 150 Forte Plus)  MG capsule capsule, Take  by mouth Daily With Breakfast., Disp: , Rfl:   •  isosorbide mononitrate (IMDUR) 30 MG 24 hr tablet, Take 1 tablet by mouth Daily., Disp: 90 tablet, Rfl: 3  •  levothyroxine (SYNTHROID, LEVOTHROID) 88 MCG tablet, Take 88 mcg by mouth Every Morning., Disp: , Rfl:   •  O2 (OXYGEN),  Inhale 2 L/min 1 (One) Time. Through c-pap, Disp: , Rfl:   •  pantoprazole (PROTONIX) 40 MG EC tablet, Take 1 tablet by mouth Daily., Disp: 90 tablet, Rfl: 3  •  RESTASIS 0.05 % ophthalmic emulsion, Administer 1 drop to both eyes Every Night., Disp: , Rfl:   •  sotalol (BETAPACE) 120 MG tablet, Take 1 tablet by mouth Every 12 (Twelve) Hours., Disp: 60 tablet, Rfl: 11  •  telmisartan (MICARDIS) 80 MG tablet, Take 1 tablet by mouth Daily. (Patient taking differently: Take 80 mg by mouth Every Morning.), Disp: 90 tablet, Rfl: 3  •  warfarin (COUMADIN) 5 MG tablet, Take 1 tablet by mouth Take As Directed. Restart Warfarin on Wedensday 3/31/21.   Same schedule as pre-op:  5 mg DAILY, EXCEPT NONE ON Sunday. (Patient taking differently: Take 5 mg by mouth Every Night.), Disp: 30 tablet, Rfl: 3   ?   ?   Ciprofloxacin, Contrast dye, Iodinated diagnostic agents, and Ofloxacin         ECG 12 Lead    Date/Time: 7/8/2021 2:11 PM  Performed by: Sacha Romero MD  Authorized by: Sacha Romero MD   Comparison: compared with previous ECG from 7/7/2021  Similar to previous ECG  Comments: Sinus mechanism at 54 bpm with PVCs.  Borderline first-degree AV block.  QTc 4 4 0 ms.  No change from prior.             ?   Assessment/Plan    ?   ? 1. PAF, on Sotalol therapy        EKG reviewed by Dr. Romero. Per Dr. Romero continue Sotalol as prescribed and return yulissa. For Day # 3 EKG. Patient aware, appt. Scheduled. PH,LPN  ?

## 2021-07-09 ENCOUNTER — CLINICAL SUPPORT (OUTPATIENT)
Dept: CARDIOLOGY | Facility: CLINIC | Age: 76
End: 2021-07-09

## 2021-07-09 VITALS
DIASTOLIC BLOOD PRESSURE: 54 MMHG | SYSTOLIC BLOOD PRESSURE: 149 MMHG | OXYGEN SATURATION: 94 % | WEIGHT: 293 LBS | BODY MASS INDEX: 45.99 KG/M2 | HEIGHT: 67 IN | HEART RATE: 62 BPM

## 2021-07-09 DIAGNOSIS — I48.0 PAROXYSMAL ATRIAL FIBRILLATION (HCC): Primary | ICD-10-CM

## 2021-07-09 PROCEDURE — 93000 ELECTROCARDIOGRAM COMPLETE: CPT | Performed by: PHYSICIAN ASSISTANT

## 2021-07-09 NOTE — PROGRESS NOTES
Holly CARBAJAL Rice  1945 7/9/2021   ?   Chief Complaint   Patient presents with   • Nurse Visit     Third EKG-Sotalol Therapy. Has not taken blood pressure med today      ?   HPI:   ? Patient with known HX of PAF. She was seen 7-6-2021 and Sotalol was increased to 120 mg po bid, first dose that evening. She has had x 3 doses so far and tolerating increased dosing well.  ?   ?     Current Outpatient Medications:   •  albuterol (PROVENTIL) (5 MG/ML) 0.5% nebulizer solution, Take 2.5 mg by nebulization Every 6 (Six) Hours As Needed for Wheezing., Disp: , Rfl:   •  allopurinol (ZYLOPRIM) 100 MG tablet, Take 200 mg by mouth Every Morning., Disp: , Rfl:   •  ALPRAZolam (XANAX) 0.25 MG tablet, Take 0.25 mg by mouth As Needed for Anxiety., Disp: , Rfl:   •  amLODIPine (NORVASC) 2.5 MG tablet, Take 1 tablet by mouth Daily., Disp: 30 tablet, Rfl: 11  •  Ascorbic Acid (VITAMIN C GUMMIE PO), Take  by mouth 2 (Two) Times a Day., Disp: , Rfl:   •  busPIRone (BUSPAR) 5 MG tablet, Take 5 mg by mouth Daily As Needed (ANXIETY). Takes occas. , Disp: , Rfl:   •  CloNIDine (CATAPRES) 0.2 MG tablet, Take 1 tablet by mouth 3 (Three) Times a Day. (Patient taking differently: Take 0.2 mg by mouth 2 (Two) Times a Day.), Disp: 90 tablet, Rfl: 3  •  furosemide (LASIX) 20 MG tablet, Take 20 mg by mouth Daily., Disp: , Rfl:   •  hydrALAZINE (APRESOLINE) 50 MG tablet, Take 1 tablet by mouth 3 (Three) Times a Day. (Patient taking differently: Take 100 mg by mouth 2 (two) times a day.), Disp: 90 tablet, Rfl: 11  •  iron polysacch complex-B12-VitC-FA-Succ (Ferrex 150 Forte Plus)  MG capsule capsule, Take  by mouth Daily With Breakfast., Disp: , Rfl:   •  isosorbide mononitrate (IMDUR) 30 MG 24 hr tablet, Take 1 tablet by mouth Daily., Disp: 90 tablet, Rfl: 3  •  levothyroxine (SYNTHROID, LEVOTHROID) 88 MCG tablet, Take 88 mcg by mouth Every Morning., Disp: , Rfl:   •  O2 (OXYGEN), Inhale 2 L/min 1 (One) Time. Through c-pap, Disp: , Rfl:   •   pantoprazole (PROTONIX) 40 MG EC tablet, Take 1 tablet by mouth Daily., Disp: 90 tablet, Rfl: 3  •  RESTASIS 0.05 % ophthalmic emulsion, Administer 1 drop to both eyes Every Night., Disp: , Rfl:   •  sotalol (BETAPACE) 120 MG tablet, Take 1 tablet by mouth Every 12 (Twelve) Hours., Disp: 60 tablet, Rfl: 11  •  telmisartan (MICARDIS) 80 MG tablet, Take 1 tablet by mouth Daily. (Patient taking differently: Take 80 mg by mouth Every Morning.), Disp: 90 tablet, Rfl: 3  •  warfarin (COUMADIN) 5 MG tablet, Take 1 tablet by mouth Take As Directed. Restart Warfarin on Wedensday 3/31/21.   Same schedule as pre-op:  5 mg DAILY, EXCEPT NONE ON Sunday. (Patient taking differently: Take 5 mg by mouth Every Night.), Disp: 30 tablet, Rfl: 3   ?   ?   Ciprofloxacin, Contrast dye, Iodinated diagnostic agents, and Ofloxacin         ECG 12 Lead    Date/Time: 7/9/2021 10:05 AM  Performed by: Km Amezcua PA  Authorized by: Km Amezcua PA   Comparison: compared with previous ECG from 7/9/2021               ?   Assessment/Plan    ?   ? 1. Paroxysmal atrial fibrillation    EKG reviewed by Km WOLFF, no changes, patient to follow up appointment. Patient verbalized understanding. Rachel Roberts LPN    ?

## 2021-08-05 ENCOUNTER — OFFICE VISIT (OUTPATIENT)
Dept: CARDIOLOGY | Facility: CLINIC | Age: 76
End: 2021-08-05

## 2021-08-05 VITALS
DIASTOLIC BLOOD PRESSURE: 60 MMHG | SYSTOLIC BLOOD PRESSURE: 160 MMHG | HEART RATE: 61 BPM | BODY MASS INDEX: 45.99 KG/M2 | HEIGHT: 67 IN | OXYGEN SATURATION: 93 % | WEIGHT: 293 LBS

## 2021-08-05 DIAGNOSIS — Z99.89 OSA ON CPAP: ICD-10-CM

## 2021-08-05 DIAGNOSIS — R00.2 PALPITATIONS: ICD-10-CM

## 2021-08-05 DIAGNOSIS — E78.5 DYSLIPIDEMIA: ICD-10-CM

## 2021-08-05 DIAGNOSIS — G47.33 OSA ON CPAP: ICD-10-CM

## 2021-08-05 DIAGNOSIS — I35.9 AORTIC VALVE DISORDER: ICD-10-CM

## 2021-08-05 DIAGNOSIS — I51.89 DIASTOLIC DYSFUNCTION: Primary | ICD-10-CM

## 2021-08-05 DIAGNOSIS — I27.20 PULMONARY HYPERTENSION (HCC): ICD-10-CM

## 2021-08-05 DIAGNOSIS — I10 UNCONTROLLED HYPERTENSION: ICD-10-CM

## 2021-08-05 DIAGNOSIS — I48.0 PAROXYSMAL ATRIAL FIBRILLATION (HCC): ICD-10-CM

## 2021-08-05 DIAGNOSIS — I82.4Y9 ACUTE DEEP VEIN THROMBOSIS (DVT) OF PROXIMAL VEIN OF LOWER EXTREMITY, UNSPECIFIED LATERALITY (HCC): ICD-10-CM

## 2021-08-05 DIAGNOSIS — I10 ESSENTIAL HYPERTENSION: ICD-10-CM

## 2021-08-05 PROCEDURE — 99214 OFFICE O/P EST MOD 30 MIN: CPT | Performed by: INTERNAL MEDICINE

## 2021-08-05 PROCEDURE — 93000 ELECTROCARDIOGRAM COMPLETE: CPT | Performed by: INTERNAL MEDICINE

## 2021-08-05 RX ORDER — AMLODIPINE BESYLATE 5 MG/1
5 TABLET ORAL DAILY
Qty: 30 TABLET | Refills: 11 | Status: ON HOLD | OUTPATIENT
Start: 2021-08-05 | End: 2021-09-09 | Stop reason: SDUPTHER

## 2021-08-05 NOTE — PATIENT INSTRUCTIONS
Obesity, Adult  Obesity is the condition of having too much total body fat. Being overweight or obese means that your weight is greater than what is considered healthy for your body size. Obesity is determined by a measurement called BMI. BMI is an estimate of body fat and is calculated from height and weight. For adults, a BMI of 30 or higher is considered obese.  Obesity can lead to other health concerns and major illnesses, including:  · Stroke.  · Coronary artery disease (CAD).  · Type 2 diabetes.  · Some types of cancer, including cancers of the colon, breast, uterus, and gallbladder.  · Osteoarthritis.  · High blood pressure (hypertension).  · High cholesterol.  · Sleep apnea.  · Gallbladder stones.  · Infertility problems.  What are the causes?  Common causes of this condition include:  · Eating daily meals that are high in calories, sugar, and fat.  · Being born with genes that may make you more likely to become obese.  · Having a medical condition that causes obesity, including:  ? Hypothyroidism.  ? Polycystic ovarian syndrome (PCOS).  ? Binge-eating disorder.  ? Cushing syndrome.  · Taking certain medicines, such as steroids, antidepressants, and seizure medicines.  · Not being physically active (sedentary lifestyle).  · Not getting enough sleep.  · Drinking high amounts of sugar-sweetened beverages, such as soft drinks.  What increases the risk?  The following factors may make you more likely to develop this condition:  · Having a family history of obesity.  · Being a woman of  descent.  · Being a man of  descent.  · Living in an area with limited access to:  ? [place], recreation centers, or sidewalks.  ? Healthy food choices, such as grocery stores and farmers' markets.  What are the signs or symptoms?  The main sign of this condition is having too much body fat.  How is this diagnosed?  This condition is diagnosed based on:  · Your BMI. If you are an adult with a BMI of 30 or  higher, you are considered obese.  · Your waist circumference. This measures the distance around your waistline.  · Your skinfold thickness. Your health care provider may gently pinch a fold of your skin and measure it.  You may have other tests to check for underlying conditions.  How is this treated?  Treatment for this condition often includes changing your lifestyle. Treatment may include some or all of the following:  · Dietary changes. This may include developing a healthy meal plan.  · Regular physical activity. This may include activity that causes your heart to beat faster (aerobic exercise) and strength training. Work with your health care provider to design an exercise program that works for you.  · Medicine to help you lose weight if you are unable to lose 1 pound a week after 6 weeks of healthy eating and more physical activity.  · Treating conditions that cause the obesity (underlying conditions).  · Surgery. Surgical options may include gastric banding and gastric bypass. Surgery may be done if:  ? Other treatments have not helped to improve your condition.  ? You have a BMI of 40 or higher.  ? You have life-threatening health problems related to obesity.  Follow these instructions at home:  Eating and drinking    · Follow recommendations from your health care provider about what you eat and drink. Your health care provider may advise you to:  ? Limit fast food, sweets, and processed snack foods.  ? Choose low-fat options, such as low-fat milk instead of whole milk.  ? Eat 5 or more servings of fruits or vegetables every day.  ? Eat at home more often. This gives you more control over what you eat.  ? Choose healthy foods when you eat out.  ? Learn to read food labels. This will help you understand how much food is considered 1 serving.  ? Learn what a healthy serving size is.  ? Keep low-fat snacks available.  ? Limit sugary drinks, such as soda, fruit juice, sweetened iced tea, and flavored  milk.  · Drink enough water to keep your urine pale yellow.  · Do not follow a fad diet. Fad diets can be unhealthy and even dangerous.  Physical activity  · Exercise regularly, as told by your health care provider.  ? Most adults should get up to 150 minutes of moderate-intensity exercise every week.  ? Ask your health care provider what types of exercise are safe for you and how often you should exercise.  · Warm up and stretch before being active.  · Cool down and stretch after being active.  · Rest between periods of activity.  Lifestyle  · Work with your health care provider and a dietitian to set a weight-loss goal that is healthy and reasonable for you.  · Limit your screen time.  · Find ways to reward yourself that do not involve food.  · Do not drink alcohol if:  ? Your health care provider tells you not to drink.  ? You are pregnant, may be pregnant, or are planning to become pregnant.  · If you drink alcohol:  ? Limit how much you use to:  § 0-1 drink a day for women.  § 0-2 drinks a day for men.  ? Be aware of how much alcohol is in your drink. In the [place] one drink equals one 12 oz bottle of beer (355 mL), one 5 oz glass of wine (148 mL), or one 1½ oz glass of hard liquor (44 mL).  General instructions  · Keep a weight-loss journal to keep track of the food you eat and how much exercise you get.  · Take over-the-counter and prescription medicines only as told by your health care provider.  · Take vitamins and supplements only as told by your health care provider.  · Consider joining a support group. Your health care provider may be able to recommend a support group.  · Keep all follow-up visits as told by your health care provider. This is important.  Contact a health care provider if:  · You are unable to meet your weight loss goal after 6 weeks of dietary and lifestyle changes.  Get help right away if you are having:  · Trouble breathing.  · Suicidal thoughts or behaviors.  Summary  · Obesity is the  condition of having too much total body fat.  · Being overweight or obese means that your weight is greater than what is considered healthy for your body size.  · Work with your health care provider and a dietitian to set a weight-loss goal that is healthy and reasonable for you.  · Exercise regularly, as told by your health care provider. Ask your health care provider what types of exercise are safe for you and how often you should exercise.  This information is not intended to replace advice given to you by your health care provider. Make sure you discuss any questions you have with your health care provider.  Document Revised: 08/22/2019 Document Reviewed: 08/22/2019  [employer] Patient Education © 2021 [employer]  MyPlate from [employer]    MyPlate is an outline of a general healthy diet based on the 2010 Dietary Guidelines for Americans, from the [place] Department of Agriculture (USDA). It sets guidelines for how much food you should eat from each food group based on your age, sex, and level of physical activity.  What are tips for following MyPlate?  To follow MyPlate recommendations:  · Eat a wide variety of fruits and vegetables, grains, and protein foods.  · Serve smaller portions and eat less food throughout the day.  · Limit portion sizes to avoid overeating.  · Enjoy your food.  · Get at least 150 minutes of exercise every week. This is about 30 minutes each day, 5 or more days per week.  It can be difficult to have every meal look like MyPlate. Think about MyPlate as eating guidelines for an entire day, rather than each individual meal.  Fruits and vegetables  · Make half of your plate fruits and vegetables.  · Eat many different colors of fruits and vegetables each day.  · For a 2,000 calorie daily food plan, eat:  ? 2½ cups of vegetables every day.  ? 2 cups of fruit every day.  · 1 cup is equal to:  ? 1 cup raw or cooked vegetables.  ? 1 cup raw fruit.  ? 1 medium-sized orange, apple, or banana.  ? 1 cup  100% fruit or vegetable juice.  ? 2 cups raw leafy greens, such as lettuce, spinach, or kale.  ? ½ cup dried fruit.  Grains  · One fourth of your plate should be grains.  · Make at least half of the grains you eat each day whole grains.  · For a 2,000 calorie daily food plan, eat 6 oz of grains every day.  · 1 oz is equal to:  ? 1 slice bread.  ? 1 cup cereal.  ? ½ cup cooked rice, cereal, or pasta.  Protein  · One fourth of your plate should be protein.  · Eat a wide variety of protein foods, including meat, poultry, fish, eggs, beans, nuts, and tofu.  · For a 2,000 calorie daily food plan, eat 5½ oz of protein every day.  · 1 oz is equal to:  ? 1 oz meat, poultry, or fish.  ? ¼ cup cooked beans.  ? 1 egg.  ? ½ oz nuts or seeds.  ? 1 Tbsp peanut butter.  Dairy  · Drink fat-free or low-fat (1%) milk.  · Eat or drink dairy as a side to meals.  · For a 2,000 calorie daily food plan, eat or drink 3 cups of dairy every day.  · 1 cup is equal to:  ? 1 cup milk, yogurt, cottage cheese, or soy milk (soy beverage).  ? 2 oz processed cheese.  ? 1½ oz natural cheese.  Fats, oils, salt, and sugars  · Only small amounts of oils are recommended.  · Avoid foods that are high in calories and low in nutritional value (empty calories), like foods high in fat or added sugars.  · Choose foods that are low in salt (sodium). Choose foods that have less than 140 milligrams (mg) of sodium per serving.  · Drink water instead of sugary drinks. Drink enough water each day to keep your urine pale yellow.  Where to find support  · Work with your health care provider or a nutrition specialist (dietitian) to develop a customized eating plan that is right for you.  · Download an hannah (mobile application) to help you track your daily food intake.  Where to find more information  · Go to ChooseMyPlate.gov for more information.  Summary  · MyPlate is a general guideline for healthy eating from the USDA. It is based on the 2010 Dietary Guidelines for  Americans.  · In general, fruits and vegetables should take up ½ of your plate, grains should take up ¼ of your plate, and protein should take up ¼ of your plate.  This information is not intended to replace advice given to you by your health care provider. Make sure you discuss any questions you have with your health care provider.  Document Revised: 05/21/2020 Document Reviewed: 03/19/2018  [employer] Patient Education © 2021 [employer]

## 2021-08-05 NOTE — PROGRESS NOTES
Subjective   Holly Corona is a 75 y.o. female     Chief Complaint   Patient presents with   • Follow-up     Here for eval.    • Loss of Consciousness   • Atrial Fibrillation       PROBLEM LIST:     1. Paroxysmal Atrial  fibrillation  1.1 Pulmonary vein isolation procedure with ablation of right atrial flutter by Dr. Mario, March 2013.  2. Severe pulmonary hypertension with pulmonary pressure 60-65% by most recent cath.  3. Hypertension  3.1 Echo 9/9/15 - mild LVH; EF > 65%; mild to mod MR; mod TR; PA 55-60; mild OK  3.2 recent ECHO 03/09/17, left ventricular chamber is mildly dilated. Mild concentric LVH. EF > 65%. Issa Grade ll diastolic dysfunction. Left atrium moderately dilated, right atrium mild to moderate dilated. Systolic pressure 55-60 mmHg.   3.3 recent stress 04/11/17 inferior ischemia, chest wall attenuation.   4. Dyslipidemia  5. History of DVT/PE  6. History of colon CA x 2 status post surgery 2014.  7. Carotid Artery Stenosis  7.1 Carotid U/S 3/8/16 - 16-49% MARYBEL and LICA; antegrade flow  8. HANK - CPAP   9. GOUT, recurrent flare-ups      Specialty Problems        Cardiology Problems    HTN (hypertension)        PAF on chronic coumadin (CMS/HCC)        Diastolic dysfunction        Hx DVT and PE (CMS/HCC)        Palpitations        Peripheral vascular disease (CMS/HCC)        Pulmonary hypertension with PA pressure >55mmhg (CMS/HCC)        Severe Aortic Stenosis                HPI:  Ms. Corona returns for follow-up at her request.  Shortly after increasing sotalol CN a severe episode of her typical symptoms of presyncope.  She was not orthostatic and states that she was sitting and relaxing when that occurred.  Did not check her blood pressure.  Prior event monitor demonstrated frequent episodes of A. fib which could not be correlated with the patient's symptoms.    Ms. Corona also states that she has had no improvement in her blood pressure with very low-dose amlodipine.                        PRIOR  MEDICATIONS    Current Outpatient Medications on File Prior to Visit   Medication Sig Dispense Refill   • albuterol (PROVENTIL) (5 MG/ML) 0.5% nebulizer solution Take 2.5 mg by nebulization Every 6 (Six) Hours As Needed for Wheezing.     • allopurinol (ZYLOPRIM) 100 MG tablet Take 200 mg by mouth Every Morning.     • ALPRAZolam (XANAX) 0.25 MG tablet Take 0.25 mg by mouth As Needed for Anxiety.     • amLODIPine (NORVASC) 2.5 MG tablet Take 1 tablet by mouth Daily. 30 tablet 11   • Ascorbic Acid (VITAMIN C GUMMIE PO) Take  by mouth 2 (Two) Times a Day.     • busPIRone (BUSPAR) 5 MG tablet Take 5 mg by mouth Daily As Needed (ANXIETY). Takes occas.      • CloNIDine (CATAPRES) 0.2 MG tablet Take 1 tablet by mouth 3 (Three) Times a Day. (Patient taking differently: Take 0.2 mg by mouth 2 (Two) Times a Day.) 90 tablet 3   • furosemide (LASIX) 20 MG tablet Take 20 mg by mouth Daily.     • hydrALAZINE (APRESOLINE) 50 MG tablet Take 1 tablet by mouth 3 (Three) Times a Day. (Patient taking differently: Take 100 mg by mouth 2 (two) times a day.) 90 tablet 11   • iron polysacch complex-B12-VitC-FA-Succ (Ferrex 150 Forte Plus)  MG capsule capsule Take  by mouth Daily With Breakfast.     • isosorbide mononitrate (IMDUR) 30 MG 24 hr tablet Take 1 tablet by mouth Daily. 90 tablet 3   • levothyroxine (SYNTHROID, LEVOTHROID) 88 MCG tablet Take 88 mcg by mouth Every Morning.     • O2 (OXYGEN) Inhale 2 L/min 1 (One) Time. Through c-pap     • pantoprazole (PROTONIX) 40 MG EC tablet Take 1 tablet by mouth Daily. 90 tablet 3   • RESTASIS 0.05 % ophthalmic emulsion Administer 1 drop to both eyes Every Night.     • sotalol (BETAPACE) 120 MG tablet Take 1 tablet by mouth Every 12 (Twelve) Hours. 60 tablet 11   • telmisartan (MICARDIS) 80 MG tablet Take 1 tablet by mouth Daily. (Patient taking differently: Take 80 mg by mouth Every Morning.) 90 tablet 3   • warfarin (COUMADIN) 5 MG tablet Take 1 tablet by mouth Take As Directed. Restart  Warfarin on Wedensday 3/31/21.   Same schedule as pre-op:  5 mg DAILY, EXCEPT NONE ON Sunday. (Patient taking differently: Take 5 mg by mouth Every Night.) 30 tablet 3     No current facility-administered medications on file prior to visit.       ALLERGIES:    Ciprofloxacin, Contrast dye, Iodinated diagnostic agents, and Ofloxacin    PAST MEDICAL HISTORY:    Past Medical History:   Diagnosis Date   • Abnormal stress test 4/17/2017   • Acute gout of right shoulder 9/11/2018   • Anemia    • Anxiety    • Aortic valve stenosis    • Atrial fibrillation (CMS/HCC)     A.  History of prior pulmonary vein ablation 03/14/2013. B.  On chronic Coumadin and Tikosyn therapy.   • Carotid artery stenosis    • Carotid bruit    • Chronic kidney disease    • COPD (chronic obstructive pulmonary disease) (CMS/[facility])    • Deep venous thrombosis (CMS/[facility])     A.  History of right lower extremity DVT treated with in therapy until October 2000.   • Diastolic dysfunction    • Diastolic dysfunction    • Dizziness    • Dyslipidemia    • Edema    • Exertional shortness of breath    • GERD (gastroesophageal reflux disease)    • Gout    • H/O echocardiogram     A.  Echocardiogram of 10/16/2013 reports an ejection fraction of 55-60%, a moderately to severely dilated left atrium, mild to moderately dilated right atrium, trace aortic regurgitation, mild mitral and tricuspid regurgitation with calculated    • Hiatal hernia    • History of blood transfusion     AFTER COLON SURGERY, NO REACTION    • Hypertension     A.  Echocardiogram 10/16/2013 reports a calculated RVSP of 50-55 mmHg.B.  Right heart catheterization 03/12/2009 at  reported RV of 72/19, PA 72/27 and PCWP of 24, this was felt to be     • Hypothyroidism    • Morbid obesity (CMS/[facility])    • MRSA infection ~2005    LEFT HAND TREATED AT [facility] WITH ORAL ANTICIOTICS ~2005    • Obstructive sleep apnea     A.  On CPAP AND 2L NC  each bedtime.   • Osteoarthritis    • Palpitations    •  Peptic ulcer disease    • Peripheral vascular disease (CMS/HCC)    • Pulmonary embolus (CMS/HCC)     A.  Developed during chemotherapy in 2/2012. B.  Coumadin therapy reinitiated at that time.   • Pulmonary hypertension (CMS/HCC)    • S/P cardiac cath     3-10-21   • S/P transesophageal echocardiogram (LEANDRO)     3-10-21   • Signet ring cell adenocarcinoma (CMS/HCC)     A.  Diagnosed on colonoscopy E. 10/14/2011, status post colon resection and 2 of 20 nodes positive, T3, N1b Stage IIIB. B.  Status post chemotherapy.    • Staph infection     LEG ~2019 TREATED WITH ORAL ANTIBIOTICS    • Syncope    • Syncope    • Wears dentures    • Wears glasses        SURGICAL HISTORY:    Past Surgical History:   Procedure Laterality Date   • AORTIC VALVE REPAIR/REPLACEMENT N/A 3/25/2021    Procedure: TRANSCATHETER AORTIC VALVE REPLACEMENT;  Surgeon: Sacha العلي MD;  Location:  [facility];  Service: Cardiothoracic;  Laterality: N/A;  flouro 8  dose 1005mGy  contrast 65   • AORTIC VALVE REPAIR/REPLACEMENT N/A 3/25/2021    Procedure: TRANSCATHETER AORTIC VALVE INSERTION;  Surgeon: Milana Whitaker MD;  Location:  [facility];  Service: Cardiovascular;  Laterality: N/A;   • CARDIAC ABLATION      catheter ablation atrial fibrillation, 2013 PER [facility]ER    • CARDIAC CATHETERIZATION     • CARDIAC CATHETERIZATION N/A 3/10/2021    Procedure: LEFT HEART CATH;  Surgeon: Milana Whitaker MD;  Location:  [facility] CATH INVASIVE LOCATION;  Service: Cardiology;  Laterality: N/A;   • CARDIAC CATHETERIZATION      cardiac cath procedure summary, A.  Cardiac catheterization April 2007 reported no significance coronary artery disease with markedly elevated LVEDP.   • CARPAL TUNNEL RELEASE Bilateral     neuroplasty decompression median nerve at carpal tunnel 1997   • COLONOSCOPY      SEVERAL MOST RECENT ~2018   • FOOT SURGERY Right    • HEMICOLECTOMY      partial colectomy , A.  Status post right hemicolectomy secondary to colon cancer in 2011.  "  • HYSTERECTOMY         • KNEE ARTHROPLASTY Left     knee replacement    • TRANSESOPHAGEAL ECHOCARDIOGRAM (LEANDRO) N/A 3/25/2021    Procedure: TRANSESOPHAGEAL ECHOCARDIOGRAM WITH ANESTHESIA;  Surgeon: Sacha العلي MD;  Location: [facility];  Service: Cardiothoracic;  Laterality: N/A;       SOCIAL HISTORY:    Social History     Socioeconomic History   • Marital status:      Spouse name: Not on file   • Number of children: 3   • Years of education: HS   • Highest education level: Not on file   Tobacco Use   • Smoking status: Former Smoker     Packs/day: 1.00     Years: 12.00     Pack years: 12.00     Types: Cigarettes     Quit date: 3/1/1971     Years since quittin.4   • Smokeless tobacco: Never Used   Vaping Use   • Vaping Use: Never used   Substance and Sexual Activity   • Alcohol use: No   • Drug use: No   • Sexual activity: Defer       FAMILY HISTORY:    Family History   Problem Relation Age of Onset   • Other Mother         MEDICAL PROBLEMS   • Other Sister         myocardial infarction.       Review of Systems   Constitutional: Positive for fatigue.   HENT: Negative.    Eyes: Positive for visual disturbance (reading glasses).   Respiratory: Positive for shortness of breath (usual).    Cardiovascular: Positive for chest pain (\"not much\"), palpitations and leg swelling (usual).   Gastrointestinal: Negative.    Endocrine: Negative.    Genitourinary: Negative.    Musculoskeletal: Positive for arthralgias, gait problem (ambulates with rolling walker) and myalgias.   Skin: Negative.    Allergic/Immunologic: Negative.    Neurological: Positive for dizziness and light-headedness. Negative for syncope (none since 7-20).   Hematological: Bruises/bleeds easily.   Psychiatric/Behavioral: Negative.        VISIT VITALS:  Vitals:    21 0928   BP: 160/60   BP Location: Left arm   Patient Position: Sitting   Pulse: 61   SpO2: 93%   Weight: (!) 138 kg (303 lb 3.2 oz)   Height: 170.2 cm " "(67.01\")      /60 (BP Location: Left arm, Patient Position: Sitting)   Pulse 61   Ht 170.2 cm (67.01\")   Wt (!) 138 kg (303 lb 3.2 oz)   LMP  (LMP Unknown)   SpO2 93%   BMI 47.48 kg/m²     RECENT LABS:    Objective       Physical Exam      ECG 12 Lead    Date/Time: 8/5/2021 11:05 AM  Performed by: Sacha Romero MD  Authorized by: Sacha Romero MD   Comparison: compared with previous ECG from 7/9/2021  Similar to previous ECG  Comments: Sinus mechanism at 55 with borderline first-degree AV block.  Minor intra-atrial conduction delay, QTC is 467.  No change from prior.              Assessment/Plan   #1.  \"Spells\".  These may represent recurrences of paroxysmal atrial fibrillation although an exact correlation could not be made on prior event monitoring.  However, the symptoms occurred shortly after creasing sotalol dosing (and despite no significant change in QTC with that\" I would like to check a 12-lead EKG today to assess for QTC.  If there is no significant change I doubt monomorphous V. tach like we be a contributing factor.    2.  Refractory systemic hypertension.  I believe that Ms. Corona is compliant with medications.  We will increase amlodipine to 5 mg daily and the patient was given precautions reference orthostatic symptoms and lower extremity edema.  She will follow blood pressures and report those to us.    3.  Other problems as above are stable.    4.  Ms. Corona is planning on seeing Dr. Mario near the end of the month and will await his input with regard to a possible connection between dysrhythmia and spells.  The patient will do leg elevation, sodium restriction, and compression available to address edema.  She will follow with Dr. Andrew as instructed, with Dr. Leong on a schedule, and in our office in November as previously scheduled or on a as needed basis as discussed.     Diagnosis Plan   1. Diastolic dysfunction     2. Dyslipidemia     3. Essential hypertension     4. " PAF on chronic coumadin (CMS/[facility])     5. Palpitations     6. Severe Aortic Stenosis     7. Acute deep vein thrombosis (DVT) of proximal vein of lower extremity, unspecified laterality (CMS/HCC)     8. Pulmonary hypertension with PA pressure >55mmhg (CMS/HCC)     9. HANK on CPAP         No follow-ups on file.         Holly Corona  reports that she quit smoking about 50 years ago. Her smoking use included cigarettes. She has a 12.00 pack-year smoking history. She has never used smokeless tobacco.. I have educated her on the risk of diseases from using tobacco products such as cancer, COPD and heart disease.       ACP discussion was held with the patient during this visit. info. already given at previous visit        Patient's Body mass index is 47.48 kg/m². indicating that she is morbidly obese (BMI > 40 or > 35 with obesity - related health condition). Obesity-related health conditions include the following: obstructive sleep apnea, hypertension, dyslipidemias and DVT / PE, PAF with RVR. Obesity is unchanged. BMI is pcp addressing. We discussed portion control and increasing exercise..       Nissa Qiu LPN    Scribed for Dr. Sacha Romero by Nissa Qiu LPN August 5, 2021 09:38 EDT         Electronically signed by:            This note is dictated utilizing voice recognition software.  Although this record has been proof read, transcriptional errors may still be present. If questions occur regarding the content of this record please do not hesitate to call our office.

## 2021-08-24 ENCOUNTER — APPOINTMENT (OUTPATIENT)
Dept: CARDIOLOGY | Facility: HOSPITAL | Age: 76
End: 2021-08-24

## 2021-08-25 ENCOUNTER — OFFICE VISIT (OUTPATIENT)
Dept: CARDIOLOGY | Facility: CLINIC | Age: 76
End: 2021-08-25

## 2021-08-25 VITALS
DIASTOLIC BLOOD PRESSURE: 48 MMHG | SYSTOLIC BLOOD PRESSURE: 110 MMHG | OXYGEN SATURATION: 94 % | HEART RATE: 57 BPM | HEIGHT: 67 IN | WEIGHT: 293 LBS | BODY MASS INDEX: 45.99 KG/M2

## 2021-08-25 DIAGNOSIS — R00.1 SYMPTOMATIC BRADYCARDIA: ICD-10-CM

## 2021-08-25 DIAGNOSIS — I48.0 PAROXYSMAL ATRIAL FIBRILLATION (HCC): ICD-10-CM

## 2021-08-25 DIAGNOSIS — I49.5 TACHYCARDIA-BRADYCARDIA SYNDROME (HCC): Primary | ICD-10-CM

## 2021-08-25 DIAGNOSIS — I35.0 NONRHEUMATIC AORTIC VALVE STENOSIS: ICD-10-CM

## 2021-08-25 PROCEDURE — 93000 ELECTROCARDIOGRAM COMPLETE: CPT | Performed by: INTERNAL MEDICINE

## 2021-08-25 PROCEDURE — 99214 OFFICE O/P EST MOD 30 MIN: CPT | Performed by: INTERNAL MEDICINE

## 2021-08-25 NOTE — PROGRESS NOTES
Holly Corona  1945  [phone]    08/25/2021    [facility] CARDIOLOGY     Referring Provider: No ref. provider found     Abdoul Andrew MD  [address] [place] [id number]    Chief Complaint   Patient presents with   • Paroxysmal atrial fibrillation (CMS/[facility])       Problem List:   1. Paroxysmal atrial fibrillation/atrial flutter:  a. Echocardiogram, 02/08/2006 with mild to moderate LVH, moderate MR, pulmonary HTN with RVSP at 55 mmHg; EF 65%.   b. GXT, 04/03/2006 with no ischemia, EF 65%.  c. Event recorder, April 2007 through May 2007 showing atrial flutter.  d. Echocardiogram, 05/07/2007 with pulmonary HTN, LA 4.5, EF 65%.  e. Cardiolite, 05/07/2007, normal study, EF 78%.  f. Left heart cath, 05/09/2007 with normal coronary arteries, EF 70%.  g. EKG, 10/21/2007 showing atrial fibrillation at 146 BPM.  h. Failed sotalol therapy.  i. Tikosyn initiated, 08/08/2012.  j. Echocardiogram, 09/17/2012 with an EF of 65%; mild LVH, moderate diastolic dysfunction, moderate to severe biatrial enlargement, mild MR, moderate TR with an RVSP of 65 mmHg. LA 4.7. Aortic valve sclerosis.  k. Pulmonary vein isolation procedure with ablation of right atrial flutter and nonsustained low posterior atrial tachycardia, 03/15/2013 complicated by dysphagia that lasted 24-48 hours.  l. MCOT two weeks 7/2/2020: NSR, SB, rare NSAT, rare NSVT  m. LEANDRO, 3/10/21, EF 65%, Mod biatrial enlargement, severe aortic stenosis, mild MR, mild TR  n. Echo, 4/29/21, LV mildly dilated, EF 50-55%, Mild concentric left ventricular hypertrophy. MV is thickened and sclerotic. Mild MR. Mild TR.   o. Ziopatch 2 weeks 5//2021 normal sinus rhythm 1% atrial fibrillation with RVR sinus bradycardia with heart rates at 38 to 40 bpm less than 1% PVCs.  2. Severe aortic stenosis  a. LHC, 3/10/21, Severe aortic stenosis with mean gradient of 63 mmHg across the AV. EF 65%.   b. S/p TAVR with Dr. Sol, 3/25/21  3.   a. Heart  cath 6/9/17: Non obstructive CAD, LVEF 55%   b. LHC, 3/10/21, Severe aortic stenosis with mean gradient of 63 mmHg across the AV. EF 65%.   4. HTN  5. Right leg deep venous thrombosis, treated with Coumadin therapy until October 2007.  6. Hiatal hernia/gastroesophageal reflux disease.  7. Carotid bruits: Normal carotid duplex, March 2006 and June 2007.  8. Osteoarthritis.  9. Hypothyroidism.  10. Sleep apnea, on CPAP.  11. Dyslipidemia, no current therapy.  12. Colon cancer, status post resection of 17 inches of the right ascending colon, undergoing chemotherapy.  13. Pulmonary embolism, February 2012, Coumadin initiated.  14. Pulmonary hypertension:  a. Echocardiogram, 06/21/2011, showing PA systolic pressure estimated at 60-65 mmHg.  b. Moderate TR with an RVSP of 65 mmHg by echocardiogram, 09/17/2012.  15. Surgical history:  a. Hysterectomy.  b. Carpal tunnel repair.  c. Left knee replacement.    Allergies  Allergies   Allergen Reactions   • Ciprofloxacin Itching   • Contrast Dye Rash     Patient usually takes prednisone prior to contrast dye.   • Iodinated Diagnostic Agents Rash     Patient usually takes prednisone prior to contrast dye.   • Ofloxacin Rash       Current Medications    Current Outpatient Medications:   •  albuterol (PROVENTIL) (5 MG/ML) 0.5% nebulizer solution, Take 2.5 mg by nebulization Every 6 (Six) Hours As Needed for Wheezing., Disp: , Rfl:   •  allopurinol (ZYLOPRIM) 100 MG tablet, Take 200 mg by mouth Every Morning., Disp: , Rfl:   •  ALPRAZolam (XANAX) 0.25 MG tablet, Take 0.25 mg by mouth As Needed for Anxiety., Disp: , Rfl:   •  amLODIPine (NORVASC) 5 MG tablet, Take 1 tablet by mouth Daily., Disp: 30 tablet, Rfl: 11  •  Ascorbic Acid (VITAMIN C GUMMIE PO), Take  by mouth 2 (Two) Times a Day., Disp: , Rfl:   •  CloNIDine (CATAPRES) 0.2 MG tablet, Take 1 tablet by mouth 3 (Three) Times a Day. (Patient taking differently: Take 0.2 mg by mouth 2 (Two) Times a Day.), Disp: 90 tablet, Rfl:  3  •  furosemide (LASIX) 20 MG tablet, Take 20 mg by mouth Daily., Disp: , Rfl:   •  hydrALAZINE (APRESOLINE) 50 MG tablet, Take 1 tablet by mouth 3 (Three) Times a Day. (Patient taking differently: Take 100 mg by mouth 2 (two) times a day.), Disp: 90 tablet, Rfl: 11  •  iron polysacch complex-B12-VitC-FA-Succ (Ferrex 150 Forte Plus)  MG capsule capsule, Take  by mouth Daily With Breakfast., Disp: , Rfl:   •  isosorbide mononitrate (IMDUR) 30 MG 24 hr tablet, Take 1 tablet by mouth Daily., Disp: 90 tablet, Rfl: 3  •  levothyroxine (SYNTHROID, LEVOTHROID) 88 MCG tablet, Take 88 mcg by mouth Every Morning., Disp: , Rfl:   •  O2 (OXYGEN), Inhale 2 L/min 1 (One) Time. Through c-pap, Disp: , Rfl:   •  pantoprazole (PROTONIX) 40 MG EC tablet, Take 1 tablet by mouth Daily., Disp: 90 tablet, Rfl: 3  •  RESTASIS 0.05 % ophthalmic emulsion, Administer 1 drop to both eyes Every Night., Disp: , Rfl:   •  sotalol (BETAPACE) 120 MG tablet, Take 1 tablet by mouth Every 12 (Twelve) Hours., Disp: 60 tablet, Rfl: 11  •  telmisartan (MICARDIS) 80 MG tablet, Take 1 tablet by mouth Daily. (Patient taking differently: Take 80 mg by mouth Every Morning.), Disp: 90 tablet, Rfl: 3  •  warfarin (COUMADIN) 5 MG tablet, Take 1 tablet by mouth Take As Directed. Restart Warfarin on Wedensday 3/31/21.   Same schedule as pre-op:  5 mg DAILY, EXCEPT NONE ON Sunday. (Patient taking differently: Take 5 mg by mouth Every Night.), Disp: 30 tablet, Rfl: 3    History of Present Illness     Pt presents for follow up of PAF/HTN/AS. Since we last saw the pt, she underwent a TAVR procedure with Dr. Sol 3/25/21, due to her worsened AV stenosis. Since this time, patient underwent echo in April that showed EF 50-55%.  Since her TAVR, she has had multiple episodes (16) of significant near syncope while sitting.  Most of these episodes occur with a tachycardia sensation and then a pause in her chest.  She underwent a 14-day event recorder in May  "unfortunately she had no episodes during that time.  However the event recorder did demonstrate sinus rates in the high 30s to low 40 bpm while awake.  The patient complained of fatigue, poor exercise tolerance, and generalized weakness when her heart rates have been running in the 40 bpm she is aware of some episodes of atrial fibrillation but they are very short-lived overall speaking.  Since her TAVR, her shortness of breath has improved with exertion.  Presently no chest pain.  Blood pressures at home have been running approximately 110/60.  Her sotalol was recently increased from 80 twice a day to 120 twice a day by Dr. Romero's office.    ROS:  General:  + fatigue, No weight gain or loss  Cardiovascular:  Denies CP, PND, syncope, + near syncope + palpitations.  Pulmonary:  + MAGUIRE, no cough, or wheezing      Vitals:    08/25/21 1347   BP: 110/48   BP Location: Left arm   Patient Position: Sitting   Pulse: 57   SpO2: 94%   Weight: (!) 138 kg (303 lb 9.6 oz)   Height: 170.2 cm (67\")     Body mass index is 47.55 kg/m².  PE:  General: NAD  Neck: no JVD, no carotid bruits, no TM  Heart RRR, NL S1, S2, S4 present, no rubs, murmurs  Lungs: CTA, no wheezes, rhonchi, or rales  Abd: soft, non-tender, NL BS  Ext: No musculoskeletal deformities, + edema, No cyanosis, or clubbing  Psych: normal mood and affect    Diagnostic Data:        ECG 12 Lead    Date/Time: 8/25/2021 2:14 PM  Performed by: Bala Mario MD  Authorized by: Bala Mario MD   Comparison: compared with previous ECG from 8/6/2021  Similar to previous ECG  Rhythm: sinus bradycardia  BPM: 56              1. Tachycardia-bradycardia syndrome (CMS/HCC)    2. Symptomatic bradycardia    3. Paroxysmal atrial fibrillation (CMS/HCC)    4. Nonrheumatic aortic valve stenosis          Plan:    1) PAF s/p RFA/PV 2013 now with tachycardia-bradycardia syndrome symptomatic in nature associated with near syncopal episodes.  We will pursue right-sided DDDR " permanent pacemaker implantation.  Will hold warfarin 48 hours prior to procedure.  Following the pacemaker, consider lowering sotalol back down to 80 mg p.o. twice daily.    2) Anticoagulation  Continue warfarin    3)AV stenosis  -s/p TAVR 3/25/21 with Dr. Sol  -Echo, 4/29/21, LV mildly dilated, EF 50-55%, Mild concentric left ventricular hypertrophy. MV is thickened and sclerotic. Mild MR. Mild TR.     4) HTN- controlled  Wt loss, exercise, salt reduction    F/up in 6 months    Scribed for Bala Mario MD by JORGE Harris. 8/25/2021 14:14 EDT     I, Bala Mario MD, personally performed the services described in this documentation as scribed by the above named individual in my presence, and it is both accurate and complete.  8/25/2021  14:14 EDT

## 2021-08-26 PROBLEM — I49.5 TACHYCARDIA-BRADYCARDIA SYNDROME (HCC): Status: ACTIVE | Noted: 2021-08-26

## 2021-08-26 PROBLEM — R00.1 SYMPTOMATIC BRADYCARDIA: Status: ACTIVE | Noted: 2021-08-26

## 2021-09-02 ENCOUNTER — PREP FOR SURGERY (OUTPATIENT)
Dept: OTHER | Facility: HOSPITAL | Age: 76
End: 2021-09-02

## 2021-09-02 DIAGNOSIS — I49.5 TACHYCARDIA-BRADYCARDIA SYNDROME (HCC): Primary | ICD-10-CM

## 2021-09-02 RX ORDER — SODIUM CHLORIDE 0.9 % (FLUSH) 0.9 %
10 SYRINGE (ML) INJECTION AS NEEDED
Status: CANCELLED | OUTPATIENT
Start: 2021-09-02

## 2021-09-02 RX ORDER — SODIUM CHLORIDE 0.9 % (FLUSH) 0.9 %
3 SYRINGE (ML) INJECTION EVERY 12 HOURS SCHEDULED
Status: CANCELLED | OUTPATIENT
Start: 2021-09-02

## 2021-09-02 RX ORDER — CEFAZOLIN SODIUM 2 G/100ML
2 INJECTION, SOLUTION INTRAVENOUS ONCE
Status: CANCELLED | OUTPATIENT
Start: 2021-09-02 | End: 2021-09-02

## 2021-09-02 RX ORDER — SODIUM CHLORIDE 9 MG/ML
1 INJECTION, SOLUTION INTRAVENOUS CONTINUOUS
Status: CANCELLED | OUTPATIENT
Start: 2021-09-02 | End: 2021-09-02

## 2021-09-02 RX ORDER — NITROGLYCERIN 0.4 MG/1
0.4 TABLET SUBLINGUAL
Status: CANCELLED | OUTPATIENT
Start: 2021-09-02

## 2021-09-02 RX ORDER — ACETAMINOPHEN 325 MG/1
650 TABLET ORAL EVERY 4 HOURS PRN
Status: CANCELLED | OUTPATIENT
Start: 2021-09-02

## 2021-09-08 ENCOUNTER — PRE-ADMISSION TESTING (OUTPATIENT)
Dept: PREADMISSION TESTING | Facility: HOSPITAL | Age: 76
End: 2021-09-08

## 2021-09-08 ENCOUNTER — PREP FOR SURGERY (OUTPATIENT)
Dept: OTHER | Facility: HOSPITAL | Age: 76
End: 2021-09-08

## 2021-09-08 DIAGNOSIS — R00.1 SYMPTOMATIC BRADYCARDIA: ICD-10-CM

## 2021-09-08 DIAGNOSIS — I49.5 TACHYCARDIA-BRADYCARDIA SYNDROME (HCC): Primary | ICD-10-CM

## 2021-09-08 DIAGNOSIS — I49.5 TACHYCARDIA-BRADYCARDIA SYNDROME (HCC): ICD-10-CM

## 2021-09-08 LAB
ANION GAP SERPL CALCULATED.3IONS-SCNC: 12 MMOL/L (ref 5–15)
BUN SERPL-MCNC: 33 MG/DL (ref 8–23)
BUN/CREAT SERPL: 24.6 (ref 7–25)
CALCIUM SPEC-SCNC: 8.8 MG/DL (ref 8.6–10.5)
CHLORIDE SERPL-SCNC: 103 MMOL/L (ref 98–107)
CO2 SERPL-SCNC: 23 MMOL/L (ref 22–29)
CREAT SERPL-MCNC: 1.34 MG/DL (ref 0.57–1)
DEPRECATED RDW RBC AUTO: 61.6 FL (ref 37–54)
DEPRECATED RDW RBC AUTO: 63.5 FL (ref 37–54)
ERYTHROCYTE [DISTWIDTH] IN BLOOD BY AUTOMATED COUNT: 17.6 % (ref 12.3–15.4)
ERYTHROCYTE [DISTWIDTH] IN BLOOD BY AUTOMATED COUNT: 17.7 % (ref 12.3–15.4)
FLUAV SUBTYP SPEC NAA+PROBE: NOT DETECTED
FLUBV RNA ISLT QL NAA+PROBE: NOT DETECTED
GFR SERPL CREATININE-BSD FRML MDRD: 38 ML/MIN/1.73
GLUCOSE SERPL-MCNC: 116 MG/DL (ref 65–99)
HBA1C MFR BLD: 5.4 % (ref 4.8–5.6)
HCT VFR BLD AUTO: 36.3 % (ref 34–46.6)
HCT VFR BLD AUTO: 36.3 % (ref 34–46.6)
HGB BLD-MCNC: 11.4 G/DL (ref 12–15.9)
HGB BLD-MCNC: 11.8 G/DL (ref 12–15.9)
INR PPP: 2.74 (ref 0.85–1.16)
MCH RBC QN AUTO: 30.5 PG (ref 26.6–33)
MCH RBC QN AUTO: 30.7 PG (ref 26.6–33)
MCHC RBC AUTO-ENTMCNC: 31.4 G/DL (ref 31.5–35.7)
MCHC RBC AUTO-ENTMCNC: 32.5 G/DL (ref 31.5–35.7)
MCV RBC AUTO: 94.5 FL (ref 79–97)
MCV RBC AUTO: 97.1 FL (ref 79–97)
PLATELET # BLD AUTO: 143 10*3/MM3 (ref 140–450)
PLATELET # BLD AUTO: 150 10*3/MM3 (ref 140–450)
PMV BLD AUTO: 10.7 FL (ref 6–12)
PMV BLD AUTO: 11 FL (ref 6–12)
POTASSIUM SERPL-SCNC: 4.3 MMOL/L (ref 3.5–5.2)
PROTHROMBIN TIME: 27.8 SECONDS (ref 11.4–14.4)
RBC # BLD AUTO: 3.74 10*6/MM3 (ref 3.77–5.28)
RBC # BLD AUTO: 3.84 10*6/MM3 (ref 3.77–5.28)
SARS-COV-2 RNA PNL SPEC NAA+PROBE: NOT DETECTED
SODIUM SERPL-SCNC: 138 MMOL/L (ref 136–145)
WBC # BLD AUTO: 6.29 10*3/MM3 (ref 3.4–10.8)
WBC # BLD AUTO: 6.35 10*3/MM3 (ref 3.4–10.8)

## 2021-09-08 PROCEDURE — 80048 BASIC METABOLIC PNL TOTAL CA: CPT

## 2021-09-08 PROCEDURE — C9803 HOPD COVID-19 SPEC COLLECT: HCPCS

## 2021-09-08 PROCEDURE — 83036 HEMOGLOBIN GLYCOSYLATED A1C: CPT | Performed by: NURSE PRACTITIONER

## 2021-09-08 PROCEDURE — 36415 COLL VENOUS BLD VENIPUNCTURE: CPT

## 2021-09-08 PROCEDURE — 85610 PROTHROMBIN TIME: CPT

## 2021-09-08 PROCEDURE — 87636 SARSCOV2 & INF A&B AMP PRB: CPT

## 2021-09-08 PROCEDURE — 85027 COMPLETE CBC AUTOMATED: CPT

## 2021-09-08 RX ORDER — ACETAMINOPHEN 325 MG/1
650 TABLET ORAL EVERY 4 HOURS PRN
Status: CANCELLED | OUTPATIENT
Start: 2021-09-08

## 2021-09-08 RX ORDER — SODIUM CHLORIDE 0.9 % (FLUSH) 0.9 %
3 SYRINGE (ML) INJECTION EVERY 12 HOURS SCHEDULED
Status: CANCELLED | OUTPATIENT
Start: 2021-09-08

## 2021-09-08 RX ORDER — NITROGLYCERIN 0.4 MG/1
0.4 TABLET SUBLINGUAL
Status: CANCELLED | OUTPATIENT
Start: 2021-09-08

## 2021-09-08 RX ORDER — SODIUM CHLORIDE 0.9 % (FLUSH) 0.9 %
10 SYRINGE (ML) INJECTION AS NEEDED
Status: CANCELLED | OUTPATIENT
Start: 2021-09-08

## 2021-09-08 RX ORDER — CEFAZOLIN SODIUM IN 0.9 % NACL 3 G/100 ML
3 INTRAVENOUS SOLUTION, PIGGYBACK (ML) INTRAVENOUS ONCE
Status: CANCELLED | OUTPATIENT
Start: 2021-09-08 | End: 2021-09-08

## 2021-09-08 RX ORDER — SODIUM CHLORIDE 9 MG/ML
1 INJECTION, SOLUTION INTRAVENOUS CONTINUOUS
Status: CANCELLED | OUTPATIENT
Start: 2021-09-08 | End: 2021-09-08

## 2021-09-08 NOTE — PAT
An arrival time for procedure was not given during PAT visit. If patient had any questions or concerns about their arrival time, they were instructed to contact their surgeon/physician.  Additionally, if the patient referred to an arrival time that was acquired from their my chart account, patient was encouraged to verify that time with their surgeon/physician.  NO arrival times given in Pre Admission Testing Department.    Patient to apply Chlorhexadine wipes  to surgical area (as instructed) the night before procedure and the AM of procedure. Wipes provided.    COVID done in PAT.    New order for consent requested from Hellen Solares at Dr. Mario's office due to patient decided that she wants the PPM implanted in LEFT instead of right. New order not received while patient still in PAT, so we were unable to have consent signed in PAT. Blue note added to chart to have consent signed in [facility] on day of procedure.

## 2021-09-08 NOTE — DISCHARGE INSTRUCTIONS

## 2021-09-09 ENCOUNTER — APPOINTMENT (OUTPATIENT)
Dept: GENERAL RADIOLOGY | Facility: HOSPITAL | Age: 76
End: 2021-09-09

## 2021-09-09 ENCOUNTER — HOSPITAL ENCOUNTER (OUTPATIENT)
Facility: HOSPITAL | Age: 76
Discharge: HOME | End: 2021-09-09
Attending: INTERNAL MEDICINE | Admitting: INTERNAL MEDICINE

## 2021-09-09 VITALS
SYSTOLIC BLOOD PRESSURE: 163 MMHG | TEMPERATURE: 97.6 F | WEIGHT: 293 LBS | DIASTOLIC BLOOD PRESSURE: 61 MMHG | HEIGHT: 67 IN | BODY MASS INDEX: 45.99 KG/M2 | OXYGEN SATURATION: 98 % | RESPIRATION RATE: 10 BRPM | HEART RATE: 74 BPM

## 2021-09-09 DIAGNOSIS — R00.1 SYMPTOMATIC BRADYCARDIA: ICD-10-CM

## 2021-09-09 DIAGNOSIS — I49.5 TACHYCARDIA-BRADYCARDIA SYNDROME (HCC): ICD-10-CM

## 2021-09-09 DIAGNOSIS — I10 ESSENTIAL HYPERTENSION: ICD-10-CM

## 2021-09-09 PROCEDURE — 99153 MOD SED SAME PHYS/QHP EA: CPT | Performed by: INTERNAL MEDICINE

## 2021-09-09 PROCEDURE — C1898 LEAD, PMKR, OTHER THAN TRANS: HCPCS | Performed by: INTERNAL MEDICINE

## 2021-09-09 PROCEDURE — C1892 INTRO/SHEATH,FIXED,PEEL-AWAY: HCPCS | Performed by: INTERNAL MEDICINE

## 2021-09-09 PROCEDURE — 25010000003 LIDOCAINE 1 % SOLUTION: Performed by: INTERNAL MEDICINE

## 2021-09-09 PROCEDURE — 33208 INSRT HEART PM ATRIAL & VENT: CPT | Performed by: INTERNAL MEDICINE

## 2021-09-09 PROCEDURE — 25010000002 ONDANSETRON PER 1 MG: Performed by: INTERNAL MEDICINE

## 2021-09-09 PROCEDURE — 99152 MOD SED SAME PHYS/QHP 5/>YRS: CPT | Performed by: INTERNAL MEDICINE

## 2021-09-09 PROCEDURE — 25010000002 MIDAZOLAM PER 1 MG: Performed by: INTERNAL MEDICINE

## 2021-09-09 PROCEDURE — 25010000002 FENTANYL CITRATE (PF) 50 MCG/ML SOLUTION: Performed by: INTERNAL MEDICINE

## 2021-09-09 PROCEDURE — 71046 X-RAY EXAM CHEST 2 VIEWS: CPT

## 2021-09-09 PROCEDURE — C1785 PMKR, DUAL, RATE-RESP: HCPCS | Performed by: INTERNAL MEDICINE

## 2021-09-09 DEVICE — LD PM TENDRIL STS 6F58CM 2088TC58: Type: IMPLANTABLE DEVICE | Status: FUNCTIONAL

## 2021-09-09 DEVICE — LD PM TENDRIL STS 6F52CM 2088TC52: Type: IMPLANTABLE DEVICE | Status: FUNCTIONAL

## 2021-09-09 DEVICE — GEN PM ASSURITY MRI DR RF PM2272: Type: IMPLANTABLE DEVICE | Status: FUNCTIONAL

## 2021-09-09 RX ORDER — LIDOCAINE HYDROCHLORIDE 10 MG/ML
INJECTION, SOLUTION INFILTRATION; PERINEURAL AS NEEDED
Status: DISCONTINUED | OUTPATIENT
Start: 2021-09-09 | End: 2021-09-09 | Stop reason: HOSPADM

## 2021-09-09 RX ORDER — SODIUM CHLORIDE 9 MG/ML
1 INJECTION, SOLUTION INTRAVENOUS CONTINUOUS
Status: ACTIVE | OUTPATIENT
Start: 2021-09-09 | End: 2021-09-09

## 2021-09-09 RX ORDER — SODIUM CHLORIDE 0.9 % (FLUSH) 0.9 %
10 SYRINGE (ML) INJECTION AS NEEDED
Status: DISCONTINUED | OUTPATIENT
Start: 2021-09-09 | End: 2021-09-09 | Stop reason: HOSPADM

## 2021-09-09 RX ORDER — ACETAMINOPHEN 325 MG/1
650 TABLET ORAL EVERY 4 HOURS PRN
Status: DISCONTINUED | OUTPATIENT
Start: 2021-09-09 | End: 2021-09-09 | Stop reason: HOSPADM

## 2021-09-09 RX ORDER — BUPIVACAINE HYDROCHLORIDE 5 MG/ML
INJECTION, SOLUTION PERINEURAL AS NEEDED
Status: DISCONTINUED | OUTPATIENT
Start: 2021-09-09 | End: 2021-09-09 | Stop reason: HOSPADM

## 2021-09-09 RX ORDER — SODIUM CHLORIDE 9 MG/ML
INJECTION, SOLUTION INTRAVENOUS CONTINUOUS PRN
Status: DISCONTINUED | OUTPATIENT
Start: 2021-09-09 | End: 2021-09-09 | Stop reason: HOSPADM

## 2021-09-09 RX ORDER — CEFAZOLIN SODIUM 2 G/100ML
2 INJECTION, SOLUTION INTRAVENOUS ONCE
Status: DISCONTINUED | OUTPATIENT
Start: 2021-09-09 | End: 2021-09-09

## 2021-09-09 RX ORDER — SODIUM CHLORIDE 9 MG/ML
1 INJECTION, SOLUTION INTRAVENOUS CONTINUOUS
Status: DISCONTINUED | OUTPATIENT
Start: 2021-09-09 | End: 2021-09-09

## 2021-09-09 RX ORDER — SODIUM CHLORIDE 0.9 % (FLUSH) 0.9 %
3 SYRINGE (ML) INJECTION EVERY 12 HOURS SCHEDULED
Status: DISCONTINUED | OUTPATIENT
Start: 2021-09-09 | End: 2021-09-09

## 2021-09-09 RX ORDER — FENTANYL CITRATE 50 UG/ML
INJECTION, SOLUTION INTRAMUSCULAR; INTRAVENOUS AS NEEDED
Status: DISCONTINUED | OUTPATIENT
Start: 2021-09-09 | End: 2021-09-09 | Stop reason: HOSPADM

## 2021-09-09 RX ORDER — ACETAMINOPHEN 325 MG/1
650 TABLET ORAL EVERY 4 HOURS PRN
Status: DISCONTINUED | OUTPATIENT
Start: 2021-09-09 | End: 2021-09-09

## 2021-09-09 RX ORDER — MIDAZOLAM HYDROCHLORIDE 1 MG/ML
INJECTION INTRAMUSCULAR; INTRAVENOUS AS NEEDED
Status: DISCONTINUED | OUTPATIENT
Start: 2021-09-09 | End: 2021-09-09 | Stop reason: HOSPADM

## 2021-09-09 RX ORDER — SODIUM CHLORIDE 0.9 % (FLUSH) 0.9 %
3 SYRINGE (ML) INJECTION EVERY 12 HOURS SCHEDULED
Status: DISCONTINUED | OUTPATIENT
Start: 2021-09-09 | End: 2021-09-09 | Stop reason: HOSPADM

## 2021-09-09 RX ORDER — CEFAZOLIN SODIUM IN 0.9 % NACL 3 G/100 ML
3 INTRAVENOUS SOLUTION, PIGGYBACK (ML) INTRAVENOUS ONCE
Status: COMPLETED | OUTPATIENT
Start: 2021-09-09 | End: 2021-09-09

## 2021-09-09 RX ORDER — ONDANSETRON 2 MG/ML
4 INJECTION INTRAMUSCULAR; INTRAVENOUS EVERY 6 HOURS PRN
Status: DISCONTINUED | OUTPATIENT
Start: 2021-09-09 | End: 2021-09-09 | Stop reason: HOSPADM

## 2021-09-09 RX ORDER — NITROGLYCERIN 0.4 MG/1
0.4 TABLET SUBLINGUAL
Status: DISCONTINUED | OUTPATIENT
Start: 2021-09-09 | End: 2021-09-09

## 2021-09-09 RX ORDER — ACETAMINOPHEN 650 MG/1
650 SUPPOSITORY RECTAL EVERY 4 HOURS PRN
Status: DISCONTINUED | OUTPATIENT
Start: 2021-09-09 | End: 2021-09-09 | Stop reason: HOSPADM

## 2021-09-09 RX ORDER — ONDANSETRON 2 MG/ML
INJECTION INTRAMUSCULAR; INTRAVENOUS AS NEEDED
Status: DISCONTINUED | OUTPATIENT
Start: 2021-09-09 | End: 2021-09-09 | Stop reason: HOSPADM

## 2021-09-09 RX ORDER — SODIUM CHLORIDE 0.9 % (FLUSH) 0.9 %
10 SYRINGE (ML) INJECTION AS NEEDED
Status: DISCONTINUED | OUTPATIENT
Start: 2021-09-09 | End: 2021-09-09

## 2021-09-09 RX ORDER — AMLODIPINE BESYLATE 5 MG/1
5 TABLET ORAL DAILY
Qty: 30 TABLET | Refills: 11 | Status: SHIPPED | OUTPATIENT
Start: 2021-09-09 | End: 2021-11-10 | Stop reason: ALTCHOICE

## 2021-09-09 RX ORDER — NITROGLYCERIN 0.4 MG/1
0.4 TABLET SUBLINGUAL
Status: DISCONTINUED | OUTPATIENT
Start: 2021-09-09 | End: 2021-09-09 | Stop reason: HOSPADM

## 2021-09-09 RX ADMIN — Medication 3 G: at 11:14

## 2021-09-09 RX ADMIN — SODIUM CHLORIDE 1 ML/KG/HR: 9 INJECTION, SOLUTION INTRAVENOUS at 10:47

## 2021-09-09 NOTE — H&P
Cardiology H&P     Holly Corona  1945  [phone]  There is no work phone number on file.    09/09/21    DATE OF ADMISSION: 9/9/2021  [facility]    Abdoul Andrew MD  [address] [place] [address] / [place] [address]  Referring Provider: Bala Mario MD     CC: Tachy vijay syndrome    Problem List:   Paroxysmal atrial fibrillation/atrial flutter:  Echocardiogram, 02/08/2006 with mild to moderate LVH, moderate MR, pulmonary HTN with RVSP at 55 mmHg; EF 65%.   GXT, 04/03/2006 with no ischemia, EF 65%.  Event recorder, April 2007 through May 2007 showing atrial flutter.  Echocardiogram, 05/07/2007 with pulmonary HTN, LA 4.5, EF 65%.  Cardiolite, 05/07/2007, normal study, EF 78%.  Left heart cath, 05/09/2007 with normal coronary arteries, EF 70%.  EKG, 10/21/2007 showing atrial fibrillation at 146 BPM.  Failed sotalol therapy.  Tikosyn initiated, 08/08/2012.  Echocardiogram, 09/17/2012 with an EF of 65%; mild LVH, moderate diastolic dysfunction, moderate to severe biatrial enlargement, mild MR, moderate TR with an RVSP of 65 mmHg. LA 4.7. Aortic valve sclerosis.  Pulmonary vein isolation procedure with ablation of right atrial flutter and nonsustained low posterior atrial tachycardia, 03/15/2013 complicated by dysphagia that lasted 24-48 hours.  MCOT two weeks 7/2/2020: NSR, SB, rare NSAT, rare NSVT  LEANDRO, 3/10/21, EF 65%, Mod biatrial enlargement, severe aortic stenosis, mild MR, mild TR  Echo, 4/29/21, LV mildly dilated, EF 50-55%, Mild concentric left ventricular hypertrophy. MV is thickened and sclerotic. Mild MR. Mild TR.   Ziopatch 2 weeks 5//2021 normal sinus rhythm 1% atrial fibrillation with RVR sinus bradycardia with heart rates at 38 to 40 bpm less than 1% PVCs.  Severe aortic stenosis  LHC, 3/10/21, Severe aortic stenosis with mean gradient of 63 mmHg across the AV. EF 65%.   S/p TAVR with Dr. Sol, 3/25/21  CP  Heart cath 6/9/17: Non obstructive CAD, LVEF 55%   [facility],  3/10/21, Severe aortic stenosis with mean gradient of 63 mmHg across the AV. EF 65%.   HTN  Right leg deep venous thrombosis, treated with Coumadin therapy until October 2007.  Hiatal hernia/gastroesophageal reflux disease.  Carotid bruits: Normal carotid duplex, March 2006 and June 2007.  Osteoarthritis.  Hypothyroidism.  Sleep apnea, on CPAP.  Dyslipidemia, no current therapy.  Colon cancer, status post resection of 17 inches of the right ascending colon, undergoing chemotherapy.  Pulmonary embolism, February 2012, Coumadin initiated.  Pulmonary hypertension:  Echocardiogram, 06/21/2011, showing PA systolic pressure estimated at 60-65 mmHg.  Moderate TR with an RVSP of 65 mmHg by echocardiogram, 09/17/2012.  Surgical history:  Hysterectomy.  Carpal tunnel repair.  Left knee replacement.    History of Present Illness:     Mrs. Corona is a pleasant 76-year-old white female with above-noted past medical history.  She presents today with plans to undergo left-sided permanent pacemaker implant with Dr. Mario for her tachybradycardia syndrome. Patient was seen at her regular office follow-up on 8/25/2021, she had recently underwent a TAVR procedure with Dr. Sol 3/25/21, due to her worsened AV stenosis. Since this time, patient underwent echo in April that showed EF 50-55%. Since her TAVR, she has had multiple episodes (16) of significant near syncope while sitting.  Most of these episodes occured with a tachycardia sensation and then a pause in her chest. Sotalol was recently increased from 80 twice a day to 120 twice a day by Dr. Roemro's office. She underwent a 14-day event recorder in May unfortunately she had no episodes during that time. However the event recorder did demonstrate sinus rates in the high 30s to low 40 bpm while awake. The patient continues to complain of severe fatigue, poor exercise tolerance, and generalized weakness when her heart rates have been running in the 40s she is aware of  some episodes of atrial fibrillation but they are very short-lived overall speaking. Since her TAVR, her shortness of breath has improved with exertion. Presently no chest pain. Blood pressures at home have been running approximately 110/60.  NPO since MN. No recent infections/wounds/illnesses.     Allergies   Allergen Reactions    Ciprofloxacin Itching    Contrast Dye Rash     Patient usually takes prednisone & benadryl prior to contrast dye.    Iodinated Diagnostic Agents Rash     Patient usually takes prednisone & benadryl prior to contrast dye.    Ofloxacin Rash       Prior to Admission Medications       Prescriptions Last Dose Informant Patient Reported? Taking?    albuterol (PROVENTIL) (5 MG/ML) 0.5% nebulizer solution  Self Yes No    Take 2.5 mg by nebulization Every 6 (Six) Hours As Needed for Wheezing.    allopurinol (ZYLOPRIM) 100 MG tablet  Self Yes No    Take 200 mg by mouth Every Morning.    ALPRAZolam (XANAX) 0.25 MG tablet  Self Yes No    Take 0.25 mg by mouth As Needed for Anxiety.    amLODIPine (NORVASC) 5 MG tablet  Self No No    Take 1 tablet by mouth Daily.    Patient taking differently:  Take 2.5 mg by mouth Daily. Pt has prescription for 5 mg, but is still taking her prior prescription of 2.5 mg currently    Ascorbic Acid (VITAMIN C GUMMIE PO)  Self Yes No    Take 1 dose by mouth Daily.    CloNIDine (CATAPRES) 0.2 MG tablet  Self No No    Take 1 tablet by mouth 3 (Three) Times a Day.    Patient taking differently:  Take 0.2 mg by mouth 2 (Two) Times a Day.    furosemide (LASIX) 20 MG tablet  Self Yes No    Take 20 mg by mouth Daily.    hydrALAZINE (APRESOLINE) 50 MG tablet  Self No No    Take 1 tablet by mouth 3 (Three) Times a Day.    Patient taking differently:  Take 100 mg by mouth 2 (two) times a day.    iron polysacch complex-B12-VitC-FA-Succ (Ferrex 150 Forte Plus)  MG capsule capsule  Self Yes No    Take  by mouth Daily With Breakfast.    isosorbide mononitrate (IMDUR) 30 MG 24 hr  tablet  Self No No    Take 1 tablet by mouth Daily.    levothyroxine (SYNTHROID, LEVOTHROID) 88 MCG tablet  Self Yes No    Take 88 mcg by mouth Every Morning.    O2 (OXYGEN)   Yes No    Inhale 2 L/min 1 (One) Time. Through c-pap    pantoprazole (PROTONIX) 40 MG EC tablet  Self No No    Take 1 tablet by mouth Daily.    RESTASIS 0.05 % ophthalmic emulsion  Self Yes No    Administer 1 drop to both eyes Every Night.    sotalol (BETAPACE) 120 MG tablet  Self No No    Take 1 tablet by mouth Every 12 (Twelve) Hours.    telmisartan (MICARDIS) 80 MG tablet  Self No No    Take 1 tablet by mouth Daily.    Patient taking differently:  Take 80 mg by mouth Every Morning.    warfarin (COUMADIN) 5 MG tablet  Self No No    Take 1 tablet by mouth Take As Directed. Restart Warfarin on Wedensday 3/31/21.   Same schedule as pre-op:  5 mg DAILY, EXCEPT NONE ON Sunday.    Patient taking differently:  Take 5 mg by mouth Every Night. Last dose 9/6/2021              Current Facility-Administered Medications:     acetaminophen (TYLENOL) tablet 650 mg, 650 mg, Oral, Q4H PRN, Yady Zuñiga APRN    ceFAZolin in Sodium Chloride (ANCEF) IVPB solution 3 g, 3 g, Intravenous, Once, Yady Zuñiga APRN    nitroglycerin (NITROSTAT) SL tablet 0.4 mg, 0.4 mg, Sublingual, Q5 Min PRN, Yady Zuñiga APRN    sodium chloride 0.9 % flush 10 mL, 10 mL, Intravenous, PRN, Yady Zuñiga APRN    sodium chloride 0.9 % flush 3 mL, 3 mL, Intravenous, Q12H, Yady Zuñiga APRN    sodium chloride 0.9 % infusion, 1 mL/kg/hr (Order-Specific), Intravenous, Continuous, Yady Zuñiga APRN    Social History     Socioeconomic History    Marital status:      Spouse name: Not on file    Number of children: 3    Years of education: HS    Highest education level: Not on file   Tobacco Use    Smoking status: Former Smoker     Packs/day: 1.00     Years: 12.00     Pack years: 12.00     Types: Cigarettes     Quit date:  "3/1/1971     Years since quittin.5    Smokeless tobacco: Never Used   Vaping Use    Vaping Use: Never used   Substance and Sexual Activity    Alcohol use: No    Drug use: No    Sexual activity: Defer       Family History   Problem Relation Age of Onset    Other Mother         MEDICAL PROBLEMS    Other Sister         myocardial infarction.       REVIEW OF SYSTEMS:   CONSTITUTIONAL:         No weight loss, fever, chills, +weakness + fatigue.   HEENT:                            No visual loss, blurred vision, double vision, yellow sclerae.                                             No hearing loss, congestion, sore throat.   SKIN:                                No rashes, urticaria, ulcers, sores.     RESPIRATORY:               + shortness of breath, -hemoptysis, cough, sputum.   GI:                                     No anorexia, nausea, vomiting, diarrhea. No abdominal pain, melena.   :                                   No burning on urination, hematuria or increased frequency.  ENDOCRINE:                   No diaphoresis, cold or heat intolerance. No polyuria or polydipsia.   NEURO:                            No headache, +dizziness, +near syncope,- paralysis, ataxia, or parasthesias.                                            No change in bowel or bladder control. No history of CVA/TIA  MUSCULOSKELETAL:    No muscle, back pain, joint pain or stiffness.   HEMATOLOGY:               No anemia, bleeding, bruising. No history of DVT/PE.  PSYCH:                            No history of depression, anxiety    Vitals:    21 0820 21 0822   BP: (!) 186/78 (!) 183/77   BP Location: Right arm Left arm   Pulse: 80 (!) 0   Temp: 97.6 °F (36.4 °C)    TempSrc: Tympanic    SpO2: 96% 96%   Weight: (!) 140 kg (308 lb)    Height: 170.2 cm (67\")          Vital Sign Min/Max for last 24 hours  Temp  Min: 97.6 °F (36.4 °C)  Max: 97.6 °F (36.4 °C)   BP  Min: 183/77  Max: 186/78   Pulse  Min: 0  Max: 80   No data " recorded   SpO2  Min: 96 %  Max: 96 %   No data recorded    No intake or output data in the 24 hours ending 09/09/21 0930        VS:    HR 65 bpm   /77  SP02 95%    Physical Exam:  GEN: Well nourished, Well- developed  No acute distress  HEENT: Normocephalic, Atraumatic, PERRLA, moist mucous membranes  NECK: supple, NO JVD, no thyromegaly, no lymphadenopathy  CARDIAC: S1S2  RRR no murmur, gallop, rub  LUNGS: Clear to ausculation, normal respiratory effort  ABDOMEN: Soft, nontender, normal bowel sounds  EXTREMITIES:No gross deformities,  No clubbing, cyanosis, or edema  SKIN: Warm, dry  NEURO: No focal deficits  PSYCHIATRIC: Normal affect and mood      I personally viewed and interpreted the patient's EKG/Telemetry/lab data    Data:   Results from last 7 days   Lab Units 09/08/21  1529   WBC 10*3/mm3 6.35  6.29   HEMOGLOBIN g/dL 11.8*  11.4*   HEMATOCRIT % 36.3  36.3   PLATELETS 10*3/mm3 150  143     Results from last 7 days   Lab Units 09/08/21  1529   SODIUM mmol/L 138   POTASSIUM mmol/L 4.3   CHLORIDE mmol/L 103   CO2 mmol/L 23.0   BUN mg/dL 33*   CREATININE mg/dL 1.34*   GLUCOSE mg/dL 116*      Results from last 7 days   Lab Units 09/08/21  1529   HEMOGLOBIN A1C % 5.40             Results from last 7 days   Lab Units 09/08/21  1529   PROTIME Seconds 27.8*   INR  2.74*                 No intake or output data in the 24 hours ending 09/09/21 0930    Chest X-Ray:  Imaging Results (Last 24 Hours)       ** No results found for the last 24 hours. **          COVID19   Date Value Ref Range Status   09/08/2021 Not Detected Not Detected - Ref. Range Final     COVID19   Date Value Ref Range Status   09/08/2021 Not Detected Not Detected - Ref. Range Final       Telemetry: NSR 72 bpm   EKG: None for review today    Assessment and Plan:     1) PAF s/p RFA/PVA 2013 now with tachycardia-bradycardia syndrome severely symptomatic in nature associated with fatigue, sob, near syncopal episodes. We will pursue left-sided  DDDR permanent pacemaker implantation. The risks, benefits, and alternatives of the procedure have been reviewed and the patient wishes to proceed. Following the pacemaker, will consider lowering sotalol back down to 80 mg p.o. twice daily. N.p.o. since midnight. No recent infections/wounds/illnesses.  -[id number], on Coumadin, held x2 days for today's procedure      3)AV stenosis  -s/p TAVR 3/25/21 with Dr. Sol  -Echo, 4/29/21, LV mildly dilated, EF 50-55%, Mild concentric left ventricular hypertrophy. MV is thickened and sclerotic. Mild MR. Mild TR.      4) HTN- controlled  Wt loss, exercise, salt reduction    Electronically signed by JORGE Avendano, 09/09/21, 8:26 AM EDT.    I have read the above note and agree

## 2021-09-10 ENCOUNTER — CALL CENTER PROGRAMS (OUTPATIENT)
Dept: CALL CENTER | Facility: HOSPITAL | Age: 76
End: 2021-09-10

## 2021-09-10 NOTE — OUTREACH NOTE
PCI/Device Survey      Responses   Facility patient discharged from?  [facility]   Procedure date  09/09/21   Procedure (if device, specify in description)  Device   Device Description  GEN PM ASSURITY MRI DR RADHIKA Model No. [id number] Serial No. [id number]   Performing MD Dr. Bala Mario   Attempt successful?  Yes   Call start time  1554   Call end time  1602   Has the patient had any of the following symptoms since discharge?  -- [N/A]   Is the patient taking prescribed medications:  -- [No new meds]   Nursing intervention  Reminded to continue to take prescribed medications, Nurse provided patient education   Does the patient have any of the following symptoms related to the cath/surgical site?  -- [n/a]   Nursing intervention  Patient education provided   Does the patient have an appointment scheduled with the cardiologist?  Yes   Appointment comments  Has appt for f/u next week   If the patient is a current smoker, are they able to teach back resources for cessation?  Not a smoker   Did the patient feel prepared to go home on the same day as the procedure?  Yes   Is the patient satisfied with the same day discharge process?  Yes   PCI/Device call completed  Yes   Wrap up additional comments  Pt states she has not checked transmitter but I had her check while I was on the phone and it is lit. I told her to call the number from booklet to confirm it is transmitting correctly. Bandage in palce to CW and she is taking sponge baths.           Patricia Lau RN

## 2021-09-10 NOTE — OUTREACH NOTE
PCI/Device Survey      Responses   Facility patient discharged from?  [facility]   Procedure date  09/09/21   Procedure (if device, specify in description)  Device   Device Description  GEN PM ASSURITY MRI DR RF Model No. [id number] Serial No. [id number]   Performing MD Dr. Bala Mario   Attempt successful?  No   Unsuccessful attempts  Attempt 1          Patricia Lau RN

## 2021-09-17 ENCOUNTER — CLINICAL SUPPORT NO REQUIREMENTS (OUTPATIENT)
Dept: CARDIOLOGY | Facility: CLINIC | Age: 76
End: 2021-09-17

## 2021-09-17 DIAGNOSIS — Z95.0 CARDIAC PACEMAKER IN SITU: Primary | ICD-10-CM

## 2021-09-17 PROCEDURE — 99024 POSTOP FOLLOW-UP VISIT: CPT | Performed by: INTERNAL MEDICINE

## 2021-09-17 NOTE — PROGRESS NOTES
WOUND CHECK    2021    Holly SOLOMON Corona, : 1945    [facility] DEVICE CHECK    B/P:  (Sitting)  (Standing)     Pulse:     Patient has fever: [] Temperature if indicated: no fever noted    Wound Location: left infraclavicular  Dressing Removed [x]        Old Dressing Appearance:  Clean, dry []                 Old, bloody drainage [x]                            Moist, serous drainage []                Moist, thick yellow/green drainage []       Wound Appearance: Redness []                  Drainage []                  Culture obtained []        Color:      Consistency:      Amount:          Gloves used, wound cleansed with sterile 4x4 and peroxide [x]       MD notified [] MD orders:     Antibiotic started []  If checked, type  Other: Wound is clean, dry and intact. No s/s of infection noted. Wound care and restrictions discussed with the patient. She and her  agreed and verbalized understanding.    Appointment for follow-up scheduled for 3 months post procedure []    Future Appointments   Date Time Provider Department Center   11/10/2021 10:30 AM Sacha Romero MD [facility]   2021  1:30 PM [place] [facility]           Antonietta Mclaughlin RN, 21      MD Signature:______________________________ Completed By/Date:

## 2021-10-23 PROCEDURE — 93294 REM INTERROG EVL PM/LDLS PM: CPT | Performed by: INTERNAL MEDICINE

## 2021-10-23 PROCEDURE — 93296 REM INTERROG EVL PM/IDS: CPT | Performed by: INTERNAL MEDICINE

## 2021-11-10 ENCOUNTER — OFFICE VISIT (OUTPATIENT)
Dept: CARDIOLOGY | Facility: CLINIC | Age: 76
End: 2021-11-10

## 2021-11-10 VITALS
SYSTOLIC BLOOD PRESSURE: 188 MMHG | OXYGEN SATURATION: 91 % | WEIGHT: 293 LBS | BODY MASS INDEX: 45.99 KG/M2 | HEART RATE: 74 BPM | DIASTOLIC BLOOD PRESSURE: 72 MMHG | HEIGHT: 67 IN

## 2021-11-10 DIAGNOSIS — G47.33 OSA ON CPAP: ICD-10-CM

## 2021-11-10 DIAGNOSIS — R00.2 PALPITATIONS: ICD-10-CM

## 2021-11-10 DIAGNOSIS — I27.20 PULMONARY HYPERTENSION (HCC): ICD-10-CM

## 2021-11-10 DIAGNOSIS — I48.0 PAROXYSMAL ATRIAL FIBRILLATION (HCC): ICD-10-CM

## 2021-11-10 DIAGNOSIS — I35.9 AORTIC VALVE DISORDER: ICD-10-CM

## 2021-11-10 DIAGNOSIS — I73.9 PERIPHERAL VASCULAR DISEASE (HCC): ICD-10-CM

## 2021-11-10 DIAGNOSIS — I82.4Y9 ACUTE DEEP VEIN THROMBOSIS (DVT) OF PROXIMAL VEIN OF LOWER EXTREMITY, UNSPECIFIED LATERALITY (HCC): ICD-10-CM

## 2021-11-10 DIAGNOSIS — I51.89 DIASTOLIC DYSFUNCTION: ICD-10-CM

## 2021-11-10 DIAGNOSIS — I10 PRIMARY HYPERTENSION: ICD-10-CM

## 2021-11-10 DIAGNOSIS — I49.5 TACHYCARDIA-BRADYCARDIA SYNDROME (HCC): Primary | ICD-10-CM

## 2021-11-10 DIAGNOSIS — Z99.89 OSA ON CPAP: ICD-10-CM

## 2021-11-10 DIAGNOSIS — R00.1 SYMPTOMATIC BRADYCARDIA: ICD-10-CM

## 2021-11-10 DIAGNOSIS — E78.5 DYSLIPIDEMIA: ICD-10-CM

## 2021-11-10 PROCEDURE — 99024 POSTOP FOLLOW-UP VISIT: CPT | Performed by: INTERNAL MEDICINE

## 2021-11-10 RX ORDER — AMLODIPINE BESYLATE 2.5 MG/1
2.5 TABLET ORAL DAILY
COMMUNITY
Start: 2021-11-03

## 2021-11-10 RX ORDER — CLOTRIMAZOLE AND BETAMETHASONE DIPROPIONATE 10; .64 MG/G; MG/G
CREAM TOPICAL
COMMUNITY
Start: 2021-10-08 | End: 2022-05-04

## 2021-11-10 RX ORDER — HYDRALAZINE HYDROCHLORIDE 100 MG/1
TABLET, FILM COATED ORAL 2 TIMES DAILY
COMMUNITY
Start: 2021-11-02 | End: 2021-11-10 | Stop reason: SDUPTHER

## 2021-11-10 RX ORDER — HYDRALAZINE HYDROCHLORIDE 100 MG/1
100 TABLET, FILM COATED ORAL 3 TIMES DAILY
Qty: 90 TABLET | Refills: 5
Start: 2021-11-10

## 2021-11-10 RX ORDER — GENTAMICIN SULFATE 1 MG/G
OINTMENT TOPICAL
COMMUNITY
Start: 2021-09-28

## 2021-11-10 RX ORDER — OLMESARTAN MEDOXOMIL AND HYDROCHLOROTHIAZIDE 20/12.5 20; 12.5 MG/1; MG/1
1 TABLET ORAL DAILY
Qty: 30 TABLET | Refills: 11 | Status: SHIPPED | OUTPATIENT
Start: 2021-11-10 | End: 2022-05-04

## 2021-11-10 NOTE — PATIENT INSTRUCTIONS
Obesity, Adult  Obesity is the condition of having too much total body fat. Being overweight or obese means that your weight is greater than what is considered healthy for your body size. Obesity is determined by a measurement called BMI. BMI is an estimate of body fat and is calculated from height and weight. For adults, a BMI of 30 or higher is considered obese.  Obesity can lead to other health concerns and major illnesses, including:  · Stroke.  · Coronary artery disease (CAD).  · Type 2 diabetes.  · Some types of cancer, including cancers of the colon, breast, uterus, and gallbladder.  · Osteoarthritis.  · High blood pressure (hypertension).  · High cholesterol.  · Sleep apnea.  · Gallbladder stones.  · Infertility problems.  What are the causes?  Common causes of this condition include:  · Eating daily meals that are high in calories, sugar, and fat.  · Being born with genes that may make you more likely to become obese.  · Having a medical condition that causes obesity, including:  ? Hypothyroidism.  ? Polycystic ovarian syndrome (PCOS).  ? Binge-eating disorder.  ? Cushing syndrome.  · Taking certain medicines, such as steroids, antidepressants, and seizure medicines.  · Not being physically active (sedentary lifestyle).  · Not getting enough sleep.  · Drinking high amounts of sugar-sweetened beverages, such as soft drinks.  What increases the risk?  The following factors may make you more likely to develop this condition:  · Having a family history of obesity.  · Being a woman of  descent.  · Being a man of  descent.  · Living in an area with limited access to:  ? [place], recreation centers, or sidewalks.  ? Healthy food choices, such as grocery stores and farmers' markets.  What are the signs or symptoms?  The main sign of this condition is having too much body fat.  How is this diagnosed?  This condition is diagnosed based on:  · Your BMI. If you are an adult with a BMI of 30 or  higher, you are considered obese.  · Your waist circumference. This measures the distance around your waistline.  · Your skinfold thickness. Your health care provider may gently pinch a fold of your skin and measure it.  You may have other tests to check for underlying conditions.  How is this treated?  Treatment for this condition often includes changing your lifestyle. Treatment may include some or all of the following:  · Dietary changes. This may include developing a healthy meal plan.  · Regular physical activity. This may include activity that causes your heart to beat faster (aerobic exercise) and strength training. Work with your health care provider to design an exercise program that works for you.  · Medicine to help you lose weight if you are unable to lose 1 pound a week after 6 weeks of healthy eating and more physical activity.  · Treating conditions that cause the obesity (underlying conditions).  · Surgery. Surgical options may include gastric banding and gastric bypass. Surgery may be done if:  ? Other treatments have not helped to improve your condition.  ? You have a BMI of 40 or higher.  ? You have life-threatening health problems related to obesity.  Follow these instructions at home:  Eating and drinking    · Follow recommendations from your health care provider about what you eat and drink. Your health care provider may advise you to:  ? Limit fast food, sweets, and processed snack foods.  ? Choose low-fat options, such as low-fat milk instead of whole milk.  ? Eat 5 or more servings of fruits or vegetables every day.  ? Eat at home more often. This gives you more control over what you eat.  ? Choose healthy foods when you eat out.  ? Learn to read food labels. This will help you understand how much food is considered 1 serving.  ? Learn what a healthy serving size is.  ? Keep low-fat snacks available.  ? Limit sugary drinks, such as soda, fruit juice, sweetened iced tea, and flavored  milk.  · Drink enough water to keep your urine pale yellow.  · Do not follow a fad diet. Fad diets can be unhealthy and even dangerous.    Physical activity  · Exercise regularly, as told by your health care provider.  ? Most adults should get up to 150 minutes of moderate-intensity exercise every week.  ? Ask your health care provider what types of exercise are safe for you and how often you should exercise.  · Warm up and stretch before being active.  · Cool down and stretch after being active.  · Rest between periods of activity.  Lifestyle  · Work with your health care provider and a dietitian to set a weight-loss goal that is healthy and reasonable for you.  · Limit your screen time.  · Find ways to reward yourself that do not involve food.  · Do not drink alcohol if:  ? Your health care provider tells you not to drink.  ? You are pregnant, may be pregnant, or are planning to become pregnant.  · If you drink alcohol:  ? Limit how much you use to:  § 0-1 drink a day for women.  § 0-2 drinks a day for men.  ? Be aware of how much alcohol is in your drink. In the [place] one drink equals one 12 oz bottle of beer (355 mL), one 5 oz glass of wine (148 mL), or one 1½ oz glass of hard liquor (44 mL).  General instructions  · Keep a weight-loss journal to keep track of the food you eat and how much exercise you get.  · Take over-the-counter and prescription medicines only as told by your health care provider.  · Take vitamins and supplements only as told by your health care provider.  · Consider joining a support group. Your health care provider may be able to recommend a support group.  · Keep all follow-up visits as told by your health care provider. This is important.  Contact a health care provider if:  · You are unable to meet your weight loss goal after 6 weeks of dietary and lifestyle changes.  Get help right away if you are having:  · Trouble breathing.  · Suicidal thoughts or behaviors.  Summary  · Obesity is  the condition of having too much total body fat.  · Being overweight or obese means that your weight is greater than what is considered healthy for your body size.  · Work with your health care provider and a dietitian to set a weight-loss goal that is healthy and reasonable for you.  · Exercise regularly, as told by your health care provider. Ask your health care provider what types of exercise are safe for you and how often you should exercise.  This information is not intended to replace advice given to you by your health care provider. Make sure you discuss any questions you have with your health care provider.  Document Revised: 08/22/2019 Document Reviewed: 08/22/2019  [employer] Patient Education © 2021 [employer]  BMI for Adults  What is BMI?  Body mass index (BMI) is a number that is calculated from a person's weight and height. BMI can help estimate how much of a person's weight is composed of fat. BMI does not measure body fat directly. Rather, it is an alternative to procedures that directly measure body fat, which can be difficult and expensive.  BMI can help identify people who may be at higher risk for certain medical problems.  What are BMI measurements used for?  BMI is used as a screening tool to identify possible weight problems. It helps determine whether a person is obese, overweight, a healthy weight, or underweight.  BMI is useful for:  · Identifying a weight problem that may be related to a medical condition or may increase the risk for medical problems.  · Promoting changes, such as changes in diet and exercise, to help reach a healthy weight. BMI screening can be repeated to see if these changes are working.  How is BMI calculated?  BMI involves measuring your weight in relation to your height. Both height and weight are measured, and the BMI is calculated from those numbers. This can be done either in English ([place] or metric measurements. Note that charts and online BMI calculators are  "available to help you find your BMI quickly and easily without having to do these calculations yourself.  To calculate your BMI in English ([place] measurements:    1. Measure your weight in pounds (lb).  2. Multiply the number of pounds by 703.  ? For example, for a person who weighs 180 lb, multiply that number by 703, which equals 126,540.  3. Measure your height in inches. Then multiply that number by itself to get a measurement called \"inches squared.\"  ? For example, for a person who is 70 inches tall, the \"inches squared\" measurement is 70 inches x 70 inches, which equals 4,900 inches squared.  4. Divide the total from step 2 (number of lb x 703) by the total from step 3 (inches squared): 126,540 ÷ 4,900 = 25.8. This is your BMI.    To calculate your BMI in metric measurements:  1. Measure your weight in kilograms (kg).  2. Measure your height in meters (m). Then multiply that number by itself to get a measurement called \"meters squared.\"  ? For example, for a person who is 1.75 m tall, the \"meters squared\" measurement is 1.75 m x 1.75 m, which is equal to 3.1 meters squared.  3. Divide the number of kilograms (your weight) by the meters squared number. In this example: 70 ÷ 3.1 = 22.6. This is your BMI.  What do the results mean?  BMI charts are used to identify whether you are underweight, normal weight, overweight, or obese. The following guidelines will be used:  · Underweight: BMI less than 18.5.  · Normal weight: BMI between 18.5 and 24.9.  · Overweight: BMI between 25 and 29.9.  · Obese: BMI of 30 or above.  Keep these notes in mind:  · Weight includes both fat and muscle, so someone with a muscular build, such as an athlete, may have a BMI that is higher than 24.9. In cases like these, BMI is not an accurate measure of body fat.  · To determine if excess body fat is the cause of a BMI of 25 or higher, further assessments may need to be done by a health care provider.  · BMI is usually interpreted in the " same way for men and women.  Where to find more information  For more information about BMI, including tools to quickly calculate your BMI, go to these websites:  · Centers for Disease Control and Prevention: www.cdc.gov  · American Heart Association: www.heart.org  · National Heart, Lung, and [employer] [place]: www.nhlbi.nih.gov  Summary  · Body mass index (BMI) is a number that is calculated from a person's weight and height.  · BMI may help estimate how much of a person's weight is composed of fat. BMI can help identify those who may be at higher risk for certain medical problems.  · BMI can be measured using English measurements or metric measurements.  · BMI charts are used to identify whether you are underweight, normal weight, overweight, or obese.  This information is not intended to replace advice given to you by your health care provider. Make sure you discuss any questions you have with your health care provider.  Document Revised: 09/09/2020 Document Reviewed: 07/17/2020  [employer] Patient Education © 2021 [employer]

## 2021-11-10 NOTE — PROGRESS NOTES
Subjective   Holly Corona is a 76 y.o. female     Chief Complaint   Patient presents with   • Follow-up     Here for 6 mo. f/u   • Atrial Fibrillation   • Hypertension   • Hyperlipidemia   • Sleep Apnea   • Deep Vein Thrombosis   • Pulmonary Embolism       PROBLEM LIST:     1. Paroxysmal Atrial  fibrillation  1.1 Pulmonary vein isolation procedure with ablation of right atrial flutter by Dr. Mario, March 2013.  2. Severe pulmonary hypertension with pulmonary pressure 60-65% by most recent cath.  3. Hypertension  3.1 Echo 9/9/15 - mild LVH; EF > 65%; mild to mod MR; mod TR; PA 55-60; mild FL  3.2 recent ECHO 03/09/17, left ventricular chamber is mildly dilated. Mild concentric LVH. EF > 65%. Issa Grade ll diastolic dysfunction. Left atrium moderately dilated, right atrium mild to moderate dilated. Systolic pressure 55-60 mmHg.   3.3 recent stress 04/11/17 inferior ischemia, chest wall attenuation.   4. Dyslipidemia  5. History of DVT/PE  6. History of colon CA x 2 status post surgery 2014.  7. Carotid Artery Stenosis  7.1 Carotid U/S 3/8/16 - 16-49% MARYBEL and LICA; antegrade flow  8. HANK - CPAP   9. GOUT, recurrent flare-ups    10. Severe aortic stenosis     10.1LHC, 3/10/21, Severe aortic stenosis with mean gradient of 63 mmHg across the AV. EF 65%.   10.2 S/p TAVR with Dr. Sol, 3/25/21      Specialty Problems        Cardiology Problems    HTN (hypertension)        PAF on chronic coumadin (CMS/HCC)        Diastolic dysfunction        Hx DVT and PE (CMS/HCC)        Palpitations        Peripheral vascular disease (HCC)        Pulmonary hypertension with PA pressure >55mmhg (CMS/HCC)        Severe Aortic Stenosis        Symptomatic bradycardia        Tachycardia-bradycardia syndrome (HCC)                HPI:  Ms. Corona returns for follow-up on the above.    She had some increase in strength and decrease in exertional dyspnea after TAVR.  Aortic valve area of 1 cm² was increased to 1.4 after that procedure.   Postprocedure echo showed normal valve function.  Ms. Corona had additional improvement after pacemaker implantation.  She only rarely senses weakness and fatigue.  We increased sotalol because of an increased sense of palpitations associated with PAF.  Symptoms have been well controlled with that increase in medication.    Ms. Corona has no explanation for recent severe elevation in blood pressures.  She states that blood pressures generally run in the 160s consistently at home.  She continues to be without orthopnea, PND, or change in lower extremity edema.  She has no bleeding nor symptoms of TIA or stroke.                      PRIOR MEDICATIONS    Current Outpatient Medications on File Prior to Visit   Medication Sig Dispense Refill   • albuterol (PROVENTIL) (5 MG/ML) 0.5% nebulizer solution Take 2.5 mg by nebulization Every 6 (Six) Hours As Needed for Wheezing.     • allopurinol (ZYLOPRIM) 100 MG tablet Take 200 mg by mouth Every Morning.     • ALPRAZolam (XANAX) 0.25 MG tablet Take 0.25 mg by mouth As Needed for Anxiety.     • amLODIPine (NORVASC) 2.5 MG tablet Daily.     • Ascorbic Acid (VITAMIN C GUMMIE PO) Take 1 dose by mouth Daily.     • CloNIDine (CATAPRES) 0.2 MG tablet Take 1 tablet by mouth 3 (Three) Times a Day. (Patient taking differently: Take 0.2 mg by mouth 2 (Two) Times a Day.) 90 tablet 3   • clotrimazole-betamethasone (LOTRISONE) 1-0.05 % cream prn     • furosemide (LASIX) 20 MG tablet Take 20 mg by mouth Daily.     • gentamicin (GARAMYCIN) 0.1 % ointment Occas. prn     • hydrALAZINE (APRESOLINE) 100 MG tablet 2 (Two) Times a Day.     • isosorbide mononitrate (IMDUR) 30 MG 24 hr tablet Take 1 tablet by mouth Daily. 90 tablet 3   • levothyroxine (SYNTHROID, LEVOTHROID) 88 MCG tablet Take 88 mcg by mouth Every Morning.     • O2 (OXYGEN) Inhale 2 L/min 1 (One) Time. Through c-pap     • pantoprazole (PROTONIX) 40 MG EC tablet Take 1 tablet by mouth Daily. 90 tablet 3   • RESTASIS 0.05 % ophthalmic  emulsion Administer 1 drop to both eyes Every Night.     • sotalol (BETAPACE) 120 MG tablet Take 1 tablet by mouth Every 12 (Twelve) Hours. 60 tablet 11   • warfarin (COUMADIN) 5 MG tablet Take 1 tablet by mouth Take As Directed. Restart Warfarin on Wedensday 3/31/21.   Same schedule as pre-op:  5 mg DAILY, EXCEPT NONE ON Sunday. (Patient taking differently: Take 5 mg by mouth Every Night.) 30 tablet 3   • [DISCONTINUED] amLODIPine (NORVASC) 5 MG tablet Take 1 tablet by mouth Daily. 30 tablet 11   • [DISCONTINUED] hydrALAZINE (APRESOLINE) 50 MG tablet Take 1 tablet by mouth 3 (Three) Times a Day. (Patient taking differently: Take 100 mg by mouth 2 (two) times a day.) 90 tablet 11   • [DISCONTINUED] iron polysacch complex-B12-VitC-FA-Succ (Ferrex 150 Forte Plus)  MG capsule capsule Take  by mouth Daily With Breakfast.     • [DISCONTINUED] telmisartan (MICARDIS) 80 MG tablet Take 1 tablet by mouth Daily. (Patient taking differently: Take 80 mg by mouth Every Morning.) 90 tablet 3     No current facility-administered medications on file prior to visit.       ALLERGIES:    Ciprofloxacin, Contrast dye, Iodinated diagnostic agents, and Ofloxacin    PAST MEDICAL HISTORY:    Past Medical History:   Diagnosis Date   • Abnormal stress test 4/17/2017   • Acute gout of right shoulder 9/11/2018   • Anemia    • Anxiety    • Aortic valve stenosis    • Atrial fibrillation (HCC)     A.  History of prior pulmonary vein ablation 03/14/2013. B.  On chronic Coumadin and Tikosyn therapy.   • Carotid artery stenosis    • Carotid bruit    • CHF (congestive heart failure) (HCC)    • Chronic kidney disease    • COPD (chronic obstructive pulmonary disease) (HCC)    • Deep venous thrombosis (HCC)     A.  History of right lower extremity DVT treated with in therapy until October 2000.   • Diastolic dysfunction    • Diastolic dysfunction    • Dizziness    • Dyslipidemia    • Edema    • Exertional shortness of breath    • GERD  (gastroesophageal reflux disease)    • Gout    • H/O echocardiogram     A.  Echocardiogram of 10/16/2013 reports an ejection fraction of 55-60%, a moderately to severely dilated left atrium, mild to moderately dilated right atrium, trace aortic regurgitation, mild mitral and tricuspid regurgitation with calculated    • Hiatal hernia    • History of blood transfusion     AFTER COLON SURGERY, NO REACTION    • History of peptic ulcer    • History of pulmonary embolism 2012    A.  Developed during chemotherapy in 2/2012. B.  Coumadin therapy reinitiated at that time.   • Hypertension     A.  Echocardiogram 10/16/2013 reports a calculated RVSP of 50-55 mmHg.B.  Right heart catheterization 03/12/2009 at  reported RV of 72/19, PA 72/27 and PCWP of 24, this was felt to be     • Hypothyroidism    • Morbid obesity (HCC)    • MRSA infection ~2005    LEFT HAND TREATED AT [facility] WITH ORAL ANTICIOTICS ~2005    • Obstructive sleep apnea     On CPAP and 2L O2 NC each bedtime.   • Osteoarthritis    • Palpitations    • Peripheral vascular disease (HCC)    • Pulmonary hypertension (HCC)    • S/P cardiac cath     3-10-21   • S/P transesophageal echocardiogram (LEANDRO)     3-10-21   • Signet ring cell adenocarcinoma (HCC)     A.  Diagnosed on colonoscopy E. 10/14/2011, status post colon resection and 2 of 20 nodes positive, T3, N1b Stage IIIB. B.  Status post chemotherapy.    • Staph infection     LEG ~2019 TREATED WITH ORAL ANTIBIOTICS    • Syncope    • Syncope    • Wears dentures     Upper and lower plates   • Wears glasses     Reading glasses       SURGICAL HISTORY:    Past Surgical History:   Procedure Laterality Date   • AORTIC VALVE REPAIR/REPLACEMENT N/A 3/25/2021    Procedure: TRANSCATHETER AORTIC VALVE REPLACEMENT;  Surgeon: Sacha العلي MD;  Location: [facility];  Service: Cardiothoracic;  Laterality: N/A;  flouro 8  dose 1005mGy  contrast 65   • AORTIC VALVE REPAIR/REPLACEMENT N/A 3/25/2021     Procedure: TRANSCATHETER AORTIC VALVE INSERTION;  Surgeon: Milana Whitaker MD;  Location: [facility];  Service: Cardiovascular;  Laterality: N/A;   • CARDIAC ABLATION      catheter ablation atrial fibrillation,  PER ESTER    • CARDIAC CATHETERIZATION     • CARDIAC CATHETERIZATION N/A 3/10/2021    Procedure: LEFT HEART CATH;  Surgeon: Milana Whitaker MD;  Location: [facility] CATH INVASIVE LOCATION;  Service: Cardiology;  Laterality: N/A;   • CARDIAC CATHETERIZATION      cardiac cath procedure summary, A.  Cardiac catheterization 2007 reported no significance coronary artery disease with markedly elevated LVEDP.   • CARDIAC ELECTROPHYSIOLOGY PROCEDURE N/A 2021    Procedure: Pacemaker , R- sided PPM, hold warfarin 2 days prior;  Surgeon: Bala Mario MD;  Location: [facility] EP INVASIVE LOCATION;  Service: Cardiology;  Laterality: N/A;   • CARPAL TUNNEL RELEASE Bilateral     neuroplasty decompression median nerve at carpal tunnel    • COLONOSCOPY      SEVERAL MOST RECENT ~2018   • FOOT SURGERY Right    • HEMICOLECTOMY      partial colectomy , A.  Status post right hemicolectomy secondary to colon cancer in .   • HYSTERECTOMY         • KNEE ARTHROPLASTY Left     knee replacement    • TRANSESOPHAGEAL ECHOCARDIOGRAM (LEANDRO) N/A 3/25/2021    Procedure: TRANSESOPHAGEAL ECHOCARDIOGRAM WITH ANESTHESIA;  Surgeon: Sacha العلي MD;  Location: [facility];  Service: Cardiothoracic;  Laterality: N/A;       SOCIAL HISTORY:    Social History     Socioeconomic History   • Marital status:    • Number of children: 3   • Years of education: HS   Tobacco Use   • Smoking status: Former Smoker     Packs/day: 1.00     Years: 12.00     Pack years: 12.00     Types: Cigarettes     Quit date: 3/1/1971     Years since quittin.7   • Smokeless tobacco: Never Used   Vaping Use   • Vaping Use: Never used   Substance and Sexual Activity   • Alcohol use: No   • Drug use: No   • Sexual  "activity: Defer       FAMILY HISTORY:    Family History   Problem Relation Age of Onset   • Other Mother         MEDICAL PROBLEMS   • Other Sister         myocardial infarction.       Review of Systems   Constitutional: Positive for fatigue.   HENT: Positive for postnasal drip.    Eyes: Positive for visual disturbance (reading glasses).   Respiratory: Positive for shortness of breath (same).    Cardiovascular: Positive for palpitations (occas. ) and leg swelling (same). Negative for chest pain.   Gastrointestinal: Negative.    Endocrine: Negative.    Genitourinary: Negative.    Musculoskeletal: Positive for arthralgias and myalgias.   Skin: Negative.    Allergic/Immunologic: Positive for environmental allergies.   Neurological: Positive for dizziness and light-headedness. Negative for syncope.   Hematological: Bruises/bleeds easily (on Coumadin).       VISIT VITALS:  Vitals:    11/10/21 1031   BP: (!) 190/76   BP Location: Left arm   Patient Position: Sitting   Pulse: 74   SpO2: 91%   Weight: (!) 138 kg (303 lb 6.4 oz)   Height: 170.2 cm (67.01\")      BP (!) 190/76 (BP Location: Left arm, Patient Position: Sitting) Comment: had not taken am meds, took meds now  Pulse 74   Ht 170.2 cm (67.01\")   Wt (!) 138 kg (303 lb 6.4 oz)   LMP  (LMP Unknown)   SpO2 91%   BMI 47.51 kg/m²     RECENT LABS:    Objective       Physical Exam  Vitals and nursing note reviewed.   Constitutional:       General: She is not in acute distress.     Appearance: She is well-developed.   HENT:      Head: Normocephalic and atraumatic.   Eyes:      Conjunctiva/sclera: Conjunctivae normal.      Pupils: Pupils are equal, round, and reactive to light.   Neck:      Vascular: Carotid bruit (transmitted murmur vs bruits bilat. ) present. No hepatojugular reflux or JVD.      Trachea: No tracheal deviation.      Comments: Nl. Carotid upstrokes  Cardiovascular:      Rate and Rhythm: Normal rate and regular rhythm.      Pulses:           Radial pulses " are 2+ on the right side and 2+ on the left side.      Heart sounds: Murmur heard.    Systolic murmur is present.  No friction rub.      Comments: S1 slightly decreased  1-2/6 systolic ejection murmur RUSB  No AI  No MR  No TR  Pulmonary:      Effort: Pulmonary effort is normal.      Breath sounds: Normal breath sounds. No wheezing, rhonchi or rales.      Comments: Nl. Expir. Phase  Nl. Breath sound intensity  Abdominal:      General: Bowel sounds are normal. There is no distension or abdominal bruit.      Palpations: Abdomen is soft. There is no mass.      Tenderness: There is no abdominal tenderness. There is no guarding or rebound.      Comments: No organomegaly   Musculoskeletal:         General: No tenderness or deformity. Normal range of motion.      Cervical back: Normal range of motion and neck supple.      Right lower leg: Edema present.      Left lower leg: Edema present.      Comments: LLE, 2+ edema  RLE, 1+ edema  Mod. Dillan. Stasis changes bilat.   Pedal pulses not checked due to edema   Skin:     General: Skin is warm and dry.      Coloration: Skin is not pale.      Findings: No erythema or rash.   Neurological:      Mental Status: She is alert and oriented to person, place, and time.   Psychiatric:         Behavior: Behavior normal.         Thought Content: Thought content normal.         Judgment: Judgment normal.         Procedures  RENAL ARTERY U/S NEVER DONE ORDERED BACK IN AUG. IF B/P NOT CONTROLLED WITH TODAY'S MED CHANGES WILL NEED U/S. PER DR. LEYVA. PH,LPN    Assessment/Plan   #1.  Severe aortic stenosis status post TAVR with normal postprocedural valve function by echo.  We will continue to monitor.    2.  Paroxysmal atrial fibrillation is part of diffuse conduction system disease with tachybradycardia syndrome.  Palpitations are well controlled on higher dose sotalol and the patient is having minimal symptoms of cerebral hypoperfusion after pacemaker implantation.  We will continue to  monitor.    3.  Severe systemic hypertension.  Apparently Micardis was stopped because of hyperkalemia.  However the patient had reasonable control on that therapy.  Therefore, I would like to start low-dose olmesartan 20 mg daily in conjunction with hydrochlorothiazide 12.5 mg daily to mitigate any associated hyperkalemia.  We will continue very low-dose Lasix.  We will check labs in 2 weeks to reassess potassium as well as overall renal function.  Patient will perform serial blood pressure checks at home we will follow-up on those telephonically in 2 to 3 weeks.    4.  Other problems are currently stable we will continue to monitor.    5.  Ms. Corona will follow with her primary providers as instructed with recommendations for follow-up in our office based on response to therapy, labs, symptoms as discussed, or in 6 months.   Diagnosis Plan   1. Tachycardia-bradycardia syndrome (HCC)     2. Symptomatic bradycardia     3. Severe Aortic Stenosis     4. Peripheral vascular disease (HCC)     5. Palpitations     6. PAF on chronic coumadin (CMS/HCC)     7. Primary hypertension     8. Dyslipidemia     9. Diastolic dysfunction     10. Acute deep vein thrombosis (DVT) of proximal vein of lower extremity, unspecified laterality (HCC)     11. Pulmonary hypertension with PA pressure >55mmhg (CMS/HCC)     12. HANK on CPAP         No follow-ups on file.         Holly Corona  reports that she quit smoking about 50 years ago. Her smoking use included cigarettes. She has a 12.00 pack-year smoking history. She has never used smokeless tobacco.. I have educated her on the risk of diseases from using tobacco products such as cancer, COPD and heart disease.       ACP discussion was held with the patient during this visit. info. already given at previous visit        Patient's Body mass index is 47.51 kg/m². indicating that she is morbidly obese (BMI > 40 or > 35 with obesity - related health condition). Obesity-related health conditions  include the following: obstructive sleep apnea, hypertension, dyslipidemias and PAF, PHTN, DVT/PE, colon CA, ASHLEE/PAD, AV replacement. Obesity is unchanged. BMI is pcp addressing. We discussed portion control and increasing exercise..       Nissa Qiu LPN    Scribed for Dr. Sacha Romero by Nissa Qiu LPN November 10, 2021 10:40 EST         Electronically signed by:            This note is dictated utilizing voice recognition software.  Although this record has been proof read, transcriptional errors may still be present. If questions occur regarding the content of this record please do not hesitate to call our office.

## 2021-12-06 ENCOUNTER — LAB (OUTPATIENT)
Dept: LAB | Facility: HOSPITAL | Age: 76
End: 2021-12-06

## 2021-12-06 DIAGNOSIS — I48.0 PAROXYSMAL ATRIAL FIBRILLATION (HCC): ICD-10-CM

## 2021-12-06 DIAGNOSIS — I49.5 TACHYCARDIA-BRADYCARDIA SYNDROME (HCC): ICD-10-CM

## 2021-12-06 DIAGNOSIS — I10 PRIMARY HYPERTENSION: ICD-10-CM

## 2021-12-06 LAB
ANION GAP SERPL CALCULATED.3IONS-SCNC: 13 MMOL/L (ref 5–15)
BUN SERPL-MCNC: 35 MG/DL (ref 8–23)
BUN/CREAT SERPL: 25.2 (ref 7–25)
CALCIUM SPEC-SCNC: 8.8 MG/DL (ref 8.6–10.5)
CHLORIDE SERPL-SCNC: 101 MMOL/L (ref 98–107)
CO2 SERPL-SCNC: 25 MMOL/L (ref 22–29)
CREAT SERPL-MCNC: 1.39 MG/DL (ref 0.57–1)
GFR SERPL CREATININE-BSD FRML MDRD: 37 ML/MIN/1.73
GLUCOSE SERPL-MCNC: 104 MG/DL (ref 65–99)
MAGNESIUM SERPL-MCNC: 1.8 MG/DL (ref 1.6–2.4)
POTASSIUM SERPL-SCNC: 5.2 MMOL/L (ref 3.5–5.2)
SODIUM SERPL-SCNC: 139 MMOL/L (ref 136–145)

## 2021-12-06 PROCEDURE — 80048 BASIC METABOLIC PNL TOTAL CA: CPT

## 2021-12-06 PROCEDURE — 36415 COLL VENOUS BLD VENIPUNCTURE: CPT

## 2021-12-06 PROCEDURE — 83735 ASSAY OF MAGNESIUM: CPT

## 2021-12-10 ENCOUNTER — OFFICE VISIT (OUTPATIENT)
Dept: CARDIOLOGY | Facility: CLINIC | Age: 76
End: 2021-12-10

## 2021-12-10 VITALS
SYSTOLIC BLOOD PRESSURE: 150 MMHG | WEIGHT: 293 LBS | HEART RATE: 81 BPM | OXYGEN SATURATION: 98 % | BODY MASS INDEX: 45.99 KG/M2 | DIASTOLIC BLOOD PRESSURE: 88 MMHG | HEIGHT: 67 IN

## 2021-12-10 DIAGNOSIS — Z95.3 S/P TAVR (TRANSCATHETER AORTIC VALVE REPLACEMENT), BIOPROSTHETIC: ICD-10-CM

## 2021-12-10 DIAGNOSIS — I49.5 TACHYCARDIA-BRADYCARDIA SYNDROME (HCC): Primary | ICD-10-CM

## 2021-12-10 PROCEDURE — 99214 OFFICE O/P EST MOD 30 MIN: CPT | Performed by: PHYSICIAN ASSISTANT

## 2021-12-10 PROCEDURE — 93280 PM DEVICE PROGR EVAL DUAL: CPT | Performed by: PHYSICIAN ASSISTANT

## 2021-12-10 NOTE — PROGRESS NOTES
Holly Corona  1945  [phone]    08/25/2021    [facility] CARDIOLOGY     Referring Provider: No ref. provider found     Abdoul Andrew MD  [address] [place] [id number]    Chief Complaint   Patient presents with   • Tachycardia-bradycardia syndrome       Problem List:   1. Paroxysmal atrial fibrillation/atrial flutter:  a. Echocardiogram, 02/08/2006 with mild to moderate LVH, moderate MR, pulmonary HTN with RVSP at 55 mmHg; EF 65%.   b. GXT, 04/03/2006 with no ischemia, EF 65%.  c. Event recorder, April 2007 through May 2007 showing atrial flutter.  d. Echocardiogram, 05/07/2007 with pulmonary HTN, LA 4.5, EF 65%.  e. Cardiolite, 05/07/2007, normal study, EF 78%.  f. Left heart cath, 05/09/2007 with normal coronary arteries, EF 70%.  g. EKG, 10/21/2007 showing atrial fibrillation at 146 BPM.  h. Failed sotalol therapy.  i. Tikosyn initiated, 08/08/2012.  j. Echocardiogram, 09/17/2012 with an EF of 65%; mild LVH, moderate diastolic dysfunction, moderate to severe biatrial enlargement, mild MR, moderate TR with an RVSP of 65 mmHg. LA 4.7. Aortic valve sclerosis.  k. Pulmonary vein isolation procedure with ablation of right atrial flutter and nonsustained low posterior atrial tachycardia, 03/15/2013 complicated by dysphagia that lasted 24-48 hours.  l. MCOT two weeks 7/2/2020: NSR, SB, rare NSAT, rare NSVT  m. LEANDRO, 3/10/21, EF 65%, Mod biatrial enlargement, severe aortic stenosis, mild MR, mild TR  n. Echo, 4/29/21, LV mildly dilated, EF 50-55%, Mild concentric left ventricular hypertrophy. MV is thickened and sclerotic. Mild MR. Mild TR.   o. Ziopatch 2 weeks 5//2021 normal sinus rhythm 1% atrial fibrillation with RVR sinus bradycardia with heart rates at 38 to 40 bpm less than 1% PVCs.  2. Severe aortic stenosis  a. LHC, 3/10/21, Severe aortic stenosis with mean gradient of 63 mmHg across the AV. EF 65%.   b. S/p TAVR with Dr. Sol, 3/25/21  3. CP  a. Heart cath  6/9/17: Non obstructive CAD, LVEF 55%   b. [facility], 3/10/21, Severe aortic stenosis with mean gradient of 63 mmHg across the AV. EF 65%.   4. HTN  5. Right leg deep venous thrombosis, treated with Coumadin therapy until October 2007.  6. Hiatal hernia/gastroesophageal reflux disease.  7. Carotid bruits: Normal carotid duplex, March 2006 and June 2007.  8. Osteoarthritis.  9. Hypothyroidism.  10. Sleep apnea, on CPAP.  11. Dyslipidemia, no current therapy.  12. Colon cancer, status post resection of 17 inches of the right ascending colon, undergoing chemotherapy.  13. Pulmonary embolism, February 2012, Coumadin initiated.  14. Pulmonary hypertension:  a. Echocardiogram, 06/21/2011, showing PA systolic pressure estimated at 60-65 mmHg.  b. Moderate TR with an RVSP of 65 mmHg by echocardiogram, 09/17/2012.  15. Surgical history:  a. Hysterectomy.  b. Carpal tunnel repair.  c. Left knee replacement.    Allergies  Allergies   Allergen Reactions   • Ciprofloxacin Itching   • Contrast Dye Rash     Patient usually takes prednisone & benadryl prior to contrast dye.   • Iodinated Diagnostic Agents Rash     Patient usually takes prednisone & benadryl prior to contrast dye.   • Ofloxacin Rash       Current Medications    Current Outpatient Medications:   •  albuterol (PROVENTIL) (5 MG/ML) 0.5% nebulizer solution, Take 2.5 mg by nebulization Every 6 (Six) Hours As Needed for Wheezing., Disp: , Rfl:   •  allopurinol (ZYLOPRIM) 100 MG tablet, Take 200 mg by mouth Every Morning., Disp: , Rfl:   •  ALPRAZolam (XANAX) 0.25 MG tablet, Take 0.25 mg by mouth As Needed for Anxiety., Disp: , Rfl:   •  amLODIPine (NORVASC) 2.5 MG tablet, Daily., Disp: , Rfl:   •  Ascorbic Acid (VITAMIN C GUMMIE PO), Take 1 dose by mouth Daily., Disp: , Rfl:   •  CloNIDine (CATAPRES) 0.2 MG tablet, Take 1 tablet by mouth 3 (Three) Times a Day. (Patient taking differently: Take 0.2 mg by mouth 2 (Two) Times a Day.), Disp: 90 tablet, Rfl: 3  •   clotrimazole-betamethasone (LOTRISONE) 1-0.05 % cream, prn, Disp: , Rfl:   •  furosemide (LASIX) 20 MG tablet, Take 20 mg by mouth Daily., Disp: , Rfl:   •  gentamicin (GARAMYCIN) 0.1 % ointment, Occas. prn, Disp: , Rfl:   •  hydrALAZINE (APRESOLINE) 100 MG tablet, Take 1 tablet by mouth 3 (Three) Times a Day. (Patient taking differently: Take 100 mg by mouth 2 (Two) Times a Day.), Disp: 90 tablet, Rfl: 5  •  isosorbide mononitrate (IMDUR) 30 MG 24 hr tablet, Take 1 tablet by mouth Daily., Disp: 90 tablet, Rfl: 3  •  levothyroxine (SYNTHROID, LEVOTHROID) 88 MCG tablet, Take 88 mcg by mouth Every Morning., Disp: , Rfl:   •  O2 (OXYGEN), Inhale 2 L/min 1 (One) Time. Through c-pap, Disp: , Rfl:   •  olmesartan-hydrochlorothiazide (Benicar HCT) 20-12.5 MG per tablet, Take 1 tablet by mouth Daily., Disp: 30 tablet, Rfl: 11  •  pantoprazole (PROTONIX) 40 MG EC tablet, Take 1 tablet by mouth Daily., Disp: 90 tablet, Rfl: 3  •  RESTASIS 0.05 % ophthalmic emulsion, Administer 1 drop to both eyes Every Night., Disp: , Rfl:   •  sotalol (BETAPACE) 120 MG tablet, Take 1 tablet by mouth Every 12 (Twelve) Hours., Disp: 60 tablet, Rfl: 11  •  warfarin (COUMADIN) 5 MG tablet, Take 1 tablet by mouth Take As Directed. Restart Warfarin on Wedensday 3/31/21.   Same schedule as pre-op:  5 mg DAILY, EXCEPT NONE ON Sunday. (Patient taking differently: Take 5 mg by mouth Every Night.), Disp: 30 tablet, Rfl: 3    History of Present Illness     Pt presents for follow up of PAF/HTN/AS.  She is status post Saint [employer]ar permanent pacemaker.  Since last office states she is doing very well.  She has had not any episodes of sustained palpitations.  She does not notice any A. fib.  She has no chest pain or chest tightness suggesting angina.  She denies any worsening heart failure symptoms.  She follows Dr. Romero regarding her valvular heart disease and blood pressure.  ROS:  General:  + fatigue, No weight gain or loss  Cardiovascular:  Denies CP,  "PND, syncope, + near syncope + palpitations.  Pulmonary:  + MAGUIRE, no cough, or wheezing      Vitals:    12/10/21 1210   BP: 150/88   BP Location: Left arm   Patient Position: Sitting   Pulse: 81   SpO2: 98%   Weight: (!) 137 kg (303 lb)   Height: 170.2 cm (67\")     Body mass index is 47.46 kg/m².  PE:  General: NAD  Neck: no JVD, no carotid bruits, no TM  Heart RRR, NL S1, S2, S4 present, no rubs, murmurs  Lungs: CTA, no wheezes, rhonchi, or rales  Abd: soft, non-tender, NL BS  Ext: No musculoskeletal deformities, + edema, No cyanosis, or clubbing  Psych: normal mood and affect    Diagnostic Data:  Device interrogation: Saint Jamar DDDR 70/120 a paced 97% RV paced less than 1%.  Normal thresholds impedances.  Battery voltage 3.04 V.  9.4 years remaining.  Less than 1% A. fib.    Procedures    1. Tachycardia-bradycardia syndrome (HCC)    2. S/p bioprostetic TAVR 3/25/21          Plan:    1) PAF s/p RFA/PV 2013 now with tachycardia-bradycardia syndrome: Saint Jamar PPM, Normal function. On Sotalol. . EKG August 2021 normal sinus rhythm QTC acceptable    2) Anticoagulation  Continue warfarin    3)AV stenosis  -s/p TAVR 3/25/21 with Dr. Sol  -Echo, 4/29/21, LV mildly dilated, EF 50-55%, Mild concentric left ventricular hypertrophy. MV is thickened and sclerotic. Mild MR. Mild TR.     4) HTN- controlled  Wt loss, exercise, salt reduction    F/up in 6 months    Electronically signed by MARIELLA Terrazas, 12/10/21, 12:32 PM EST.  "

## 2022-01-13 DIAGNOSIS — Z95.3 S/P TAVR (TRANSCATHETER AORTIC VALVE REPLACEMENT), BIOPROSTHETIC: Primary | ICD-10-CM

## 2022-01-13 DIAGNOSIS — I35.9 AORTIC VALVE DISORDER: ICD-10-CM

## 2022-01-22 PROCEDURE — 93294 REM INTERROG EVL PM/LDLS PM: CPT | Performed by: INTERNAL MEDICINE

## 2022-01-22 PROCEDURE — 93296 REM INTERROG EVL PM/IDS: CPT | Performed by: INTERNAL MEDICINE

## 2022-02-16 ENCOUNTER — TELEPHONE (OUTPATIENT)
Dept: CARDIOLOGY | Facility: CLINIC | Age: 77
End: 2022-02-16

## 2022-02-16 NOTE — TELEPHONE ENCOUNTER
Patient called she is a little confused she thought that if she had pacemaker it would control A Fib. She is having trouble has been in A Fib all day rates anywhere from 70 to 144. She is feeling bad having racing / jumping. Her blood pressure 170/95 at 2:00 took blood pressure at 4:00 and it was better 127/75. She has had her medications today.

## 2022-02-18 NOTE — TELEPHONE ENCOUNTER
Called and left message explained what pacemaker does and asked patient to call back if she feels she is needing a sooner apt per Dr Castro.

## 2022-04-05 ENCOUNTER — TELEPHONE (OUTPATIENT)
Dept: CARDIOLOGY | Facility: CLINIC | Age: 77
End: 2022-04-05

## 2022-04-05 NOTE — TELEPHONE ENCOUNTER
Called to check in with Mrs Corona in regards to her Merlin home monitor reading showing ongoing AF since 4/3/22.  Left message for her to return my call.     Mrs Corona returned my call and she hadn't really noticed her afib till last night and her heart rates were elevated according to her BP machine. She does get SOA with activity a little more than normal.  Reading in [facility] [place] Mario to review.  Presenting EGM @ 2am was afib with ventricular rate of 115 bpm.

## 2022-04-14 ENCOUNTER — LAB (OUTPATIENT)
Dept: LAB | Facility: HOSPITAL | Age: 77
End: 2022-04-14

## 2022-04-14 ENCOUNTER — OFFICE VISIT (OUTPATIENT)
Dept: CARDIOLOGY | Facility: CLINIC | Age: 77
End: 2022-04-14

## 2022-04-14 ENCOUNTER — HOSPITAL ENCOUNTER (OUTPATIENT)
Dept: CARDIOLOGY | Facility: HOSPITAL | Age: 77
Discharge: HOME | End: 2022-04-14

## 2022-04-14 VITALS
WEIGHT: 293 LBS | BODY MASS INDEX: 45.99 KG/M2 | SYSTOLIC BLOOD PRESSURE: 176 MMHG | OXYGEN SATURATION: 94 % | DIASTOLIC BLOOD PRESSURE: 72 MMHG | HEART RATE: 72 BPM | HEIGHT: 67 IN

## 2022-04-14 VITALS — BODY MASS INDEX: 45.99 KG/M2 | HEIGHT: 67 IN | WEIGHT: 293 LBS

## 2022-04-14 DIAGNOSIS — I35.9 AORTIC VALVE DISORDER: ICD-10-CM

## 2022-04-14 DIAGNOSIS — I50.20 SYSTOLIC CONGESTIVE HEART FAILURE, UNSPECIFIED HF CHRONICITY: Primary | ICD-10-CM

## 2022-04-14 DIAGNOSIS — Z95.3 S/P TAVR (TRANSCATHETER AORTIC VALVE REPLACEMENT), BIOPROSTHETIC: ICD-10-CM

## 2022-04-14 DIAGNOSIS — R60.0 BILATERAL LEG EDEMA: ICD-10-CM

## 2022-04-14 DIAGNOSIS — R60.0 LOWER EXTREMITY EDEMA: ICD-10-CM

## 2022-04-14 DIAGNOSIS — I50.20 SYSTOLIC CONGESTIVE HEART FAILURE, UNSPECIFIED HF CHRONICITY: ICD-10-CM

## 2022-04-14 DIAGNOSIS — R06.02 SHORTNESS OF BREATH: ICD-10-CM

## 2022-04-14 DIAGNOSIS — R06.02 SHORTNESS OF BREATH: Primary | ICD-10-CM

## 2022-04-14 LAB
ALBUMIN SERPL-MCNC: 3.85 G/DL (ref 3.5–5.2)
ALBUMIN/GLOB SERPL: 1.2 G/DL
ALP SERPL-CCNC: 151 U/L (ref 39–117)
ALT SERPL W P-5'-P-CCNC: 16 U/L (ref 1–33)
ANION GAP SERPL CALCULATED.3IONS-SCNC: 11.4 MMOL/L (ref 5–15)
AST SERPL-CCNC: 22 U/L (ref 1–32)
BASOPHILS # BLD AUTO: 0.03 10*3/MM3 (ref 0–0.2)
BASOPHILS NFR BLD AUTO: 0.4 % (ref 0–1.5)
BILIRUB SERPL-MCNC: 0.7 MG/DL (ref 0–1.2)
BUN SERPL-MCNC: 13 MG/DL (ref 8–23)
BUN/CREAT SERPL: 12.4 (ref 7–25)
CALCIUM SPEC-SCNC: 9.2 MG/DL (ref 8.6–10.5)
CHLORIDE SERPL-SCNC: 98 MMOL/L (ref 98–107)
CO2 SERPL-SCNC: 23.6 MMOL/L (ref 22–29)
CREAT SERPL-MCNC: 1.05 MG/DL (ref 0.57–1)
DEPRECATED RDW RBC AUTO: 46.7 FL (ref 37–54)
EGFRCR SERPLBLD CKD-EPI 2021: 54.5 ML/MIN/1.73
EOSINOPHIL # BLD AUTO: 0.42 10*3/MM3 (ref 0–0.4)
EOSINOPHIL NFR BLD AUTO: 5.8 % (ref 0.3–6.2)
ERYTHROCYTE [DISTWIDTH] IN BLOOD BY AUTOMATED COUNT: 14.1 % (ref 12.3–15.4)
GLOBULIN UR ELPH-MCNC: 3.2 GM/DL
GLUCOSE SERPL-MCNC: 117 MG/DL (ref 65–99)
HCT VFR BLD AUTO: 40.2 % (ref 34–46.6)
HGB BLD-MCNC: 12.7 G/DL (ref 12–15.9)
IMM GRANULOCYTES # BLD AUTO: 0.03 10*3/MM3 (ref 0–0.05)
IMM GRANULOCYTES NFR BLD AUTO: 0.4 % (ref 0–0.5)
LYMPHOCYTES # BLD AUTO: 1.15 10*3/MM3 (ref 0.7–3.1)
LYMPHOCYTES NFR BLD AUTO: 15.8 % (ref 19.6–45.3)
MCH RBC QN AUTO: 28.7 PG (ref 26.6–33)
MCHC RBC AUTO-ENTMCNC: 31.6 G/DL (ref 31.5–35.7)
MCV RBC AUTO: 91 FL (ref 79–97)
MONOCYTES # BLD AUTO: 0.59 10*3/MM3 (ref 0.1–0.9)
MONOCYTES NFR BLD AUTO: 8.1 % (ref 5–12)
NEUTROPHILS NFR BLD AUTO: 5.06 10*3/MM3 (ref 1.7–7)
NEUTROPHILS NFR BLD AUTO: 69.5 % (ref 42.7–76)
NRBC BLD AUTO-RTO: 0 /100 WBC (ref 0–0.2)
NT-PROBNP SERPL-MCNC: 931.4 PG/ML (ref 0–1800)
PLATELET # BLD AUTO: 217 10*3/MM3 (ref 140–450)
PMV BLD AUTO: 10.6 FL (ref 6–12)
POTASSIUM SERPL-SCNC: 3.9 MMOL/L (ref 3.5–5.2)
PROT SERPL-MCNC: 7 G/DL (ref 6–8.5)
RBC # BLD AUTO: 4.42 10*6/MM3 (ref 3.77–5.28)
SODIUM SERPL-SCNC: 133 MMOL/L (ref 136–145)
TROPONIN T SERPL-MCNC: <0.01 NG/ML (ref 0–0.03)
TSH SERPL DL<=0.05 MIU/L-ACNC: 5.1 UIU/ML (ref 0.27–4.2)
WBC NRBC COR # BLD: 7.28 10*3/MM3 (ref 3.4–10.8)

## 2022-04-14 PROCEDURE — 85025 COMPLETE CBC W/AUTO DIFF WBC: CPT

## 2022-04-14 PROCEDURE — 93306 TTE W/DOPPLER COMPLETE: CPT | Performed by: INTERNAL MEDICINE

## 2022-04-14 PROCEDURE — 99211 OFF/OP EST MAY X REQ PHY/QHP: CPT | Performed by: PHYSICIAN ASSISTANT

## 2022-04-14 PROCEDURE — 84484 ASSAY OF TROPONIN QUANT: CPT

## 2022-04-14 PROCEDURE — 93306 TTE W/DOPPLER COMPLETE: CPT

## 2022-04-14 PROCEDURE — 83880 ASSAY OF NATRIURETIC PEPTIDE: CPT

## 2022-04-14 PROCEDURE — 80053 COMPREHEN METABOLIC PANEL: CPT

## 2022-04-14 PROCEDURE — 36415 COLL VENOUS BLD VENIPUNCTURE: CPT

## 2022-04-14 PROCEDURE — 84443 ASSAY THYROID STIM HORMONE: CPT

## 2022-04-14 RX ORDER — FUROSEMIDE 40 MG/1
40 TABLET ORAL DAILY
Qty: 30 TABLET | Refills: 5 | Status: SHIPPED | OUTPATIENT
Start: 2022-04-14 | End: 2022-11-01 | Stop reason: SDUPTHER

## 2022-04-14 NOTE — PROGRESS NOTES
Holly Corona  1945 4/14/2022   ?   Chief Complaint   Patient presents with   • Shortness of Breath     Here for nurse visit SX check   • Leg Swelling      ?   HPI:   ?   ? Patient was downstairs registering to have echo done today and staff called upstairs concerned with patient's shortness of breath. Patient states she saw PCP, Dr. Andrew yest. And he did not say anything about her shortness of breath, although she states it hadn't gotten worse until after visit yest. And worse today. She has used 02 prn in past but has had to use it all day today at 2 L via N/C. When asked if she had trouble sleeping flat in bed last night she stated she struggled through the shortness of breath and stayed in bed. She also uses 02 through her CPAP at night. She doesn't think BLE edema is any worse than it normally is. She states she just hasn't felt good yest.Or today and had a rough night last night.  Also relays that the other days she stayed in a-[facility] for about 30 hours and that EP office told her about it also, but nothing was changed. Confirms taking lasix 20 mg daily.  Echo was done and above discussed with Lokesh Nuñez, PAC and he ordered labs to be drawn downstairs today, CXR, and then will see patient in a nurse visit also. Labs were drawn, CXR order given to daughter also present so she can go to [facility] today. PH,LPN  ?     Current Outpatient Medications:   •  albuterol (PROVENTIL) (5 MG/ML) 0.5% nebulizer solution, Take 2.5 mg by nebulization Every 6 (Six) Hours As Needed for Wheezing., Disp: , Rfl:   •  allopurinol (ZYLOPRIM) 100 MG tablet, Take 200 mg by mouth Every Morning., Disp: , Rfl:   •  ALPRAZolam (XANAX) 0.25 MG tablet, Take 0.25 mg by mouth As Needed for Anxiety., Disp: , Rfl:   •  amLODIPine (NORVASC) 2.5 MG tablet, Daily., Disp: , Rfl:   •  Ascorbic Acid (VITAMIN C GUMMIE PO), Take 1 dose by mouth Daily., Disp: , Rfl:   •  CloNIDine (CATAPRES) 0.2 MG tablet, Take 1 tablet by mouth 3 (Three) Times a Day.  (Patient taking differently: Take 0.2 mg by mouth 2 (Two) Times a Day.), Disp: 90 tablet, Rfl: 3  •  clotrimazole-betamethasone (LOTRISONE) 1-0.05 % cream, prn, Disp: , Rfl:   •  furosemide (LASIX) 20 MG tablet, Take 20 mg by mouth Daily., Disp: , Rfl:   •  gentamicin (GARAMYCIN) 0.1 % ointment, Occas. prn, Disp: , Rfl:   •  hydrALAZINE (APRESOLINE) 100 MG tablet, Take 1 tablet by mouth 3 (Three) Times a Day. (Patient taking differently: Take 100 mg by mouth 2 (Two) Times a Day.), Disp: 90 tablet, Rfl: 5  •  isosorbide mononitrate (IMDUR) 30 MG 24 hr tablet, Take 1 tablet by mouth Daily., Disp: 90 tablet, Rfl: 3  •  levothyroxine (SYNTHROID, LEVOTHROID) 88 MCG tablet, Take 88 mcg by mouth Every Morning., Disp: , Rfl:   •  O2 (OXYGEN), Inhale 2 L/min 1 (One) Time. Through c-pap, Disp: , Rfl:   •  olmesartan-hydrochlorothiazide (Benicar HCT) 20-12.5 MG per tablet, Take 1 tablet by mouth Daily., Disp: 30 tablet, Rfl: 11  •  pantoprazole (PROTONIX) 40 MG EC tablet, Take 1 tablet by mouth Daily., Disp: 90 tablet, Rfl: 3  •  RESTASIS 0.05 % ophthalmic emulsion, Administer 1 drop to both eyes Every Night., Disp: , Rfl:   •  sotalol (BETAPACE) 120 MG tablet, Take 1 tablet by mouth Every 12 (Twelve) Hours., Disp: 60 tablet, Rfl: 11  •  warfarin (COUMADIN) 5 MG tablet, Take 1 tablet by mouth Take As Directed. Restart Warfarin on Wedensday 3/31/21.   Same schedule as pre-op:  5 mg DAILY, EXCEPT NONE ON Sunday. (Patient taking differently: Take 5 mg by mouth Every Night.), Disp: 30 tablet, Rfl: 3   ?   ?   Ciprofloxacin, Contrast dye, Iodinated diagnostic agents, and Ofloxacin       Procedures     ?   Assessment/Plan    ?1. Shortness of breath      2. BLE edema    Lokesh Nuñez, PAC in to see patient. V/o received to increase lasix to 40 mg daily, Bmp/Mag in 1 week and states he will send a message to Dr. Mario's office regarding recent a-fib episode. Further treatment pending lab and cxr results. Updated script sent to  [facility]. Patient and daughter both verbalized they understood all above. discussed above with Rachel Roberts LPN here at the office so she can check on lab and cxr results yulissa. PH,LPN  ?

## 2022-04-15 ENCOUNTER — TELEPHONE (OUTPATIENT)
Dept: CARDIOLOGY | Facility: CLINIC | Age: 77
End: 2022-04-15

## 2022-04-15 NOTE — TELEPHONE ENCOUNTER
Patient returned call to office, informed of previous note and directions. Instructed to go to ER with worsening symptoms.  Patient verbalized understanding. Rachel Roberts LPN

## 2022-04-15 NOTE — TELEPHONE ENCOUNTER
Attempted to reach patient on both numbers, left messages on both voicemails to call office. Copy of labs sent to patients primary care physician. Chest xray wnl. Echo results pending. Patient to see Dr Romero on May 4 th, Patient to go to nearest ER if any further symptoms. Rachel Roberts LPN

## 2022-04-17 LAB
AORTIC DIMENSIONLESS INDEX: 0.51 (DI)
BH CV ECHO MEAS - AO MAX PG: 18.6 MMHG
BH CV ECHO MEAS - AO MEAN PG: 9.6 MMHG
BH CV ECHO MEAS - AO ROOT DIAM: 2.32 CM
BH CV ECHO MEAS - AO V2 MAX: 215.4 CM/SEC
BH CV ECHO MEAS - AO V2 VTI: 56.2 CM
BH CV ECHO MEAS - AVA(I,D): 1.86 CM2
BH CV ECHO MEAS - EDV(CUBED): 72.7 ML
BH CV ECHO MEAS - EF_3D-VOL: 63 %
BH CV ECHO MEAS - ESV(CUBED): 29.6 ML
BH CV ECHO MEAS - FS: 25.9 %
BH CV ECHO MEAS - IVS/LVPW: 1.06 CM
BH CV ECHO MEAS - IVSD: 1.08 CM
BH CV ECHO MEAS - LA A2CS (ATRIAL LENGTH): 6.8 CM
BH CV ECHO MEAS - LA DIMENSION: 5.6 CM
BH CV ECHO MEAS - LAT PEAK E' VEL: 6.8 CM/SEC
BH CV ECHO MEAS - LV MASS(C)D: 146 GRAMS
BH CV ECHO MEAS - LV MAX PG: 4.8 MMHG
BH CV ECHO MEAS - LV MEAN PG: 2.48 MMHG
BH CV ECHO MEAS - LV V1 MAX: 109.4 CM/SEC
BH CV ECHO MEAS - LV V1 VTI: 32.8 CM
BH CV ECHO MEAS - LVIDD: 4.2 CM
BH CV ECHO MEAS - LVIDS: 3.1 CM
BH CV ECHO MEAS - LVOT AREA: 3.2 CM2
BH CV ECHO MEAS - LVOT DIAM: 2.01 CM
BH CV ECHO MEAS - LVPWD: 1.02 CM
BH CV ECHO MEAS - MED PEAK E' VEL: 6.7 CM/SEC
BH CV ECHO MEAS - MV A MAX VEL: 75.1 CM/SEC
BH CV ECHO MEAS - MV DEC SLOPE: 892 CM/SEC2
BH CV ECHO MEAS - MV DEC TIME: 0.23 MSEC
BH CV ECHO MEAS - MV E MAX VEL: 131 CM/SEC
BH CV ECHO MEAS - MV E/A: 1.74
BH CV ECHO MEAS - MV MAX PG: 12.9 MMHG
BH CV ECHO MEAS - MV MEAN PG: 4 MMHG
BH CV ECHO MEAS - MV V2 VTI: 44.4 CM
BH CV ECHO MEAS - MVA(VTI): 2.35 CM2
BH CV ECHO MEAS - RAP SYSTOLE: 10 MMHG
BH CV ECHO MEAS - RVDD: 3.4 CM
BH CV ECHO MEAS - RVSP: 75.8 MMHG
BH CV ECHO MEAS - SV(LVOT): 104.3 ML
BH CV ECHO MEAS - TR MAX PG: 65.8 MMHG
BH CV ECHO MEAS - TR MAX VEL: 405.7 CM/SEC
BH CV ECHO MEASUREMENTS AVERAGE E/E' RATIO: 19.41
IVRT: 98 MSEC
LEFT ATRIUM VOLUME INDEX: 46.5 ML/M2
LEFT ATRIUM VOLUME: 112 CM3
MAXIMAL PREDICTED HEART RATE: 142 BPM
STRESS TARGET HR: 121 BPM

## 2022-04-22 ENCOUNTER — OFFICE VISIT (OUTPATIENT)
Dept: CARDIOLOGY | Facility: CLINIC | Age: 77
End: 2022-04-22

## 2022-04-22 ENCOUNTER — TELEPHONE (OUTPATIENT)
Dept: CARDIOLOGY | Facility: CLINIC | Age: 77
End: 2022-04-22

## 2022-04-22 DIAGNOSIS — I49.5 TACHY-BRADY SYNDROME: Primary | ICD-10-CM

## 2022-04-22 PROCEDURE — 93288 INTERROG EVL PM/LDLS PM IP: CPT | Performed by: INTERNAL MEDICINE

## 2022-04-25 ENCOUNTER — LAB (OUTPATIENT)
Dept: LAB | Facility: HOSPITAL | Age: 77
End: 2022-04-25

## 2022-04-25 DIAGNOSIS — R60.0 BILATERAL LEG EDEMA: ICD-10-CM

## 2022-04-25 DIAGNOSIS — R06.02 SHORTNESS OF BREATH: ICD-10-CM

## 2022-04-25 LAB
ANION GAP SERPL CALCULATED.3IONS-SCNC: 13.1 MMOL/L (ref 5–15)
BUN SERPL-MCNC: 19 MG/DL (ref 8–23)
BUN/CREAT SERPL: 16.7 (ref 7–25)
CALCIUM SPEC-SCNC: 9.1 MG/DL (ref 8.6–10.5)
CHLORIDE SERPL-SCNC: 100 MMOL/L (ref 98–107)
CO2 SERPL-SCNC: 27.9 MMOL/L (ref 22–29)
CREAT SERPL-MCNC: 1.14 MG/DL (ref 0.57–1)
EGFRCR SERPLBLD CKD-EPI 2021: 49.4 ML/MIN/1.73
GLUCOSE SERPL-MCNC: 103 MG/DL (ref 65–99)
MAGNESIUM SERPL-MCNC: 1.9 MG/DL (ref 1.6–2.4)
POTASSIUM SERPL-SCNC: 4.5 MMOL/L (ref 3.5–5.2)
SODIUM SERPL-SCNC: 141 MMOL/L (ref 136–145)

## 2022-04-25 PROCEDURE — 83735 ASSAY OF MAGNESIUM: CPT

## 2022-04-25 PROCEDURE — 36415 COLL VENOUS BLD VENIPUNCTURE: CPT

## 2022-04-25 PROCEDURE — 80048 BASIC METABOLIC PNL TOTAL CA: CPT

## 2022-04-29 ENCOUNTER — APPOINTMENT (OUTPATIENT)
Dept: CARDIOLOGY | Facility: HOSPITAL | Age: 77
End: 2022-04-29

## 2022-05-02 ENCOUNTER — TELEMEDICINE (OUTPATIENT)
Dept: CARDIOLOGY | Facility: HOSPITAL | Age: 77
End: 2022-05-02

## 2022-05-02 VITALS
WEIGHT: 293 LBS | BODY MASS INDEX: 45.99 KG/M2 | DIASTOLIC BLOOD PRESSURE: 69 MMHG | HEART RATE: 72 BPM | HEIGHT: 67 IN | SYSTOLIC BLOOD PRESSURE: 160 MMHG

## 2022-05-02 DIAGNOSIS — I48.0 PAROXYSMAL ATRIAL FIBRILLATION: ICD-10-CM

## 2022-05-02 DIAGNOSIS — I51.89 DIASTOLIC DYSFUNCTION: ICD-10-CM

## 2022-05-02 DIAGNOSIS — Z95.3 S/P TAVR (TRANSCATHETER AORTIC VALVE REPLACEMENT), BIOPROSTHETIC: Primary | ICD-10-CM

## 2022-05-02 DIAGNOSIS — G47.33 OSA ON CPAP: ICD-10-CM

## 2022-05-02 DIAGNOSIS — I10 PRIMARY HYPERTENSION: ICD-10-CM

## 2022-05-02 DIAGNOSIS — Z99.89 OSA ON CPAP: ICD-10-CM

## 2022-05-02 PROCEDURE — 99443 PR PHYS/QHP TELEPHONE EVALUATION 21-30 MIN: CPT | Performed by: NURSE PRACTITIONER

## 2022-05-02 NOTE — PROGRESS NOTES
"[facility]  [facility]  Telemedicine note    05/02/2022     Holly Corona  [address] [place] [address]    1945    Abdoul Andrew MD    Holly Corona is a 76 y.o. female.      Subjective:     Chief Complaint:  Cardiac Valve Problem (One year s/p TAVR 3/25/21 (23 mm Driscoll Lawrence 3 prosthesis))       This was an telephone enabled telemedicine encounter. Unable to connect via video.  This was completed as telephone visit. You have chosen to receive care through a telephone visit. Do you consent to use a telephone visit for your medical care today? Yes    HPI :  Mrs. Corona states she continues to follow with EP service and recently had a bout with Afib flare up.  This caused her to temporarily increase lasix utilization.      Since TAVR last year, she continues to have \"good days and bad days\".  Still MAGUIRE and sometimes dizzy.  Overall, BP is better controlled and no longer labile.  She is able to complete all ADL's and drive her car.        Problem List:   1.  Paroxysmal atrial fibrillation/ flutter              A.  PVA right atrial flutter Dr. Mario 3/2013              B.  CHADS Vasc = 6              C.  Warfarin   D.  [facility] PPM 9/2021   E.  Follows with [facility] EP service in addition to general Cardiology w/ Dr. Sacha Romero  2.  Aortic stenosis              A.  TTE 2/2/21: BILLY 0.8 cm2, AV mean 35 mm Hg, BILLY 3.53 m/sec              B.  LEANDRO 3/10/21: AV mean gradient 43 mm Hg, AV Vmax 4.36                    m/sec, LVEF 65%, Aortic annulus 2.3 cm   C.  TAVR 3/25/21 using Driscoll Lawrence size 23 mm prosthesis   D.  TTE 4/17/22: LVEF 60-65%, BILLY 1.9 cm2, AV mean 9.6 mm Hg, Vmax 2.15 m/sec  3.  CAD, non-obstructive              A. Minor irregularities noted per cath 3/10/21 in LAD, LCX  4.  Severe pulmonary HTN               A.  TTE RVSP 2/2/21: 60 mm Hg   B.  TTE 4/17/22 RVSP 75.8 mm Hg  5.  HANK              A.  CPAP + O2 compliant  6.  Hx Pulmonary embolus              A.  " 2/2012 developed during chemotherapy for colon cancer              B.  Warfarin   7.  Colon cancer, signet ring cell adenocarcinoma              A.  Colon resection 10/2011  8.  Morbid obesity              A.  BMI 47.8 as of 3/2021   B.  BMI 47.93 kg/m2 as of 5/2/22  9.  Diastolic heart failure              A.  Diastolic dysfunction grade III per echo 2/2/21              B.  NYHA class II as of 5/2/22  10.  CKD stage 3  11.  Carotid stenosis              A. Mild per doppler 16-49% bilaterally 3/2/21  12.  DJD              A.  TKA left 2005              B.  Spinal stenosis  13.  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    Patient Active Problem List   Diagnosis   • PAF on chronic coumadin (CMS/HCC)   • HTN (hypertension)   • HANK on CPAP   • Dyslipidemia   • Palpitations   • Diastolic dysfunction   • GERD (gastroesophageal reflux disease)   • Hx DVT and PE (CMS/HCC)   • Hypothyroidism   • Morbid obesity (HCC)   • Osteoarthritis   • Peripheral vascular disease (HCC)   • Pulmonary hypertension with PA pressure >55mmhg (CMS/HCC)   • Signet ring cell adenocarcinoma (HCC)   • Anemia   • Severe Aortic Stenosis   • 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   • Acute postop pulmonary edema and hypoxemia (CMS/HCC)   • Tachycardia-bradycardia syndrome (HCC)   • Symptomatic bradycardia       Past Medical History:   Diagnosis Date   • Abnormal stress test 4/17/2017   • Acute gout of right shoulder 9/11/2018   • Anemia    • Anxiety    • Aortic valve stenosis    • Atrial fibrillation (HCC)     A.  History of prior pulmonary vein ablation 03/14/2013. B.  On chronic Coumadin and Tikosyn therapy.   • Carotid artery stenosis    • Carotid bruit    • CHF (congestive heart failure) (HCC)    • Chronic kidney disease    • COPD (chronic obstructive pulmonary disease) (HCC)    • Deep venous thrombosis (HCC)     A.  History of right lower extremity DVT treated with in therapy until October 2000.   • Diastolic dysfunction    • Diastolic dysfunction    • Dizziness    •  Dyslipidemia    • Edema    • Exertional shortness of breath    • GERD (gastroesophageal reflux disease)    • Gout    • H/O echocardiogram     A.  Echocardiogram of 10/16/2013 reports an ejection fraction of 55-60%, a moderately to severely dilated left atrium, mild to moderately dilated right atrium, trace aortic regurgitation, mild mitral and tricuspid regurgitation with calculated    • Hiatal hernia    • History of blood transfusion     AFTER COLON SURGERY, NO REACTION    • History of peptic ulcer    • History of pulmonary embolism 2012    A.  Developed during chemotherapy in 2/2012. B.  Coumadin therapy reinitiated at that time.   • Hypertension     A.  Echocardiogram 10/16/2013 reports a calculated RVSP of 50-55 mmHg.B.  Right heart catheterization 03/12/2009 at  reported RV of 72/19, PA 72/27 and PCWP of 24, this was felt to be     • Hypothyroidism    • Morbid obesity (HCC)    • MRSA infection ~2005    LEFT HAND TREATED AT [facility] WITH ORAL ANTICIOTICS ~2005    • Obstructive sleep apnea     On CPAP and 2L O2 NC each bedtime.   • Osteoarthritis    • Palpitations    • Peripheral vascular disease (HCC)    • Pulmonary hypertension (HCC)    • S/P cardiac cath     3-10-21   • S/P transesophageal echocardiogram (LEANDRO)     3-10-21   • Signet ring cell adenocarcinoma (HCC)     A.  Diagnosed on colonoscopy E. 10/14/2011, status post colon resection and 2 of 20 nodes positive, T3, N1b Stage IIIB. B.  Status post chemotherapy.    • Staph infection     LEG ~2019 TREATED WITH ORAL ANTIBIOTICS    • Syncope    • Syncope    • Wears dentures     Upper and lower plates   • Wears glasses     Reading glasses       Past Surgical History:   Procedure Laterality Date   • AORTIC VALVE REPAIR/REPLACEMENT N/A 3/25/2021    Procedure: TRANSCATHETER AORTIC VALVE REPLACEMENT;  Surgeon: Sacha العلي MD;  Location: [facility];  Service: Cardiothoracic;  Laterality: N/A;  flouro 8  dose 1005mGy  contrast 65   • AORTIC  VALVE REPAIR/REPLACEMENT N/A 3/25/2021    Procedure: TRANSCATHETER AORTIC VALVE INSERTION;  Surgeon: Milana Whitaker MD;  Location: [facility];  Service: Cardiovascular;  Laterality: N/A;   • CARDIAC ABLATION      catheter ablation atrial fibrillation, 2013 PER [facility]    • CARDIAC CATHETERIZATION     • CARDIAC CATHETERIZATION N/A 3/10/2021    Procedure: LEFT HEART CATH;  Surgeon: Milana Whitaker MD;  Location:  [facility] CATH INVASIVE LOCATION;  Service: Cardiology;  Laterality: N/A;   • CARDIAC CATHETERIZATION      cardiac cath procedure summary, A.  Cardiac catheterization April 2007 reported no significance coronary artery disease with markedly elevated LVEDP.   • CARDIAC ELECTROPHYSIOLOGY PROCEDURE N/A 9/9/2021    Procedure: Pacemaker , R- sided PPM, hold warfarin 2 days prior;  Surgeon: Bala Mario MD;  Location: [facility] EP INVASIVE LOCATION;  Service: Cardiology;  Laterality: N/A;   • CARPAL TUNNEL RELEASE Bilateral     neuroplasty decompression median nerve at carpal tunnel 1997   • COLONOSCOPY      SEVERAL MOST RECENT ~2018   • FOOT SURGERY Right    • HEMICOLECTOMY      partial colectomy , A.  Status post right hemicolectomy secondary to colon cancer in 2011.   • HYSTERECTOMY      1993   • KNEE ARTHROPLASTY Left     knee replacement 2005   • TRANSESOPHAGEAL ECHOCARDIOGRAM (LEANDRO) N/A 3/25/2021    Procedure: TRANSESOPHAGEAL ECHOCARDIOGRAM WITH ANESTHESIA;  Surgeon: Sacha العلي MD;  Location: [facility];  Service: Cardiothoracic;  Laterality: N/A;       Family History   Problem Relation Age of Onset   • Other Mother         MEDICAL PROBLEMS   • Other Sister         myocardial infarction.       Social History     Socioeconomic History   • Marital status:    • Number of children: 3   • Years of education: HS   Tobacco Use   • Smoking status: Former Smoker     Packs/day: 1.00     Years: 12.00     Pack years: 12.00     Types: Cigarettes     Quit date: 3/1/1971     Years since quitting:  51.2   • Smokeless tobacco: Never Used   Vaping Use   • Vaping Use: Never used   Substance and Sexual Activity   • Alcohol use: No   • Drug use: No   • Sexual activity: Defer       Allergies   Allergen Reactions   • Ciprofloxacin Itching   • Contrast Dye Rash     Patient usually takes prednisone & benadryl prior to contrast dye.   • Iodinated Diagnostic Agents Rash     Patient usually takes prednisone & benadryl prior to contrast dye.   • Ofloxacin Rash         Current Outpatient Medications:   •  albuterol (PROVENTIL) (5 MG/ML) 0.5% nebulizer solution, Take 2.5 mg by nebulization Every 6 (Six) Hours As Needed for Wheezing., Disp: , Rfl:   •  allopurinol (ZYLOPRIM) 100 MG tablet, Take 200 mg by mouth Every Morning., Disp: , Rfl:   •  ALPRAZolam (XANAX) 0.25 MG tablet, Take 0.25 mg by mouth As Needed for Anxiety., Disp: , Rfl:   •  amLODIPine (NORVASC) 2.5 MG tablet, Daily., Disp: , Rfl:   •  Ascorbic Acid (VITAMIN C GUMMIE PO), Take 1 dose by mouth Daily., Disp: , Rfl:   •  CloNIDine (CATAPRES) 0.2 MG tablet, Take 1 tablet by mouth 3 (Three) Times a Day. (Patient taking differently: Take 0.2 mg by mouth 2 (Two) Times a Day.), Disp: 90 tablet, Rfl: 3  •  clotrimazole-betamethasone (LOTRISONE) 1-0.05 % cream, prn, Disp: , Rfl:   •  furosemide (LASIX) 40 MG tablet, Take 1 tablet by mouth Daily., Disp: 30 tablet, Rfl: 5  •  gentamicin (GARAMYCIN) 0.1 % ointment, Occas. prn, Disp: , Rfl:   •  hydrALAZINE (APRESOLINE) 100 MG tablet, Take 1 tablet by mouth 3 (Three) Times a Day. (Patient taking differently: Take 100 mg by mouth 2 (Two) Times a Day.), Disp: 90 tablet, Rfl: 5  •  isosorbide mononitrate (IMDUR) 30 MG 24 hr tablet, Take 1 tablet by mouth Daily., Disp: 90 tablet, Rfl: 3  •  levothyroxine (SYNTHROID, LEVOTHROID) 88 MCG tablet, Take 88 mcg by mouth Every Morning., Disp: , Rfl:   •  O2 (OXYGEN), Inhale 2 L/min 1 (One) Time. Through c-pap, Disp: , Rfl:   •  olmesartan-hydrochlorothiazide (Benicar HCT) 20-12.5 MG  "per tablet, Take 1 tablet by mouth Daily., Disp: 30 tablet, Rfl: 11  •  pantoprazole (PROTONIX) 40 MG EC tablet, Take 1 tablet by mouth Daily., Disp: 90 tablet, Rfl: 3  •  RESTASIS 0.05 % ophthalmic emulsion, Administer 1 drop to both eyes Every Night., Disp: , Rfl:   •  sotalol (BETAPACE) 120 MG tablet, Take 1 tablet by mouth Every 12 (Twelve) Hours., Disp: 60 tablet, Rfl: 11  •  warfarin (COUMADIN) 5 MG tablet, Take 1 tablet by mouth Take As Directed. Restart Warfarin on Wedensday 3/31/21.   Same schedule as pre-op:  5 mg DAILY, EXCEPT NONE ON Sunday. (Patient taking differently: Take 5 mg by mouth Every Night.), Disp: 30 tablet, Rfl: 3    The following portions of the patient's history were reviewed today and updated as appropriate: allergies, current medications, past family history, past medical history, past social history, past surgical history and problem list     Review of Systems   Constitutional: Positive for malaise/fatigue.   HENT: Negative.    Eyes: Negative.    Cardiovascular: Positive for dyspnea on exertion, irregular heartbeat, leg swelling and palpitations. Negative for chest pain, near-syncope, orthopnea and syncope.   Respiratory: Negative for cough, sleep disturbances due to breathing and wheezing.    Endocrine: Negative.    Hematologic/Lymphatic: Bruises/bleeds easily.   Skin: Negative.    Musculoskeletal: Positive for arthritis.   Gastrointestinal: Negative.    Genitourinary: Negative.    Neurological: Negative.    Psychiatric/Behavioral: Negative.    Allergic/Immunologic: Negative.        Objective:     Vitals:    05/02/22 1101   BP: 160/69   Pulse: 72   Weight: (!) 139 kg (306 lb)   Height: 170.2 cm (67\")       Body mass index is 47.93 kg/m².    Vitals reviewed.   Constitutional:       Appearance: Not in distress.   Pulmonary:      Effort: Pulmonary effort is normal.   Neurological:      Mental Status: Alert and oriented to person, place and time.         Lab and Diagnostic Review:  Adult " Transthoracic Echo Complete W/ Cont if Necessary Per Protocol (04/14/2022 15:03)        Assessment and Plan:   1. S/p bioprostetic TAVR 3/25/21  - Echo reviewed w/ patient.  TAVR prosthesis functioning well  - [facility]  NYHA class II  - no further follow up required in TAVR clinic    2. Primary hypertension  - above goal presently  - reivew BP log and meds with Cardiology @ clinic follow up later this week    3. Diastolic dysfunction  - multi-factorial (HANK, pulmonary HTN, hx valvular disease, obesity, dysrhythmia)  - stable    4. HANK on CPAP  - compliant    5. PAF on chronic coumadin (CMS/HCC)  - following with [facility]      This visit has been scheduled as a telephone visit. Total time of discussion was 30 minutes.      It has been a pleasure to participate in the care of this patient.  Patient was instructed to call the [facility] with any questions, concerns, or worsening symptoms.

## 2022-05-04 ENCOUNTER — OFFICE VISIT (OUTPATIENT)
Dept: CARDIOLOGY | Facility: CLINIC | Age: 77
End: 2022-05-04

## 2022-05-04 VITALS
OXYGEN SATURATION: 95 % | HEIGHT: 67 IN | WEIGHT: 293 LBS | DIASTOLIC BLOOD PRESSURE: 79 MMHG | BODY MASS INDEX: 45.99 KG/M2 | HEART RATE: 70 BPM | SYSTOLIC BLOOD PRESSURE: 181 MMHG

## 2022-05-04 DIAGNOSIS — I35.9 AORTIC VALVE DISORDER: ICD-10-CM

## 2022-05-04 DIAGNOSIS — Z99.89 OSA ON CPAP: ICD-10-CM

## 2022-05-04 DIAGNOSIS — R06.02 SHORTNESS OF BREATH: ICD-10-CM

## 2022-05-04 DIAGNOSIS — I49.5 TACHYCARDIA-BRADYCARDIA SYNDROME: ICD-10-CM

## 2022-05-04 DIAGNOSIS — I48.0 PAROXYSMAL ATRIAL FIBRILLATION: ICD-10-CM

## 2022-05-04 DIAGNOSIS — I10 PRIMARY HYPERTENSION: ICD-10-CM

## 2022-05-04 DIAGNOSIS — R60.0 BILATERAL LEG EDEMA: ICD-10-CM

## 2022-05-04 DIAGNOSIS — I73.9 PERIPHERAL VASCULAR DISEASE: ICD-10-CM

## 2022-05-04 DIAGNOSIS — R00.2 PALPITATIONS: ICD-10-CM

## 2022-05-04 DIAGNOSIS — I82.4Y9 ACUTE DEEP VEIN THROMBOSIS (DVT) OF PROXIMAL VEIN OF LOWER EXTREMITY, UNSPECIFIED LATERALITY: ICD-10-CM

## 2022-05-04 DIAGNOSIS — I27.20 PULMONARY HYPERTENSION: ICD-10-CM

## 2022-05-04 DIAGNOSIS — I51.89 DIASTOLIC DYSFUNCTION: Primary | ICD-10-CM

## 2022-05-04 DIAGNOSIS — G47.33 OSA ON CPAP: ICD-10-CM

## 2022-05-04 DIAGNOSIS — E78.5 DYSLIPIDEMIA: ICD-10-CM

## 2022-05-04 PROCEDURE — 99214 OFFICE O/P EST MOD 30 MIN: CPT | Performed by: INTERNAL MEDICINE

## 2022-05-04 PROCEDURE — 93000 ELECTROCARDIOGRAM COMPLETE: CPT | Performed by: INTERNAL MEDICINE

## 2022-05-04 RX ORDER — OLMESARTAN MEDOXOMIL AND HYDROCHLOROTHIAZIDE 20/12.5 20; 12.5 MG/1; MG/1
1 TABLET ORAL DAILY
Qty: 90 TABLET | Refills: 3 | Status: SHIPPED | OUTPATIENT
Start: 2022-05-04 | End: 2022-05-05 | Stop reason: SDUPTHER

## 2022-05-04 NOTE — PROGRESS NOTES
Subjective   Holly Corona is a 76 y.o. female     Chief Complaint   Patient presents with   • Follow-up     Here for 6 mo. F/u   • Atrial Fibrillation   • Hypertension   • Hyperlipidemia   • Palpitations   • Sleep Apnea   • Shortness of Breath   • Cardiac Valve Problem       PROBLEM LIST:     1. Paroxysmal Atrial  fibrillation  1.1 Pulmonary vein isolation procedure with ablation of right atrial flutter by Dr. Mario, March 2013.  2. Severe pulmonary hypertension with pulmonary pressure 60-65% by most recent cath.  3. Hypertension  3.1 Echo 9/9/15 - mild LVH; EF > 65%; mild to mod MR; mod TR; PA 55-60; mild AZ  3.2 recent ECHO 03/09/17, left ventricular chamber is mildly dilated. Mild concentric LVH. EF > 65%. Issa Grade ll diastolic dysfunction. Left atrium moderately dilated, right atrium mild to moderate dilated. Systolic pressure 55-60 mmHg.   3.3 recent stress 04/11/17 inferior ischemia, chest wall attenuation.  3.4 Echo, 4-, EF 60-65%, 3D 63%, grade 2 DD, post. Mitral leaf sclerosis, mild MR, mild-mod. TR, pulm. Pressures low-mid 70's   4. Dyslipidemia  5. History of DVT/PE  6. History of colon CA x 2 status post surgery 2014.  7. Carotid Artery Stenosis  7.1 Carotid U/S 3/8/16 - 16-49% MARYBEL and LICA; antegrade flow  8. HANK - CPAP   9. GOUT, recurrent flare-ups    10. Severe aortic stenosis     10.1LHC, 3/10/21, Severe aortic stenosis with mean gradient of 63 mmHg across the AV. EF 65%.   10.2 S/p TAVR with Dr. Sol, 3/25/21    Specialty Problems        Cardiology Problems    HTN (hypertension)        PAF on chronic coumadin (CMS/HCC)        Diastolic dysfunction        Hx DVT and PE (CMS/HCC)        Palpitations        Peripheral vascular disease (HCC)        Pulmonary hypertension with PA pressure >55mmhg (CMS/HCC)        Severe Aortic Stenosis        Symptomatic bradycardia        Tachycardia-bradycardia syndrome (HCC)                HPI:  Ms. Corona returns for follow-up of the  above.    She has been stable since the time of her last visit.  She has chest discomfort unchanged from prior.  She intermittently palpitates when she is in atrial fibrillation but symptoms have not changed for a prolonged period she continues to describe class III exertional dyspnea but not orthopnea or PND.  She uses her CPAP mask every night.  She does not claudicate or describe other symptoms of peripheral arterial disease and she has no symptoms of arterial embolic events to include no symptoms of TIA or stroke.  She has no bleeding on warfarin and she has no symptoms related to palpitations.    The patient's major complaint is of episodes of lightheadedness.  These have occurred intermittently since TAVR last year.  Symptoms are not orthostatic and are not associated with palpitations.  Ms. Cornoa will have several minutes where she feels lightheaded and dizzy and symptoms always resolve spontaneously.  She has no focal neurologic defects.    Recent echo was reviewed.  Ms. Corona has a normally functioning TAVR with an aortic valve area of 1.9 cm².  LV systolic function remains preserved with left ventricular hypertrophy and grade 2 diastolic dysfunction.  There were mild mitral valve morphologic abnormalities with mild to moderate MR.  There is moderate TR compatible with pulmonary artery systolic pressures approaching 70 mmHg.    Blood pressures remain suboptimally controlled.  The patient stopped olmesartan of her own accord because she was worried about kidney dysfunction.                      PRIOR MEDICATIONS    Current Outpatient Medications on File Prior to Visit   Medication Sig Dispense Refill   • albuterol (PROVENTIL) (5 MG/ML) 0.5% nebulizer solution Take 2.5 mg by nebulization Every 6 (Six) Hours As Needed for Wheezing.     • allopurinol (ZYLOPRIM) 100 MG tablet Take 200 mg by mouth Every Morning.     • ALPRAZolam (XANAX) 0.25 MG tablet Take 0.25 mg by mouth As Needed for Anxiety.     • amLODIPine  (NORVASC) 2.5 MG tablet Daily.     • Ascorbic Acid (VITAMIN C GUMMIE PO) Take 1 dose by mouth Daily.     • CloNIDine (CATAPRES) 0.2 MG tablet Take 1 tablet by mouth 3 (Three) Times a Day. (Patient taking differently: Take 0.2 mg by mouth 2 (Two) Times a Day.) 90 tablet 3   • furosemide (LASIX) 40 MG tablet Take 1 tablet by mouth Daily. 30 tablet 5   • gentamicin (GARAMYCIN) 0.1 % ointment Occas. prn     • hydrALAZINE (APRESOLINE) 100 MG tablet Take 1 tablet by mouth 3 (Three) Times a Day. (Patient taking differently: Take 100 mg by mouth 2 (Two) Times a Day.) 90 tablet 5   • isosorbide mononitrate (IMDUR) 30 MG 24 hr tablet Take 1 tablet by mouth Daily. 90 tablet 3   • levothyroxine (SYNTHROID, LEVOTHROID) 88 MCG tablet Take 88 mcg by mouth Every Morning.     • O2 (OXYGEN) Inhale 2 L/min 1 (One) Time. Through c-pap     • pantoprazole (PROTONIX) 40 MG EC tablet Take 1 tablet by mouth Daily. 90 tablet 3   • RESTASIS 0.05 % ophthalmic emulsion Administer 1 drop to both eyes Every Night.     • sotalol (BETAPACE) 120 MG tablet Take 1 tablet by mouth Every 12 (Twelve) Hours. 60 tablet 11   • warfarin (COUMADIN) 5 MG tablet Take 1 tablet by mouth Take As Directed. Restart Warfarin on Wedensday 3/31/21.   Same schedule as pre-op:  5 mg DAILY, EXCEPT NONE ON Sunday. (Patient taking differently: Take 5 mg by mouth Every Night.) 30 tablet 3   • [DISCONTINUED] clotrimazole-betamethasone (LOTRISONE) 1-0.05 % cream prn     • [DISCONTINUED] olmesartan-hydrochlorothiazide (Benicar HCT) 20-12.5 MG per tablet Take 1 tablet by mouth Daily. 30 tablet 11     No current facility-administered medications on file prior to visit.       ALLERGIES:    Ciprofloxacin, Contrast dye, Iodinated diagnostic agents, and Ofloxacin    PAST MEDICAL HISTORY:    Past Medical History:   Diagnosis Date   • Abnormal stress test 4/17/2017   • Acute gout of right shoulder 9/11/2018   • Anemia    • Anxiety    • Aortic valve stenosis    • Atrial fibrillation  (HCC)     A.  History of prior pulmonary vein ablation 03/14/2013. B.  On chronic Coumadin and Tikosyn therapy.   • Carotid artery stenosis    • Carotid bruit    • CHF (congestive heart failure) (HCC)    • Chronic kidney disease    • COPD (chronic obstructive pulmonary disease) (HCC)    • Deep venous thrombosis (HCC)     A.  History of right lower extremity DVT treated with in therapy until October 2000.   • Diastolic dysfunction    • Diastolic dysfunction    • Dizziness    • Dyslipidemia    • Edema    • Exertional shortness of breath    • GERD (gastroesophageal reflux disease)    • Gout    • H/O echocardiogram     A.  Echocardiogram of 10/16/2013 reports an ejection fraction of 55-60%, a moderately to severely dilated left atrium, mild to moderately dilated right atrium, trace aortic regurgitation, mild mitral and tricuspid regurgitation with calculated    • Hiatal hernia    • History of blood transfusion     AFTER COLON SURGERY, NO REACTION    • History of peptic ulcer    • History of pulmonary embolism 2012    A.  Developed during chemotherapy in 2/2012. B.  Coumadin therapy reinitiated at that time.   • Hypertension     A.  Echocardiogram 10/16/2013 reports a calculated RVSP of 50-55 mmHg.B.  Right heart catheterization 03/12/2009 at  reported RV of 72/19, PA 72/27 and PCWP of 24, this was felt to be     • Hypothyroidism    • Morbid obesity (HCC)    • MRSA infection ~2005    LEFT HAND TREATED AT [facility] WITH ORAL ANTICIOTICS ~2005    • Obstructive sleep apnea     On CPAP and 2L O2 NC each bedtime.   • Osteoarthritis    • Palpitations    • Peripheral vascular disease (HCC)    • Pulmonary hypertension (HCC)    • S/P cardiac cath     3-10-21   • S/P transesophageal echocardiogram (LEANDRO)     3-10-21   • Signet ring cell adenocarcinoma (HCC)     A.  Diagnosed on colonoscopy E. 10/14/2011, status post colon resection and 2 of 20 nodes positive, T3, N1b Stage IIIB. B.  Status post chemotherapy.    • Anaid  infection     LEG ~2019 TREATED WITH ORAL ANTIBIOTICS    • Syncope    • Syncope    • Wears dentures     Upper and lower plates   • Wears glasses     Reading glasses       SURGICAL HISTORY:    Past Surgical History:   Procedure Laterality Date   • AORTIC VALVE REPAIR/REPLACEMENT N/A 3/25/2021    Procedure: TRANSCATHETER AORTIC VALVE REPLACEMENT;  Surgeon: Sacha العلي MD;  Location:  [facility];  Service: Cardiothoracic;  Laterality: N/A;  flouro 8  dose 1005mGy  contrast 65   • AORTIC VALVE REPAIR/REPLACEMENT N/A 3/25/2021    Procedure: TRANSCATHETER AORTIC VALVE INSERTION;  Surgeon: Milana Whitaker MD;  Location:  [facility];  Service: Cardiovascular;  Laterality: N/A;   • CARDIAC ABLATION      catheter ablation atrial fibrillation, 2013 PER [facility]    • CARDIAC CATHETERIZATION     • CARDIAC CATHETERIZATION N/A 3/10/2021    Procedure: LEFT HEART CATH;  Surgeon: Milana Whitaker MD;  Location:  [facility] CATH INVASIVE [facility];  Service: Cardiology;  Laterality: N/A;   • CARDIAC CATHETERIZATION      cardiac cath procedure summary, A.  Cardiac catheterization April 2007 reported no significance coronary artery disease with markedly elevated LVEDP.   • CARDIAC ELECTROPHYSIOLOGY PROCEDURE N/A 9/9/2021    Procedure: Pacemaker , R- sided PPM, hold warfarin 2 days prior;  Surgeon: Bala Mario MD;  Location: [facility] EP INVASIVE LOCATION;  Service: Cardiology;  Laterality: N/A;   • CARPAL TUNNEL RELEASE Bilateral     neuroplasty decompression median nerve at carpal tunnel 1997   • COLONOSCOPY      SEVERAL MOST RECENT ~2018   • FOOT SURGERY Right    • HEMICOLECTOMY      partial colectomy , A.  Status post right hemicolectomy secondary to colon cancer in 2011.   • HYSTERECTOMY      1993   • KNEE ARTHROPLASTY Left     knee replacement 2005   • TRANSESOPHAGEAL ECHOCARDIOGRAM (LEANDRO) N/A 3/25/2021    Procedure: TRANSESOPHAGEAL ECHOCARDIOGRAM WITH ANESTHESIA;  Surgeon: Sacha العلي MD;  Location: [facility]  "HYBRID DEJUAN;  Service: Cardiothoracic;  Laterality: N/A;       SOCIAL HISTORY:    Social History     Socioeconomic History   • Marital status:    • Number of children: 3   • Years of education: HS   Tobacco Use   • Smoking status: Former Smoker     Packs/day: 1.00     Years: 12.00     Pack years: 12.00     Types: Cigarettes     Quit date: 3/1/1971     Years since quittin.2   • Smokeless tobacco: Never Used   Vaping Use   • Vaping Use: Never used   Substance and Sexual Activity   • Alcohol use: No   • Drug use: No   • Sexual activity: Defer       FAMILY HISTORY:    Family History   Problem Relation Age of Onset   • Other Mother         MEDICAL PROBLEMS   • Other Sister         myocardial infarction.       Review of Systems   Constitutional: Positive for fatigue.   HENT: Negative.    Eyes: Positive for visual disturbance (glasses).   Respiratory: Positive for shortness of breath.    Cardiovascular: Positive for palpitations and leg swelling. Negative for chest pain.   Gastrointestinal: Negative.  Negative for blood in stool.   Endocrine: Negative.    Genitourinary: Negative.  Negative for hematuria.   Musculoskeletal: Positive for arthralgias, gait problem (ambulates with rolling walker) and myalgias.   Skin: Negative.    Allergic/Immunologic: Positive for environmental allergies.   Neurological: Positive for dizziness and light-headedness. Negative for syncope.   Hematological: Negative.    Psychiatric/Behavioral: Negative.        VISIT VITALS:  Vitals:    22 0926   BP: (!) 181/79   BP Location: Left arm   Patient Position: Sitting   Pulse: 70   SpO2: 95%   Weight: (!) 140 kg (308 lb 9.6 oz)   Height: 170.2 cm (67.01\")      BP (!) 181/79 (BP Location: Left arm, Patient Position: Sitting) Comment: has not taken am b/p meds  Pulse 70   Ht 170.2 cm (67.01\")   Wt (!) 140 kg (308 lb 9.6 oz)   LMP  (LMP Unknown)   SpO2 95%   BMI 48.32 kg/m²     RECENT LABS:    Objective       Physical Exam  Vitals " and nursing note reviewed.   Constitutional:       General: She is not in acute distress.     Appearance: She is well-developed.   HENT:      Head: Normocephalic and atraumatic.   Eyes:      Conjunctiva/sclera: Conjunctivae normal.      Pupils: Pupils are equal, round, and reactive to light.   Neck:      Vascular: Carotid bruit (bilat. transmitted  murmurs) present. No hepatojugular reflux or JVD.      Trachea: No tracheal deviation.      Comments: Nl. Carotid upstrokes  Cardiovascular:      Rate and Rhythm: Normal rate and regular rhythm.      Pulses:           Radial pulses are 2+ on the right side and 2+ on the left side.      Heart sounds: S1 normal. Murmur heard.    Systolic murmur is present.    No friction rub.      Comments: S2 splits  2-3/6 TR  No MR  2-3/6 systolic ejection murmur RUSB radiates to LUSB  Pulmonary:      Effort: Pulmonary effort is normal.      Breath sounds: Normal breath sounds. No wheezing, rhonchi or rales.      Comments: Nl. Expir. Phase  Nl. Breath sound intensity    Abdominal:      General: Bowel sounds are normal. There is no distension or abdominal bruit.      Palpations: Abdomen is soft. There is no mass.      Tenderness: There is no abdominal tenderness. There is no guarding or rebound.      Comments: No organomegaly   Musculoskeletal:         General: No tenderness or deformity. Normal range of motion.      Cervical back: Normal range of motion and neck supple.      Right lower leg: Edema present.      Left lower leg: Edema present.      Comments: LLE, 3-4+ edema to upper calf, pedal pulses not assessed  RLE, little less than 3-4+ edema to upper calf,  pedal pulses not assessed   Skin:     General: Skin is warm and dry.      Coloration: Skin is not pale.      Findings: No erythema or rash.   Neurological:      Mental Status: She is alert and oriented to person, place, and time.   Psychiatric:         Behavior: Behavior normal.         Thought Content: Thought content normal.          Judgment: Judgment normal.           ECG 12 Lead    Date/Time: 5/4/2022 10:12 AM  Performed by: Sacha Romero MD  Authorized by: Sacha Romero MD   Comparison: compared with previous ECG from 8/25/2021  Comments: Sinus rhythm versus atrial pacing with first-degree AV block.  Otherwise unremarkable.  Unchanged from prior.              Assessment/Plan   #1.  Valvular heart disease.  The patient has a normally functioning TAVR and mitral and tricuspid regurgitation are stable.  No further evaluation is indicated at this time.    2.  Episodes of dizziness and, occasionally, presyncope.  Symptoms are not orthostatic, not positional, not related to palpitations.  Therefore, I feel a cardiac source is unlikely.  We will defer to Dr. Andrew with regard to further management.    3.  Uncontrolled systemic hypertension.  I urged the patient to restart olmesartan with hydrochlorothiazide.  We will follow labs closely and I described that meds may have a protective long-term effect on kidney function.    4.  Exertional dyspnea and lower extremity edema.  Pulmonary artery pressures have increased from prior.  We will refer the patient to pulmonary hypertension clinic at  to see if any new pharmacotherapy is available that might help with symptoms.    5.  Paroxysmal atrial fibrillation.  The patient is only mildly symptomatic and is tolerating current medications.  We will make no changes.    6. .      Ms. Croona will follow with Dr. Andrew as instructed we will plan on seeing her in follow-up in 6 months or on appearing basis as discussed.   Diagnosis Plan   1. Diastolic dysfunction     2. Dyslipidemia     3. Primary hypertension     4. PAF on chronic coumadin (CMS/HCC)     5. Palpitations     6. Peripheral vascular disease (HCC)     7. Severe Aortic Stenosis     8. Tachycardia-bradycardia syndrome (HCC)     9. Acute deep vein thrombosis (DVT) of proximal vein of lower extremity, unspecified laterality ([facility])      10. Pulmonary hypertension with PA pressure >55mmhg (CMS/HCC)     11. HANK on CPAP         No follow-ups on file.         Holly Corona  reports that she quit smoking about 51 years ago. Her smoking use included cigarettes. She has a 12.00 pack-year smoking history. She has never used smokeless tobacco.. I have educated her on the risk of diseases from using tobacco products such as cancer, COPD and heart disease.           Class 3 Severe Obesity (BMI >=40). Obesity-related health conditions include the following: obstructive sleep apnea, hypertension, dyslipidemias and DVT, PE, colon CA, ASHLEE/PAD, TAVR. Obesity is to be assessed at today's visit. BMI is pcp addressing. We discussed portion control and increasing exercise.       ACP discussion was held with the patient during this visit. info. already given at previous visit      Electronically signed by:    Scribed for Sacha Romero MD by Nissa Qiu LPN on May 4, 2022  at 09:34 EDT    I, Sacha Romero MD personally performed the services described in this documentation as scribed by the above named individual in my presence, and it is both accurate and complete. May 4, 2022 09:34 EDT      This note is dictated utilizing voice recognition software.  Although this record has been proof read, transcriptional errors may still be present. If questions occur regarding the content of this record please do not hesitate to call our office.

## 2022-05-05 DIAGNOSIS — R06.02 SHORTNESS OF BREATH: ICD-10-CM

## 2022-05-05 DIAGNOSIS — R60.0 BILATERAL LEG EDEMA: ICD-10-CM

## 2022-05-05 RX ORDER — OLMESARTAN MEDOXOMIL AND HYDROCHLOROTHIAZIDE 20/12.5 20; 12.5 MG/1; MG/1
1 TABLET ORAL DAILY
Qty: 90 TABLET | Refills: 3 | Status: SHIPPED | OUTPATIENT
Start: 2022-05-05 | End: 2022-11-01 | Stop reason: ALTCHOICE

## 2022-06-17 ENCOUNTER — OFFICE VISIT (OUTPATIENT)
Dept: CARDIOLOGY | Facility: CLINIC | Age: 77
End: 2022-06-17

## 2022-06-17 VITALS
WEIGHT: 293 LBS | BODY MASS INDEX: 45.99 KG/M2 | SYSTOLIC BLOOD PRESSURE: 160 MMHG | DIASTOLIC BLOOD PRESSURE: 60 MMHG | HEIGHT: 67 IN | HEART RATE: 75 BPM | OXYGEN SATURATION: 96 %

## 2022-06-17 DIAGNOSIS — I35.9 AORTIC VALVE DISORDER: ICD-10-CM

## 2022-06-17 DIAGNOSIS — I10 PRIMARY HYPERTENSION: ICD-10-CM

## 2022-06-17 DIAGNOSIS — I48.0 PAROXYSMAL ATRIAL FIBRILLATION: Primary | ICD-10-CM

## 2022-06-17 DIAGNOSIS — I49.5 TACHYCARDIA-BRADYCARDIA SYNDROME: ICD-10-CM

## 2022-06-17 PROCEDURE — 93000 ELECTROCARDIOGRAM COMPLETE: CPT | Performed by: NURSE PRACTITIONER

## 2022-06-17 PROCEDURE — 99214 OFFICE O/P EST MOD 30 MIN: CPT | Performed by: NURSE PRACTITIONER

## 2022-06-17 RX ORDER — KETOCONAZOLE 20 MG/G
CREAM TOPICAL
COMMUNITY
Start: 2022-06-13

## 2022-06-17 NOTE — PROGRESS NOTES
Holly Corona  1945  [phone]    06/17/2022    [facility] CARDIOLOGY     Referring Provider: No ref. provider found     Abdoul Andrew MD  [address] [place] [id number]    Chief Complaint   Patient presents with   • Diastolic dysfunction   • Tachy-vijay syndrome        Problem List:     1. Paroxysmal atrial fibrillation/atrial flutter:  a. Echocardiogram, 02/08/2006 with mild to moderate LVH, moderate MR, pulmonary HTN with RVSP at 55 mmHg; EF 65%.   b. GXT, 04/03/2006 with no ischemia, EF 65%.  c. Event recorder, April 2007 through May 2007 showing atrial flutter.  d. Echocardiogram, 05/07/2007 with pulmonary HTN, LA 4.5, EF 65%.  e. Cardiolite, 05/07/2007, normal study, EF 78%.  f. Left heart cath, 05/09/2007 with normal coronary arteries, EF 70%.  g. EKG, 10/21/2007 showing atrial fibrillation at 146 BPM.  h. Failed sotalol therapy.  i. Tikosyn initiated, 08/08/2012.  j. Echocardiogram, 09/17/2012 with an EF of 65%; mild LVH, moderate diastolic dysfunction, moderate to severe biatrial enlargement, mild MR, moderate TR with an RVSP of 65 mmHg. LA 4.7. Aortic valve sclerosis.  k. Pulmonary vein isolation procedure with ablation of right atrial flutter and nonsustained low posterior atrial tachycardia, 03/15/2013 complicated by dysphagia that lasted 24-48 hours.  l. MCOT two weeks 7/2/2020: NSR, SB, rare NSAT, rare NSVT  m. LEANDRO, 3/10/21, EF 65%, Mod biatrial enlargement, severe aortic stenosis, mild MR, mild TR  n. Echo, 4/29/21, LV mildly dilated, EF 50-55%, Mild concentric left ventricular hypertrophy. MV is thickened and sclerotic. Mild MR. Mild TR.   o. Ziopatch 2 weeks 5//2021 normal sinus rhythm 1% atrial fibrillation with RVR sinus bradycardia with heart rates at 38 to 40 bpm less than 1% PVCs.  p. Echo, 4/14/22, EF 60-65%, mod to severe LA enlargement. Mild RA enlargement. Mitral calcification. Mild MR. Mild to mod TR. Pulm HTN.   1. Severe aortic stenosis  a. C,  3/10/21, Severe aortic stenosis with mean gradient of 63 mmHg across the AV. EF 65%.   b. S/p TAVR with Dr. Sol, 3/25/21  2. CP  a. Heart cath 6/9/17: Non obstructive CAD, LVEF 55%   b. LHC, 3/10/21, Severe aortic stenosis with mean gradient of 63 mmHg across the AV. EF 65%.   3. HTN  4. Right leg deep venous thrombosis, treated with Coumadin therapy until October 2007.  5. Hiatal hernia/gastroesophageal reflux disease.  6. Carotid bruits: Normal carotid duplex, March 2006 and June 2007.  7. Osteoarthritis.  8. Hypothyroidism.  9. Sleep apnea, on CPAP.  10. Dyslipidemia, no current therapy.  11. Colon cancer, status post resection of 17 inches of the right ascending colon, undergoing chemotherapy.  12. Pulmonary embolism, February 2012, Coumadin initiated.  13. Pulmonary hypertension:  a. Echocardiogram, 06/21/2011, showing PA systolic pressure estimated at 60-65 mmHg.  b. Moderate TR with an RVSP of 65 mmHg by echocardiogram, 09/17/2012.  14. Surgical history:  a. Hysterectomy.  b. Carpal tunnel repair.  c. Left knee replacement.  Allergies  Allergies   Allergen Reactions   • Ciprofloxacin Itching   • Contrast Dye Rash     Patient usually takes prednisone & benadryl prior to contrast dye.   • Iodinated Diagnostic Agents Rash     Patient usually takes prednisone & benadryl prior to contrast dye.   • Ofloxacin Rash       Current Medications    Current Outpatient Medications:   •  albuterol (PROVENTIL) (5 MG/ML) 0.5% nebulizer solution, Take 2.5 mg by nebulization Every 6 (Six) Hours As Needed for Wheezing., Disp: , Rfl:   •  allopurinol (ZYLOPRIM) 100 MG tablet, Take 200 mg by mouth Every Morning., Disp: , Rfl:   •  ALPRAZolam (XANAX) 0.25 MG tablet, Take 0.25 mg by mouth As Needed for Anxiety., Disp: , Rfl:   •  amLODIPine (NORVASC) 2.5 MG tablet, Daily., Disp: , Rfl:   •  Ascorbic Acid (VITAMIN C GUMMIE PO), Take 1 dose by mouth Daily., Disp: , Rfl:   •  CloNIDine (CATAPRES) 0.2 MG tablet, Take 1 tablet  by mouth 3 (Three) Times a Day. (Patient taking differently: Take 0.2 mg by mouth 2 (Two) Times a Day.), Disp: 90 tablet, Rfl: 3  •  furosemide (LASIX) 40 MG tablet, Take 1 tablet by mouth Daily., Disp: 30 tablet, Rfl: 5  •  gentamicin (GARAMYCIN) 0.1 % ointment, Occas. prn, Disp: , Rfl:   •  hydrALAZINE (APRESOLINE) 100 MG tablet, Take 1 tablet by mouth 3 (Three) Times a Day. (Patient taking differently: Take 100 mg by mouth 2 (Two) Times a Day.), Disp: 90 tablet, Rfl: 5  •  isosorbide mononitrate (IMDUR) 30 MG 24 hr tablet, Take 1 tablet by mouth Daily., Disp: 90 tablet, Rfl: 3  •  levothyroxine (SYNTHROID, LEVOTHROID) 88 MCG tablet, Take 88 mcg by mouth Every Morning., Disp: , Rfl:   •  O2 (OXYGEN), Inhale 2 L/min 1 (One) Time. Through c-pap, Disp: , Rfl:   •  olmesartan-hydrochlorothiazide (Benicar HCT) 20-12.5 MG per tablet, Take 1 tablet by mouth Daily., Disp: 90 tablet, Rfl: 3  •  pantoprazole (PROTONIX) 40 MG EC tablet, Take 1 tablet by mouth Daily., Disp: 90 tablet, Rfl: 3  •  RESTASIS 0.05 % ophthalmic emulsion, Administer 1 drop to both eyes Every Night., Disp: , Rfl:   •  sotalol (BETAPACE) 120 MG tablet, Take 1 tablet by mouth Every 12 (Twelve) Hours., Disp: 60 tablet, Rfl: 11  •  warfarin (COUMADIN) 5 MG tablet, Take 1 tablet by mouth Take As Directed. Restart Warfarin on Wedensday 3/31/21.   Same schedule as pre-op:  5 mg DAILY, EXCEPT NONE ON Sunday. (Patient taking differently: Take 5 mg by mouth Every Night.), Disp: 30 tablet, Rfl: 3  •  ketoconazole (NIZORAL) 2 % cream, , Disp: , Rfl:   •  mupirocin (BACTROBAN) 2 % ointment, As Needed., Disp: , Rfl:     History of Present Illness     Pt presents for follow up of PAF/HTN/AS/PM check. Since we last saw the pt, pt has had episodes of AF but rare. She did have a 3 day episode in April, no known triggers. She was moderately symptomatic with palps, fatigue. Denies CP, SOB. She converted spontaneously. Overall feels AF well controlled on sotalol. She  "does note to having mild to mod LH/Dizziness 1-2 times monthly over the last year. This will occur mostly at rest and she feels she is blacking out, no kathleen syncope. She denies nay  denies any AF episodes, SOB, CP, LH, and dizziness. Denies any hospitalizations, ER visits, bleeding issues on Coumadin, or TIA/CVA symptoms. INRs stable. Recent Echo with normal EF, but has moderate pulmonary HTN and is f/u with [facility] pulmonology soon. Overall feels well.    ROS:  General:  +fatigue, -weight gain or loss  Cardiovascular:  Denies CP, PND, +LH/Dizziness, -syncope, near syncope, edema + palpitations.  Pulmonary:  Denies MAGUIRE, cough, or wheezing      Vitals:    06/17/22 1049   BP: 160/60   BP Location: Right arm   Patient Position: Sitting   Pulse: 75   SpO2: 96%   Weight: (!) 140 kg (308 lb)   Height: 170.2 cm (67\")     Body mass index is 48.24 kg/m².  PE:  General: NAD  Neck: no JVD, no carotid bruits, no TM  Heart RRR, NL S1, S2, S4 present, no rubs, murmurs  Lungs: CTA, no wheezes, rhonchi, or rales  Abd: soft, non-tender, NL BS  Ext: No musculoskeletal deformities, no edema, cyanosis, or clubbing  Psych: normal mood and affect    Diagnostic Data:  [facility] ppm interrogation: DDDR @ 70, Ap 94%,  4%. APVs. AMS 3.9%. Longest 3.5 hours (5/10/22). 9 years on battery.       ECG 12 Lead    Date/Time: 6/17/2022 11:05 AM  Performed by: Yady Zuñiga APRN  Authorized by: Yady Zuñiga APRN   Comparison: compared with previous ECG from 5/4/2022  Rhythm: sinus rhythm  Rate: normal  BPM: 71  Comments: AP           1. PAF on chronic coumadin (CMS/HCC)    2. Tachycardia-bradycardia syndrome (HCC)    3. Severe Aortic Stenosis    4. Primary hypertension      Plan:  1) PAF s/p RFA/PV 2013 now with tachycardia-bradycardia syndrome: [place] [facility] PPM, Normal function. 4% AMS on device interrogation - up from 1% in December. On Sotalol.  QTC acceptable, continue.   -CHADsVasc=3 Continue warfarin, INRs stable.      2. " Dizziness  -Will order 30 day holter. Appears to be VVS. Patient advised to increase oral hydration, salt. Change positions slowly.     3)AV stenosis  -s/p TAVR 3/25/21 with Dr. Sol  -Echo, 4/14/22, EF 60-65%, mod to severe LA enlargement. Mild RA enlargement. Mitral calcification. Mild MR. Mild to mod TR. Pulm HTN. - has f/u apt with .      4) HTN  - controlled  Wt loss, exercise, salt reduction    F/up in 6 months    Electronically signed by JORGE Avendano, 06/17/22, 11:02 AM EDT.

## 2022-06-20 DIAGNOSIS — R42 DIZZY: Primary | ICD-10-CM

## 2022-06-23 ENCOUNTER — TELEPHONE (OUTPATIENT)
Dept: CARDIOLOGY | Facility: CLINIC | Age: 77
End: 2022-06-23

## 2022-06-23 NOTE — TELEPHONE ENCOUNTER
Patient called and states that BP has been running low, becomes sleepy, and thinks this could be the cause of her always being tired, would like to address with .    Last Pm- 127/58 hr 72  112/49 hr 72  11/49 hr 70  133/66 hr 73  138/71 hr 70 s  135/60

## 2022-06-23 NOTE — TELEPHONE ENCOUNTER
PATIENT STATES HAS EPISODES OF SUDDEN SLEEPINESS AND THOUGHT MAYBE B/P'S WERE CAUSE. I TOLD HER READINGS SHE REPORTED WERE GREAT AND THAT I DON'T THINK DR. LEYVA WILL WANT TO MAKE ANY ADJUSTMENTS, BUT THAT I WOULD LET HIM KNOW. ADVISED HER TO SEE FAMILY PCP FOR OTHER CAUSE FOR SX. VERBALIZED OK. PH,LPN

## 2022-06-24 ENCOUNTER — TELEPHONE (OUTPATIENT)
Dept: CARDIOLOGY | Facility: CLINIC | Age: 77
End: 2022-06-24

## 2022-06-24 NOTE — TELEPHONE ENCOUNTER
Patient would like you to call her. You saw her in clinic and wanted her to wear an MCOT. She would like to wear a different monitor if possible.

## 2022-06-27 ENCOUNTER — TELEPHONE (OUTPATIENT)
Dept: CARDIOLOGY | Facility: CLINIC | Age: 77
End: 2022-06-27

## 2022-06-27 NOTE — TELEPHONE ENCOUNTER
Spoke with patient and she wanted to know monitor details on monitor she wore with  as she does not want to wear MCOT that was prescribed by Dr. Mario office.    Gave patient information on cardiac heart monitor. She verbally understands. I advised would need to address with ordering provider to be changed.    BERTHA GREGG MA

## 2022-06-28 ENCOUNTER — TELEPHONE (OUTPATIENT)
Dept: CARDIOLOGY | Facility: CLINIC | Age: 77
End: 2022-06-28

## 2022-06-28 NOTE — TELEPHONE ENCOUNTER
Advised patient that Dr Romero recommends she discuss this with Dr Mario as he request monitor. She can call the company to better explain monitor type she received and help with set up. Johanna Antonio MA     ----- Message from Nissa Qiu LPN sent at 6/28/2022 11:28 AM EDT -----  She needs to discuss that with their office since they are the ordering. She just needs to tell them that. Thanks  ----- Message -----  From: Johanna Antonio MA  Sent: 6/28/2022  10:40 AM EDT  To: Nissa Qiu LPN    Patient left message wanting to speak to you and only you. I called her back to see if I could help. Dr Mario ordered a 30 day monitor she is not familiar with and is sending it back. She wants Dr Romero to order the same type monitor with phone that she has used before.

## 2022-06-29 ENCOUNTER — TELEPHONE (OUTPATIENT)
Dept: CARDIOLOGY | Facility: CLINIC | Age: 77
End: 2022-06-29

## 2022-06-29 NOTE — TELEPHONE ENCOUNTER
Tried to call patient regarding MCOT monitor, she did not answer. Left voicemail to call me back at [phone].     Electronically signed by JORGE Avendano, 06/29/22, 8:19 AM EDT.

## 2022-08-11 ENCOUNTER — TELEPHONE (OUTPATIENT)
Dept: CARDIOLOGY | Facility: CLINIC | Age: 77
End: 2022-08-11

## 2022-08-11 NOTE — TELEPHONE ENCOUNTER
Called pt, scheduled her w/ Jr next week. Advised her to keep that appt, she is aware that it's w/ JR. Also advised her that I will keep her appt set up w/ Dr. Romero for November.

## 2022-08-11 NOTE — TELEPHONE ENCOUNTER
"PATIENT STATES SOMETHING IS GOING TO HAVE TO BE DONE, STILL HAVING \"PASSING OUT\" EPISODES. INFORMED HER I WOULD HAVE ONE OF THE PAC STAFF CALL HER WITH AN APPT. VERBALIZED OK. PH,LPN  "

## 2022-08-18 ENCOUNTER — OFFICE VISIT (OUTPATIENT)
Dept: CARDIOLOGY | Facility: CLINIC | Age: 77
End: 2022-08-18

## 2022-08-18 VITALS
HEART RATE: 78 BPM | BODY MASS INDEX: 45.99 KG/M2 | SYSTOLIC BLOOD PRESSURE: 162 MMHG | WEIGHT: 293 LBS | DIASTOLIC BLOOD PRESSURE: 72 MMHG | HEIGHT: 67 IN | OXYGEN SATURATION: 96 %

## 2022-08-18 DIAGNOSIS — Z95.3 S/P TAVR (TRANSCATHETER AORTIC VALVE REPLACEMENT), BIOPROSTHETIC: ICD-10-CM

## 2022-08-18 DIAGNOSIS — I48.0 PAROXYSMAL ATRIAL FIBRILLATION: Primary | ICD-10-CM

## 2022-08-18 DIAGNOSIS — R55 NEAR SYNCOPE: ICD-10-CM

## 2022-08-18 DIAGNOSIS — I27.20 PULMONARY HYPERTENSION: ICD-10-CM

## 2022-08-18 DIAGNOSIS — I10 PRIMARY HYPERTENSION: ICD-10-CM

## 2022-08-18 PROCEDURE — 99214 OFFICE O/P EST MOD 30 MIN: CPT | Performed by: NURSE PRACTITIONER

## 2022-08-18 PROCEDURE — 93000 ELECTROCARDIOGRAM COMPLETE: CPT | Performed by: NURSE PRACTITIONER

## 2022-08-18 RX ORDER — CLONIDINE HYDROCHLORIDE 0.1 MG/1
0.1 TABLET ORAL 2 TIMES DAILY
Qty: 180 TABLET | Refills: 3 | Status: SHIPPED | OUTPATIENT
Start: 2022-08-18 | End: 2022-11-02 | Stop reason: SDUPTHER

## 2022-08-18 NOTE — PROGRESS NOTES
Subjective     Holly Corona is a 76 y.o. female who presents to day for Loss of Consciousness and Atrial Fibrillation.    CHIEF COMPLIANT  Chief Complaint   Patient presents with   • Loss of Consciousness   • Atrial Fibrillation       Active Problems:  Problem List Items Addressed This Visit        Cardiac and Vasculature    PAF on chronic coumadin (CMS/[facility]) - Primary    Overview       a. Left heart cath, 05/09/2007 with normal coronary arteries, EF 70%.  b. Failed sotalol therapy.  c. Tikosyn initiated, 08/08/2012.  d. Echocardiogram, 09/17/2012 with an EF of 65%; mild LVH, moderate diastolic dysfunction, moderate to severe biatrial enlargement, mild MR, moderate TR with an RVSP of 65 mmHg. LA 4.7. Aortic valve sclerosis.  e. Pulmonary vein isolation procedure with ablation of right atrial flutter and nonsustained low posterior atrial tachycardia, 03/15/2013 complicated by dysphagia that lasted 24-48 hours.         Relevant Orders    ECG 12 Lead    Adult Transthoracic Echo Complete W/ Cont if Necessary Per Protocol    HTN (hypertension)    Relevant Medications    cloNIDine (Catapres) 0.1 MG tablet    Other Relevant Orders    ECG 12 Lead    Adult Transthoracic Echo Complete W/ Cont if Necessary Per Protocol       Pulmonary and Pneumonias    Pulmonary hypertension with PA pressure >55mmhg (CMS/[facility])    Relevant Orders    ECG 12 Lead    Adult Transthoracic Echo Complete W/ Cont if Necessary Per Protocol      Other Visit Diagnoses     S/p TAVR (transcatheter aortic valve replacement), bioprosthetic        Relevant Orders    ECG 12 Lead    Adult Transthoracic Echo Complete W/ Cont if Necessary Per Protocol    Near syncope        Relevant Orders    ECG 12 Lead    Adult Transthoracic Echo Complete W/ Cont if Necessary Per Protocol           PROBLEM LIST:      1. Paroxysmal Atrial  fibrillation  1.1 Pulmonary vein isolation procedure with ablation of right atrial flutter by Dr. Mario, March 2013.  2. Severe pulmonary  "hypertension with pulmonary pressure 60-65% by most recent cath.  3. Hypertension  3.1 Echo 9/9/15 - mild LVH; EF > 65%; mild to mod MR; mod TR; PA 55-60; mild AR  3.2 recent ECHO 03/09/17, left ventricular chamber is mildly dilated. Mild concentric LVH. EF > 65%. Issa Grade ll diastolic dysfunction. Left atrium moderately dilated, right atrium mild to moderate dilated. Systolic pressure 55-60 mmHg.   3.3 recent stress 04/11/17 inferior ischemia, chest wall attenuation.  3.4 Echo, 4-, EF 60-65%, 3D 63%, grade 2 DD, post. Mitral leaf sclerosis, mild MR, mild-mod. TR, pulm. Pressures low-mid 70's   4. Dyslipidemia  5. History of DVT/PE  6. History of colon CA x 2 status post surgery 2014.  7. Carotid Artery Stenosis  7.1 Carotid U/S 3/8/16 - 16-49% MARYBEL and LICA; antegrade flow  8. HANK - CPAP   9. GOUT, recurrent flare-ups    10. Severe aortic stenosis     10.1LHC, 3/10/21, Severe aortic stenosis with mean gradient of 63 mmHg across the AV. EF 65%.   10.2 S/p TAVR with Dr. Sol, 3/25/21    HPI  HPI      Holly Mike is a 76-year-old female who presents today for loss of consciousness and atrial fibrillation. She is accompanied by her grandson, Deyvi.     The patient states she had the TAVR valve put in 03/2021 by Dr. العلي. She reports that 3 days later, after returning home, she would be sitting around the breakfast table or anywhere as she is always sitting when these \"spells\" occur. The patient describes these \"spells\" as a feeling of going to pass out for about 2 minutes, which she states, \"it feels like hours when the incident occurs.\" She reports that she had an occurrence today around 10:30 to 11:00 AM when she was making some phone calls and she began to not feel right. The patient reported she felt like her vision was not clear and that is when she called her  into the kitchen to check her blood pressure, and that is when it was reading high at 168/90 mmHg. However, she states when she " "took her blood pressure again around 1:30 PM, it was reading low at 99/57 mmHg. The patient states she checks her blood pressure often at home and has a machine that tells her when she goes into atrial fibrillation, which showed that she was in atrial fibrillation at 11:30 AM when she \"felt off.\" She inquired if these incidents could be vertigo, as she describes it \"hitting her like a rock\" when she experiences feeling not well. The patient states she has had vertigo in the past in which she used meclizine to help when she had the vertigo issues, but states this was something she has never experienced before. She notes that she almost did not come to this appointment due to feeling so bad. The patient is currently on sotalol for her atrial fibrillation, but it can also cause arrhythmias. A previous pacemaker check, which she has done in person, shows that she had ventricular tachycardia, which is why the sotalol was increased from 80 mg to 120 mg. She reports that she checks her blood pressure every night at 11 or midnight and that is without taking her medications: sotalol, hydralazine, and clonidine as they are taken in the morning, and her blood pressure at night will be 120 to even 110 for the systolic pressure. She notes this has been going on for about 2 months of her pressures looking like this at night before taking her medications in the morning.     The patient is currently taking olmesartan, hydrochlorothiazide, hydralazine, and clonidine. She reports that Dr. Romero got onto her for stopping the hydralazine, but also increased her Lasix to 40 mg from being only 20 mg which concerns her for her kidneys. The patient states she plays with taking the amlodipine and hydralazine. She states she has had trouble with holding onto fluid in her legs for years. The patient states she canceled her appointment with the pulmonary hypertension clinic as she spoke with Dr. Hernandez and she went into full discussion that " it would be a lot of work up with the possibility of not finding anything. The patient states 8 to 9 years ago, she worked with Dr. Warren who tried Viagra on her to see if that would help her pulmonary hypotension, but she states that she felt like it did not help her, nor did it make her feel worse.     The patient had ultrasounds of her carotid arteries which looked good but showed the mid right internal carotid artery and the proximal mid left had a little blockage but nothing to be concerned about what is causing her near syncope episodes. She had an echocardiogram performed on 04/22/22 by Dr. Liu, that showed the patient had good heart function. The patient states she has not received a heart catheter performed, she did however, have a procedure done that went in her jugular veins through her groin area with Dr. Warren.         PRIOR MEDS  Current Outpatient Medications on File Prior to Visit   Medication Sig Dispense Refill   • albuterol (PROVENTIL) (5 MG/ML) 0.5% nebulizer solution Take 2.5 mg by nebulization Every 6 (Six) Hours As Needed for Wheezing.     • allopurinol (ZYLOPRIM) 100 MG tablet Take 200 mg by mouth Every Morning.     • ALPRAZolam (XANAX) 0.25 MG tablet Take 0.25 mg by mouth As Needed for Anxiety.     • amLODIPine (NORVASC) 2.5 MG tablet Daily.     • Ascorbic Acid (VITAMIN C GUMMIE PO) Take 1 dose by mouth Daily.     • furosemide (LASIX) 40 MG tablet Take 1 tablet by mouth Daily. 30 tablet 5   • gentamicin (GARAMYCIN) 0.1 % ointment Occas. prn     • hydrALAZINE (APRESOLINE) 100 MG tablet Take 1 tablet by mouth 3 (Three) Times a Day. (Patient taking differently: Take 100 mg by mouth 2 (Two) Times a Day.) 90 tablet 5   • isosorbide mononitrate (IMDUR) 30 MG 24 hr tablet Take 1 tablet by mouth Daily. 90 tablet 3   • ketoconazole (NIZORAL) 2 % cream      • levothyroxine (SYNTHROID, LEVOTHROID) 88 MCG tablet Take 88 mcg by mouth Every Morning.     • mupirocin (BACTROBAN) 2 % ointment As Needed.      • O2 (OXYGEN) Inhale 2 L/min 1 (One) Time. Through c-pap     • olmesartan-hydrochlorothiazide (Benicar HCT) 20-12.5 MG per tablet Take 1 tablet by mouth Daily. 90 tablet 3   • pantoprazole (PROTONIX) 40 MG EC tablet Take 1 tablet by mouth Daily. 90 tablet 3   • RESTASIS 0.05 % ophthalmic emulsion Administer 1 drop to both eyes Every Night.     • sotalol (BETAPACE) 120 MG tablet Take 1 tablet by mouth Every 12 (Twelve) Hours. 60 tablet 11   • warfarin (COUMADIN) 5 MG tablet Take 1 tablet by mouth Take As Directed. Restart Warfarin on Wedensday 3/31/21.   Same schedule as pre-op:  5 mg DAILY, EXCEPT NONE ON Sunday. (Patient taking differently: Take 5 mg by mouth Every Night.) 30 tablet 3     No current facility-administered medications on file prior to visit.       ALLERGIES  Ciprofloxacin, Contrast dye, Iodinated diagnostic agents, and Ofloxacin    HISTORY  Past Medical History:   Diagnosis Date   • Abnormal stress test 4/17/2017   • Acute gout of right shoulder 9/11/2018   • Anemia    • Anxiety    • Aortic valve stenosis    • Atrial fibrillation (HCC)     A.  History of prior pulmonary vein ablation 03/14/2013. B.  On chronic Coumadin and Tikosyn therapy.   • Carotid artery stenosis    • Carotid bruit    • CHF (congestive heart failure) (HCC)    • Chronic kidney disease    • COPD (chronic obstructive pulmonary disease) (HCC)    • Deep venous thrombosis (HCC)     A.  History of right lower extremity DVT treated with in therapy until October 2000.   • Diastolic dysfunction    • Diastolic dysfunction    • Dizziness    • Dyslipidemia    • Edema    • Exertional shortness of breath    • GERD (gastroesophageal reflux disease)    • Gout    • H/O echocardiogram     A.  Echocardiogram of 10/16/2013 reports an ejection fraction of 55-60%, a moderately to severely dilated left atrium, mild to moderately dilated right atrium, trace aortic regurgitation, mild mitral and tricuspid regurgitation with calculated    • Hiatal hernia     • History of blood transfusion     AFTER COLON SURGERY, NO REACTION    • History of peptic ulcer    • History of pulmonary embolism     A.  Developed during chemotherapy in 2012. B.  Coumadin therapy reinitiated at that time.   • Hypertension     A.  Echocardiogram 10/16/2013 reports a calculated RVSP of 50-55 mmHg.B.  Right heart catheterization 2009 at  reported RV of 72/19, PA 72/27 and PCWP of 24, this was felt to be     • Hypothyroidism    • Morbid obesity (HCC)    • MRSA infection ~    LEFT HAND TREATED AT [facility] WITH ORAL ANTICIOTICS ~    • Obstructive sleep apnea     On CPAP and 2L O2 NC each bedtime.   • Osteoarthritis    • Palpitations    • Peripheral vascular disease (HCC)    • Pulmonary hypertension (HCC)    • S/P cardiac cath     3-10-21   • S/P transesophageal echocardiogram (LEANDRO)     3-10-21   • Signet ring cell adenocarcinoma (HCC)     A.  Diagnosed on colonoscopy E. 10/14/2011, status post colon resection and 2 of 20 nodes positive, T3, N1b Stage IIIB. B.  Status post chemotherapy.    • Staph infection     LEG ~ TREATED WITH ORAL ANTIBIOTICS    • Syncope    • Syncope    • Wears dentures     Upper and lower plates   • Wears glasses     Reading glasses       Social History     Socioeconomic History   • Marital status:    • Number of children: 3   • Years of education: HS   Tobacco Use   • Smoking status: Former Smoker     Packs/day: 1.00     Years: 12.00     Pack years: 12.00     Types: Cigarettes     Quit date: 3/1/1971     Years since quittin.5   • Smokeless tobacco: Never Used   Vaping Use   • Vaping Use: Never used   Substance and Sexual Activity   • Alcohol use: No   • Drug use: No   • Sexual activity: Defer       Family History   Problem Relation Age of Onset   • Other Mother         MEDICAL PROBLEMS   • Other Sister         myocardial infarction.       Review of Systems   Constitutional: Positive for fatigue. Negative for chills and fever.  "  HENT: Positive for rhinorrhea. Negative for congestion and sore throat.    Respiratory: Positive for shortness of breath. Negative for chest tightness.    Cardiovascular: Positive for palpitations (skipping). Negative for chest pain and leg swelling.   Gastrointestinal: Negative for constipation, diarrhea and nausea.   Musculoskeletal: Positive for arthralgias and back pain. Negative for neck pain.   Allergic/Immunologic: Positive for environmental allergies. Negative for food allergies.   Neurological: Positive for dizziness (when sitting), syncope (pre syncope), weakness and light-headedness.   Hematological: Bruises/bleeds easily.   Psychiatric/Behavioral: Negative for sleep disturbance.       Objective     VITALS: /72 (BP Location: Left arm, Patient Position: Sitting)   Pulse 78   Ht 170.2 cm (67.01\")   Wt (!) 137 kg (302 lb 9.6 oz)   LMP  (LMP Unknown)   SpO2 96%   BMI 47.38 kg/m²     LABS:   Lab Results (most recent)     None          IMAGING:   No Images in the past 120 days found..    EXAM:  Physical Exam  Vitals and nursing note reviewed.   Constitutional:       Appearance: She is well-developed.   HENT:      Head: Normocephalic.   Eyes:      Pupils: Pupils are equal, round, and reactive to light.   Neck:      Thyroid: No thyroid mass.      Vascular: Carotid bruit present. No JVD.      Trachea: Trachea and phonation normal.   Cardiovascular:      Rate and Rhythm: Normal rate and regular rhythm.      Pulses:           Radial pulses are 2+ on the right side and 2+ on the left side.        Posterior tibial pulses are 2+ on the right side and 2+ on the left side.      Heart sounds: Murmur heard.     No friction rub. No gallop.   Pulmonary:      Effort: Pulmonary effort is normal. No respiratory distress.      Breath sounds: Normal breath sounds. No wheezing or rales.   Abdominal:      General: Bowel sounds are normal.      Palpations: Abdomen is soft.   Musculoskeletal:         General: Swelling " present. Normal range of motion.      Cervical back: Neck supple.   Skin:     General: Skin is warm and dry.      Capillary Refill: Capillary refill takes less than 2 seconds.      Findings: No rash.   Neurological:      Mental Status: She is alert and oriented to person, place, and time.   Psychiatric:         Speech: Speech normal.         Behavior: Behavior normal.         Thought Content: Thought content normal.         Judgment: Judgment normal.            Procedure     ECG 12 Lead    Date/Time: 8/18/2022 2:39 PM  Performed by: Washington Roberto APRN  Authorized by: Washington Roberto APRN   Comparison: compared with previous ECG from 6/17/2022  Rhythm: paced  Ectopy: unifocal PVCs  Rate: normal  BPM: 71  QRS axis: normal  Comments:  ms  No acute changes               Assessment & Plan    Diagnosis Plan   1. Paroxysmal atrial fibrillation (HCC)  ECG 12 Lead    Adult Transthoracic Echo Complete W/ Cont if Necessary Per Protocol   2. Primary hypertension  ECG 12 Lead    Adult Transthoracic Echo Complete W/ Cont if Necessary Per Protocol   3. S/p TAVR (transcatheter aortic valve replacement), bioprosthetic  ECG 12 Lead    Adult Transthoracic Echo Complete W/ Cont if Necessary Per Protocol   4. Pulmonary hypertension (HCC)  ECG 12 Lead    Adult Transthoracic Echo Complete W/ Cont if Necessary Per Protocol   5. Near syncope  ECG 12 Lead    Adult Transthoracic Echo Complete W/ Cont if Necessary Per Protocol   1.Hypertension  The patient was advised to decrease the dose of clonidine from 0.2 mg to 0.1 mg and see how that affects the dizziness feeling, if it still is present, and her blood pressure is still low, then we will discuss getting that medication gone completely. The patient was advised to keep on her other medications and continue to monitor her blood pressure.  2.  The patient was advised that getting another echocardiogram would be beneficial to further evaluate her TAVR for potential causes of her  near syncope. The patient will follow-up tomorrow for another appointment to check her pacemaker and examine further.  3.  Patient does have a history of atrial fibrillation which she is on sotalol for rhythm control, and warfarin for anticoagulation.  She denies any signs or symptoms of bleeding.  4.  Informed of signs and symptoms of ACS and advised to seek emergent treatment for any new worsening symptoms.  Patient also advised sooner follow-up as needed.  Also advised to follow-up with family doctor as needed  This note is dictated utilizing voice recognition software.  Although this record has been proof read, transcriptional errors may still be present. If questions occur regarding the content of this record please do not hesitate to call our office.  I have reviewed and confirmed the accuracy of the ROS as documented by the MA/LPN/RN JORGE Silveira      Return in about 3 months (around 11/18/2022), or if symptoms worsen or fail to improve.    Diagnoses and all orders for this visit:    1. Paroxysmal atrial fibrillation (HCC) (Primary)  -     ECG 12 Lead  -     Cancel: Adult Transthoracic Echo Complete W/ Cont if Necessary Per Protocol; Future  -     Adult Transthoracic Echo Complete W/ Cont if Necessary Per Protocol; Future    2. Primary hypertension  -     ECG 12 Lead  -     Cancel: Adult Transthoracic Echo Complete W/ Cont if Necessary Per Protocol; Future  -     Adult Transthoracic Echo Complete W/ Cont if Necessary Per Protocol; Future    3. S/p TAVR (transcatheter aortic valve replacement), bioprosthetic  -     ECG 12 Lead  -     Cancel: Adult Transthoracic Echo Complete W/ Cont if Necessary Per Protocol; Future  -     Adult Transthoracic Echo Complete W/ Cont if Necessary Per Protocol; Future    4. Pulmonary hypertension (HCC)  -     ECG 12 Lead  -     Cancel: Adult Transthoracic Echo Complete W/ Cont if Necessary Per Protocol; Future  -     Adult Transthoracic Echo Complete W/ Cont if Necessary  Per Protocol; Future    5. Near syncope  -     ECG 12 Lead  -     Cancel: Adult Transthoracic Echo Complete W/ Cont if Necessary Per Protocol; Future  -     Adult Transthoracic Echo Complete W/ Cont if Necessary Per Protocol; Future    Other orders  -     cloNIDine (Catapres) 0.1 MG tablet; Take 1 tablet by mouth 2 (Two) Times a Day.  Dispense: 180 tablet; Refill: 3        Holly Corona  reports that she quit smoking about 51 years ago. Her smoking use included cigarettes. She has a 12.00 pack-year smoking history. She has never used smokeless tobacco.. I have educated her on the risk of diseases from using tobacco products.      Advance Care Planning   ACP discussion was held with the patient during this visit. Patient does not have an advance directive, information provided.      Transcribed from ambient dictation for JORGE Silveira by Liana Zurita.  08/18/22   20:44 EDT    Patient verbalized consent to the visit recording.  I have personally performed the services described in this document as transcribed by the above individual, and it is both accurate and complete.  JORGE Silveira  8/21/2022  22:42 EDT           MEDS ORDERED DURING VISIT:  New Medications Ordered This Visit   Medications   • cloNIDine (Catapres) 0.1 MG tablet     Sig: Take 1 tablet by mouth 2 (Two) Times a Day.     Dispense:  180 tablet     Refill:  3           This document has been electronically signed by JORGE Silveira Jr.  August 21, 2022 22:42 EDT

## 2022-08-19 ENCOUNTER — HOSPITAL ENCOUNTER (OUTPATIENT)
Dept: CARDIOLOGY | Facility: HOSPITAL | Age: 77
Discharge: HOME | End: 2022-08-19
Admitting: NURSE PRACTITIONER

## 2022-08-19 VITALS — WEIGHT: 293 LBS | BODY MASS INDEX: 45.99 KG/M2 | HEIGHT: 67 IN

## 2022-08-19 DIAGNOSIS — R55 NEAR SYNCOPE: ICD-10-CM

## 2022-08-19 DIAGNOSIS — Z95.3 S/P TAVR (TRANSCATHETER AORTIC VALVE REPLACEMENT), BIOPROSTHETIC: ICD-10-CM

## 2022-08-19 DIAGNOSIS — I27.20 PULMONARY HYPERTENSION: ICD-10-CM

## 2022-08-19 DIAGNOSIS — I48.0 PAROXYSMAL ATRIAL FIBRILLATION: ICD-10-CM

## 2022-08-19 DIAGNOSIS — I10 PRIMARY HYPERTENSION: ICD-10-CM

## 2022-08-19 PROCEDURE — 93306 TTE W/DOPPLER COMPLETE: CPT | Performed by: INTERNAL MEDICINE

## 2022-08-19 PROCEDURE — 93306 TTE W/DOPPLER COMPLETE: CPT

## 2022-08-27 LAB
AORTIC DIMENSIONLESS INDEX: 0.71 (DI)
BH CV ECHO MEAS - AO MAX PG: 15.8 MMHG
BH CV ECHO MEAS - AO MEAN PG: 8.6 MMHG
BH CV ECHO MEAS - AO ROOT DIAM: 2.25 CM
BH CV ECHO MEAS - AO V2 MAX: 199 CM/SEC
BH CV ECHO MEAS - AO V2 VTI: 47.9 CM
BH CV ECHO MEAS - AVA(I,D): 2.49 CM2
BH CV ECHO MEAS - EDV(CUBED): 88.9 ML
BH CV ECHO MEAS - EF(MOD-BP): 55 %
BH CV ECHO MEAS - ESV(CUBED): 32.3 ML
BH CV ECHO MEAS - FS: 28.6 %
BH CV ECHO MEAS - IVS/LVPW: 0.74 CM
BH CV ECHO MEAS - IVSD: 0.82 CM
BH CV ECHO MEAS - LA DIMENSION: 4.8 CM
BH CV ECHO MEAS - LAT PEAK E' VEL: 6.1 CM/SEC
BH CV ECHO MEAS - LV MASS(C)D: 144.1 GRAMS
BH CV ECHO MEAS - LV MAX PG: 8 MMHG
BH CV ECHO MEAS - LV MEAN PG: 4.6 MMHG
BH CV ECHO MEAS - LV V1 MAX: 141.6 CM/SEC
BH CV ECHO MEAS - LV V1 VTI: 39.5 CM
BH CV ECHO MEAS - LVIDD: 4.5 CM
BH CV ECHO MEAS - LVIDS: 3.2 CM
BH CV ECHO MEAS - LVOT AREA: 3 CM2
BH CV ECHO MEAS - LVOT DIAM: 1.96 CM
BH CV ECHO MEAS - LVPWD: 1.11 CM
BH CV ECHO MEAS - MED PEAK E' VEL: 5.4 CM/SEC
BH CV ECHO MEAS - MV A MAX VEL: 71.4 CM/SEC
BH CV ECHO MEAS - MV DEC SLOPE: 437.7 CM/SEC2
BH CV ECHO MEAS - MV DEC TIME: 0.32 MSEC
BH CV ECHO MEAS - MV E MAX VEL: 113 CM/SEC
BH CV ECHO MEAS - MV E/A: 1.58
BH CV ECHO MEAS - MV MAX PG: 4.9 MMHG
BH CV ECHO MEAS - MV MEAN PG: 2.02 MMHG
BH CV ECHO MEAS - MV P1/2T: 78.9 MSEC
BH CV ECHO MEAS - MV V2 VTI: 32.4 CM
BH CV ECHO MEAS - MVA(P1/2T): 2.8 CM2
BH CV ECHO MEAS - MVA(VTI): 3.7 CM2
BH CV ECHO MEAS - PA V2 MAX: 92.7 CM/SEC
BH CV ECHO MEAS - RAP SYSTOLE: 10 MMHG
BH CV ECHO MEAS - RV MAX PG: 1.69 MMHG
BH CV ECHO MEAS - RV V1 MAX: 64.9 CM/SEC
BH CV ECHO MEAS - RV V1 VTI: 14.9 CM
BH CV ECHO MEAS - RVDD: 2.7 CM
BH CV ECHO MEAS - RVSP: 40.3 MMHG
BH CV ECHO MEAS - SV(LVOT): 119.3 ML
BH CV ECHO MEAS - TAPSE (>1.6): 2.07 CM
BH CV ECHO MEAS - TR MAX PG: 30.3 MMHG
BH CV ECHO MEAS - TR MAX VEL: 275.3 CM/SEC
BH CV ECHO MEASUREMENTS AVERAGE E/E' RATIO: 19.65
BH CV XLRA - TDI S': 12.2 CM/SEC
MAXIMAL PREDICTED HEART RATE: 142 BPM
STRESS TARGET HR: 121 BPM

## 2022-08-31 ENCOUNTER — TELEPHONE (OUTPATIENT)
Dept: CARDIOLOGY | Facility: CLINIC | Age: 77
End: 2022-08-31

## 2022-08-31 NOTE — TELEPHONE ENCOUNTER
ECHO  Called pt to notify of no acute findings on testing, no answer lvm.  ----- Message from Beatrice Jimenez MA sent at 8/31/2022 10:21 AM EDT -----  Regarding: FW:    ----- Message -----  From: Washington Roberto APRN  Sent: 8/30/2022  11:59 PM EDT  To: Beatrice Jimenez MA  Subject: FW:                                              No acute findings on echo keep follow up  ----- Message -----  From: Sacha Romero MD  Sent: 8/27/2022   4:49 PM EDT  To: JORGE Silveira

## 2022-10-22 PROCEDURE — 93296 REM INTERROG EVL PM/IDS: CPT | Performed by: STUDENT IN AN ORGANIZED HEALTH CARE EDUCATION/TRAINING PROGRAM

## 2022-10-22 PROCEDURE — 93294 REM INTERROG EVL PM/LDLS PM: CPT | Performed by: STUDENT IN AN ORGANIZED HEALTH CARE EDUCATION/TRAINING PROGRAM

## 2022-11-01 ENCOUNTER — OFFICE VISIT (OUTPATIENT)
Dept: CARDIOLOGY | Facility: CLINIC | Age: 77
End: 2022-11-01

## 2022-11-01 VITALS
OXYGEN SATURATION: 94 % | HEIGHT: 67 IN | SYSTOLIC BLOOD PRESSURE: 141 MMHG | WEIGHT: 293 LBS | DIASTOLIC BLOOD PRESSURE: 63 MMHG | HEART RATE: 74 BPM | BODY MASS INDEX: 45.99 KG/M2

## 2022-11-01 DIAGNOSIS — R06.02 SHORTNESS OF BREATH: ICD-10-CM

## 2022-11-01 DIAGNOSIS — R00.2 PALPITATIONS: ICD-10-CM

## 2022-11-01 DIAGNOSIS — G47.33 OSA ON CPAP: ICD-10-CM

## 2022-11-01 DIAGNOSIS — I51.89 DIASTOLIC DYSFUNCTION: Primary | ICD-10-CM

## 2022-11-01 DIAGNOSIS — I73.9 PERIPHERAL VASCULAR DISEASE: ICD-10-CM

## 2022-11-01 DIAGNOSIS — E78.5 DYSLIPIDEMIA: ICD-10-CM

## 2022-11-01 DIAGNOSIS — I48.0 PAROXYSMAL ATRIAL FIBRILLATION: ICD-10-CM

## 2022-11-01 DIAGNOSIS — R60.0 BILATERAL LEG EDEMA: ICD-10-CM

## 2022-11-01 DIAGNOSIS — I82.4Y9 ACUTE DEEP VEIN THROMBOSIS (DVT) OF PROXIMAL VEIN OF LOWER EXTREMITY, UNSPECIFIED LATERALITY: ICD-10-CM

## 2022-11-01 DIAGNOSIS — I49.5 TACHYCARDIA-BRADYCARDIA SYNDROME: ICD-10-CM

## 2022-11-01 DIAGNOSIS — Z99.89 OSA ON CPAP: ICD-10-CM

## 2022-11-01 DIAGNOSIS — I10 PRIMARY HYPERTENSION: ICD-10-CM

## 2022-11-01 DIAGNOSIS — R00.1 SYMPTOMATIC BRADYCARDIA: ICD-10-CM

## 2022-11-01 DIAGNOSIS — I35.9 AORTIC VALVE DISORDER: ICD-10-CM

## 2022-11-01 PROCEDURE — 99214 OFFICE O/P EST MOD 30 MIN: CPT | Performed by: INTERNAL MEDICINE

## 2022-11-01 RX ORDER — OLMESARTAN MEDOXOMIL 20 MG/1
20 TABLET ORAL DAILY
Qty: 90 TABLET | Refills: 3 | Status: SHIPPED | OUTPATIENT
Start: 2022-11-01

## 2022-11-01 RX ORDER — FUROSEMIDE 20 MG/1
TABLET ORAL
Qty: 180 TABLET | Refills: 3 | Status: SHIPPED | OUTPATIENT
Start: 2022-11-01 | End: 2022-11-03 | Stop reason: SDUPTHER

## 2022-11-01 NOTE — PROGRESS NOTES
Subjective   Holly Corona is a 77 y.o. female     Chief Complaint   Patient presents with   • Follow-up     Here for 6 mo. F/u   • Atrial Fibrillation   • Hypertension   • Hyperlipidemia   • Deep Vein Thrombosis   • Pulmonary Embolism   • Sleep Apnea       PROBLEM LIST:     1. Paroxysmal Atrial  fibrillation  1.1 Pulmonary vein isolation procedure with ablation of right atrial flutter by Dr. Mario, March 2013.  2. Severe pulmonary hypertension with pulmonary pressure 60-65% by most recent cath.  3. Hypertension  3.1 Echo 9/9/15 - mild LVH; EF > 65%; mild to mod MR; mod TR; PA 55-60; mild CT  3.2 recent ECHO 03/09/17, left ventricular chamber is mildly dilated. Mild concentric LVH. EF > 65%. Issa Grade ll diastolic dysfunction. Left atrium moderately dilated, right atrium mild to moderate dilated. Systolic pressure 55-60 mmHg.   3.3 recent stress 04/11/17 inferior ischemia, chest wall attenuation.  3.4 Echo, 4-, EF 60-65%, 3D 63%, grade 2 DD, post. Mitral leaf sclerosis, mild MR, mild-mod. TR, pulm. Pressures low-mid 70's   4. Dyslipidemia  5. History of DVT/PE  6. History of colon CA x 2 status post surgery 2014.  7. Carotid Artery Stenosis  7.1 Carotid U/S 3/8/16 - 16-49% MARYBEL and LICA; antegrade flow  8. HANK - CPAP   9. GOUT, recurrent flare-ups    10. Severe aortic stenosis     10.1LHC, 3/10/21, Severe aortic stenosis with mean gradient of 63 mmHg across the AV. EF 65%.   10.2 S/p TAVR with Dr. Sol, 3/25/21    Specialty Problems        Cardiology Problems    HTN (hypertension)        PAF on chronic coumadin (CMS/HCC)        Diastolic dysfunction        Hx DVT and PE (CMS/HCC)        Palpitations        Peripheral vascular disease (HCC)        Pulmonary hypertension with PA pressure >55mmhg (CMS/HCC)        Severe Aortic Stenosis        Symptomatic bradycardia        Tachycardia-bradycardia syndrome (HCC)             HPI:  Ms. Corona returns for follow-up on the above.    She is having  "significantly fewer episodes of \"lightheadedness\" and these episodes are much less severe than they had been in the past.  Symptoms are not positional, orthostatic, or associated with palpitations.  Blood pressures have increased somewhat medication changes made above.  However, on questioning today obtain the obvious that Mr. Corona is not taking medications as prescribed.  She uses both olmesartan and hydrochlorothiazide and amlodipine on appearing basis based on swelling and shortness of breath.  I feel this may be contributing to the patient's blood pressure lability.  Ms. Corona continues to have mild palpitations.  Remote event monitoring demonstrated no dysrhythmic substrate was remarkable only for atrial noise.  Echocardiogram demonstrated normally functioning aortic valve prosthesis with an aortic valve area of 2.5 cm².  There is preserved LV systolic function, grade 2 diastolic dysfunction, and pulmonary artery pressures were in the 40s to see significantly less than on prior measurements).  This raises the possibility that correcting the patient's aortic stenosis has resulted in a significant decline in pulmonary pressures.                    PRIOR MEDICATIONS    Current Outpatient Medications on File Prior to Visit   Medication Sig Dispense Refill   • albuterol (PROVENTIL) (5 MG/ML) 0.5% nebulizer solution Take 2.5 mg by nebulization Every 6 (Six) Hours As Needed for Wheezing.     • allopurinol (ZYLOPRIM) 100 MG tablet Take 200 mg by mouth Every Morning.     • ALPRAZolam (XANAX) 0.25 MG tablet Take 0.25 mg by mouth As Needed for Anxiety.     • amLODIPine (NORVASC) 2.5 MG tablet Daily. For last mo. Just taking here and there for different reasons     • Ascorbic Acid (VITAMIN C GUMMIE PO) Take 1 dose by mouth Daily.     • cloNIDine (Catapres) 0.1 MG tablet Take 1 tablet by mouth 2 (Two) Times a Day. 180 tablet 3   • furosemide (LASIX) 40 MG tablet Take 1 tablet by mouth Daily. 30 tablet 5   • gentamicin " (GARAMYCIN) 0.1 % ointment Occas. prn     • hydrALAZINE (APRESOLINE) 100 MG tablet Take 1 tablet by mouth 3 (Three) Times a Day. (Patient taking differently: Take 1 tablet by mouth 2 (Two) Times a Day.) 90 tablet 5   • isosorbide mononitrate (IMDUR) 30 MG 24 hr tablet Take 1 tablet by mouth Daily. 90 tablet 3   • ketoconazole (NIZORAL) 2 % cream      • levothyroxine (SYNTHROID, LEVOTHROID) 88 MCG tablet Take 88 mcg by mouth Every Morning.     • mupirocin (BACTROBAN) 2 % ointment As Needed.     • O2 (OXYGEN) Inhale 2 L/min 1 (One) Time. Through c-pap     • olmesartan-hydrochlorothiazide (Benicar HCT) 20-12.5 MG per tablet Take 1 tablet by mouth Daily. (Patient taking differently: Take 1 tablet by mouth Daily. For last mo. Just taking here and there for different reasons) 90 tablet 3   • pantoprazole (PROTONIX) 40 MG EC tablet Take 1 tablet by mouth Daily. 90 tablet 3   • RESTASIS 0.05 % ophthalmic emulsion Administer 1 drop to both eyes Every Night.     • sotalol (BETAPACE) 120 MG tablet Take 1 tablet by mouth Every 12 (Twelve) Hours. 60 tablet 11   • warfarin (COUMADIN) 5 MG tablet Take 1 tablet by mouth Take As Directed. Restart Warfarin on Wedensday 3/31/21.   Same schedule as pre-op:  5 mg DAILY, EXCEPT NONE ON Sunday. (Patient taking differently: Take 1 tablet by mouth Every Night.) 30 tablet 3     No current facility-administered medications on file prior to visit.       ALLERGIES:    Ciprofloxacin, Contrast dye, Iodinated diagnostic agents, and Ofloxacin    PAST MEDICAL HISTORY:    Past Medical History:   Diagnosis Date   • Abnormal stress test 4/17/2017   • Acute gout of right shoulder 9/11/2018   • Anemia    • Anxiety    • Aortic valve stenosis    • Atrial fibrillation (HCC)     A.  History of prior pulmonary vein ablation 03/14/2013. B.  On chronic Coumadin and Tikosyn therapy.   • Carotid artery stenosis    • Carotid bruit    • CHF (congestive heart failure) (HCC)    • Chronic kidney disease    • COPD  (chronic obstructive pulmonary disease) (HCC)    • Deep venous thrombosis (HCC)     A.  History of right lower extremity DVT treated with in therapy until October 2000.   • Diastolic dysfunction    • Diastolic dysfunction    • Dizziness    • Dyslipidemia    • Edema    • Exertional shortness of breath    • GERD (gastroesophageal reflux disease)    • Gout    • H/O echocardiogram     A.  Echocardiogram of 10/16/2013 reports an ejection fraction of 55-60%, a moderately to severely dilated left atrium, mild to moderately dilated right atrium, trace aortic regurgitation, mild mitral and tricuspid regurgitation with calculated    • Hiatal hernia    • History of blood transfusion     AFTER COLON SURGERY, NO REACTION    • History of peptic ulcer    • History of pulmonary embolism 2012    A.  Developed during chemotherapy in 2/2012. B.  Coumadin therapy reinitiated at that time.   • Hypertension     A.  Echocardiogram 10/16/2013 reports a calculated RVSP of 50-55 mmHg.B.  Right heart catheterization 03/12/2009 at  reported RV of 72/19, PA 72/27 and PCWP of 24, this was felt to be     • Hypothyroidism    • Morbid obesity (HCC)    • MRSA infection ~2005    LEFT HAND TREATED AT [facility] WITH ORAL ANTICIOTICS ~2005    • Obstructive sleep apnea     On CPAP and 2L O2 NC each bedtime.   • Osteoarthritis    • Palpitations    • Peripheral vascular disease (HCC)    • Pulmonary hypertension (HCC)    • S/P cardiac cath     3-10-21   • S/P transesophageal echocardiogram (LEANDRO)     3-10-21   • Signet ring cell adenocarcinoma (HCC)     A.  Diagnosed on colonoscopy E. 10/14/2011, status post colon resection and 2 of 20 nodes positive, T3, N1b Stage IIIB. B.  Status post chemotherapy.    • Staph infection     LEG ~2019 TREATED WITH ORAL ANTIBIOTICS    • Syncope    • Syncope    • Wears dentures     Upper and lower plates   • Wears glasses     Reading glasses       SURGICAL HISTORY:    Past Surgical History:   Procedure Laterality  Date   • AORTIC VALVE REPAIR/REPLACEMENT N/A 3/25/2021    Procedure: TRANSCATHETER AORTIC VALVE REPLACEMENT;  Surgeon: Sacha العلي MD;  Location: [facility];  Service: Cardiothoracic;  Laterality: N/A;  flouro 8  dose 1005mGy  contrast 65   • AORTIC VALVE REPAIR/REPLACEMENT N/A 3/25/2021    Procedure: TRANSCATHETER AORTIC VALVE INSERTION;  Surgeon: Milana Whitaker MD;  Location: [facility];  Service: Cardiovascular;  Laterality: N/A;   • CARDIAC ABLATION      catheter ablation atrial fibrillation, 2013 PER [facility]    • CARDIAC CATHETERIZATION     • CARDIAC CATHETERIZATION N/A 3/10/2021    Procedure: LEFT HEART CATH;  Surgeon: Milana Whitaker MD;  [facility];  Service: Cardiology;  Laterality: N/A;   • CARDIAC CATHETERIZATION      cardiac cath procedure summary, A.  Cardiac catheterization April 2007 reported no significance coronary artery disease with markedly elevated LVEDP.   • CARDIAC ELECTROPHYSIOLOGY PROCEDURE N/A 9/9/2021    Procedure: Pacemaker , R- sided PPM, hold warfarin 2 days prior;  Surgeon: Bala Mario MD;  [facility];  Service: Cardiology;  Laterality: N/A;   • CARPAL TUNNEL RELEASE Bilateral     neuroplasty decompression median nerve at carpal tunnel 1997   • COLONOSCOPY      SEVERAL MOST RECENT ~2018   • FOOT SURGERY Right    • HEMICOLECTOMY      partial colectomy , A.  Status post right hemicolectomy secondary to colon cancer in 2011.   • HYSTERECTOMY      1993   • KNEE ARTHROPLASTY Left     knee replacement 2005   • TRANSESOPHAGEAL ECHOCARDIOGRAM (LEANDRO) N/A 3/25/2021    Procedure: TRANSESOPHAGEAL ECHOCARDIOGRAM WITH ANESTHESIA;  Surgeon: Sacha العلي MD;  Location: [facility];  Service: Cardiothoracic;  Laterality: N/A;       SOCIAL HISTORY:    Social History     Socioeconomic History   • Marital status:    • Number of children: 3   • Years of education: HS   Tobacco Use   • Smoking status:  "Former     Packs/day: 1.00     Years: 12.00     Pack years: 12.00     Types: Cigarettes     Quit date: 3/1/1971     Years since quittin.7   • Smokeless tobacco: Never   Vaping Use   • Vaping Use: Never used   Substance and Sexual Activity   • Alcohol use: No   • Drug use: No   • Sexual activity: Defer       FAMILY HISTORY:    Family History   Problem Relation Age of Onset   • Other Mother         MEDICAL PROBLEMS   • Other Sister         myocardial infarction.       Review of Systems   Constitutional: Positive for fatigue (usual).   HENT: Negative.    Eyes: Positive for visual disturbance (glasses prn).   Respiratory: Positive for shortness of breath.    Cardiovascular: Positive for palpitations (HX PAF) and leg swelling (usual). Negative for chest pain.   Gastrointestinal: Negative.    Endocrine: Negative.    Genitourinary: Negative.    Musculoskeletal: Positive for arthralgias, gait problem (ambulates with rolling walker) and myalgias.   Skin: Negative.    Allergic/Immunologic: Negative.    Neurological: Positive for dizziness and light-headedness. Negative for syncope.   Hematological: Negative.    Psychiatric/Behavioral: Negative.        VISIT VITALS:  Vitals:    22 1152   BP: 141/63   BP Location: Left arm   Patient Position: Sitting   Pulse: 74   SpO2: 94%   Weight: (!) 139 kg (305 lb 6.4 oz)   Height: 170 cm (66.93\")      /63 (BP Location: Left arm, Patient Position: Sitting)   Pulse 74   Ht 170 cm (66.93\")   Wt (!) 139 kg (305 lb 6.4 oz)   LMP  (LMP Unknown)   SpO2 94%   BMI 47.93 kg/m²     RECENT LABS:    Objective       Physical Exam    Procedures      Assessment & Plan   #1.  Status post TAVR for aortic stenosis with normally functioning valve.  We will continue to monitor.    2.  Conduction system disease status post pacemaker implantation.  Although the patient has palpitations she had no significant dysrhythmic substrate detected on event monitoring.  All episodes of mode " switching were due to atrial noise.  I cannot see any direct correlation between mode switching and the patient's symptoms.  The patient is tolerating warfarin without difficulty.  She has no bleeding and no symptoms of TIA or stroke.    3.  Neurologic symptoms as described above.  These seem to be improving over time.  In the absence of focal defects, and with improving symptoms, I do not think that further specific evaluation is indicated at this time.    4.  Systemic hypertension.  I believe that as needed dosing of long-acting medications is contributing to labile blood pressures.  Ms. Corona also raises concern about dual diuretic therapy.  In that regard we will change losartan hydrochlorothiazide to olmesartan 20 mg daily and the patient agrees to take olmesartan and amlodipine as instructed as well as her other medications as currently prescribed.    5.  Lower extremity edema.  I think this is multifactorial in etiology.  The patient appears to have a lessened requirement for diuretic which may be related to decreased pulmonary pressures.  We will decrease Lasix to 20 mg daily and the patient will increase to 40 mg daily for increased swelling or shortness of breath.  She will take her increased dose to no more than 3 days sequentially.  If more Lasix again is required she will call our office.    6.  Ms. Corona will follow with Dr. Joyce as instructed we will plan on seeing her in follow-up in 1 month or.   Diagnosis Plan   1. Diastolic dysfunction        2. Dyslipidemia        3. Primary hypertension        4. PAF on chronic coumadin (CMS/HCC)        5. Palpitations        6. Peripheral vascular disease (HCC)        7. Severe Aortic Stenosis        8. Symptomatic bradycardia        9. Tachycardia-bradycardia syndrome (HCC)        10. Acute deep vein thrombosis (DVT) of proximal vein of lower extremity, unspecified laterality ([facility])        11. HANK on CPAP            No follow-ups on file.         Holly Corona   reports that she quit smoking about 51 years ago. Her smoking use included cigarettes. She has a 12.00 pack-year smoking history. She has never used smokeless tobacco.. I have educated her on the risk of diseases from using tobacco products such as cancer, COPD and heart disease.        Advance Care Planning   ACP discussion was held with the patient during this visit. info. provided at previous visit.         Class 3 Severe Obesity (BMI >=40). Obesity-related health conditions include the following: obstructive sleep apnea, hypertension, dyslipidemias and PAF, PHTN, DVT/PE, ASHLEE, s/p TAVR. Obesity is to be assessed at today's visit. BMI is pcp addressing. We discussed portion control and increasing exercise.             Electronically signed by:    Scribed for Sacha Romero MD by Nissa Qiu LPN on November 1, 2022  at 12:52 EDT    I, Sacha Romero MD personally performed the services described in this documentation as scribed by the above named individual in my presence, and it is both accurate and complete. November 1, 2022 12:52 EDT      Dictated Utilizing Dragon Dictation: Part of this note may be an electronic transcription/translation of spoken language to printed text using the Dragon Dictation System.

## 2022-11-02 RX ORDER — CLONIDINE HYDROCHLORIDE 0.1 MG/1
0.1 TABLET ORAL 2 TIMES DAILY
Qty: 180 TABLET | Refills: 3 | Status: SHIPPED | OUTPATIENT
Start: 2022-11-02

## 2022-11-02 NOTE — TELEPHONE ENCOUNTER
Caller: Holly Corona    Relationship: Self    Best call back number: [phone]    Requested Prescriptions:   Requested Prescriptions     Pending Prescriptions Disp Refills   • cloNIDine (Catapres) 0.1 MG tablet 180 tablet 3     Sig: Take 1 tablet by mouth 2 (Two) Times a Day.        Pharmacy where request should be sent: [facility] - [address] [phone] [facility] [phone]      Additional details provided by patient: MAKE SURE YOU ORDER 90 DAY SUPPLY    Does the patient have less than a 3 day supply:  [] Yes  [x] No    Som Forrester Rep   11/02/22 12:10 EDT

## 2022-11-03 DIAGNOSIS — R60.0 BILATERAL LEG EDEMA: ICD-10-CM

## 2022-11-03 DIAGNOSIS — R06.02 SHORTNESS OF BREATH: ICD-10-CM

## 2022-11-03 RX ORDER — FUROSEMIDE 20 MG/1
TABLET ORAL
Qty: 180 TABLET | Refills: 3 | Status: SHIPPED | OUTPATIENT
Start: 2022-11-03

## 2022-11-04 ENCOUNTER — TELEPHONE (OUTPATIENT)
Dept: CARDIOLOGY | Facility: CLINIC | Age: 77
End: 2022-11-04

## 2022-11-04 NOTE — TELEPHONE ENCOUNTER
INSTRUCTED PATIENT TO CALL [facility] AND TELL THEM TO CHECK THEIR ELECTRONIC RECORDS BECAUSE CLARIFICATION ON THE LASIX WAS SENT TO THEM YEST. VERBALIZED OK. PH,LPN

## 2022-11-04 NOTE — TELEPHONE ENCOUNTER
Caller: HAILEE  Relationship: SELF    Best call back number: [phone]    What is the best time to reach you: ANY        What was the call regarding:PT SAYS [facility] WILL NOT FILL HER LASIX-  THEY ONLY THING THEY TOLD HER IS THE OFFICE NEEDS TO CALL THEM.    Do you require a callback: YES

## 2022-11-08 ENCOUNTER — LAB (OUTPATIENT)
Dept: LAB | Facility: HOSPITAL | Age: 77
End: 2022-11-08

## 2022-11-08 DIAGNOSIS — I10 PRIMARY HYPERTENSION: ICD-10-CM

## 2022-11-08 DIAGNOSIS — R06.02 SHORTNESS OF BREATH: ICD-10-CM

## 2022-11-08 DIAGNOSIS — I48.0 PAROXYSMAL ATRIAL FIBRILLATION: ICD-10-CM

## 2022-11-08 DIAGNOSIS — I51.89 DIASTOLIC DYSFUNCTION: ICD-10-CM

## 2022-11-08 DIAGNOSIS — R60.0 BILATERAL LEG EDEMA: ICD-10-CM

## 2022-11-08 LAB
ALBUMIN SERPL-MCNC: 3.6 G/DL (ref 3.5–5.2)
ALBUMIN/GLOB SERPL: 1.1 G/DL
ALP SERPL-CCNC: 121 U/L (ref 39–117)
ALT SERPL W P-5'-P-CCNC: 12 U/L (ref 1–33)
ANION GAP SERPL CALCULATED.3IONS-SCNC: 12 MMOL/L (ref 5–15)
AST SERPL-CCNC: 18 U/L (ref 1–32)
BILIRUB SERPL-MCNC: 0.4 MG/DL (ref 0–1.2)
BUN SERPL-MCNC: 25 MG/DL (ref 8–23)
BUN/CREAT SERPL: 20.5 (ref 7–25)
CALCIUM SPEC-SCNC: 9.2 MG/DL (ref 8.6–10.5)
CHLORIDE SERPL-SCNC: 98 MMOL/L (ref 98–107)
CO2 SERPL-SCNC: 27 MMOL/L (ref 22–29)
CREAT SERPL-MCNC: 1.22 MG/DL (ref 0.57–1)
EGFRCR SERPLBLD CKD-EPI 2021: 45.2 ML/MIN/1.73
GLOBULIN UR ELPH-MCNC: 3.3 GM/DL
GLUCOSE SERPL-MCNC: 103 MG/DL (ref 65–99)
POTASSIUM SERPL-SCNC: 4.7 MMOL/L (ref 3.5–5.2)
PROT SERPL-MCNC: 6.9 G/DL (ref 6–8.5)
SODIUM SERPL-SCNC: 137 MMOL/L (ref 136–145)

## 2022-11-08 PROCEDURE — 80053 COMPREHEN METABOLIC PANEL: CPT | Performed by: INTERNAL MEDICINE

## 2022-12-12 ENCOUNTER — OFFICE VISIT (OUTPATIENT)
Dept: CARDIOLOGY | Facility: CLINIC | Age: 77
End: 2022-12-12

## 2022-12-12 VITALS
OXYGEN SATURATION: 95 % | SYSTOLIC BLOOD PRESSURE: 131 MMHG | HEART RATE: 73 BPM | BODY MASS INDEX: 45.99 KG/M2 | DIASTOLIC BLOOD PRESSURE: 56 MMHG | HEIGHT: 67 IN | WEIGHT: 293 LBS

## 2022-12-12 DIAGNOSIS — I35.9 AORTIC VALVE DISORDER: ICD-10-CM

## 2022-12-12 DIAGNOSIS — R60.0 LOWER EXTREMITY EDEMA: ICD-10-CM

## 2022-12-12 DIAGNOSIS — I10 PRIMARY HYPERTENSION: ICD-10-CM

## 2022-12-12 DIAGNOSIS — I73.9 PERIPHERAL VASCULAR DISEASE: ICD-10-CM

## 2022-12-12 DIAGNOSIS — R06.02 SHORTNESS OF BREATH: ICD-10-CM

## 2022-12-12 DIAGNOSIS — E78.5 DYSLIPIDEMIA: ICD-10-CM

## 2022-12-12 DIAGNOSIS — I50.20 SYSTOLIC CONGESTIVE HEART FAILURE, UNSPECIFIED HF CHRONICITY: ICD-10-CM

## 2022-12-12 DIAGNOSIS — I27.20 PULMONARY HYPERTENSION: ICD-10-CM

## 2022-12-12 DIAGNOSIS — R00.2 PALPITATIONS: ICD-10-CM

## 2022-12-12 DIAGNOSIS — R00.1 SYMPTOMATIC BRADYCARDIA: ICD-10-CM

## 2022-12-12 DIAGNOSIS — I48.0 PAROXYSMAL ATRIAL FIBRILLATION: ICD-10-CM

## 2022-12-12 DIAGNOSIS — I51.89 DIASTOLIC DYSFUNCTION: Primary | ICD-10-CM

## 2022-12-12 DIAGNOSIS — Z99.89 OSA ON CPAP: ICD-10-CM

## 2022-12-12 DIAGNOSIS — I49.5 TACHYCARDIA-BRADYCARDIA SYNDROME: ICD-10-CM

## 2022-12-12 DIAGNOSIS — G47.33 OSA ON CPAP: ICD-10-CM

## 2022-12-12 DIAGNOSIS — I82.4Y9 ACUTE DEEP VEIN THROMBOSIS (DVT) OF PROXIMAL VEIN OF LOWER EXTREMITY, UNSPECIFIED LATERALITY: ICD-10-CM

## 2022-12-12 PROCEDURE — 99213 OFFICE O/P EST LOW 20 MIN: CPT | Performed by: INTERNAL MEDICINE

## 2022-12-12 NOTE — PROGRESS NOTES
Subjective   Holly Corona is a 77 y.o. female     Chief Complaint   Patient presents with   • Follow-up   • Atrial Fibrillation   • Hyperlipidemia   • Hypertension   • Palpitations   • Deep Vein Thrombosis   • Pulmonary Embolism       PROBLEM LIST:      1. Paroxysmal Atrial  fibrillation  1.1 Pulmonary vein isolation procedure with ablation of right atrial flutter by Dr. Mario, March 2013.  2. Severe pulmonary hypertension with pulmonary pressure 60-65% by most recent cath.  3. Hypertension  3.1 Echo 9/9/15 - mild LVH; EF > 65%; mild to mod MR; mod TR; PA 55-60; mild IL  3.2 recent ECHO 03/09/17, left ventricular chamber is mildly dilated. Mild concentric LVH. EF > 65%. Issa Grade ll diastolic dysfunction. Left atrium moderately dilated, right atrium mild to moderate dilated. Systolic pressure 55-60 mmHg.   3.3 recent stress 04/11/17 inferior ischemia, chest wall attenuation.  3.4 Echo, 4-, EF 60-65%, 3D 63%, grade 2 DD, post. Mitral leaf sclerosis, mild MR, mild-mod. TR, pulm. Pressures low-mid 70's   4. Dyslipidemia  5. History of DVT/PE  6. History of colon CA x 2 status post surgery 2014.  7. Carotid Artery Stenosis  7.1 Carotid U/S 3/8/16 - 16-49% MARYBEL and LICA; antegrade flow  8. HANK - CPAP   9. GOUT, recurrent flare-ups    10. Severe aortic stenosis     10.1LHC, 3/10/21, Severe aortic stenosis with mean gradient of 63 mmHg across the AV. EF 65%.   10.2 S/p TAVR with Dr. Sol, 3/25/21    Specialty Problems        Cardiology Problems    HTN (hypertension)        PAF on chronic coumadin (CMS/HCC)        Diastolic dysfunction        Hx DVT and PE (CMS/HCC)        Palpitations        Peripheral vascular disease (HCC)        Pulmonary hypertension with PA pressure >55mmhg (CMS/HCC)        Severe Aortic Stenosis        Symptomatic bradycardia        Tachycardia-bradycardia syndrome (HCC)             HPI:  Holly returns for follow-up to assess response to therapy.    She took additional Lasix  several times in the past but for the last week has been taking only 20 mg daily and feels well.  Ms. Corona has had only 1 or 2 episodes of lightheadedness since her last visit.  These episodes are becoming less frequent, less prolonged, and less severe.  Of note, symptoms are not orthostatic or positional in any way and are not accompanied by palpitations, shortness of breath or dizziness.  Nargis states that she has only rare palpitations which are not associated with other symptoms.  She denies any symptoms of TIA or stroke and she has no bleeding.    Ms. Corona brings in a blood pressure log that demonstrates overall adequate blood pressure control.  I do not think occasional excursions of hypertension warrant treatment change at this time.                    PRIOR MEDICATIONS    Current Outpatient Medications on File Prior to Visit   Medication Sig Dispense Refill   • albuterol (PROVENTIL) (5 MG/ML) 0.5% nebulizer solution Take 2.5 mg by nebulization Every 6 (Six) Hours As Needed for Wheezing.     • allopurinol (ZYLOPRIM) 100 MG tablet Take 100 mg by mouth Daily.     • ALPRAZolam (XANAX) 0.25 MG tablet Take 0.25 mg by mouth As Needed for Anxiety.     • amLODIPine (NORVASC) 2.5 MG tablet Take 2.5 mg by mouth Daily.     • Ascorbic Acid (VITAMIN C GUMMIE PO) Take 1 dose by mouth Daily.     • cloNIDine (Catapres) 0.1 MG tablet Take 1 tablet by mouth 2 (Two) Times a Day. 180 tablet 3   • furosemide (LASIX) 20 MG tablet Take lasix 20 mg daily and may take an extra 20 mg tab daily prn 180 tablet 3   • gentamicin (GARAMYCIN) 0.1 % ointment Occas. prn     • hydrALAZINE (APRESOLINE) 100 MG tablet Take 1 tablet by mouth 3 (Three) Times a Day. (Patient taking differently: Take 100 mg by mouth 2 (Two) Times a Day.) 90 tablet 5   • isosorbide mononitrate (IMDUR) 30 MG 24 hr tablet Take 1 tablet by mouth Daily. 90 tablet 3   • ketoconazole (NIZORAL) 2 % cream      • levothyroxine (SYNTHROID, LEVOTHROID) 88 MCG tablet Take 88 mcg  by mouth Every Morning.     • mupirocin (BACTROBAN) 2 % ointment As Needed.     • O2 (OXYGEN) Inhale 2 L/min 1 (One) Time. Through c-pap     • olmesartan (Benicar) 20 MG tablet Take 1 tablet by mouth Daily. 90 tablet 3   • pantoprazole (PROTONIX) 40 MG EC tablet Take 1 tablet by mouth Daily. 90 tablet 3   • RESTASIS 0.05 % ophthalmic emulsion Administer 1 drop to both eyes Every Night.     • sotalol (BETAPACE) 120 MG tablet Take 1 tablet by mouth Every 12 (Twelve) Hours. 60 tablet 11   • warfarin (COUMADIN) 5 MG tablet Take 1 tablet by mouth Take As Directed. Restart Warfarin on Wedensday 3/31/21.   Same schedule as pre-op:  5 mg DAILY, EXCEPT NONE ON Sunday. (Patient taking differently: Take 5 mg by mouth Every Night.) 30 tablet 3     No current facility-administered medications on file prior to visit.       ALLERGIES:    Ciprofloxacin, Contrast dye, Iodinated diagnostic agents, and Ofloxacin    PAST MEDICAL HISTORY:    Past Medical History:   Diagnosis Date   • Abnormal stress test 4/17/2017   • Acute gout of right shoulder 9/11/2018   • Anemia    • Anxiety    • Aortic valve stenosis    • Atrial fibrillation (HCC)     A.  History of prior pulmonary vein ablation 03/14/2013. B.  On chronic Coumadin and Tikosyn therapy.   • Carotid artery stenosis    • Carotid bruit    • CHF (congestive heart failure) (HCC)    • Chronic kidney disease    • COPD (chronic obstructive pulmonary disease) (HCC)    • Deep venous thrombosis (HCC)     A.  History of right lower extremity DVT treated with in therapy until October 2000.   • Diastolic dysfunction    • Diastolic dysfunction    • Dizziness    • Dyslipidemia    • Edema    • Exertional shortness of breath    • GERD (gastroesophageal reflux disease)    • Gout    • H/O echocardiogram     A.  Echocardiogram of 10/16/2013 reports an ejection fraction of 55-60%, a moderately to severely dilated left atrium, mild to moderately dilated right atrium, trace aortic regurgitation, mild  mitral and tricuspid regurgitation with calculated    • Hiatal hernia    • History of blood transfusion     AFTER COLON SURGERY, NO REACTION    • History of peptic ulcer    • History of pulmonary embolism 2012    A.  Developed during chemotherapy in 2/2012. B.  Coumadin therapy reinitiated at that time.   • Hypertension     A.  Echocardiogram 10/16/2013 reports a calculated RVSP of 50-55 mmHg.B.  Right heart catheterization 03/12/2009 at  reported RV of 72/19, PA 72/27 and PCWP of 24, this was felt to be     • Hypothyroidism    • Morbid obesity (HCC)    • MRSA infection ~2005    LEFT HAND TREATED AT [facility] WITH ORAL ANTICIOTICS ~2005    • Obstructive sleep apnea     On CPAP and 2L O2 NC each bedtime.   • Osteoarthritis    • Palpitations    • Peripheral vascular disease (HCC)    • Pulmonary hypertension (HCC)    • S/P cardiac cath     3-10-21   • S/P transesophageal echocardiogram (LEANDRO)     3-10-21   • Signet ring cell adenocarcinoma (HCC)     A.  Diagnosed on colonoscopy E. 10/14/2011, status post colon resection and 2 of 20 nodes positive, T3, N1b Stage IIIB. B.  Status post chemotherapy.    • Staph infection     LEG ~2019 TREATED WITH ORAL ANTIBIOTICS    • Syncope    • Syncope    • Wears dentures     Upper and lower plates   • Wears glasses     Reading glasses       SURGICAL HISTORY:    Past Surgical History:   Procedure Laterality Date   • AORTIC VALVE REPAIR/REPLACEMENT N/A 3/25/2021    Procedure: TRANSCATHETER AORTIC VALVE REPLACEMENT;  Surgeon: Sacha العلي MD;  Location:  [facility];  Service: Cardiothoracic;  Laterality: N/A;  flouro 8  dose 1005mGy  contrast 65   • AORTIC VALVE REPAIR/REPLACEMENT N/A 3/25/2021    Procedure: TRANSCATHETER AORTIC VALVE INSERTION;  Surgeon: Milana Whitaker MD;  Location:  [facility];  Service: Cardiovascular;  Laterality: N/A;   • CARDIAC ABLATION      catheter ablation atrial fibrillation, 2013 PER [facility]ER    • CARDIAC CATHETERIZATION     •  CARDIAC CATHETERIZATION N/A 3/10/2021    Procedure: LEFT HEART CATH;  Surgeon: Milana Whitaker MD;  [facility]:  [facility];  Service: Cardiology;  Laterality: N/A;   • CARDIAC CATHETERIZATION      cardiac cath procedure summary, A.  Cardiac catheterization 2007 reported no significance coronary artery disease with markedly elevated LVEDP.   • CARDIAC ELECTROPHYSIOLOGY PROCEDURE N/A 2021    Procedure: Pacemaker , R- sided PPM, hold warfarin 2 days prior;  Surgeon: Bala Mario MD;  Location:  [facility];  Service: Cardiology;  Laterality: N/A;   • CARPAL TUNNEL RELEASE Bilateral     neuroplasty decompression median nerve at carpal tunnel    • COLONOSCOPY      SEVERAL MOST RECENT ~2018   • FOOT SURGERY Right    • HEMICOLECTOMY      partial colectomy , A.  Status post right hemicolectomy secondary to colon cancer in .   • HYSTERECTOMY         • KNEE ARTHROPLASTY Left     knee replacement    • TRANSESOPHAGEAL ECHOCARDIOGRAM (LEANDRO) N/A 3/25/2021    Procedure: TRANSESOPHAGEAL ECHOCARDIOGRAM WITH ANESTHESIA;  Surgeon: Sacha العلي MD;  [facility];  Service: Cardiothoracic;  Laterality: N/A;       SOCIAL HISTORY:    Social History     Socioeconomic History   • Marital status:    • Number of children: 3   • Years of education: HS   Tobacco Use   • Smoking status: Former     Packs/day: 1.00     Years: 12.00     Pack years: 12.00     Types: Cigarettes     Quit date: 3/1/1971     Years since quittin.8   • Smokeless tobacco: Never   Vaping Use   • Vaping Use: Never used   Substance and Sexual Activity   • Alcohol use: No   • Drug use: No   • Sexual activity: Defer       FAMILY HISTORY:    Family History   Problem Relation Age of Onset   • Other Mother         MEDICAL PROBLEMS   • Other Sister         myocardial infarction.       Review of Systems   Constitutional: Positive for fatigue.   HENT: Negative.    Eyes: Positive for visual  "disturbance (glasses prn).   Respiratory: Positive for shortness of breath.    Cardiovascular: Positive for palpitations and leg swelling (usual). Negative for chest pain.   Gastrointestinal: Negative.    Endocrine: Negative.    Genitourinary: Negative.    Musculoskeletal: Positive for arthralgias, gait problem (ambulates with rolling walker) and myalgias.   Skin: Negative.    Allergic/Immunologic: Positive for environmental allergies.   Neurological: Negative for syncope.   Hematological: Negative.    Psychiatric/Behavioral: Negative.        VISIT VITALS:  Vitals:    12/12/22 1124   BP: 131/56   BP Location: Left arm   Patient Position: Sitting   Pulse: 73   SpO2: 95%   Weight: (!) 140 kg (307 lb 12.8 oz)   Height: 170 cm (66.93\")      /56 (BP Location: Left arm, Patient Position: Sitting)   Pulse 73   Ht 170 cm (66.93\")   Wt (!) 140 kg (307 lb 12.8 oz)   LMP  (LMP Unknown)   SpO2 95%   BMI 48.31 kg/m²     RECENT LABS:    Objective       Physical Exam  Vitals and nursing note reviewed.   Constitutional:       General: She is not in acute distress.     Appearance: She is well-developed.   HENT:      Head: Normocephalic and atraumatic.   Eyes:      Conjunctiva/sclera: Conjunctivae normal.      Pupils: Pupils are equal, round, and reactive to light.   Neck:      Vascular: No carotid bruit, hepatojugular reflux or JVD.      Trachea: No tracheal deviation.      Comments: Nl. Carotid upstrokes  Cardiovascular:      Rate and Rhythm: Normal rate and regular rhythm.      Pulses:           Radial pulses are 2+ on the right side and 2+ on the left side.      Heart sounds: S1 normal and S2 normal. Murmur heard.    Systolic murmur is present.    Gallop present. S4 (soft) sounds present.      Comments: 1/6 systolic ejection murmur RUSB  1-2/6 TR  No MR  No AI  Pulmonary:      Effort: Pulmonary effort is normal.      Breath sounds: Normal breath sounds. No wheezing, rhonchi or rales.      Comments: Nl. Expir. " Phase  Nl. Breath sound intensity    Abdominal:      General: Bowel sounds are normal. There is no distension or abdominal bruit.      Palpations: Abdomen is soft. There is no mass.      Tenderness: There is no abdominal tenderness. There is no guarding or rebound.      Comments: No organomegaly   Musculoskeletal:         General: No tenderness or deformity. Normal range of motion.      Cervical back: Normal range of motion and neck supple.      Right lower leg: Edema present.      Left lower leg: Edema present.      Comments: BLE, 3+ edema, [place]. Stasis changes same     Skin:     General: Skin is warm and dry.      Coloration: Skin is not pale.      Findings: No erythema or rash.   Neurological:      Mental Status: She is alert and oriented to person, place, and time.   Psychiatric:         Behavior: Behavior normal.         Thought Content: Thought content normal.         Judgment: Judgment normal.         Procedures      Assessment & Plan   #1 1.  Systemic hypertension.  Blood pressures are reasonably well controlled on current medications.  We will continue as present.    2.  Severe aortic stenosis status post TAVR.  Echo demonstrated a normally functioning valve which is supported by physical exam today.    3.  Pulmonary hypertension.  The patient is symptomatically stable.    4.  Diastolic dysfunction.  Therapy is appropriate.    5.  Lower extremity edema.  This is multifactorial in etiology related to numbers 2 3 and 4 above and is currently stable.  We will try to continue on reduced dose diuretic therapy.    6.  Ms. Corona will follow with Dr. Andrew as instructed we will plan on seeing her in follow-up in 6 months or on appearing basis as discussed.   Diagnosis Plan   1. Diastolic dysfunction        2. Dyslipidemia        3. Primary hypertension        4. PAF on chronic coumadin (CMS/HCC)        5. Palpitations        6. Peripheral vascular disease (HCC)        7. Severe Aortic Stenosis        8. Symptomatic  bradycardia        9. Tachycardia-bradycardia syndrome (HCC)        10. Acute deep vein thrombosis (DVT) of proximal vein of lower extremity, unspecified laterality (HCC)        11. HANK on CPAP        12. Pulmonary hypertension with PA pressure >55mmhg (CMS/HCC)            No follow-ups on file.         Holly Corona  reports that she quit smoking about 51 years ago. Her smoking use included cigarettes. She has a 12.00 pack-year smoking history. She has never used smokeless tobacco.. I have educated her on the risk of diseases from using tobacco products such as cancer, COPD and heart disease.     Advance Care Planning   ACP discussion was held with the patient during this visit. info. provided previously           Class 3 Severe Obesity (BMI >=40). Obesity-related health conditions include the following: obstructive sleep apnea, hypertension, dyslipidemias and PAF, PHTN, DVT/PE, s/p TAVR. Obesity is unchanged. BMI is pcp addressing. We discussed portion control and increasing exercise.             Electronically signed by:    Scribed for Sacha Romero MD by Nissa Qiu LPN on December 12, 2022  at 11:31 EST    I, Sacha Romero MD personally performed the services described in this documentation as scribed by the above named individual in my presence, and it is both accurate and complete. December 12, 2022 11:31 EST      Dictated Utilizing Dragon Dictation: Part of this note may be an electronic transcription/translation of spoken language to printed text using the Dragon Dictation System.

## 2023-01-01 ENCOUNTER — TELEPHONE (OUTPATIENT)
Dept: CARDIOLOGY | Facility: CLINIC | Age: 78
End: 2023-01-01
Payer: MEDICARE

## 2023-01-21 PROCEDURE — 93294 REM INTERROG EVL PM/LDLS PM: CPT | Performed by: STUDENT IN AN ORGANIZED HEALTH CARE EDUCATION/TRAINING PROGRAM

## 2023-01-21 PROCEDURE — 93296 REM INTERROG EVL PM/IDS: CPT | Performed by: STUDENT IN AN ORGANIZED HEALTH CARE EDUCATION/TRAINING PROGRAM

## 2023-03-17 ENCOUNTER — OFFICE VISIT (OUTPATIENT)
Dept: CARDIOLOGY | Facility: CLINIC | Age: 78
End: 2023-03-17
Payer: MEDICARE

## 2023-03-17 VITALS
DIASTOLIC BLOOD PRESSURE: 70 MMHG | HEIGHT: 67 IN | OXYGEN SATURATION: 92 % | WEIGHT: 293 LBS | BODY MASS INDEX: 45.99 KG/M2 | SYSTOLIC BLOOD PRESSURE: 140 MMHG | HEART RATE: 80 BPM

## 2023-03-17 DIAGNOSIS — I10 PRIMARY HYPERTENSION: ICD-10-CM

## 2023-03-17 DIAGNOSIS — I48.0 PAROXYSMAL ATRIAL FIBRILLATION: Primary | ICD-10-CM

## 2023-03-17 DIAGNOSIS — R00.2 PALPITATIONS: ICD-10-CM

## 2023-03-17 PROCEDURE — 93000 ELECTROCARDIOGRAM COMPLETE: CPT | Performed by: PHYSICIAN ASSISTANT

## 2023-03-17 PROCEDURE — 3078F DIAST BP <80 MM HG: CPT | Performed by: PHYSICIAN ASSISTANT

## 2023-03-17 PROCEDURE — 3077F SYST BP >= 140 MM HG: CPT | Performed by: PHYSICIAN ASSISTANT

## 2023-03-17 PROCEDURE — 99214 OFFICE O/P EST MOD 30 MIN: CPT | Performed by: PHYSICIAN ASSISTANT

## 2023-03-17 NOTE — PROGRESS NOTES
[facility]   OFFICE NOTE      Holly Corona  1945  PCP: Abdoul Andrew MD    SUBJECTIVE:   Holly Corona is a 77 y.o. female seen for a follow up visit regarding the following:     CC: Atrial fibrillation.    HPI:   Pleasant 77-year-old female seen today regarding atrial fibrillation, tachybradycardia syndrome, [employer]ar pacemaker and hypertension.  She follows with Dr. Sacha Romero for her hypertension and aortic valve disease. Since we last saw the pt, pt denies any long symptomatic AF episodes, SOB, CP, LH, and dizziness. Denies any hospitalizations, ER visits, bleeding, or TIA/CVA symptoms. Overall feels well.  She does know she needs to lose weight and have difficult time with this.  She also states she wants to get off some of her blood pressure medications.      Cardiac PMH: (Old records have been reviewed and summarized below)  1. Paroxysmal atrial fibrillation/atrial flutter:  1. Echocardiogram, 02/08/2006 with mild to moderate LVH, moderate MR, pulmonary HTN with RVSP at 55 mmHg; EF 65%.   2. GXT, 04/03/2006 with no ischemia, EF 65%.  3. Event recorder, April 2007 through May 2007 showing atrial flutter.  4. Echocardiogram, 05/07/2007 with pulmonary HTN, LA 4.5, EF 65%.  5. Cardiolite, 05/07/2007, normal study, EF 78%.  6. Left heart cath, 05/09/2007 with normal coronary arteries, EF 70%.  7. EKG, 10/21/2007 showing atrial fibrillation at 146 BPM.  8. Failed sotalol therapy.  9. Tikosyn initiated, 08/08/2012.  10. Echocardiogram, 09/17/2012 with an EF of 65%; mild LVH, moderate diastolic dysfunction, moderate to severe biatrial enlargement, mild MR, moderate TR with an RVSP of 65 mmHg. LA 4.7. Aortic valve sclerosis.  11. Pulmonary vein isolation procedure with ablation of right atrial flutter and nonsustained low posterior atrial tachycardia, 03/15/2013 complicated by dysphagia that lasted 24-48 hours.  12. MCOT two weeks 7/2/2020: NSR, SB, rare NSAT, rare  NSVT  13. LEANDRO, 3/10/21, EF 65%, Mod biatrial enlargement, severe aortic stenosis, mild MR, mild TR  14. Echo, 4/29/21, LV mildly dilated, EF 50-55%, Mild concentric left ventricular hypertrophy. MV is thickened and sclerotic. Mild MR. Mild TR.   15. Ziopatch 2 weeks 5//2021 normal sinus rhythm 1% atrial fibrillation with RVR sinus bradycardia with heart rates at 38 to 40 bpm less than 1% PVCs.  16. Echo, 4/14/22, EF 60-65%, mod to severe LA enlargement. Mild RA enlargement. Mitral calcification. Mild MR. Mild to mod TR. Pulm HTN.   2. Severe aortic stenosis  1. [facility], 3/10/21, Severe aortic stenosis with mean gradient of 63 mmHg across the AV. EF 65%.   2. S/p TAVR with Dr. Sol, 3/25/21  3. CP  1. Heart cath 6/9/17: Non obstructive CAD, LVEF 55%   2. C, 3/10/21, Severe aortic stenosis with mean gradient of 63 mmHg across the AV. EF 65%.   4. HTN  5. Right leg deep venous thrombosis, treated with Coumadin therapy until October 2007.  6. Hiatal hernia/gastroesophageal reflux disease.  7. Carotid bruits: Normal carotid duplex, March 2006 and June 2007.  8. Osteoarthritis.  9. Hypothyroidism.  10. Sleep apnea, on CPAP.  11. Dyslipidemia, no current therapy.  12. Colon cancer, status post resection of 17 inches of the right ascending colon, undergoing chemotherapy.  13. Pulmonary embolism, February 2012, Coumadin initiated.  14. Pulmonary hypertension:  1. Echocardiogram, 06/21/2011, showing PA systolic pressure estimated at 60-65 mmHg.  2. Moderate TR with an RVSP of 65 mmHg by echocardiogram, 09/17/2012.  15. Surgical history:  1. Hysterectomy.  2. Carpal tunnel repair.  3. Left knee replacement.    Past Medical History, Past Surgical History, Family history, Social History, and Medications were all reviewed with the patient today and updated as necessary.       Current Outpatient Medications:   •  albuterol (PROVENTIL) (5 MG/ML) 0.5% nebulizer solution, Take 2.5 mg by nebulization Every 6 (Six) Hours As  Needed for Wheezing., Disp: , Rfl:   •  allopurinol (ZYLOPRIM) 100 MG tablet, Take 1 tablet by mouth Daily., Disp: , Rfl:   •  ALPRAZolam (XANAX) 0.25 MG tablet, Take 1 tablet by mouth As Needed for Anxiety., Disp: , Rfl:   •  amLODIPine (NORVASC) 2.5 MG tablet, Take 1 tablet by mouth Daily., Disp: , Rfl:   •  Ascorbic Acid (VITAMIN C GUMMIE PO), Take 1 dose by mouth Daily., Disp: , Rfl:   •  cloNIDine (Catapres) 0.1 MG tablet, Take 1 tablet by mouth 2 (Two) Times a Day., Disp: 180 tablet, Rfl: 3  •  furosemide (LASIX) 20 MG tablet, Take lasix 20 mg daily and may take an extra 20 mg tab daily prn, Disp: 180 tablet, Rfl: 3  •  gentamicin (GARAMYCIN) 0.1 % ointment, Occas. prn, Disp: , Rfl:   •  hydrALAZINE (APRESOLINE) 100 MG tablet, Take 1 tablet by mouth 3 (Three) Times a Day. (Patient taking differently: Take 1 tablet by mouth 2 (Two) Times a Day.), Disp: 90 tablet, Rfl: 5  •  isosorbide mononitrate (IMDUR) 30 MG 24 hr tablet, Take 1 tablet by mouth Daily., Disp: 90 tablet, Rfl: 3  •  ketoconazole (NIZORAL) 2 % cream, , Disp: , Rfl:   •  levothyroxine (SYNTHROID, LEVOTHROID) 88 MCG tablet, Take 1 tablet by mouth Every Morning., Disp: , Rfl:   •  mupirocin (BACTROBAN) 2 % ointment, As Needed., Disp: , Rfl:   •  O2 (OXYGEN), Inhale 2 L/min 1 (One) Time. Through c-pap, Disp: , Rfl:   •  olmesartan (Benicar) 20 MG tablet, Take 1 tablet by mouth Daily., Disp: 90 tablet, Rfl: 3  •  pantoprazole (PROTONIX) 40 MG EC tablet, Take 1 tablet by mouth Daily., Disp: 90 tablet, Rfl: 3  •  RESTASIS 0.05 % ophthalmic emulsion, Administer 1 drop to both eyes Every Night., Disp: , Rfl:   •  sotalol (BETAPACE) 120 MG tablet, Take 1 tablet by mouth Every 12 (Twelve) Hours., Disp: 60 tablet, Rfl: 11  •  warfarin (COUMADIN) 5 MG tablet, Take 1 tablet by mouth Take As Directed. Restart Warfarin on Wedensday 3/31/21.   Same schedule as pre-op:  5 mg DAILY, EXCEPT NONE ON Sunday. (Patient taking differently: Take 1 tablet by mouth Every  "Night.), Disp: 30 tablet, Rfl: 3      Allergies   Allergen Reactions   • Ciprofloxacin Itching   • Contrast Dye (Echo Or Unknown Ct/Mr) Rash     Patient usually takes prednisone & benadryl prior to contrast dye.   • Iodinated Contrast Media Rash     Patient usually takes prednisone & benadryl prior to contrast dye.   • Ofloxacin Rash         PHYSICAL EXAM:    /70 (BP Location: Left arm, Patient Position: Sitting, Cuff Size: Adult)   Pulse 80   Ht 170.2 cm (67\")   Wt (!) 139 kg (307 lb)   LMP  (LMP Unknown)   SpO2 92%   BMI 48.08 kg/m²        Wt Readings from Last 5 Encounters:   03/17/23 (!) 139 kg (307 lb)   12/12/22 (!) 140 kg (307 lb 12.8 oz)   11/01/22 (!) 139 kg (305 lb 6.4 oz)   08/19/22 (!) 137 kg (302 lb 0.5 oz)   08/18/22 (!) 137 kg (302 lb 9.6 oz)       BP Readings from Last 5 Encounters:   03/17/23 140/70   12/12/22 131/56   11/01/22 141/63   08/18/22 162/72   06/17/22 160/60       General appearance - Alert, well appearing, and in no distress   Mental status - Affect appropriate to mood.  Eyes - Sclerae anicteric,  ENMT - Hearing grossly normal bilaterally, Dental hygiene good.  Neck - Carotids upstroke normal bilaterally, no bruits, no JVD.  Resp - Clear to auscultation, no wheezes, rales or rhonchi, symmetric air entry.  Heart - Normal rate, regular rhythm, normal S1, S2, no murmurs, rubs, clicks or gallops.  GI - Soft, nontender, nondistended, no masses or organomegaly.  Neurological - Grossly intact - normal speech, no focal findings  Musculoskeletal - No joint tenderness, deformity or swelling, no muscular tenderness noted.  Extremities - Peripheral pulses normal, no pedal edema, no clubbing or cyanosis.  Skin - Normal coloration and turgor.  Psych -  oriented to person, place, and time.    Medical problems and test results were reviewed with the patient today.     No results found for this or any previous visit (from the past 672 hour(s)).      EKG: (EKG has been independently " visualized by me and summarized below)    ECG 12 Lead    Date/Time: 3/17/2023 2:56 PM  Performed by: Washington Everett PA  Authorized by: Washington Everett PA   Comparison: compared with previous ECG   Similar to previous ECG  Comparison to previous EC2022  Rhythm: sinus rhythm and paced  Rate: normal  Conduction: conduction normal  ST Segments: ST segments normal  Other: no other findings    Clinical impression: non-specific ECG            Device Interrogation:  Please see device interrogation form which has been signed and updated for full details.  [employer]ar pacemaker.  DDD are at 70.  A paced 93%.  RV paced 5.8%.  Normal P wave wave R wave thresholds and impedances.  Battery voltage is 8 years remaining.  Less than 1% mode switch.  Some episodes artifact.  Rare atrial lead noise.    ASSESSMENT   1.  PAF: Status post RFA pulm vein ablation procedure .  Now on sotalol therapy.  Last interrogation 1.2% mode switch.  Chronic Coumadin therapy.  INR is  stable.    2.  Tachybradycardia syndrome: [employer]ar pacemaker normal function.     3.  Aortic valve stenosis: Status post TAVR Dr. Marge Sol , last echocardiogram 2022 EF 65% moderate to severe left atrial enlargement.  Mitral valve calcification.    4.  Hypertension: Controlled.  Followed closely by Dr. Romero.    PLAN  · Sotalol is working well for her maintaining normal rhythm.  EKG remained stable.  We will continue this course.  · Return follow-up in 6 months or sooner as needed.            3/17/2023  14:45 EDT

## 2023-04-22 PROCEDURE — 93296 REM INTERROG EVL PM/IDS: CPT | Performed by: STUDENT IN AN ORGANIZED HEALTH CARE EDUCATION/TRAINING PROGRAM

## 2023-04-22 PROCEDURE — 93294 REM INTERROG EVL PM/LDLS PM: CPT | Performed by: STUDENT IN AN ORGANIZED HEALTH CARE EDUCATION/TRAINING PROGRAM

## 2023-05-08 ENCOUNTER — TELEPHONE (OUTPATIENT)
Dept: CARDIOLOGY | Facility: CLINIC | Age: 78
End: 2023-05-08
Payer: MEDICARE

## 2023-05-08 NOTE — TELEPHONE ENCOUNTER
Lyudmila oliva/ called to verify the pt's Coumadin dose.  According to the info in the pt's chart and Pat's INR binder, our office does not adjust the pt's Coumadin.  Lyudmila was provider EPs number.

## 2023-05-15 NOTE — TELEPHONE ENCOUNTER
Pt's daughter, Monica, LVM on triage stating pt is admitted in [facility] and that [facility] is needed med list and some other records. Daughter stated to send it to mother's email.   #[phone]      Per chart review, pt's daughter is listed on verbal release. Pt has mychart, but has not been logged in since 6/24/22.     We cannot email any pt information and haven't received any requests for info from .       Called pt's daughter, explained that we can't email pt information. Stated that I could fax records to them or they can call or fax us. She stated that the nurse left the room, but would relay our contact info to them.   I also explained that they should have access to our records via care everywhere, she verbalized understanding and stated she would let them know.

## 2023-06-20 NOTE — TELEPHONE ENCOUNTER
MRS. WHITE IS AWARE CAROTID U/S RESULTS ARE NOT BACK YET, BUT THAT I WOULD CALL HER WHEN THEY ARE. ALFREDO TORREZ      ----- Message from Hiwot Crowe sent at 2/19/2019 10:22 AM EST -----  Pt LVM requesting results of carotid artery test.   [phone]     Yes

## (undated) DEVICE — 3M™ STERI-DRAPE™ INSTRUMENT POUCH 1018: Brand: STERI-DRAPE™

## (undated) DEVICE — CATH GUIDE BERN 5F 65CM

## (undated) DEVICE — LEX ELECTRO PHYSIOLOGY: Brand: MEDLINE INDUSTRIES, INC.

## (undated) DEVICE — CANN NASL CO2 DIVIDED A/

## (undated) DEVICE — HDRST POSTIN FM CRDL TRACH SLOT 9X8X4IN

## (undated) DEVICE — GLV SURG BIOGEL LTX PF 7 1/2

## (undated) DEVICE — SET PRIMARY GRVTY 10DP MALE LL 104IN

## (undated) DEVICE — GW AMPLTZ SUPERSTIFF STR .035IN 180CM

## (undated) DEVICE — CAUTERY TIP POLISHER: Brand: DEVON

## (undated) DEVICE — GW CERBRL FC PTFE STD/STR .035 260CM

## (undated) DEVICE — IRRIGATOR BULB ASEPTO 60CC STRL

## (undated) DEVICE — TUBING, SUCTION, 1/4" X 10', STRAIGHT: Brand: MEDLINE

## (undated) DEVICE — 3M™ STERI-STRIP™ REINFORCED ADHESIVE SKIN CLOSURES, R1547, 1/2 IN X 4 IN (12 MM X 100 MM), 6 STRIPS/ENVELOPE: Brand: 3M™ STERI-STRIP™

## (undated) DEVICE — INTRO SHEATH PRELUDE IDEAL SPRNG COIL 021 6F 23X80CM

## (undated) DEVICE — SKIN PREP TRAY W/CHG: Brand: MEDLINE INDUSTRIES, INC.

## (undated) DEVICE — MEDI-VAC YANKAUER SUCTION HANDLE W/BULBOUS TIP: Brand: CARDINAL HEALTH

## (undated) DEVICE — PENCL E/S HNDSWCH ROCKRBTN HOLSTR 10FT

## (undated) DEVICE — COVER,TABLE,HVY DUTY,60"X90",STRL: Brand: MEDLINE

## (undated) DEVICE — HNDL BLAD BARD/PARKER POLY REUS

## (undated) DEVICE — SUT SILK 0 SH 30IN K834H

## (undated) DEVICE — SOL NACL 0.9PCT 1000ML

## (undated) DEVICE — ADULT, W/LG. BACK PAD, RADIOTRANSPARENT ELEMENT AND LEAD WIRE: Brand: DEFIBRILLATION ELECTRODES

## (undated) DEVICE — SUCTION CANISTER, 2500CC, RIGID: Brand: DEROYAL

## (undated) DEVICE — PERCLOSE PROGLIDE™ SUTURE-MEDIATED CLOSURE SYSTEM: Brand: PERCLOSE PROGLIDE™

## (undated) DEVICE — PK CATH CARD 10

## (undated) DEVICE — PINNACLE R/O II INTRODUCER SHEATH WITH RADIOPAQUE MARKER: Brand: PINNACLE

## (undated) DEVICE — INTENDED FOR TISSUE SEPARATION, AND OTHER PROCEDURES THAT REQUIRE A SHARP SURGICAL BLADE TO PUNCTURE OR CUT.: Brand: BARD-PARKER ® STAINLESS STEEL BLADES

## (undated) DEVICE — ELECTRD NDL EZ CLN STD 2.75IN

## (undated) DEVICE — 3M™ IOBAN™ 2 ANTIMICROBIAL INCISE DRAPE 6651EZ: Brand: IOBAN™ 2

## (undated) DEVICE — PK MINOR SPLT 10

## (undated) DEVICE — SYR LUERLOK 50ML

## (undated) DEVICE — GW INQWIRE FC PTFE STD J/1.5 .035 260

## (undated) DEVICE — SI AVANTI+ 7F STD W/GW  NO OBT: Brand: AVANTI

## (undated) DEVICE — DRSNG SURESITE WNDW 4X4.5

## (undated) DEVICE — GW J TP FIX CORE .035 150

## (undated) DEVICE — CATH DIAG EXPO M/ PK 5F FL4/FR4 PIG

## (undated) DEVICE — APPL CHLORAPREP TINTED 26ML TEAL

## (undated) DEVICE — GW SAFARI2 PRESH XSM CRV .035IN 3.2X2.9X275CM

## (undated) DEVICE — BOWL UTIL STRL 32OZ

## (undated) DEVICE — ST INF PRI SMRTSTE 20DRP 2VLV 24ML 117

## (undated) DEVICE — DECANT BG O JET

## (undated) DEVICE — GLV SURG BIOGEL LTX PF 8

## (undated) DEVICE — GOWN,NON-REINFORCED,SIRUS,SET IN SLV,XL: Brand: MEDLINE

## (undated) DEVICE — MODEL BT2000 P/N 700287-012KIT CONTENTS: MANIFOLD WITH SALINE AND CONTRAST PORTS, SALINE TUBING WITH SPIKE AND HAND SYRINGE, TRANSDUCER: Brand: BT2000 AUTOMATED MANIFOLD KIT

## (undated) DEVICE — CLTH CLENS READYCLEANSE PERI CARE PK/5

## (undated) DEVICE — LIMB HOLDER, WRIST/ANKLE: Brand: DEROYAL

## (undated) DEVICE — SENSR CERBRL O2 PK/2

## (undated) DEVICE — ANGIOGRAPHIC CATHETER: Brand: EXPO™

## (undated) DEVICE — INTRO TEAR AWAY/LVD W/SD PRT 7F 13CM

## (undated) DEVICE — GLIDEX™ COATED HYDROPHILIC GUIDEWIRE: Brand: MAGIC TORQUE™

## (undated) DEVICE — ADHS SKIN PREMIERPRO EXOFIN TOPICAL HI/VISC .5ML

## (undated) DEVICE — CATH DIAG EXPO .056 AL1 6F 100CM

## (undated) DEVICE — KT MANIFOLD CATHLAB CUST

## (undated) DEVICE — CATH DIAG EXPO .045 FL3  5F 100CM

## (undated) DEVICE — NDL PERC 1PRT THNWALL W/BASEPLT 18G 7CM

## (undated) DEVICE — 3M™ STERI-DRAPE™ FLUOROSCOPE DRAPE, 10 PER CARTON / 4 CARTONS PER CASE, 1012: Brand: STERI-DRAPE™

## (undated) DEVICE — CATH PACE PACEL BIPOL 5F110CM

## (undated) DEVICE — BOOT SUT XRAY DETECT STD YEL/BLU CA/50

## (undated) DEVICE — BLANKT WARM UNDER/BDY A/ 100X195CM DISP LF

## (undated) DEVICE — RADIFOCUS GLIDEWIRE: Brand: GLIDEWIRE

## (undated) DEVICE — CABL PACE ATRIAL PT BLU

## (undated) DEVICE — DEV COMP RAD PRELUDESYNC 24CM

## (undated) DEVICE — SLV REPOSTNG CATH STRL 60CM

## (undated) DEVICE — SYR LUERLOK 30CC

## (undated) DEVICE — PROVIDES A STERILE INTERFACE BETWEEN THE OPERATING ROOM SURGICAL LAMPS (NON-STERILE) AND THE SURGEON OR NURSE (STERILE).: Brand: STERION®CLAMP COVER FABRIC

## (undated) DEVICE — MODEL AT P65, P/N 701554-001KIT CONTENTS: HAND CONTROLLER, 3-WAY HIGH-PRESSURE STOPCOCK WITH ROTATING END AND PREMIUM HIGH-PRESSURE TUBING: Brand: ANGIOTOUCH® KIT

## (undated) DEVICE — ELECTRD DEFIB M/FUNC PROPADZ STRL 2PK

## (undated) DEVICE — DRSNG SURESITE123 4X4.8IN

## (undated) DEVICE — SHEET, DRAPE, SPLIT, STERILE: Brand: MEDLINE

## (undated) DEVICE — DRAPE,TOP,102X53,STERILE: Brand: MEDLINE

## (undated) DEVICE — PK ATS CUST W CARDIOTOMY RESEVOIR

## (undated) DEVICE — COVER,MAYO STAND,XL,STERILE: Brand: MEDLINE